# Patient Record
Sex: FEMALE | Race: WHITE | NOT HISPANIC OR LATINO | Employment: FULL TIME | URBAN - METROPOLITAN AREA
[De-identification: names, ages, dates, MRNs, and addresses within clinical notes are randomized per-mention and may not be internally consistent; named-entity substitution may affect disease eponyms.]

---

## 2017-05-09 ENCOUNTER — ALLSCRIPTS OFFICE VISIT (OUTPATIENT)
Dept: OTHER | Facility: OTHER | Age: 57
End: 2017-05-09

## 2017-06-02 ENCOUNTER — ALLSCRIPTS OFFICE VISIT (OUTPATIENT)
Dept: OTHER | Facility: OTHER | Age: 57
End: 2017-06-02

## 2017-06-02 DIAGNOSIS — R53.1 WEAKNESS: ICD-10-CM

## 2017-06-02 DIAGNOSIS — Z12.31 ENCOUNTER FOR SCREENING MAMMOGRAM FOR MALIGNANT NEOPLASM OF BREAST: ICD-10-CM

## 2018-01-13 VITALS
HEIGHT: 60 IN | BODY MASS INDEX: 33.67 KG/M2 | HEART RATE: 104 BPM | DIASTOLIC BLOOD PRESSURE: 74 MMHG | WEIGHT: 171.52 LBS | SYSTOLIC BLOOD PRESSURE: 106 MMHG | TEMPERATURE: 97.7 F

## 2018-01-14 VITALS
SYSTOLIC BLOOD PRESSURE: 110 MMHG | TEMPERATURE: 97.6 F | WEIGHT: 176.37 LBS | HEART RATE: 92 BPM | BODY MASS INDEX: 34.63 KG/M2 | HEIGHT: 60 IN | DIASTOLIC BLOOD PRESSURE: 64 MMHG

## 2018-01-23 ENCOUNTER — ALLSCRIPTS OFFICE VISIT (OUTPATIENT)
Dept: OTHER | Facility: OTHER | Age: 58
End: 2018-01-23

## 2018-01-24 NOTE — PROGRESS NOTES
Assessment   1  Acute upper respiratory infection (465 9) (J06 9)    Plan   Acute upper respiratory infection    · Drink at least 6 glasses of water or juice a day ; Status:Complete;   Done: [id number]   · Good hand washing is one of the best ways to control the spread of germs ;    Status:Complete;   Done: 12EGV9469   · The following home treatments may soothe a sore throat ; Status:Complete;   Done:    94TGQ8520    Discussion/Summary      No need for additional medications continue the tea , cough drops and Vicks as helping may return to work tomorrow note provided      The patient was counseled regarding instructions for management,-- risk factor reductions,-- prognosis,-- impressions  The treatment plan was reviewed with the patient/guardian  The patient/guardian understands and agrees with the treatment plan       Self Referrals: No      History of Present Illness   HPI: [facility] 1/16 for stomach bug and one day diarrhea was gone next day, then 2 days later with cough / cold and rested at home used all the OTC meds then decreased and admits symptoms are slightly improved but still coughing and worse at night, developed some chest wall pain  feels the vicks on chest helped more than anything else  clear discharge with blowing nose not been back at work this week  lots of sick contacts including family and work contacts  Hospital Based Practices Required Assessment:      Pain Assessment      the patient states they do not have pain  (on a scale of 0 to 10, the patient rates the pain at 0 )       Prefered Language is  ENGLISH  Primary Language is  ENGLISH  Review of Systems        Constitutional: feeling tired  ENT: as noted in HPI  Cardiovascular: as noted in HPI  Respiratory: as noted in HPI  Gastrointestinal: no complaints of abdominal pain, no constipation, no nausea or diarrhea, no vomiting, no bloody stools-- and-- as noted in HPI        Musculoskeletal: no complaints of arthralgia, no myalgia, no joint swelling or stiffness, no limb pain or swelling  ROS reviewed  Active Problems   1  Encounter for screening mammogram for breast cancer (V76 12) (Z12 31)   2  Feeling tired (780 79) (R53 83)   3  Follow-up vaginal Pap smear (V76 47) (Z12 72)   4  Generalized weakness (780 79) (R53 1)   5  Meniere's disease (cochlear hydrops) (386 02) (H81 09)   6  Obesity (278 00) (E66 9)   7  Screening for breast cancer (V76 10) (Z12 31)   8  Screening for colorectal cancer (V76 51) (Z12 11,Z12 12)   9  Screening for diabetes mellitus (V77 1) (Z13 1)   10  Screening for human papillomavirus (HPV) (V73 81) (Z11 51)   11  Screening for hyperlipidemia (V77 91) (Z13 220)   12  Sleep apnea (780 57) (G47 30)    Social History    · Former smoker (V15 82) (X54 773)   · Inadequate exercise (V69 0) (Z72 3)   · Physically able to work   · Sedentary lifestyle (V15 89) (Z91 89)   ·  from significant other (V61 03) (Z63 5)   · Tobacco use (305 1) (Z72 0)   · Unemployed, looking for work  The social history was reviewed and updated today  The social history was reviewed and is unchanged  Current Meds    1  DiazePAM 5 MG Oral Tablet; TAKE 1 TABLET Every 8 hours; Therapy: 05CDL8394 to (Evaluate:08Hov3081)  Requested for: 32PWT9444; Last     VX:24ZXN2827 Ordered   2  Triamterene-HCTZ 37 5-25 MG Oral Capsule; take 1 capsule daily as needed; Therapy: 51VNK8114 to (95 [id number])  Requested for: 56HEH2539; Last     Rx:19Hvb3856 Ordered     The medication list was reviewed and updated today  Allergies   1  No Known Drug Allergies    Vitals    Recorded: 95SRJ7272 11:04AM   Temperature 97 9 F   Heart Rate 896   Systolic 907   Diastolic 80   Height 5 ft    Weight 167 lb 8 78 oz   BMI Calculated 32 72   BSA Calculated 1 73     Physical Exam        Constitutional      General appearance: No acute distress, well appearing and well nourished         Eyes      Conjunctiva and lids: No swelling, erythema or discharge  -- glasses  Ears, Nose, Mouth, and Throat      External inspection of ears and nose: Normal        Otoscopic examination: Abnormal  -- minimal fluid, positive light reflex  Oropharynx: Abnormal  -- minimal post nasal drip, no erythema, no exudate, no swelling  Pulmonary      Auscultation of lungs: Clear to auscultation  Cardiovascular      Auscultation of heart: Normal rate and rhythm, normal S1 and S2, without murmurs  Lymphatic      Palpation of lymph nodes in neck: No lymphadenopathy  Musculoskeletal      Gait and station: Normal        Skin      Skin and subcutaneous tissue: Normal without rashes or lesions  Psychiatric      Orientation to person, place, and time: Normal        Mood and affect: Normal           Attending Note   Collaborating Physician Note: Collaborating Physician: I agree with the Advanced Practitioner note  Message   Return to work or school:    Ángela Betts is under my professional care  She was seen in my office on 1/23/2018    She is able to return to work on  1/24/2018         Weight Bearing Status: Full Weight-Bearing  Signatures    Electronically signed by : GHANSHYAM Hernández; Jan 23 2018 11:42AM EST                       (Author)     Electronically signed by :  STEPHEN Banks ; Jan 23 2018  4:02PM EST                       (Review)

## 2018-01-24 NOTE — MISCELLANEOUS
Message  Return to work or school:   Del Ahumada is under my professional care  She was seen in my office on 1/23/2018   She is able to return to work on  1/24/2018      Weight Bearing Status: Full Weight-Bearing  Signatures   Electronically signed by : GHANSHYAM Madrigal; Jan 23 2018 11:42AM EST                       (Author)    Electronically signed by :  STEPHEN Ortega ; Jan 23 2018  4:02PM EST                       (Review)

## 2018-02-03 DIAGNOSIS — H81.09 MENIERE'S DISEASE, UNSPECIFIED LATERALITY: Primary | ICD-10-CM

## 2018-02-04 RX ORDER — TRIAMTERENE AND HYDROCHLOROTHIAZIDE 37.5; 25 MG/1; MG/1
CAPSULE ORAL
Qty: 90 CAPSULE | Refills: 0 | Status: SHIPPED | OUTPATIENT
Start: 2018-02-04 | End: 2018-04-27 | Stop reason: SDUPTHER

## 2018-04-27 DIAGNOSIS — H81.09 MENIERE'S DISEASE, UNSPECIFIED LATERALITY: ICD-10-CM

## 2018-04-30 RX ORDER — TRIAMTERENE AND HYDROCHLOROTHIAZIDE 37.5; 25 MG/1; MG/1
CAPSULE ORAL
Qty: 90 CAPSULE | Refills: 0 | Status: SHIPPED | OUTPATIENT
Start: 2018-04-30 | End: 2018-08-10 | Stop reason: SDUPTHER

## 2018-08-10 DIAGNOSIS — H81.09 MENIERE'S DISEASE, UNSPECIFIED LATERALITY: ICD-10-CM

## 2018-08-10 RX ORDER — TRIAMTERENE AND HYDROCHLOROTHIAZIDE 37.5; 25 MG/1; MG/1
CAPSULE ORAL
Qty: 90 CAPSULE | Refills: 0 | Status: SHIPPED | OUTPATIENT
Start: 2018-08-10 | End: 2018-11-10 | Stop reason: SDUPTHER

## 2018-11-10 DIAGNOSIS — H81.09 MENIERE'S DISEASE, UNSPECIFIED LATERALITY: ICD-10-CM

## 2018-11-12 RX ORDER — TRIAMTERENE AND HYDROCHLOROTHIAZIDE 37.5; 25 MG/1; MG/1
CAPSULE ORAL
Qty: 90 CAPSULE | Refills: 0 | Status: SHIPPED | OUTPATIENT
Start: 2018-11-12 | End: 2019-02-22 | Stop reason: SDUPTHER

## 2019-02-22 DIAGNOSIS — H81.09 MENIERE'S DISEASE, UNSPECIFIED LATERALITY: ICD-10-CM

## 2019-02-26 DIAGNOSIS — H81.09 MENIERE'S DISEASE, UNSPECIFIED LATERALITY: ICD-10-CM

## 2019-02-26 RX ORDER — TRIAMTERENE AND HYDROCHLOROTHIAZIDE 37.5; 25 MG/1; MG/1
CAPSULE ORAL
Qty: 90 CAPSULE | Refills: 0 | OUTPATIENT
Start: 2019-02-26

## 2019-02-26 RX ORDER — TRIAMTERENE AND HYDROCHLOROTHIAZIDE 37.5; 25 MG/1; MG/1
1 CAPSULE ORAL DAILY
Qty: 90 CAPSULE | Refills: 0 | Status: SHIPPED | OUTPATIENT
Start: 2019-02-26 | End: 2019-04-29 | Stop reason: SDUPTHER

## 2019-03-16 DIAGNOSIS — H81.09 MENIERE'S DISEASE, UNSPECIFIED LATERALITY: ICD-10-CM

## 2019-03-17 RX ORDER — TRIAMTERENE AND HYDROCHLOROTHIAZIDE 37.5; 25 MG/1; MG/1
CAPSULE ORAL
Qty: 90 CAPSULE | Refills: 0 | OUTPATIENT
Start: 2019-03-17

## 2019-04-28 PROBLEM — H81.09 MENIERE DISEASE: Status: ACTIVE | Noted: 2019-04-28

## 2019-04-29 ENCOUNTER — OFFICE VISIT (OUTPATIENT)
Dept: INTERNAL MEDICINE CLINIC | Facility: CLINIC | Age: 59
End: 2019-04-29

## 2019-04-29 ENCOUNTER — TELEPHONE (OUTPATIENT)
Dept: GASTROENTEROLOGY | Facility: AMBULARY SURGERY CENTER | Age: 59
End: 2019-04-29

## 2019-04-29 VITALS
TEMPERATURE: 98.1 F | BODY MASS INDEX: 34.53 KG/M2 | WEIGHT: 176.81 LBS | DIASTOLIC BLOOD PRESSURE: 72 MMHG | HEART RATE: 92 BPM | SYSTOLIC BLOOD PRESSURE: 120 MMHG

## 2019-04-29 DIAGNOSIS — H81.09 MENIERE DISEASE: Primary | ICD-10-CM

## 2019-04-29 DIAGNOSIS — Z00.00 HEALTHCARE MAINTENANCE: ICD-10-CM

## 2019-04-29 DIAGNOSIS — H81.09 MENIERE'S DISEASE, UNSPECIFIED LATERALITY: ICD-10-CM

## 2019-04-29 PROCEDURE — 99213 OFFICE O/P EST LOW 20 MIN: CPT | Performed by: INTERNAL MEDICINE

## 2019-04-29 RX ORDER — TRIAMTERENE AND HYDROCHLOROTHIAZIDE 37.5; 25 MG/1; MG/1
1 CAPSULE ORAL DAILY
Qty: 90 CAPSULE | Refills: 0 | Status: SHIPPED | OUTPATIENT
Start: 2019-04-29 | End: 2019-09-10 | Stop reason: SDUPTHER

## 2019-09-10 ENCOUNTER — OFFICE VISIT (OUTPATIENT)
Dept: INTERNAL MEDICINE CLINIC | Facility: CLINIC | Age: 59
End: 2019-09-10

## 2019-09-10 ENCOUNTER — APPOINTMENT (OUTPATIENT)
Dept: LAB | Facility: CLINIC | Age: 59
End: 2019-09-10
Payer: COMMERCIAL

## 2019-09-10 VITALS
SYSTOLIC BLOOD PRESSURE: 108 MMHG | WEIGHT: 175.04 LBS | DIASTOLIC BLOOD PRESSURE: 72 MMHG | HEART RATE: 84 BPM | TEMPERATURE: 98 F | BODY MASS INDEX: 34.19 KG/M2

## 2019-09-10 DIAGNOSIS — H81.09 MENIERE'S DISEASE, UNSPECIFIED LATERALITY: ICD-10-CM

## 2019-09-10 DIAGNOSIS — Z00.00 HEALTHCARE MAINTENANCE: ICD-10-CM

## 2019-09-10 LAB
ALBUMIN SERPL BCP-MCNC: 4.3 G/DL (ref 3.5–5)
ALP SERPL-CCNC: 81 U/L (ref 46–116)
ALT SERPL W P-5'-P-CCNC: 72 U/L (ref 12–78)
ANION GAP SERPL CALCULATED.3IONS-SCNC: 7 MMOL/L (ref 4–13)
AST SERPL W P-5'-P-CCNC: 43 U/L (ref 5–45)
BASOPHILS # BLD AUTO: 0.06 THOUSANDS/ΜL (ref 0–0.1)
BASOPHILS NFR BLD AUTO: 1 % (ref 0–1)
BILIRUB SERPL-MCNC: 0.72 MG/DL (ref 0.2–1)
BUN SERPL-MCNC: 10 MG/DL (ref 5–25)
CALCIUM SERPL-MCNC: 9.7 MG/DL (ref 8.3–10.1)
CHLORIDE SERPL-SCNC: 94 MMOL/L (ref 100–108)
CHOLEST SERPL-MCNC: 278 MG/DL (ref 50–200)
CO2 SERPL-SCNC: 29 MMOL/L (ref 21–32)
CREAT SERPL-MCNC: 0.72 MG/DL (ref 0.6–1.3)
EOSINOPHIL # BLD AUTO: 0.17 THOUSAND/ΜL (ref 0–0.61)
EOSINOPHIL NFR BLD AUTO: 2 % (ref 0–6)
ERYTHROCYTE [DISTWIDTH] IN BLOOD BY AUTOMATED COUNT: 12 % (ref 11.6–15.1)
EST. AVERAGE GLUCOSE BLD GHB EST-MCNC: 197 MG/DL
GFR SERPL CREATININE-BSD FRML MDRD: 92 ML/MIN/1.73SQ M
GLUCOSE P FAST SERPL-MCNC: 170 MG/DL (ref 65–99)
HBA1C MFR BLD: 8.5 % (ref 4.2–6.3)
HCT VFR BLD AUTO: 46.4 % (ref 34.8–46.1)
HDLC SERPL-MCNC: 41 MG/DL (ref 40–60)
HGB BLD-MCNC: 15.9 G/DL (ref 11.5–15.4)
IMM GRANULOCYTES # BLD AUTO: 0.03 THOUSAND/UL (ref 0–0.2)
IMM GRANULOCYTES NFR BLD AUTO: 0 % (ref 0–2)
LDLC SERPL CALC-MCNC: 186 MG/DL (ref 0–100)
LYMPHOCYTES # BLD AUTO: 2.59 THOUSANDS/ΜL (ref 0.6–4.47)
LYMPHOCYTES NFR BLD AUTO: 25 % (ref 14–44)
MCH RBC QN AUTO: 31.4 PG (ref 26.8–34.3)
MCHC RBC AUTO-ENTMCNC: 34.3 G/DL (ref 31.4–37.4)
MCV RBC AUTO: 92 FL (ref 82–98)
MONOCYTES # BLD AUTO: 0.6 THOUSAND/ΜL (ref 0.17–1.22)
MONOCYTES NFR BLD AUTO: 6 % (ref 4–12)
NEUTROPHILS # BLD AUTO: 6.92 THOUSANDS/ΜL (ref 1.85–7.62)
NEUTS SEG NFR BLD AUTO: 66 % (ref 43–75)
NONHDLC SERPL-MCNC: 237 MG/DL
NRBC BLD AUTO-RTO: 0 /100 WBCS
PLATELET # BLD AUTO: 293 THOUSANDS/UL (ref 149–390)
PMV BLD AUTO: 10 FL (ref 8.9–12.7)
POTASSIUM SERPL-SCNC: 3.2 MMOL/L (ref 3.5–5.3)
PROT SERPL-MCNC: 8.8 G/DL (ref 6.4–8.2)
RBC # BLD AUTO: 5.06 MILLION/UL (ref 3.81–5.12)
SODIUM SERPL-SCNC: 130 MMOL/L (ref 136–145)
TRIGL SERPL-MCNC: 255 MG/DL
TSH SERPL DL<=0.05 MIU/L-ACNC: 0.69 UIU/ML (ref 0.36–3.74)
WBC # BLD AUTO: 10.37 THOUSAND/UL (ref 4.31–10.16)

## 2019-09-10 PROCEDURE — 85025 COMPLETE CBC W/AUTO DIFF WBC: CPT

## 2019-09-10 PROCEDURE — 36415 COLL VENOUS BLD VENIPUNCTURE: CPT

## 2019-09-10 PROCEDURE — 87389 HIV-1 AG W/HIV-1&-2 AB AG IA: CPT

## 2019-09-10 PROCEDURE — 84443 ASSAY THYROID STIM HORMONE: CPT

## 2019-09-10 PROCEDURE — 86704 HEP B CORE ANTIBODY TOTAL: CPT

## 2019-09-10 PROCEDURE — 80053 COMPREHEN METABOLIC PANEL: CPT

## 2019-09-10 PROCEDURE — 80061 LIPID PANEL: CPT

## 2019-09-10 PROCEDURE — 83036 HEMOGLOBIN GLYCOSYLATED A1C: CPT

## 2019-09-10 PROCEDURE — 87340 HEPATITIS B SURFACE AG IA: CPT

## 2019-09-10 PROCEDURE — 86803 HEPATITIS C AB TEST: CPT

## 2019-09-10 PROCEDURE — 86705 HEP B CORE ANTIBODY IGM: CPT

## 2019-09-10 PROCEDURE — 99213 OFFICE O/P EST LOW 20 MIN: CPT | Performed by: INTERNAL MEDICINE

## 2019-09-10 RX ORDER — DIAZEPAM 5 MG/1
5 TABLET ORAL EVERY 12 HOURS PRN
Qty: 10 TABLET | Refills: 0 | Status: SHIPPED | OUTPATIENT
Start: 2019-09-10 | End: 2022-05-25

## 2019-09-10 RX ORDER — TRIAMTERENE AND HYDROCHLOROTHIAZIDE 37.5; 25 MG/1; MG/1
1 CAPSULE ORAL DAILY
Qty: 90 CAPSULE | Refills: 0 | Status: SHIPPED | OUTPATIENT
Start: 2019-09-10 | End: 2020-01-08

## 2019-09-10 RX ORDER — DIAZEPAM 5 MG/1
1 TABLET ORAL EVERY 8 HOURS
COMMUNITY
Start: 2011-10-11 | End: 2019-09-10 | Stop reason: SDUPTHER

## 2019-09-10 RX ORDER — TRAMADOL HYDROCHLORIDE 50 MG/1
50 TABLET ORAL EVERY 6 HOURS PRN
COMMUNITY
Start: 2017-06-28 | End: 2019-10-01

## 2019-09-10 RX ORDER — IBUPROFEN 200 MG
200 TABLET ORAL EVERY 6 HOURS PRN
COMMUNITY
End: 2020-02-03

## 2019-09-10 NOTE — LETTER
September 10, 2019     Patient: Lizette Dolan   YOB: 1960   Date of Visit: 9/10/2019       To Whom it May Concern:    Elias Ray is under my professional care  She was seen in my office on 9/10/2019  She may return to work on 09/12/2019  If you have any questions or concerns, please don't hesitate to call           Sincerely,          Joslyn Alfaro MD        CC: No Recipients

## 2019-09-10 NOTE — PROGRESS NOTES
[address]  INTERNAL MEDICINE  [address]    NAME: Rain Ramon  AGE: 61 y o  SEX: female    DATE OF ENCOUNTER: 9/10/2019    Assessment and Plan     1  Meniere's disease, unspecified laterality  · Patient has been diagnosed 10 years ago, has not had acute attacks for a while but starting Saturday has been experiencing intermittent vertigo and tinnitus which she attributes to stress in her family   · She was prescribed diazepam in the past for acute attacks but doesn't have it anymore  · Work note given because diazepam will make her sleepy, she can resume working on 09/12/10 unless the vertigo worsens  · She will follow up with ENT as well   - diazepam (VALIUM) 5 mg tablet; Take 1 tablet (5 mg total) by mouth every 12 (twelve) hours as needed for anxiety for up to 5 days  Dispense: 10 tablet; Refill: 0  - triamterene-hydrochlorothiazide (DYAZIDE) 37 5-25 mg per capsule; Take 1 capsule by mouth daily  Dispense: 90 capsule; Refill: 0    No orders of the defined types were placed in this encounter       - Counseling Documentation: patient was counseled regarding: diagnostic results, instructions for management, risk factor reductions, prognosis, patient and family education, impressions, risks and benefits of treatment options and importance of compliance with treatment  - Counseling Time: not applicable  - Barriers to treatment: None  - Medication Side Effects: Adverse side effects of medications were reviewed with the patient/guardian today  - Self Referrals: No    Chief Complaint     Chief Complaint   Patient presents with    Dizziness     symptoms since Saturday    Fatigue    Anxiety       History of Present Illness     59-year-old female with a past medical history of Meniere's disease presents with vertigo and tinnitus since Saturday  The vertigo is episodic but the tinnitus has been persistent   Patient has been diagnosed 10 years ago, has not had acute attacks for a while but starting Saturday has been experiencing intermittent vertigo and tinnitus which she attributes to stress in her family  These attacks have caused her to feel anxious as well  Otherwise she is doing well  She has not established with a new ENT doctor yet        The following portions of the patient's history were reviewed and updated as appropriate: allergies, current medications, past family history, past medical history, past social history, past surgical history and problem list     Review of Systems     Review of Systems   HENT: Positive for tinnitus  Neurological: Positive for dizziness  All other systems reviewed and are negative  Active Problem List     Patient Active Problem List   Diagnosis    Meniere disease       Objective     /72   Pulse 84   Temp 98 °F (36 7 °C)   Wt 79 4 kg (175 lb 0 7 oz)   BMI 34 19 kg/m²     Physical Exam:   GENERAL: NAD  HEENT:  NC/AT, MMM, no scleral icterus  CARDIAC:  RRR, +S1/S2, no S3/S4 heard, no m/g/r  PULMONARY:  CTA B/L, no wheezing/rales/rhonci, non-labored breathing  ABDOMEN:  Soft, NT/ND, +BS, no rebound/guarding/rigidity  Extremities:  2+ Pulses in DP/PT  No edema, cyanosis, or clubbing  NEUROLOGIC:  Alert/oriented x3     SKIN:  No rashes or erythema    Current Medications     Current Outpatient Medications:     triamterene-hydrochlorothiazide (DYAZIDE) 37 5-25 mg per capsule, Take 1 capsule by mouth daily, Disp: 90 capsule, Rfl: 0    diazepam (VALIUM) 5 mg tablet, Take 1 tablet (5 mg total) by mouth every 12 (twelve) hours as needed for anxiety for up to 5 days, Disp: 10 tablet, Rfl: 0    ibuprofen (MOTRIN) 200 mg tablet, Take 200 mg by mouth every 6 (six) hours as needed, Disp: , Rfl:     traMADol (ULTRAM) 50 mg tablet, Take 50 mg by mouth every 6 (six) hours as needed, Disp: , Rfl:     Health Maintenance     Health Maintenance   Topic Date Due    Hepatitis C Screening  1960    MAMMOGRAM 1960    CRC Screening: Colonoscopy  1960    INFLUENZA VACCINE  07/01/2019    Pneumococcal Vaccine: Pediatrics (0 to 5 Years) and At-Risk Patients (6 to 59 Years) (1 of 1 - PPSV23) 05/27/2020 (Originally 7/6/1966)    Depression Screening PHQ  04/29/2020    BMI: Adult  04/29/2020    DTaP,Tdap,and Td Vaccines (2 - Td) 06/05/2024    Pneumococcal Vaccine: 65+ Years (1 of 2 - PCV13) 07/06/2025    HEPATITIS B VACCINES  Aged Out       There is no immunization history on file for this patient      Lorena Avila  9/10/2019 12:12 PM

## 2019-09-11 LAB
HBV CORE AB SER QL: NORMAL
HBV CORE IGM SER QL: NORMAL
HBV SURFACE AG SER QL: NORMAL
HCV AB SER QL: NORMAL
HIV 1+2 AB+HIV1 P24 AG SERPL QL IA: NORMAL

## 2019-09-13 ENCOUNTER — TELEPHONE (OUTPATIENT)
Dept: MULTI SPECIALTY CLINIC | Facility: CLINIC | Age: 59
End: 2019-09-13

## 2019-09-17 ENCOUNTER — TELEPHONE (OUTPATIENT)
Dept: INTERNAL MEDICINE CLINIC | Facility: CLINIC | Age: 59
End: 2019-09-17

## 2019-09-17 NOTE — TELEPHONE ENCOUNTER
Called patient and spoke to her regarding her recent lab results  Explained that her hemoglobin A1c is consistent with a diagnosis of diabetes  Patient is surprised that this news but states that she does have a family history of diabetes and does have a prior history of gestational diabetes as well  Explained to her that we may want to repeat this test to ensure its validity  I also explained finding of hyperlipidemia on lipid panel  I advised the patient to come in for an appointment to discuss diabetes diagnosis and consider starting new medications (hypoglycemics, statin, etc )  Remainder of patient's questions answered to satisfaction    Can the staff please give the patient a call tomorrow to set up this appointment? Thank you!

## 2019-09-18 NOTE — TELEPHONE ENCOUNTER
Per Dr Philip Ke request, please schedule patient for appointment to discuss diabetes diagnosis/possible medications  Thank you

## 2019-09-19 ENCOUNTER — OFFICE VISIT (OUTPATIENT)
Dept: INTERNAL MEDICINE CLINIC | Facility: CLINIC | Age: 59
End: 2019-09-19

## 2019-09-19 VITALS
SYSTOLIC BLOOD PRESSURE: 122 MMHG | WEIGHT: 180.34 LBS | TEMPERATURE: 97.9 F | DIASTOLIC BLOOD PRESSURE: 80 MMHG | BODY MASS INDEX: 35.22 KG/M2 | HEART RATE: 104 BPM

## 2019-09-19 DIAGNOSIS — Z12.31 SCREENING MAMMOGRAM, ENCOUNTER FOR: ICD-10-CM

## 2019-09-19 DIAGNOSIS — Z12.11 SCREENING FOR COLON CANCER: ICD-10-CM

## 2019-09-19 DIAGNOSIS — Z12.4 PAP SMEAR FOR CERVICAL CANCER SCREENING: ICD-10-CM

## 2019-09-19 DIAGNOSIS — E11.69 TYPE 2 DIABETES MELLITUS WITH OTHER SPECIFIED COMPLICATION, WITHOUT LONG-TERM CURRENT USE OF INSULIN (HCC): Primary | ICD-10-CM

## 2019-09-19 PROBLEM — E11.9 DIABETES MELLITUS (HCC): Status: ACTIVE | Noted: 2019-09-19

## 2019-09-19 PROCEDURE — 99213 OFFICE O/P EST LOW 20 MIN: CPT | Performed by: INTERNAL MEDICINE

## 2019-09-19 RX ORDER — ATORVASTATIN CALCIUM 40 MG/1
40 TABLET, FILM COATED ORAL DAILY
Qty: 30 TABLET | Refills: 0 | Status: SHIPPED | OUTPATIENT
Start: 2019-09-19 | End: 2019-10-15 | Stop reason: SDUPTHER

## 2019-09-19 NOTE — PROGRESS NOTES
INTERNAL MEDICINE FOLLOW-UP OFFICE VISIT  [facility]  [address], [address]    NAME: Leidy Vega  AGE: 61 y o  SEX: female    DATE OF ENCOUNTER: 9/19/2019    Assessment and Plan     1  Type 2 diabetes mellitus with other specified complication, without long-term current use of insulin (HCC)  -newly diagnosed  Elevated hemoglobin A1c of 8 5, elevated fasting glucose of 170  -given entry A1c of 8 5, will start dual oral therapy with metformin 500 mg b i d  and daily sitagliptin  -start high-intensity lipid with atorvastatin 40 mg daily  -counseled patient on healthy dietary choices and exercise regimen  -will refer to diabetes Education class  Provided with script for glucometer, lancets, test strips  -follow-up urine microalbumin to creatinine ratio  -follow-up in 3 months  - metFORMIN (GLUCOPHAGE) 500 mg tablet; Take 1 tablet (500 mg total) by mouth 2 (two) times a day with meals  Dispense: 60 tablet; Refill: 0  - sitaGLIPtin (JANUVIA) 50 mg tablet; Take 1 tablet (50 mg total) by mouth daily  Dispense: 30 tablet; Refill: 0  - atorvastatin (LIPITOR) 40 mg tablet; Take 1 tablet (40 mg total) by mouth daily  Dispense: 30 tablet; Refill: 0  - Glucometer  - Lancets  - Glucometer test strips  - Microalbumin / creatinine urine ratio  - Ambulatory referral to Diabetic Education; Future    2  Screening mammogram, encounter for  - Mammo screening bilateral w cad; Future    3  Screening for colon cancer  - Ambulatory referral to Gastroenterology; Future    4  Pap smear for cervical cancer screening  - Ambulatory referral to Gynecology;  Future    Orders Placed This Encounter   Procedures    Glucometer    Lancets    Glucometer test strips    Mammo screening bilateral w cad    Microalbumin / creatinine urine ratio    Ambulatory referral to Gastroenterology    Ambulatory referral to Gynecology    Ambulatory referral to Diabetic Education    POCT hemoglobin A1c - Counseling Documentation: patient was counseled regarding: diagnostic results, instructions for management, prognosis and patient and family education    Chief Complaint     Chief Complaint   Patient presents with    Follow-up     discuss lab results       History of Present Illness     Patient is a 51-year-old female with a past medical history of Meniere's disease diagnosed around 10 years ago presents to the office for discussion of new diagnosis of type 2 diabetes  Patient recently had labs done as an outpatient and was found to have a hemoglobin A1c of 8 5 with elevated fasting glucose of 170  Labs also significant for hyperlipidemia with elevated total cholesterol 278,     Patient states that she has generally been feeling well at home  She states that she does feel that her Meniere's disease has been acting up somewhat of late and she has had some vertigo this past week  Denies any recent polyphasia, polydipsia, polyuria  States that she has a very strong family history of cardiovascular disease and diagnoses of diabetes as well  She also has a personal history of gestational diabetes  States that she is relatively sedentary  Tries to eat a balanced diet and avoid processed foods      The following portions of the patient's history were reviewed and updated as appropriate: allergies, current medications, past family history, past medical history, past social history, past surgical history and problem list     Review of Systems     Review of Systems   Constitutional: Negative for chills and fever  HENT: Negative for sore throat and trouble swallowing  Respiratory: Negative for shortness of breath and wheezing  Cardiovascular: Negative for chest pain and palpitations  Gastrointestinal: Negative for abdominal distention and abdominal pain  Genitourinary: Negative for dysuria and frequency  Musculoskeletal: Negative for arthralgias and back pain     Skin: Negative for color change and rash  Neurological: Positive for dizziness  Negative for headaches  Psychiatric/Behavioral: Negative for agitation and behavioral problems  Active Problem List     Patient Active Problem List   Diagnosis    Meniere disease    Diabetes mellitus ([facility] [address] )    Screening mammogram, encounter for       Objective     /80   Pulse 104   Temp 97 9 °F (36 6 °C)   Wt 81 8 kg (180 lb 5 4 oz)   BMI 35 22 kg/m²     Physical Exam   Constitutional: She is oriented to person, place, and time  She appears well-developed and well-nourished  No distress  HENT:   Head: Normocephalic and atraumatic  Mouth/Throat: No oropharyngeal exudate  Eyes: Right eye exhibits no discharge  Left eye exhibits no discharge  No scleral icterus  Cardiovascular: Normal rate, regular rhythm and normal heart sounds  Exam reveals no friction rub  No murmur heard  Pulmonary/Chest: Effort normal and breath sounds normal  She has no wheezes  She has no rales  Abdominal: Soft  Bowel sounds are normal  She exhibits no distension  There is no tenderness  Musculoskeletal: She exhibits no edema or tenderness  Neurological: She is alert and oriented to person, place, and time  No sensory deficit  Skin: Skin is warm and dry  No erythema  Psychiatric: She has a normal mood and affect  Her behavior is normal      Pertinent Laboratory/Diagnostic Studies:  Patient was never admitted      Images and diagnostics reviewed     Current Medications     Current Outpatient Medications:     ibuprofen (MOTRIN) 200 mg tablet, Take 200 mg by mouth every 6 (six) hours as needed, Disp: , Rfl:     triamterene-hydrochlorothiazide (DYAZIDE) 37 5-25 mg per capsule, Take 1 capsule by mouth daily, Disp: 90 capsule, Rfl: 0    atorvastatin (LIPITOR) 40 mg tablet, Take 1 tablet (40 mg total) by mouth daily, Disp: 30 tablet, Rfl: 0    diazepam (VALIUM) 5 mg tablet, Take 1 tablet (5 mg total) by mouth every 12 (twelve) hours as needed for anxiety for up to 5 days, Disp: 10 tablet, Rfl: 0    metFORMIN (GLUCOPHAGE) 500 mg tablet, Take 1 tablet (500 mg total) by mouth 2 (two) times a day with meals, Disp: 60 tablet, Rfl: 0    sitaGLIPtin (JANUVIA) 50 mg tablet, Take 1 tablet (50 mg total) by mouth daily, Disp: 30 tablet, Rfl: 0    traMADol (ULTRAM) 50 mg tablet, Take 50 mg by mouth every 6 (six) hours as needed, Disp: , Rfl:     Health Maintenance     Health Maintenance   Topic Date Due    MAMMOGRAM  1960    CRC Screening: Colonoscopy  1960    Diabetic Foot Exam  07/06/1970    DM Eye Exam  07/06/1970    URINE MICROALBUMIN  07/06/1970    INFLUENZA VACCINE  10/16/2019 (Originally 7/1/2019)    Pneumococcal Vaccine: Pediatrics (0 to 5 Years) and At-Risk Patients (6 to 59 Years) (1 of 1 - PPSV23) 05/27/2020 (Originally 7/6/1966)    HEMOGLOBIN A1C  03/10/2020    Depression Screening PHQ  04/29/2020    BMI: Adult  09/10/2020    DTaP,Tdap,and Td Vaccines (2 - Td) 06/05/2024    Pneumococcal Vaccine: 65+ Years (1 of 2 - PCV13) 07/06/2025    Hepatitis C Screening  Completed    HEPATITIS B VACCINES  Aged Out     Immunization History   Administered Date(s) Administered    Tdap 06/05/2014       [facility]  Internal Medicine PGY-2  [facility]  [address] , [address] [address] [facility] [facility]  Office: [phone]  Fax: [phone]

## 2019-09-19 NOTE — PATIENT INSTRUCTIONS
Type 2 Diabetes in Adults   WHAT YOU NEED TO KNOW:   Type 2 diabetes is a disease that affects how your body uses glucose (sugar)  Normally, when the blood sugar level increases, the pancreas makes more insulin  Insulin helps move sugar out of the blood so it can be used for energy  Type 2 diabetes develops because either the body cannot make enough insulin, or it cannot use the insulin correctly  After many years, your pancreas may stop making insulin  DISCHARGE INSTRUCTIONS:   Call 911 for any of the following:   · You have any of the following signs of a stroke:      ¨ Numbness or drooping on one side of your face     ¨ Weakness in an arm or leg    ¨ Confusion or difficulty speaking    ¨ Dizziness, a severe headache, or vision loss    · You have any of the following signs of a heart attack:      ¨ Squeezing, pressure, or pain in your chest that lasts longer than 5 minutes or returns    ¨ Discomfort or pain in your back, neck, jaw, stomach, or arm     ¨ Trouble breathing    ¨ Nausea or vomiting    ¨ Lightheadedness or a sudden cold sweat, especially with chest pain or trouble breathing  Return to the emergency department if:   · You have severe abdominal pain, or the pain spreads to your back  You may also be vomiting  · You have trouble staying awake or focusing  · You are shaking or sweating  · You have blurred or double vision  · Your breath has a fruity, sweet smell  · Your breathing is deep and labored, or rapid and shallow  · Your heartbeat is fast and weak  Contact your healthcare provider if:   · You are vomiting or have diarrhea  · You have an upset stomach and cannot eat the foods on your meal plan  · You feel weak or more tired than usual      · You feel dizzy, have headaches, or are easily irritated  · Your skin is red, warm, dry, or swollen  · You have a wound that does not heal      · You have numbness in your arms or legs       · You have trouble coping with your illness, or you feel anxious or depressed  · You have questions or concerns about your condition or care  Medicines: You may  need any of the following:  · Hypoglycemic medicines or insulin  may be given to decrease the amount of sugar in your blood  · Blood pressure medicine  may be given to lower your blood pressure  Your blood pressure should be less than 140/90  · Cholesterol lowering medicine  may be given to prevent heart disease  · Antiplatelets , such as aspirin, help prevent blood clots  Take your antiplatelet medicine exactly as directed  These medicines make it more likely for you to bleed or bruise  If you are told to take aspirin, do not take acetaminophen or ibuprofen instead  · Take your medicine as directed  Contact your healthcare provider if you think your medicine is not helping or if you have side effects  Tell him or her if you are allergic to any medicine  Keep a list of the medicines, vitamins, and herbs you take  Include the amounts, and when and why you take them  Bring the list or the pill bottles to follow-up visits  Carry your medicine list with you in case of an emergency  Check your blood sugar level as directed: You will be taught how to use a glucose monitor  Ask your healthcare provider when and how often to check during the day  You will need to check your blood sugar level at least 3 times each day if you are on insulin  If you check your blood sugar level before a meal , it should be between 80 and 130 mg/dL  If you check your blood sugar level 1 to 2 hours after a meal , it should be less than 180 mg/dL  Ask your healthcare provider if these are good goals for you  Write down your results, and show them to your healthcare provider  He may use the results to make changes to your medicine, food, or exercise schedules  If your blood sugar level is too low: Your blood sugar level is too low if it goes below 70 mg/dL   If the level is too low, eat or drink 15 grams of fast-acting carbohydrate  These are found naturally in fruits  Fast-acting carbohydrates will raise your blood sugar level quickly  Examples of 15 grams of fast-acting carbohydrate are 4 ounces (½ cup) of fruit juice or 4 ounces of regular soda  Other examples are 2 tablespoons of raisins or 3 to 4 glucose tablets  Check your blood sugar level 15 minutes later  If the level is still low (less than 100 mg/dL), eat another 15 grams of carbohydrate  When the level returns to 100 mg/dL, eat a snack or meal that contains carbohydrates  This will help prevent another drop in blood sugar  Always carefully follow your healthcare provider's instructions on how to treat low blood sugar levels  Wear medical alert identification:  Wear medical alert jewelry or carry a card that says you have diabetes  Ask your healthcare provider where to get these items  Check your feet each day for sores:  Wear shoes and socks that fit correctly  Do not trim your toenails  Ask your healthcare provider for more information about foot care  Maintain a healthy weight:  Ask your healthcare provider how much you should weigh  A healthy weight can help you control your diabetes  Ask your provider to help you create a weight loss plan if you are overweight  Together you can set manageable weight loss goals  Follow your meal plan:  A dietitian will help you make a meal plan to keep your blood sugar level steady  Do not skip meals  Your blood sugar level may drop too low if you have taken diabetes medicine and do not eat  · Keep track of carbohydrates (sugar and starchy foods)  Your blood sugar level can get too high if you eat too many carbohydrates  Your dietitian will help you plan meals and snacks that have the right amount of carbohydrates  · Eat low-fat foods , such as skinless chicken and low-fat milk  · Eat less sodium (salt)    Limit high-sodium foods, such as soy sauce, potato chips, and soup  Do not add salt to food you cook  Limit your use of table salt  You should have less than 2,300 mg of sodium per day  · Eat high-fiber foods , such as vegetables, whole grain breads, and beans  · Limit alcohol  Alcohol affects your blood sugar level and can make it harder to manage your diabetes  Limit alcohol to 1 drink a day if you are a woman  Limit alcohol to 2 drinks a day if you are a man  A drink of alcohol is 12 ounces of beer, 5 ounces of wine, or 1½ ounces of liquor  Exercise as directed:  Exercise can help keep your blood sugar level steady, decrease your risk of heart disease, and help you lose weight  Stretch before and after you exercise  Exercise for at least 150 minutes every week  Spread this amount of exercise over at least 3 days a week  Do not skip exercise more than 2 days in a row  Include muscle strengthening activities 2 to 3 days each week  Older adults should include balance training 2 to 3 times each week  Activities that help increase balance include yoga and marga chi  Work with your healthcare provider to create an exercise plan  · Check your blood sugar level before and after exercise  Healthcare providers may tell you to change the amount of insulin you take or food you eat  If your blood sugar level is high, check your blood or urine for ketones before you exercise  Do not exercise if your blood sugar level is high and you have ketones  · If your blood sugar level is less than 100 mg/dL, have a carbohydrate snack before you exercise  Examples are 4 to 6 crackers, ½ banana, 8 ounces (1 cup) of milk, or 4 ounces (½ cup) of juice  Drink water or liquids that do not contain sugar before, during, and after exercise  Ask your dietitian or healthcare provider which liquids you should drink when you exercise  · Do not sit for longer than 30 minutes  If you cannot walk around, at least stand up  This will help you stay active and keep your blood circulating    Do not smoke: Nicotine and other chemicals in cigarettes and cigars can cause lung damage and make it more difficult to manage your diabetes  Ask your healthcare provider for information if you currently smoke and need help to quit  Do not use e-cigarettes or smokeless tobacco in place of cigarettes or to help you quit  They still contain nicotine  Check your blood pressure as directed:  Ask your healthcare provider what your blood pressure should be  Most adults with diabetes and high blood pressure should have a systolic blood pressure (first number) less than 140  Your diastolic blood pressure (second number) should be less than 90  Ask about vaccines: You have a higher risk for serious illness if you get the flu, pneumonia, or hepatitis  Ask your healthcare provider if you should get a flu, pneumonia, or hepatitis B vaccine, and when to get the vaccine  Follow up with your healthcare provider as directed: You may need to return to have your A1c checked every 3 months  You will need to return at least once each year to have your feet checked  You will need an eye exam once a year to check for retinopathy  You will also need urine tests every year to check for kidney problems  You may need tests to monitor for heart disease such as an EKG, stress test, blood pressure monitoring, and blood tests  Write down your questions so you remember to ask them during your visits  © 2017 [address] [employer]  Information is for End User's use only and may not be sold, redistributed or otherwise used for commercial purposes  All illustrations and images included in CareNotes® are the copyrighted property of [employer]  or [employer]  The above information is an  only  It is not intended as medical advice for individual conditions or treatments  Talk to your doctor, nurse or pharmacist before following any medical regimen to see if it is safe and effective for you

## 2019-09-20 ENCOUNTER — TELEPHONE (OUTPATIENT)
Dept: INTERNAL MEDICINE CLINIC | Facility: CLINIC | Age: 59
End: 2019-09-20

## 2019-09-20 NOTE — TELEPHONE ENCOUNTER
Patient called and states [facility] needs a Doc to call in and approve her refill for Januvia  I guess requires a 2 step approval? It has nothing to do with insurance  Please call [facility] at [phone]  Sent to doc of the day

## 2019-09-24 ENCOUNTER — TELEPHONE (OUTPATIENT)
Dept: FAMILY MEDICINE CLINIC | Facility: CLINIC | Age: 59
End: 2019-09-24

## 2019-09-24 NOTE — TELEPHONE ENCOUNTER
Tried calling patient to schedule an appointment for Diabetes education, but her phone is "not available/ disconnected"  Will try to call again    CAITY

## 2019-09-25 DIAGNOSIS — E11.69 TYPE 2 DIABETES MELLITUS WITH OTHER SPECIFIED COMPLICATION, WITHOUT LONG-TERM CURRENT USE OF INSULIN (HCC): ICD-10-CM

## 2019-09-30 NOTE — PATIENT INSTRUCTIONS
Colonoscopy   WHAT YOU NEED TO KNOW:   What do I need to know about a colonoscopy? A colonoscopy is a procedure to examine the inside of your colon (intestine) with a scope  A scope is a flexible tube with a small light and camera on the end  Polyps or tissue growths may be removed during your colonoscopy  How should I prepare the week before my colonoscopy? You will need to stop taking medicines that contain aspirin or iron for 7 days before your colonoscopy  If you take anticoagulants, such as warfarin, ask when you should stop taking it  Make plans for someone to drive you home after your procedure  How should I prepare the day before my colonoscopy? Your healthcare provider will have you prepare your bowels before your procedure  Your bowels will need to be empty before your procedure to allow him to clearly see your colon  You will need to do the following the day before your procedure:  · Have only clear liquids  for the entire day before your colonoscopy  Clear liquid diet includes clear fruit juices and broths, clear flavored gelatin, and hard candy  It also includes coffee, tea, carbonated beverages, and clear sports drinks  · Follow your bowel prep as directed  There are many different preparations that can be given before a colonoscopy  Some are given over 2 hours and others over 6 hours  Some are given earlier in the afternoon the day before the colonoscopy  Others are given the day before and then the morning of the colonoscopy  With any bowel prep, stay close to the bathroom  This liquid will cause your bowels to move frequently  · An enema  may be needed  Your healthcare provider may tell you to use an enema to help clean out your bowels  · Do not eat or drink anything after midnight  This will help prevent problems that can happen if you vomit while under anesthesia  What will happen during my colonoscopy? · You will be given medicine to help you relax   You will lie on your left side and raise one or both knees toward your chest  Your healthcare provider will examine your anus and use a finger to check your rectum  You may need another enema if your bowel is not empty  The scope will be lubricated and gently placed into your anus  It will then be passed through your rectum and into your colon  Water or air will be put into your colon to help clean or expand it  This is done so your healthcare provider can see your colon clearly  · Tissue samples may be taken from the walls of your bowel and sent to a lab for tests  If you have a polyp, your healthcare provider will pass a wire loop through the scope and use it to hold the polyp  The polyp is then burned or cut off the wall of your colon  Removed polyps are sent to a lab for tests  Pictures of your colon may be taken during the procedure  The scope will be removed when the procedure is done  What will happen after my colonoscopy? · Rest after your procedure  You may feel bloated, have some gas and abdominal discomfort  You may need to lie on your right side with a heating pad on your abdomen  You may need to take short walks to help move the gas out  Eat small meals, if you feel bloated  Do not drive or make important decisions until the day after your procedure  · You may have polyps removed  Do not take aspirin or go on long car trips for 7 days after your procedure  Ask your healthcare provider about any other limits after your procedure  What are the risks of a colonoscopy? You may have pain or bleeding after the scope or polyps are removed  You may also have a slow heartbeat, decreased blood pressure, or increased sweating  Your colon may tear due to the increased pressure from the scope and other instruments  This may cause bowel contents to leak out of your colon and into your abdomen  If this happens, you will need to stay in the hospital and have surgery on your colon     CARE AGREEMENT:   You have the right to help plan your care  Learn about your health condition and how it may be treated  Discuss treatment options with your caregivers to decide what care you want to receive  You always have the right to refuse treatment  The above information is an  only  It is not intended as medical advice for individual conditions or treatments  Talk to your doctor, nurse or pharmacist before following any medical regimen to see if it is safe and effective for you  © 2017 [address] is for End User's use only and may not be sold, redistributed or otherwise used for commercial purposes  All illustrations and images included in CareNotes® are the copyrighted property of A D A M , Inc  or [employer]  Mammogram   WHAT YOU NEED TO KNOW:   What do I need to know about a mammogram?  A mammogram is an x-ray of your breasts to screen for breast cancer  Experts recommend mammograms every 2 years starting at age 48 years  You may need a mammogram at age 52 years or younger if you have an increased risk for breast cancer  Talk to your healthcare provider about when you should start having mammograms and how often you need them  How do I prepare for a mammogram?   · Do not use deodorant, powder, lotion, or perfume  These products may cause particles to appear on your mammogram      · Wear a 2-piece outfit  · If your breasts are tender before your monthly period, do not have a mammogram during this time  Schedule your mammogram to be done 1 week after your period ends  · If you are breastfeeding, express as much milk as possible before the mammogram     · Bring a list of the dates and places of your past mammograms and other breast tests or treatments    What will happen during a mammogram?  A top view and a side view x-ray are usually done for each breast  Tell healthcare providers if you have breast implants or breast problems before you have your mammogram  You may need extra x-rays of each breast   · You will be given a hospital gown  Take off your clothes from the waist up  Wear the hospital gown so that it opens in the front  · You will sit or stand next to a small x-ray machine  The healthcare provider will help you place one of your breasts on the x-ray plate  Your arm and breast will be moved until your breast is in the correct position  · Your breast will be gently pressed between 2 plastic plates for a few seconds while the x-ray is taken  This may be uncomfortable  · You will be asked to hold your breath while the x-ray is taken  Another x-ray will be taken of the same breast after the position of the x-ray machine has been changed  · Your other breast will be x-rayed the same way  What will happen after my mammogram?  Your breasts may feel tender for a short while after the mammogram  You may resume your regular activities  Ask your healthcare provider when you should receive the results of your mammogram   What are the risks of a mammogram?  You will be exposed to a small amount of radiation  Some breast cancers may not show up on mammograms  When should I contact my healthcare provider? · You cannot make your appointment on time  · You do not receive your results when expected  · You have questions or concerns about the mammogram   CARE AGREEMENT:   You have the right to help plan your care  Learn about your health condition and how it may be treated  Discuss treatment options with your caregivers to decide what care you want to receive  You always have the right to refuse treatment  The above information is an  only  It is not intended as medical advice for individual conditions or treatments  Talk to your doctor, nurse or pharmacist before following any medical regimen to see if it is safe and effective for you    © 2017 [address] Information is for End User's use only and may not be sold, redistributed or otherwise used for commercial purposes  All illustrations and images included in CareNotes® are the copyrighted property of A D A [employer]  or Gilbert Vasques  Breast Self Exam for Women   WHAT YOU NEED TO KNOW:   What is a breast self-exam (BSE)? A BSE is a way to check your breasts for lumps and other changes  Regular BSEs can help you know how your breasts normally look and feel  Most breast lumps or changes are not cancer, but you should always have them checked by a healthcare provider  Your healthcare provider can also watch you do a BSE and can tell you if you are doing your BSE correctly  Why should I do a BSE? Breast cancer is the most common type of cancer in women  Even if you have mammograms, you may still want to do a BSE regularly  If you know how your breasts normally feel and look, it may help you know when to contact your healthcare provider  Mammograms can miss some cancers  You may find a lump during a BSE that did not show up on your mammogram   When should I do a BSE? Godwin your calendar to help you remember to do BSE on a regular schedule  One easy way to remember to do a BSE is to do the exam on the same day of each month  If you have periods, you may want to do your BSE 1 week after your period ends  This is the time when your breasts may be the least swollen, lumpy, or tender  You can do regular BSEs even if you are breastfeeding or have breast implants  How should I do a BSE? · Look at your breasts in a mirror  Look at the size and shape of each breast and nipple  Check for swelling, lumps, dimpling, scaly skin, or other skin changes  Look for nipple changes, such as a nipple that is painful or beginning to pull inward  Gently squeeze both nipples and check to see if fluid (that is not breast milk) comes out of them  If you find any of these or other breast changes, contact your healthcare provider   Check your breasts while you sit or  the following 3 positions:    [facility] your arms down at your sides  ¨ Raise your hands and join them behind your head  ¨ Put firm pressure with your hands on your hips  Bend slightly forward while you look at your breasts in the mirror  · Lie down and feel your breasts  When you lie down, your breast tissue spreads out evenly over your chest  This makes it easier for you to feel for lumps and anything that may not be normal for your breasts  Do a BSE on one breast at a time  ¨ Place a small pillow or towel under your left shoulder  Put your left arm behind your head  ¨ Use the 3 middle fingers of your right hand  Use your fingertip pads, on the top of your fingers  Your fingertip pad is the most sensitive part of your finger  ¨ Use small circles to feel your breast tissue  Use your fingertip pads to make dime-sized, overlapping circles on your breast and armpits  Use light, medium, and firm pressure  First, press lightly  Second, press with medium pressure to feel a little deeper into the breast  Last, use firm pressure to feel deep within your breast     ¨ Examine your entire breast area  Examine the breast area from above the breast to below the breast where you feel only ribs  Make small circles with your fingertips, starting in the middle of your armpit  Make circles going up and down the breast area  Continue toward your breast and all the way across it  Examine the area from your armpit all the way over to the middle of your chest (breastbone)  Stop at the middle of your chest     ¨ Move the pillow or towel to your right shoulder, and put your right arm behind your head  Use the 3 fingertip pads of your left hand, and repeat the above steps to do a BSE on your right breast        What else can I do to check for breast problems or cancer? Some experts suggest that women 36years of age or older should have a mammogram every year   Other experts suggest that women between the ages of 48and 76years old should have a mammogram every 2 years  Talk to your healthcare provider about when you should have a mammogram   When should I contact my healthcare provider? · You find any lumps or changes in your breasts  · You have breast pain or fluid coming from your nipples  · You have questions or concerns or concerns about your condition or care  CARE AGREEMENT:   You have the right to help plan your care  Learn about your health condition and how it may be treated  Discuss treatment options with your caregivers to decide what care you want to receive  You always have the right to refuse treatment  The above information is an  only  It is not intended as medical advice for individual conditions or treatments  Talk to your doctor, nurse or pharmacist before following any medical regimen to see if it is safe and effective for you  © 2017 [facility] Information is for End User's use only and may not be sold, redistributed or otherwise used for commercial purposes  All illustrations and images included in CareNotes® are the copyrighted property of A D A M , Inc  or [employer]  Pap Smear   GENERAL INFORMATION:   What is a Pap smear? A Pap smear, or Pap test, is a procedure to check your cervix for abnormal cells  The cervix is the narrow opening at the bottom of your uterus  The cervix meets the top part of the vagina  How do I prepare for a Pap smear? The best time to schedule the test is right after your period stops  Do not have a Pap smear during your monthly period  Do not have intercourse or put anything in your vagina for 24 hours before your test    What will happen during a Pap smear? · You will lie on your back and place your feet on footrests called stirrups  Your caregiver will gently insert a device called a speculum into your vagina  The speculum is used to spread the walls of your vagina so he can see your cervix   He will use a thin brush or cotton swab to collect cells from the inside of your cervix  · Your caregiver will also collect cells from the surface of your cervix with a plastic or wooden tool called a spatula  He may also gently scrape the upper part of your vagina for a sample  The samples are placed in a container with liquid or on a glass slide  They are sent to a lab and examined for abnormal cells  How often do I need a Pap smear? Pap smears are usually done every 1 to 3 years  You may need a Pap smear more often if you have any of the following:  · Positive test result for the human papillomavirus (HPV)    · Cervical intraepithelial neoplasm or cervical cancer    · HIV    · A weak immune system    · Exposure to diethylstilbestrol (MARBIN) medicine when your mother was pregnant with you  CARE AGREEMENT:   You have the right to help plan your care  Learn about your health condition and how it may be treated  Discuss treatment options with your caregivers to decide what care you want to receive  You always have the right to refuse treatment  The above information is an  only  It is not intended as medical advice for individual conditions or treatments  Talk to your doctor, nurse or pharmacist before following any medical regimen to see if it is safe and effective for you  © 2014 [address] is for End User's use only and may not be sold, redistributed or otherwise used for commercial purposes  All illustrations and images included in CareNotes® are the copyrighted property of A D A "[employer] , Inc  or Gilbert Vasques  Wellness Visit for Adults   AMBULATORY CARE:   A wellness visit  is when you see your healthcare provider to get screened for health problems  You can also get advice on how to stay healthy  Write down your questions so you remember to ask them  Ask your healthcare provider how often you should have a wellness visit     What happens at a wellness visit:  Your healthcare provider will ask about your health, and your family history of health problems  This includes high blood pressure, heart disease, and cancer  He or she will ask if you have symptoms that concern you, if you smoke, and about your mood  You may also be asked about your intake of medicines, supplements, food, and alcohol  Any of the following may be done:  · Your weight  will be checked  Your height may also be checked so your body mass index (BMI) can be calculated  Your BMI shows if you are at a healthy weight  · Your blood pressure  and heart rate will be checked  Your temperature may also be checked  · Blood and urine tests  may be done  Blood tests may be done to check your cholesterol levels  Abnormal cholesterol levels increase your risk for heart disease and stroke  You may also need a blood or urine test to check for diabetes if you are at increased risk  Urine tests may be done to look for signs of an infection or kidney disease  · A physical exam  includes checking your heartbeat and lungs with a stethoscope  Your healthcare provider may also check your skin to look for sun damage  · Screening tests  may be recommended  A screening test is done to check for diseases that may not cause symptoms  The screening tests you may need depend on your age, gender, family history, and lifestyle habits  For example, colorectal screening may be recommended if you are 48years old or older  Screening tests you need if you are a woman:   · A Pap smear  is used to screen for cervical cancer  Pap smears are usually done every 3 to 5 years depending on your age  You may need them more often if you have had abnormal Pap smear test results in the past  Ask your healthcare provider how often you should have a Pap smear  · A mammogram  is an x-ray of your breasts to screen for breast cancer  Experts recommend mammograms every 2 years starting at age 48 years   You may need a mammogram at age 52 years or younger if you have an increased risk for breast cancer  Talk to your healthcare provider about when you should start having mammograms and how often you need them  Vaccines you may need:   · Get an influenza vaccine  every year  The influenza vaccine protects you from the flu  Several types of viruses cause the flu  The viruses change over time, so new vaccines are made each year  · Get a tetanus-diphtheria (Td) booster vaccine  every 10 years  This vaccine protects you against tetanus and diphtheria  Tetanus is a severe infection that may cause painful muscle spasms and lockjaw  Diphtheria is a severe bacterial infection that causes a thick covering in the back of your mouth and throat  · Get a human papillomavirus (HPV) vaccine  if you are female and aged 23 to 32 or male 23 to 24 and never received it  This vaccine protects you from HPV infection  HPV is the most common infection spread by sexual contact  HPV may also cause vaginal, penile, and anal cancers  · Get a pneumococcal vaccine  if you are aged 72 years or older  The pneumococcal vaccine is an injection given to protect you from pneumococcal disease  Pneumococcal disease is an infection caused by pneumococcal bacteria  The infection may cause pneumonia, meningitis, or an ear infection  · Get a shingles vaccine  if you are aged 61 or older, even if you have had shingles before  The shingles vaccine is an injection to protect you from the varicella-zoster virus  This is the same virus that causes chickenpox  Shingles is a painful rash that develops in people who had chickenpox or have been exposed to the virus  How to eat healthy:  My Plate is a model for planning healthy meals  It shows the types and amounts of foods that should go on your plate  Fruits and vegetables make up about half of your plate, and grains and protein make up the other half  A serving of dairy is included on the side of your plate   The amount of calories and serving sizes you need depends on your age, gender, weight, and height  Examples of healthy foods are listed below:  · Eat a variety of vegetables  such as dark green, red, and orange vegetables  You can also include canned vegetables low in sodium (salt) and frozen vegetables without added butter or sauces  · Eat a variety of fresh fruits , canned fruit in 100% juice, frozen fruit, and dried fruit  · Include whole grains  At least half of the grains you eat should be whole grains  Examples include whole-wheat bread, wheat pasta, brown rice, and whole-grain cereals such as oatmeal     · Eat a variety of protein foods such as seafood (fish and shellfish), lean meat, and poultry without skin (turkey and chicken)  Examples of lean meats include pork leg, shoulder, or tenderloin, and beef round, sirloin, tenderloin, and extra lean ground beef  Other protein foods include eggs and egg substitutes, beans, peas, soy products, nuts, and seeds  · Choose low-fat dairy products such as skim or 1% milk or low-fat yogurt, cheese, and cottage cheese  · Limit unhealthy fats  such as butter, hard margarine, and shortening  Exercise:  Exercise at least 30 minutes per day on most days of the week  Some examples of exercise include walking, biking, dancing, and swimming  You can also fit in more physical activity by taking the stairs instead of the elevator or parking farther away from stores  Include muscle strengthening activities 2 days each week  Regular exercise provides many health benefits  It helps you manage your weight, and decreases your risk for type 2 diabetes, heart disease, stroke, and high blood pressure  Exercise can also help improve your mood  Ask your healthcare provider about the best exercise plan for you  General health and safety guidelines:   · Do not smoke  Nicotine and other chemicals in cigarettes and cigars can cause lung damage  Ask your healthcare provider for information if you currently smoke and need help to quit  E-cigarettes or smokeless tobacco still contain nicotine  Talk to your healthcare provider before you use these products  · Limit alcohol  A drink of alcohol is 12 ounces of beer, 5 ounces of wine, or 1½ ounces of liquor  · Lose weight, if needed  Being overweight increases your risk of certain health conditions  These include heart disease, high blood pressure, type 2 diabetes, and certain types of cancer  · Protect your skin  Do not sunbathe or use tanning beds  Use sunscreen with a SPF 15 or higher  Apply sunscreen at least 15 minutes before you go outside  Reapply sunscreen every 2 hours  Wear protective clothing, hats, and sunglasses when you are outside  · Drive safely  Always wear your seatbelt  Make sure everyone in your car wears a seatbelt  A seatbelt can save your life if you are in an accident  Do not use your cell phone when you are driving  This could distract you and cause an accident  Pull over if you need to make a call or send a text message  · Practice safe sex  Use latex condoms if are sexually active and have more than one partner  Your healthcare provider may recommend screening tests for sexually transmitted infections (STIs)  · Wear helmets, lifejackets, and protective gear  Always wear a helmet when you ride a bike or motorcycle, go skiing, or play sports that could cause a head injury  Wear protective equipment when you play sports  Wear a lifejacket when you are on a boat or doing water sports  © 2017 [address]  Information is for End User's use only and may not be sold, redistributed or otherwise used for commercial purposes  All illustrations and images included in CareNotes® are the copyrighted property of A D A [employer]  or Gilbert Vasques  The above information is an  only  It is not intended as medical advice for individual conditions or treatments   Talk to your doctor, nurse or pharmacist before following any medical regimen to see if it is safe and effective for you

## 2019-09-30 NOTE — PROGRESS NOTES
Subjective      Sherry Goodman is a 61 y o  female who presents for annual exam   Last Pap smear about 4-5 years ago  Denies history of abnormal Pap smear  Will collect Pap smear today  Mammogram ordered  Last mammogram about 5 years ago  Has never had colon cancer screening  Has referral by PCP  The patient has no complaints today  The patient is not currently sexually active    The patient is not taking hormone replacement therapy  Patient denies post-menopausal vaginal bleeding    The patient wears seatbelts: yes  The patient participates in regular exercise: no  The patient reports that there is not domestic violence in her life  Menstrual History:  OB History        3    Para   2    Term   2            AB   1    Living   2       SAB        TAB        Ectopic        Multiple        Live Births                    Menarche age:12  Patient's last menstrual period was 10/01/2010 (approximate)  Period Cycle (Days): (no menses since age 52 )    The following portions of the patient's history were reviewed and updated as appropriate: allergies, current medications, past family history, past medical history, past social history, past surgical history and problem list     Review of Systems  Pertinent items are noted in HPI  Objective      /82 (BP Location: Left arm, Patient Position: Sitting, Cuff Size: Large)   Pulse 90   Ht 5' 1" (1 549 m)   Wt 79 4 kg (175 lb)   LMP 10/01/2010 (Approximate)   Breastfeeding?  No   BMI 33 07 kg/m²     General:   alert and oriented, in no acute distress   Heart: regular rate and rhythm, S1, S2 normal, no murmur, click, rub or gallop   Lungs: clear to auscultation bilaterally   Abdomen: soft, non-tender, without masses or organomegaly and nondistended   Vulva: normal   Vagina: normal mucosa, normal discharge, no palpable nodules   Cervix: no bleeding following Pap, no cervical motion tenderness and no lesions   Uterus: normal size, non-tender, normal shape and consistency   Adnexa: normal adnexa and no mass, fullness, tenderness   Breast:  Nontender, no palpable masses, no nipple discharge, no skin changes bilaterally        Assessment/Plan     Diagnoses and all orders for this visit:    Women's annual routine gynecological examination  -     Liquid-based pap, screening    Pap smear for cervical cancer screening  -     Ambulatory referral to Gynecology    Other orders  -     Blood Glucose Monitoring Suppl (FREESTYLE FREEDOM LITE) w/Device KIT; Apply 1 m topically 4 (four) times a day as needed Use as directed  -     Probiotic Product (PRO-BIOTIC BLEND PO); Take 1 tablet by mouth daily    -encouraged to continue close contact with PCP for chronic conditions  -encouraged to continue diet for recently diagnosed diabetes  -encouraged healthy lifestyle, exercise and diet  -mammogram order printed patient encouraged to make appointment  -referral for GI for colonoscopy printed patient encouraged to make appointment  Tobacco Cessation Counseling: Tobacco cessation counseling was not provided  The patient is sincerely urged to quit consumption of tobacco  She is not ready to quit tobacco  The numerous health risks of tobacco consumption were discussed     Breast awareness reviewed  Two maternal aunts and 2 cousins with ovarian cancer number provided for hope line  Will call with results

## 2019-10-01 ENCOUNTER — OFFICE VISIT (OUTPATIENT)
Dept: OBGYN CLINIC | Facility: CLINIC | Age: 59
End: 2019-10-01

## 2019-10-01 VITALS
BODY MASS INDEX: 33.04 KG/M2 | HEIGHT: 61 IN | DIASTOLIC BLOOD PRESSURE: 82 MMHG | WEIGHT: 175 LBS | SYSTOLIC BLOOD PRESSURE: 122 MMHG | HEART RATE: 90 BPM

## 2019-10-01 DIAGNOSIS — Z01.419 WOMEN'S ANNUAL ROUTINE GYNECOLOGICAL EXAMINATION: Primary | ICD-10-CM

## 2019-10-01 DIAGNOSIS — Z12.4 PAP SMEAR FOR CERVICAL CANCER SCREENING: ICD-10-CM

## 2019-10-01 PROCEDURE — 99386 PREV VISIT NEW AGE 40-64: CPT | Performed by: NURSE PRACTITIONER

## 2019-10-01 PROCEDURE — G0145 SCR C/V CYTO,THINLAYER,RESCR: HCPCS | Performed by: NURSE PRACTITIONER

## 2019-10-01 PROCEDURE — 87624 HPV HI-RISK TYP POOLED RSLT: CPT | Performed by: NURSE PRACTITIONER

## 2019-10-01 RX ORDER — BLOOD-GLUCOSE METER
1 KIT MISCELLANEOUS 4 TIMES DAILY PRN
Refills: 0 | COMMUNITY
Start: 2019-09-21

## 2019-10-02 LAB
HPV HR 12 DNA CVX QL NAA+PROBE: NEGATIVE
HPV16 DNA CVX QL NAA+PROBE: NEGATIVE
HPV18 DNA CVX QL NAA+PROBE: NEGATIVE

## 2019-10-03 ENCOUNTER — TELEPHONE (OUTPATIENT)
Dept: FAMILY MEDICINE CLINIC | Facility: CLINIC | Age: 59
End: 2019-10-03

## 2019-10-03 NOTE — PROGRESS NOTES
Attempted made to contact patient to remind her that she have an mammogram order that still active  Derailed message left on vm to call us back with any question

## 2019-10-07 DIAGNOSIS — E11.69 TYPE 2 DIABETES MELLITUS WITH OTHER SPECIFIED COMPLICATION, WITHOUT LONG-TERM CURRENT USE OF INSULIN (HCC): ICD-10-CM

## 2019-10-07 LAB
LAB AP GYN PRIMARY INTERPRETATION: NORMAL
Lab: NORMAL

## 2019-10-07 NOTE — TELEPHONE ENCOUNTER
sitagliptin requires insurance prior auth  This medication is not integral, please inform pt that I have increased her metformin to 1000mg tabs bid which she can take all together, morning or night  Which ever is most tolerated

## 2019-10-09 NOTE — TELEPHONE ENCOUNTER
Attempted to contact patient but there was no response , left voicemail  Will attempt again at a later time

## 2019-10-11 ENCOUNTER — TELEPHONE (OUTPATIENT)
Dept: FAMILY MEDICINE CLINIC | Facility: CLINIC | Age: 59
End: 2019-10-11

## 2019-10-13 DIAGNOSIS — E11.69 TYPE 2 DIABETES MELLITUS WITH OTHER SPECIFIED COMPLICATION, WITHOUT LONG-TERM CURRENT USE OF INSULIN (HCC): ICD-10-CM

## 2019-10-14 ENCOUNTER — TELEPHONE (OUTPATIENT)
Dept: INTERNAL MEDICINE CLINIC | Facility: CLINIC | Age: 59
End: 2019-10-14

## 2019-10-14 NOTE — TELEPHONE ENCOUNTER
Patient had TDAP in 2014, patient is good for 10 years  Patient does not need vaccine   Mila, can you call and cancel her

## 2019-10-14 NOTE — TELEPHONE ENCOUNTER
Patient called to get appt for TDAP vaccine  - daughter is having a baby tomorrow 10/15/19    Please put in an order       Patient has a nurse visit appt scheduled for 10/14/19 2pm

## 2019-10-15 DIAGNOSIS — E11.69 TYPE 2 DIABETES MELLITUS WITH OTHER SPECIFIED COMPLICATION, WITHOUT LONG-TERM CURRENT USE OF INSULIN (HCC): ICD-10-CM

## 2019-10-15 RX ORDER — ATORVASTATIN CALCIUM 40 MG/1
40 TABLET, FILM COATED ORAL DAILY
Qty: 30 TABLET | Refills: 0 | Status: SHIPPED | OUTPATIENT
Start: 2019-10-15 | End: 2019-11-14 | Stop reason: SDUPTHER

## 2019-10-15 RX ORDER — ATORVASTATIN CALCIUM 40 MG/1
TABLET, FILM COATED ORAL
Qty: 30 TABLET | Refills: 0 | OUTPATIENT
Start: 2019-10-15

## 2019-10-29 ENCOUNTER — TELEPHONE (OUTPATIENT)
Dept: FAMILY MEDICINE CLINIC | Facility: CLINIC | Age: 59
End: 2019-10-29

## 2019-11-14 DIAGNOSIS — E11.69 TYPE 2 DIABETES MELLITUS WITH OTHER SPECIFIED COMPLICATION, WITHOUT LONG-TERM CURRENT USE OF INSULIN (HCC): ICD-10-CM

## 2019-11-18 RX ORDER — ATORVASTATIN CALCIUM 40 MG/1
TABLET, FILM COATED ORAL
Qty: 90 TABLET | Refills: 1 | Status: SHIPPED | OUTPATIENT
Start: 2019-11-18 | End: 2020-02-18 | Stop reason: SDUPTHER

## 2019-12-09 DIAGNOSIS — E11.69 TYPE 2 DIABETES MELLITUS WITH OTHER SPECIFIED COMPLICATION, WITHOUT LONG-TERM CURRENT USE OF INSULIN (HCC): ICD-10-CM

## 2019-12-24 ENCOUNTER — TELEPHONE (OUTPATIENT)
Dept: INTERNAL MEDICINE CLINIC | Facility: CLINIC | Age: 59
End: 2019-12-24

## 2019-12-24 NOTE — TELEPHONE ENCOUNTER
Please see below  Patient was seen by you on 9/19/19 and is scheduled to see you on 12/30/19 as you are covering for Dr Ilda Moya

## 2019-12-24 NOTE — TELEPHONE ENCOUNTER
Patient needs a new doctor's referral - has appointment with ENT 12/27/19 2pm for vertigo        Please prepare new doctor's referral so that we can attach to the appointment     thanks

## 2019-12-26 ENCOUNTER — TRANSCRIBE ORDERS (OUTPATIENT)
Dept: INTERNAL MEDICINE CLINIC | Facility: CLINIC | Age: 59
End: 2019-12-26

## 2019-12-26 DIAGNOSIS — H81.09 MENIERE DISEASE: Primary | ICD-10-CM

## 2019-12-27 ENCOUNTER — CONSULT (OUTPATIENT)
Dept: MULTI SPECIALTY CLINIC | Facility: CLINIC | Age: 59
End: 2019-12-27

## 2019-12-27 VITALS
BODY MASS INDEX: 31.8 KG/M2 | DIASTOLIC BLOOD PRESSURE: 62 MMHG | HEART RATE: 93 BPM | SYSTOLIC BLOOD PRESSURE: 124 MMHG | WEIGHT: 168.43 LBS | HEIGHT: 61 IN

## 2019-12-27 DIAGNOSIS — H91.92 HEARING LOSS OF LEFT EAR, UNSPECIFIED HEARING LOSS TYPE: Primary | ICD-10-CM

## 2019-12-27 DIAGNOSIS — H81.09 MENIERE DISEASE: ICD-10-CM

## 2019-12-27 DIAGNOSIS — R42 DIZZINESS: ICD-10-CM

## 2019-12-27 PROCEDURE — 99203 OFFICE O/P NEW LOW 30 MIN: CPT | Performed by: OTOLARYNGOLOGY

## 2019-12-27 RX ORDER — MECLIZINE HYDROCHLORIDE 25 MG/1
TABLET ORAL
Qty: 30 TABLET | Refills: 3 | Status: SHIPPED | OUTPATIENT
Start: 2019-12-27

## 2019-12-27 NOTE — PROGRESS NOTES
[facility]  [facility]  [facility] Otolaryngology      Krystle Mcdonald is a 61 y o  who presents with a chief complaint of Meniere's f/u    Pertinent elements of the history include:  Abran Limon   Is a 22-year-old female who is here for recheck on her Meniere's disease that affects her left ear  She has not been seen since 2009, and at that time saw Dr Cherelle Dickson occur in the [facility] over by the [employer] tracks  She was placed on Dyazide at that time which she continues to take once a day  She gets about 1 episode per year of spinning vertigo, and she will take a Valium if needed  That is very infrequent and usually the Valium becomes powder since she does not use it often  She states that her triggers include salt, caffeine, stress, elevated blood pressure  She does have periods of lightheadedness that occur on a regular basis with quick head movements, and she has to stare at something standing still to help control it  She also was diagnosed with diabetes in September with a hemoglobin A1c of 8 5, so is unsure if that has something to do with it  She had a CBC that showed elevated white blood cell count and elevated hemoglobin and hematocrit in September as well  She is due for a follow-up with her PCP next week and will be having lab work again after that  She has known left-sided hearing loss, and no significant fluctuations have occurred  She  Has constant tinnitus left ear as well  She has not had a recent hearing test and her family doctor refilled her medications for many years at this point  She is not currently on meclizine  She came back to ENT to see if there is any new treatment options and to have an evaluation to ensure that there is nothing more she can do    No Known Allergies  Past Medical History:   Diagnosis Date    Diabetes mellitus ([address] )     Vertigo      Past Surgical History:   Procedure Laterality Date    TUMOR REMOVAL right leg- benign      Family History   Problem Relation Age of Onset   Grant Gordon Stroke Mother     Hypertension Mother     Diabetes Mother     Throat cancer Brother     Ovarian cancer Family         2 maternal aunts and 2 cousins     No Known Problems Father     Thyroid cancer Sister     Heart disease Sister     No Known Problems Son     Heart attack Maternal Grandmother 43    Stroke Maternal Grandfather 58    Heart attack Paternal Grandmother     Leukemia Paternal Grandfather     No Known Problems Sister     No Known Problems Son      Current Outpatient Medications on File Prior to Visit   Medication Sig Dispense Refill    atorvastatin (LIPITOR) 40 mg tablet TAKE 1 TABLET BY MOUTH EVERY DAY 90 tablet 1    Blood Glucose Monitoring Suppl (FREESTYLE FREEDOM LITE) w/Device KIT Apply 1 m topically 4 (four) times a day as needed Use as directed  0    ibuprofen (MOTRIN) 200 mg tablet Take 200 mg by mouth every 6 (six) hours as needed      metFORMIN (GLUCOPHAGE) 1000 MG tablet TAKE 2 TABLETS (2,000 MG TOTAL) BY MOUTH DAILY 120 tablet 0    Probiotic Product (PRO-BIOTIC BLEND PO) Take 1 tablet by mouth daily      triamterene-hydrochlorothiazide (DYAZIDE) 37 5-25 mg per capsule Take 1 capsule by mouth daily 90 capsule 0    diazepam (VALIUM) 5 mg tablet Take 1 tablet (5 mg total) by mouth every 12 (twelve) hours as needed for anxiety for up to 5 days 10 tablet 0     No current facility-administered medications on file prior to visit  Results reviewed; images from any scan have been personally reviewed:    Labs from September 2019    Physical exam:    /62 (BP Location: Left arm, Patient Position: Sitting, Cuff Size: Adult)   Pulse 93   Ht 5' 1" (1 549 m)   Wt 76 4 kg (168 lb 6 9 oz)   LMP 10/01/2010 (Approximate)   BMI 31 82 kg/m²     Constitutional:  Well developed, well nourished and groomed, in no acute distress  Cooperative      Eyes:  Extra-ocular movements intact, smooth tracking, pupils equally round, the lids and conjunctivae are normal in appearance  Gaze-normal left and right  Nystagmus absent left and right  Head: Atraumatic, normocephalic with no lesions or palpable masses  Ears:  Auricles normal in appearance bilaterally, mastoid prominence non-tender  External Auditory Canal - Left - external auditory canal clear, no drainage observed, no edema noted in EAC, no exostoses present, no osteoma present, no tenderness noted  Right - external auditory canal is clear, no drainage observed, no edema noted in EAC, no exostoses present, no osteoma present, no tenderness noted  Otoscopic Exam - Tympanic Membrane - Left - intact and normal in appearance, no retraction of TM observed, no serous effusion observed, no evidence of tympanosclerosis  Right - intact and normal in appearance, no retraction of TM observed, no serous effusion observed, no evidence of tympanosclerosis  Nose/Sinuses:  External Inspection of the Nose - no deformities observed, no deviation of bone structure, no skin lesion present, no swelling present  Inspection of the nares - Left - nares are symmetric, no deviation of caudal portion of septum  Right - nares are symmetric, no deviation of caudal portion of septum  Nasal Mucosa - Left - no congestion observed, no mucosal lesion or mass present, no ulcerations observed  Right - no congestion observed, no mucosal lesion or mass present, no ulcerations observed  Nasal Septum - Cartilaginous Septum - midline, no bleeding noted, no crusting present, no perforation noted  Turbinates - Inferior - Left - no hypertrophy, no inflammation noted  Right - no hypertrophy, no inflammation noted  Middle - Left - no inflammation noted  Right - no inflammation noted  Oral Cavity:  Lips - Upper Lip - normal color, moist, no cracks or lesions  Lower Lip - normal color, moist, no cracks or lesions  Teeth - no loose teeth, no missing teeth   Gingiva - no bleeding observed, no inflammation present  Hard Palate - no asymmetry observed, no torus present  Soft Palate - normal, no ulcers noted  Oropharynx - no uvular edema is observed, uvula is midline, no edema of posterior pharyngeal walls observed  Tongue - normal mobility, surfaces without fissures,leukoplakia, ulceration or masses, not enlarged, no pallor noted, no white patches present  Oral Mucosa - no masses, lesions, leukoplakia, scarring and normal James's ducts, pink and moist, no discoloration noted  Tonsils -no hypertrophy, no ulcerations noted  No exudate  Floor of mouth- normal Warthin's ducts, no lesions, ulceration, leukoplakia or torus mandibularis  Salivary Glands - Submandibular Glands - Left - non-tender  Right - non-tender  Parotid Glands - Left - non-tender  Right - non-tender  Sublingual Salivary Glands - non-tender  Neck:  No visible or palpable cervical lesions or lymphadenopathy, thyroid gland is normal in size and symmetry and without masses, normal laryngeal elevation with swallowing  No tenderness  Vestibular: Rosibel Wilbert negative bilaterally, Romberg negative, Tandem walking negative, Fukuda step testing turns to right  Cardiovascular:  Not examined  Respiratory:  Normal respiratory effort without evidence of retractions or use of accessory muscles  Integument:  Normal appearing without observed masses or lesions  Neurologic:  Cranial nerves II-XII grossly intact bilaterally  Normal gait  Psychiatric:  Alert and oriented to time, place and person, normal affect, normal speech  Procedures      Assessment:   1  Hearing loss of left ear, unspecified hearing loss type  Comprehensive hearing evaluation   2   Meniere disease  Ambulatory Referral to Otolaryngology    Comprehensive hearing evaluation    meclizine (ANTIVERT) 25 mg tablet       Orders  Orders Placed This Encounter   Procedures    Comprehensive hearing evaluation     Standing Status:   Future     Standing Expiration Date:   12/27/2020 Discussion/Plan:    1  History of Meniere's disease affecting the left side  Overall well controlled, except for some episodes of lightheadedness depending on her triggers which include salt, caffeine, stress, elevated blood pressure  I would like to recheck her hearing to see if there is any  usable hearing on left  I am doubtful that we have any records for comparison given that she was seen 10 years ago  I would like her to continue Dyazide, so on caffeine restriction, and emphasized hydration to prevent kidney stones  We also discussed use of Valium as needed for vertigo attacks, which she uses very infrequently  I have called in meclizine for her to try for these shorter more lightheaded episodes to see if that helps,  Discussed side effect of drowsiness  Consider vestibular therapy, but she does not have time to do it at this point  She should follow up at least once every year or 2 years to have a repeat audiogram and checkup  On her recent labs, she did have an elevated hematocrit and hemoglobin and white count, and this needs to be repeated as thicker blood can cause dizziness  She may need an evaluation at Hematology if this persists  She has a follow-up with her primary care physician this week for a diabetes checkup, which we discussed that hypo or hyperglycemic and also affect her balance and can cause symptoms of lightheadedness  Will call with results of hearing testing in interim of her 1 year visit, sooner to be seen if she has flare ups   Or if she wants to pursue vestibular therapy  Thank you for allowing me to participate in the care of your patient

## 2019-12-30 ENCOUNTER — OFFICE VISIT (OUTPATIENT)
Dept: INTERNAL MEDICINE CLINIC | Facility: CLINIC | Age: 59
End: 2019-12-30

## 2019-12-30 ENCOUNTER — APPOINTMENT (OUTPATIENT)
Dept: LAB | Facility: CLINIC | Age: 59
End: 2019-12-30
Payer: COMMERCIAL

## 2019-12-30 VITALS
BODY MASS INDEX: 32.13 KG/M2 | HEIGHT: 61 IN | SYSTOLIC BLOOD PRESSURE: 120 MMHG | TEMPERATURE: 97.7 F | HEART RATE: 78 BPM | DIASTOLIC BLOOD PRESSURE: 72 MMHG | OXYGEN SATURATION: 97 % | WEIGHT: 170.19 LBS

## 2019-12-30 DIAGNOSIS — M25.561 CHRONIC PAIN OF RIGHT KNEE: Primary | ICD-10-CM

## 2019-12-30 DIAGNOSIS — H81.02 MENIERE'S DISEASE OF LEFT EAR: ICD-10-CM

## 2019-12-30 DIAGNOSIS — E87.1 HYPONATREMIA: ICD-10-CM

## 2019-12-30 DIAGNOSIS — E11.69 TYPE 2 DIABETES MELLITUS WITH OTHER SPECIFIED COMPLICATION, WITHOUT LONG-TERM CURRENT USE OF INSULIN (HCC): ICD-10-CM

## 2019-12-30 DIAGNOSIS — E87.6 HYPOKALEMIA: ICD-10-CM

## 2019-12-30 DIAGNOSIS — G89.29 CHRONIC PAIN OF RIGHT KNEE: Primary | ICD-10-CM

## 2019-12-30 DIAGNOSIS — R77.8 ELEVATED TOTAL PROTEIN: ICD-10-CM

## 2019-12-30 LAB
ALBUMIN SERPL BCP-MCNC: 4.2 G/DL (ref 3.5–5)
ALP SERPL-CCNC: 74 U/L (ref 46–116)
ALT SERPL W P-5'-P-CCNC: 42 U/L (ref 12–78)
ANION GAP SERPL CALCULATED.3IONS-SCNC: 6 MMOL/L (ref 4–13)
AST SERPL W P-5'-P-CCNC: 17 U/L (ref 5–45)
BASOPHILS # BLD AUTO: 0.08 THOUSANDS/ΜL (ref 0–0.1)
BASOPHILS NFR BLD AUTO: 1 % (ref 0–1)
BILIRUB SERPL-MCNC: 0.36 MG/DL (ref 0.2–1)
BUN SERPL-MCNC: 13 MG/DL (ref 5–25)
CALCIUM SERPL-MCNC: 9.9 MG/DL (ref 8.3–10.1)
CHLORIDE SERPL-SCNC: 107 MMOL/L (ref 100–108)
CO2 SERPL-SCNC: 27 MMOL/L (ref 21–32)
CREAT SERPL-MCNC: 0.63 MG/DL (ref 0.6–1.3)
EOSINOPHIL # BLD AUTO: 0.25 THOUSAND/ΜL (ref 0–0.61)
EOSINOPHIL NFR BLD AUTO: 2 % (ref 0–6)
ERYTHROCYTE [DISTWIDTH] IN BLOOD BY AUTOMATED COUNT: 13 % (ref 11.6–15.1)
GFR SERPL CREATININE-BSD FRML MDRD: 98 ML/MIN/1.73SQ M
GLUCOSE P FAST SERPL-MCNC: 82 MG/DL (ref 65–99)
HCT VFR BLD AUTO: 44.1 % (ref 34.8–46.1)
HGB BLD-MCNC: 14.6 G/DL (ref 11.5–15.4)
IMM GRANULOCYTES # BLD AUTO: 0.04 THOUSAND/UL (ref 0–0.2)
IMM GRANULOCYTES NFR BLD AUTO: 0 % (ref 0–2)
LYMPHOCYTES # BLD AUTO: 3.3 THOUSANDS/ΜL (ref 0.6–4.47)
LYMPHOCYTES NFR BLD AUTO: 30 % (ref 14–44)
MAGNESIUM SERPL-MCNC: 2.1 MG/DL (ref 1.6–2.6)
MCH RBC QN AUTO: 30.9 PG (ref 26.8–34.3)
MCHC RBC AUTO-ENTMCNC: 33.1 G/DL (ref 31.4–37.4)
MCV RBC AUTO: 93 FL (ref 82–98)
MONOCYTES # BLD AUTO: 0.58 THOUSAND/ΜL (ref 0.17–1.22)
MONOCYTES NFR BLD AUTO: 5 % (ref 4–12)
NEUTROPHILS # BLD AUTO: 6.76 THOUSANDS/ΜL (ref 1.85–7.62)
NEUTS SEG NFR BLD AUTO: 62 % (ref 43–75)
NRBC BLD AUTO-RTO: 0 /100 WBCS
PLATELET # BLD AUTO: 303 THOUSANDS/UL (ref 149–390)
PMV BLD AUTO: 10.3 FL (ref 8.9–12.7)
POTASSIUM SERPL-SCNC: 3.8 MMOL/L (ref 3.5–5.3)
PROT SERPL-MCNC: 8.2 G/DL (ref 6.4–8.2)
RBC # BLD AUTO: 4.72 MILLION/UL (ref 3.81–5.12)
SL AMB POCT HEMOGLOBIN AIC: 6.1 (ref ?–6.5)
SODIUM SERPL-SCNC: 140 MMOL/L (ref 136–145)
WBC # BLD AUTO: 11.01 THOUSAND/UL (ref 4.31–10.16)

## 2019-12-30 PROCEDURE — 99213 OFFICE O/P EST LOW 20 MIN: CPT | Performed by: INTERNAL MEDICINE

## 2019-12-30 PROCEDURE — 86334 IMMUNOFIX E-PHORESIS SERUM: CPT | Performed by: PATHOLOGY

## 2019-12-30 PROCEDURE — 3044F HG A1C LEVEL LT 7.0%: CPT | Performed by: INTERNAL MEDICINE

## 2019-12-30 PROCEDURE — 85025 COMPLETE CBC W/AUTO DIFF WBC: CPT

## 2019-12-30 PROCEDURE — 36415 COLL VENOUS BLD VENIPUNCTURE: CPT

## 2019-12-30 PROCEDURE — 84165 PROTEIN E-PHORESIS SERUM: CPT | Performed by: PATHOLOGY

## 2019-12-30 PROCEDURE — 80053 COMPREHEN METABOLIC PANEL: CPT

## 2019-12-30 PROCEDURE — 84165 PROTEIN E-PHORESIS SERUM: CPT

## 2019-12-30 PROCEDURE — 86334 IMMUNOFIX E-PHORESIS SERUM: CPT

## 2019-12-30 PROCEDURE — 83735 ASSAY OF MAGNESIUM: CPT

## 2019-12-30 PROCEDURE — 3008F BODY MASS INDEX DOCD: CPT | Performed by: INTERNAL MEDICINE

## 2019-12-30 PROCEDURE — 83036 HEMOGLOBIN GLYCOSYLATED A1C: CPT | Performed by: INTERNAL MEDICINE

## 2019-12-30 RX ORDER — NAPROXEN 500 MG/1
500 TABLET ORAL 2 TIMES DAILY PRN
Qty: 60 TABLET | Refills: 0 | Status: SHIPPED | OUTPATIENT
Start: 2019-12-30 | End: 2020-02-03

## 2019-12-30 NOTE — PROGRESS NOTES
INTERNAL MEDICINE FOLLOW-UP OFFICE VISIT  [facility]  [address], [address]    NAME: Jan De Oliveira  AGE: 61 y o  SEX: female    DATE OF ENCOUNTER: 12/30/2019    Assessment and Plan     1  Chronic pain of right knee  -likely musculoskeletal in origin  Consideration for osteoarthritis versus Baker's cyst versus popliteal tendon injury  Will manage conservatively with physical therapy, NSAIDs, activity as tolerated  -right knee x-ray ordered  - naproxen (EC NAPROSYN) 500 MG EC tablet; Take 1 tablet (500 mg total) by mouth 2 (two) times a day as needed for mild pain  Dispense: 60 tablet; Refill: 0  - Ambulatory referral to Physical Therapy; Future  - XR knee 3 vw right non injury; Future    2  Type 2 diabetes mellitus with other specified complication, without long-term current use of insulin ([facility])  -hemoglobin A1c much improved today at 6 1 from 8 5 on 09/10/2019  -continue to reinforce lifestyle modifications  Will continue with metformin monotherapy at this time  -eye and foot exam completed  - POCT hemoglobin A1c  - IRIS Diabetic eye exam  - CBC and differential; Future  - Magnesium; Future  - Comprehensive metabolic panel; Future    3  Elevated total protein  -elevated total protein seen on recent CMP with elevated hemoglobin and WBC count  Will check SPEP  - Protein electrophoresis, serum; Future    4  Hyponatremia  -seen on recent CMP in September  Possibly secondary to diuretic use as patient is on Dyazide  Will check CMP and magnesium levels and consider supplementation with potassium and magnesium if levels remain low    5  Hypokalemia  -as above    6  Meniere's disease of left ear  -stable  Follows with ENT  Continue Dyazide, meclizine p r n        Orders Placed This Encounter   Procedures    IRIS Diabetic eye exam    POCT hemoglobin A1c       Chief Complaint     Chief Complaint   Patient presents with    Follow-up    Diabetes    Knee Pain right; pain radiates through entire leg        History of Present Illness     Patient is a 68-year-old female with a past medical history of type 2 diabetes on oral medications, essential hypertension, Meniere's disease diagnosed around 2009 who presents for a routine follow-up appointment to discuss diabetes and right knee pain    In regards to patient's diabetes, she has been compliant with home metformin 2000 mg daily  She has been attempting to make lifestyle modifications with improvements in her diet, though her father unfortunately had a stroke recently which has been a large stressor for her  She states that she checks her blood glucose approximately twice daily and has got readings in the 130s to 140s on her glucometer  Denies any recent hypoglycemic episodes    Patient also endorses chronic right knee pain  She states that this initially began in the springtime and she noticed it when she was sitting on her bed sitting [place] [facility] style  She denies any inciting trauma or falls  The pain is described as a dull, achy sensation, primarily located in the posterior and lateral aspect of his right knee  She states that the pain is worsened with prolonged sitting and tends to improve after walking for a while  The pain radiates up and down her right leg  Pain is constant in nature  She states that she has noticed progressive pain and swelling in the leg over the past several months  Denies any constitutional symptoms including fever, chills, dysuria, rash  Denies any know tick exposure  States that family history is positive for gout in her father      The following portions of the patient's history were reviewed and updated as appropriate: allergies, current medications, past family history, past medical history, past social history, past surgical history and problem list     Is this patient a diabetic? Yes  If not contraindicated, are they prescribed metformin?  Yes    Review of Systems     10 point ROS negative except per HPI    Active Problem List     Patient Active Problem List   Diagnosis    Meniere disease    Diabetes mellitus ([facility] [address] )    Screening mammogram, encounter for       Objective     /72   Pulse 78   Temp 97 7 °F (36 5 °C)   Ht 5' 1" (1 549 m)   Wt 77 2 kg (170 lb 3 1 oz)   LMP 10/01/2010 (Approximate)   SpO2 97%   BMI 32 16 kg/m²     Physical Exam   Constitutional: She is oriented to person, place, and time  She appears well-developed and well-nourished  No distress  HENT:   Head: Normocephalic and atraumatic  Mouth/Throat: No oropharyngeal exudate  Eyes: Right eye exhibits no discharge  Left eye exhibits no discharge  No scleral icterus  Cardiovascular: Normal rate, regular rhythm and normal heart sounds  Exam reveals no friction rub  Pulses are no weak pulses  No murmur heard  Pulses:       Dorsalis pedis pulses are 2+ on the right side, and 2+ on the left side  Posterior tibial pulses are 2+ on the right side, and 2+ on the left side  Pulmonary/Chest: Effort normal and breath sounds normal  She has no wheezes  She has no rales  Abdominal: Soft  Bowel sounds are normal  She exhibits no distension  There is no tenderness  Musculoskeletal: She exhibits edema and tenderness (Moderate tenderness to palpation posterior and lateral to the right knee  No discrete mass appreciated)  Very small right knee effusion present  Patient able to ambulate independently and squat  Full active and passive range of motion with knee flexion, extension  Feet:   Right Foot:   Skin Integrity: Negative for ulcer, skin breakdown, erythema, warmth, callus or dry skin  Left Foot:   Skin Integrity: Negative for ulcer, skin breakdown, erythema, warmth, callus or dry skin  Neurological: She is alert and oriented to person, place, and time  No sensory deficit  Skin: Skin is warm and dry  No rash noted  No erythema  Psychiatric: She has a normal mood and affect   Her behavior is normal        Pertinent Laboratory/Diagnostic Studies:  Patient was never admitted      Images and diagnostics reviewed     Current Medications     Current Outpatient Medications:     atorvastatin (LIPITOR) 40 mg tablet, TAKE 1 TABLET BY MOUTH EVERY DAY, Disp: 90 tablet, Rfl: 1    Blood Glucose Monitoring Suppl (FREESTYLE FREEDOM LITE) w/Device KIT, Apply 1 m topically 4 (four) times a day as needed Use as directed, Disp: , Rfl: 0    ibuprofen (MOTRIN) 200 mg tablet, Take 200 mg by mouth every 6 (six) hours as needed, Disp: , Rfl:     metFORMIN (GLUCOPHAGE) 1000 MG tablet, TAKE 2 TABLETS (2,000 MG TOTAL) BY MOUTH DAILY, Disp: 120 tablet, Rfl: 0    Probiotic Product (PRO-BIOTIC BLEND PO), Take 1 tablet by mouth daily, Disp: , Rfl:     triamterene-hydrochlorothiazide (DYAZIDE) 37 5-25 mg per capsule, Take 1 capsule by mouth daily, Disp: 90 capsule, Rfl: 0    diazepam (VALIUM) 5 mg tablet, Take 1 tablet (5 mg total) by mouth every 12 (twelve) hours as needed for anxiety for up to 5 days, Disp: 10 tablet, Rfl: 0    meclizine (ANTIVERT) 25 mg tablet, 1/2 to 1 tablet PO Q8 hours prn vertigo (Patient not taking: Reported on 12/30/2019), Disp: 30 tablet, Rfl: 3    Health Maintenance     Health Maintenance   Topic Date Due    MAMMOGRAM  1960    CRC Screening: Colonoscopy  1960    Diabetic Foot Exam  07/06/1970    DM Eye Exam  07/06/1970    URINE MICROALBUMIN  07/06/1970    BMI: Followup Plan  07/06/1978    Hepatitis B Vaccine (1 of 3 - Risk 3-dose series) 07/06/1979    Influenza Vaccine  01/26/2020 (Originally 7/1/2019)    Pneumococcal Vaccine: Pediatrics (0 to 5 Years) and At-Risk Patients (6 to 59 Years) (1 of 1 - PPSV23) 05/27/2020 (Originally 7[birth year]6/1966)    HEMOGLOBIN A1C  03/10/2020    Depression Screening PHQ  10/01/2020    BMI: Adult  12/27/2020    Cervical Cancer Screening  10/01/2022    DTaP,Tdap,and Td Vaccines (2 - Td) 06/05/2024    Pneumococcal Vaccine: 65+ Years (1 of 2 - PCV13) 07/06/2025    HIV Screening  Completed    Hepatitis C Screening  Completed    HIB Vaccine  Aged Out    IPV Vaccine  Aged Out    Hepatitis A Vaccine  Aged Out    Meningococcal ACWY Vaccine  Aged Out    HPV Vaccine  Aged Out     Immunization History   Administered Date(s) Administered    Tdap 06/05/2014       [facility]  Internal Medicine PGY-2  [address] , [address], [address] [facility]  Office: [phone]  Fax: [phone]             Patient's shoes and socks removed  Right Foot/Ankle   Right Foot Inspection  Skin Exam: skin normal and skin intact no dry skin, no warmth, no callus, no erythema, no maceration, no abnormal color, no pre-ulcer, no ulcer and no callus                          Toe Exam: ROM and strength within normal limits  Sensory       Monofilament testing: intact  Vascular  Capillary refills: < 3 seconds  The right DP pulse is 2+  The right PT pulse is 2+  Left Foot/Ankle  Left Foot Inspection  Skin Exam: skin normal and skin intactno dry skin, no warmth, no erythema, no maceration, normal color, no pre-ulcer, no ulcer and no callus                         Toe Exam: ROM and strength within normal limits                   Sensory       Monofilament: intact  Vascular  Capillary refills: < 3 seconds  The left DP pulse is 2+  The left PT pulse is 2+  Assign Risk Category:  No deformity present; No loss of protective sensation;  No weak pulses       Risk: 0

## 2020-01-01 LAB
ALBUMIN SERPL ELPH-MCNC: 4.47 G/DL (ref 3.5–5)
ALBUMIN SERPL ELPH-MCNC: 58 % (ref 52–65)
ALPHA1 GLOB SERPL ELPH-MCNC: 0.37 G/DL (ref 0.1–0.4)
ALPHA1 GLOB SERPL ELPH-MCNC: 4.8 % (ref 2.5–5)
ALPHA2 GLOB SERPL ELPH-MCNC: 0.87 G/DL (ref 0.4–1.2)
ALPHA2 GLOB SERPL ELPH-MCNC: 11.3 % (ref 7–13)
BETA GLOB ABNORMAL SERPL ELPH-MCNC: 0.53 G/DL (ref 0.4–0.8)
BETA1 GLOB SERPL ELPH-MCNC: 6.9 % (ref 5–13)
BETA2 GLOB SERPL ELPH-MCNC: 6.6 % (ref 2–8)
BETA2+GAMMA GLOB SERPL ELPH-MCNC: 0.51 G/DL (ref 0.2–0.5)
GAMMA GLOB ABNORMAL SERPL ELPH-MCNC: 0.95 G/DL (ref 0.5–1.6)
GAMMA GLOB SERPL ELPH-MCNC: 12.4 % (ref 12–22)
IGG/ALB SER: 1.38 {RATIO} (ref 1.1–1.8)
INTERPRETATION UR IFE-IMP: NORMAL
M PROTEIN 1 MFR SERPL ELPH: 2.5 %
M PROTEIN 1 SERPL ELPH-MCNC: 0.19 G/DL
PROT PATTERN SERPL ELPH-IMP: ABNORMAL
PROT SERPL-MCNC: 7.7 G/DL (ref 6.4–8.2)

## 2020-01-08 DIAGNOSIS — H81.09 MENIERE'S DISEASE, UNSPECIFIED LATERALITY: ICD-10-CM

## 2020-01-08 RX ORDER — TRIAMTERENE AND HYDROCHLOROTHIAZIDE 37.5; 25 MG/1; MG/1
CAPSULE ORAL
Qty: 90 CAPSULE | Refills: 0 | Status: SHIPPED | OUTPATIENT
Start: 2020-01-08 | End: 2020-04-13

## 2020-01-22 ENCOUNTER — TELEPHONE (OUTPATIENT)
Dept: FAMILY MEDICINE CLINIC | Facility: CLINIC | Age: 60
End: 2020-01-22

## 2020-01-22 NOTE — TELEPHONE ENCOUNTER
Spoke with patient about Diabetes classes  I explained that she needs to call her insurance company to see if classes are covered  I gave her my name & number to call back once she finds out, & if she wants to schedule an appointment    DDS

## 2020-01-24 DIAGNOSIS — G89.29 CHRONIC PAIN OF RIGHT KNEE: ICD-10-CM

## 2020-01-24 DIAGNOSIS — M25.561 CHRONIC PAIN OF RIGHT KNEE: ICD-10-CM

## 2020-02-13 DIAGNOSIS — E11.69 TYPE 2 DIABETES MELLITUS WITH OTHER SPECIFIED COMPLICATION, WITHOUT LONG-TERM CURRENT USE OF INSULIN (HCC): ICD-10-CM

## 2020-02-18 ENCOUNTER — OFFICE VISIT (OUTPATIENT)
Dept: INTERNAL MEDICINE CLINIC | Facility: CLINIC | Age: 60
End: 2020-02-18

## 2020-02-18 VITALS
HEART RATE: 85 BPM | TEMPERATURE: 97.6 F | HEIGHT: 61 IN | BODY MASS INDEX: 32.97 KG/M2 | OXYGEN SATURATION: 95 % | WEIGHT: 174.6 LBS | SYSTOLIC BLOOD PRESSURE: 112 MMHG | DIASTOLIC BLOOD PRESSURE: 70 MMHG

## 2020-02-18 DIAGNOSIS — R73.9 HYPERGLYCEMIA: ICD-10-CM

## 2020-02-18 DIAGNOSIS — E11.69 TYPE 2 DIABETES MELLITUS WITH OTHER SPECIFIED COMPLICATION, WITHOUT LONG-TERM CURRENT USE OF INSULIN (HCC): Primary | ICD-10-CM

## 2020-02-18 LAB — SL AMB POCT GLUCOSE BLD: 107

## 2020-02-18 PROCEDURE — 99213 OFFICE O/P EST LOW 20 MIN: CPT | Performed by: HOSPITALIST

## 2020-02-18 PROCEDURE — 82948 REAGENT STRIP/BLOOD GLUCOSE: CPT | Performed by: HOSPITALIST

## 2020-02-18 PROCEDURE — 3008F BODY MASS INDEX DOCD: CPT | Performed by: HOSPITALIST

## 2020-02-18 RX ORDER — ATORVASTATIN CALCIUM 40 MG/1
40 TABLET, FILM COATED ORAL DAILY
Qty: 90 TABLET | Refills: 3 | Status: SHIPPED | OUTPATIENT
Start: 2020-02-18 | End: 2020-07-15 | Stop reason: SDUPTHER

## 2020-02-18 NOTE — PROGRESS NOTES
ASSESSMENT/PLAN:  Plan:  Hyperglycemia in the setting of DM2  Suspect possibly secondary to inappropriate metformin dose as well as poor diet/carbohydrate rich foods  Pt has been taking incorrect metformin dosing for last two days because prescription was for 2000mg daily  Pt reports previously she had been taking 1000 mg b i d  Patient reports blood sugar yesterday evening after dinner was 260 but that she also does not check her sugars after eating normally  Blood glucose at visit checked and 107 in clinic this AM   Counseled patient on consistent carbohydrate diet and on checking sugars when she feels unwell, avoiding foods that are likely to raise sugars quickly  Pt has not been to diabetes education classes and does not want to  Refill sent for atorvastatin  Advised patient of return precautions, that if her GI symptoms worsen she may have early stages of gastroenteritis and she should remain hydrated  Follow-up: In 3 months  CHIEF COMPLAINT: nausea, elevated blood glucose, hand numbness    HISTORY OF PRESENT ILLNESS:  49-year-old female with history of Meniere disease diabetes recently diagnosed presenting for episode of elevated blood sugar yesterday evening as well as fatigue and nausea  Patient reports that she ran out of blood sugar strips after checking yesterday evening and came to clinic because she was concerned  Patient reports that she has been checking her blood sugars 1-2x a week since last visit, mid morning around 10am after no breakfast with values around 130s-140s  Pt felt overwhelmed, tired through weekend because of future daughter-in-laws bridal shower  Yesterday, pt arrived home, had ravioli without sauce, beer, piece of chocolate and then felt very fatigued, falling asleep before 7pm  Patient checked her blood sugars and it was 260 which was unusual for her   This morning, pt still feels tired and nauseous, but felt worse one hour ago prior to arrival   Pt reports last 2 days she has been taking metformin 2000mg daily in AM because she noticed her prescription bottle advised taking it this way  Patient also reported hand numbness/tingliness for a few minutes in L hand which has mostly resolved and was present most prominently while cutting food this morning with right hand  Patient denies any prior episodes of hand tingling  Pt reported one episode of diarrhea this AM     Review of Systems   Constitutional: Negative for chills, fatigue and fever  HENT: Negative for rhinorrhea and sore throat  Respiratory: Negative for choking, chest tightness, shortness of breath, wheezing and stridor  Cardiovascular: Negative for chest pain, palpitations and leg swelling  Gastrointestinal: Positive for nausea (mild but improving this AM)  Negative for abdominal pain, blood in stool, constipation, diarrhea and vomiting  Genitourinary: Negative for hematuria  Neurological: Negative for dizziness and light-headedness  OBJECTIVE:  Vitals:    02/18/20 0912   BP: 112/70   BP Location: Left arm   Patient Position: Sitting   Cuff Size: Adult   Pulse: 85   Temp: 97 6 °F (36 4 °C)   TempSrc: Oral   SpO2: 95%   Weight: 79 2 kg (174 lb 9 7 oz)   Height: 5' 1" (1 549 m)       Physical Exam   Constitutional: She is oriented to person, place, and time  She appears well-developed and well-nourished  HENT:   Head: Normocephalic and atraumatic  Nose: Nose normal    Mouth/Throat: Oropharynx is clear and moist    Eyes: Right eye exhibits no discharge  Left eye exhibits no discharge  Cardiovascular: Normal rate, regular rhythm and normal heart sounds  Exam reveals no gallop and no friction rub  No murmur heard  Pulmonary/Chest: Effort normal and breath sounds normal  No respiratory distress  She has no wheezes  She has no rales  Abdominal: Soft  Bowel sounds are normal  She exhibits no distension  There is no tenderness  There is no guarding     Neurological: She is alert and oriented to person, place, and time  Skin: Skin is warm and dry     Psychiatric: Her speech is normal          Current Outpatient Medications:     atorvastatin (LIPITOR) 40 mg tablet, TAKE 1 TABLET BY MOUTH EVERY DAY, Disp: 90 tablet, Rfl: 1    Blood Glucose Monitoring Suppl (FREESTYLE FREEDOM LITE) w/Device KIT, Apply 1 m topically 4 (four) times a day as needed Use as directed, Disp: , Rfl: 0    NAPROXEN  MG EC tablet, TAKE 1 TABLET (500 MG TOTAL) BY MOUTH 2 (TWO) TIMES A DAY AS NEEDED FOR MILD PAIN, Disp: 60 tablet, Rfl: 0    Probiotic Product (PRO-BIOTIC BLEND PO), Take 1 tablet by mouth daily, Disp: , Rfl:     triamterene-hydrochlorothiazide (DYAZIDE) 37 5-25 mg per capsule, TAKE 1 CAPSULE BY MOUTH EVERY DAY, Disp: 90 capsule, Rfl: 0    diazepam (VALIUM) 5 mg tablet, Take 1 tablet (5 mg total) by mouth every 12 (twelve) hours as needed for anxiety for up to 5 days, Disp: 10 tablet, Rfl: 0    meclizine (ANTIVERT) 25 mg tablet, 1/2 to 1 tablet PO Q8 hours prn vertigo (Patient not taking: Reported on 12/30/2019), Disp: 30 tablet, Rfl: 3    metFORMIN (GLUCOPHAGE) 1000 MG tablet, TAKE 2 TABLETS (2,000 MG TOTAL) BY MOUTH DAILY, Disp: 120 tablet, Rfl: 0    Past Medical History:   Diagnosis Date    Diabetes mellitus ([facility] [address] )     Vertigo      Past Surgical History:   Procedure Laterality Date    TUMOR REMOVAL      right leg- benign      Social History     Socioeconomic History    Marital status: Single     Spouse name: Not on file    Number of children: Not on file    Years of education: Not on file    Highest education level: Not on file   Occupational History    Not on file   Social Needs    Financial resource strain: Not on file    Food insecurity:     Worry: Not on file     Inability: Not on file    Transportation needs:     Medical: Not on file     Non-medical: Not on file   Tobacco Use    Smoking status: Light Tobacco Smoker    Smokeless tobacco: Never Used    Tobacco comment: 1 cig/ day    Substance and Sexual Activity    Alcohol use: Yes     Frequency: Monthly or less     Comment: socially     Drug use: Never    Sexual activity: Not Currently     Partners: Male     Birth control/protection: Post-menopausal   Lifestyle    Physical activity:     Days per week: Not on file     Minutes per session: Not on file    Stress: Not on file   Relationships    Social connections:     Talks on phone: Not on file     Gets together: Not on file     Attends [employer] service: Not on file     Active member of club or organization: Not on file     Attends meetings of clubs or organizations: Not on file     Relationship status: Not on file    Intimate partner violence:     Fear of current or ex partner: Not on file     Emotionally abused: Not on file     Physically abused: Not on file     Forced sexual activity: Not on file   Other Topics Concern    Not on file   Social History Narrative    Former smoker - As per [employer]    Inadequate exercise    Physically able to work    Sedentary lifestyle     from significant other    Tobacco use - As per [employer]    Unemployed, looking for work      Family History   Problem Relation Age of Onset    Stroke Mother     Hypertension Mother     Diabetes Mother     Throat cancer Brother     Ovarian cancer Family         2 maternal aunts and 2 cousins     No Known Problems Father     Thyroid cancer Sister     Heart disease Sister     No Known Problems Son     Heart attack Maternal Grandmother 43    Stroke Maternal Grandfather 58    Heart attack Paternal Grandmother     Leukemia Paternal Grandfather     No Known Problems Sister     No Known Problems Son        [address] [facility], [address] Internal Medicine PGY-2    [facility]  [address], [address] [facility]  [phone]

## 2020-02-18 NOTE — PATIENT INSTRUCTIONS
Diabetes Mellitus Type 2 in Adults, Ambulatory Care   GENERAL INFORMATION:   Diabetes mellitus type 2  is a disease that affects how your body uses glucose (sugar)  Insulin helps move sugar out of the blood so it can be used for energy  Normally, when the blood sugar level increases, the pancreas makes more insulin  Type 2 diabetes develops because either the body cannot make enough insulin, or it cannot use the insulin correctly  After many years, your pancreas may stop making insulin  Common symptoms include the following:   · More hunger or thirst than usual     · Frequent urination     · Weight loss without trying     · Blurred vision  Seek immediate care for the following symptoms:   · Severe abdominal pain, or pain that spreads to your back  You may also be vomiting  · Trouble staying awake or focusing    · Shaking or sweating    · Blurred or double vision    · Breath has a fruity, sweet smell    · Breathing is deep and labored, or rapid and shallow    · Heartbeat is fast and weak  Treatment for diabetes mellitus type 2  includes keeping your blood sugar at a normal level  You must eat the right foods, and exercise regularly  You may also need medicine if you cannot control your blood sugar level with nutrition and exercise  Manage diabetes mellitus type 2:   · Check your blood sugar level  You will be taught how to check a small drop of blood in a glucose monitor  Ask your healthcare provider when and how often to check during the day  Ask your healthcare provider what your blood sugar levels should be when you check them  · Keep track of carbohydrates (sugar and starchy foods)  Your blood sugar level can get too high if you eat too many carbohydrates  Your dietitian will help you plan meals and snacks that have the right amount of carbohydrates  · Eat low-fat foods  Some examples are skinless chicken and low-fat milk  · Eat less sodium (salt)    Some examples of high-sodium foods to limit are soy sauce, potato chips, and soup  Do not add salt to food you cook  Limit your use of table salt  · Eat high-fiber foods  Foods that are a good source of fiber include vegetables, whole grain bread, and beans  · Limit alcohol  Alcohol affects your blood sugar level and can make it harder to manage your diabetes  Women should limit alcohol to 1 drink a day  Men should limit alcohol to 2 drinks a day  A drink of alcohol is 12 ounces of beer, 5 ounces of wine, or 1½ ounces of liquor  · Get regular exercise  Exercise can help keep your blood sugar level steady, decrease your risk of heart disease, and help you lose weight  Exercise for at least 30 minutes, 5 days a week  Include muscle strengthening activities 2 days each week  Work with your healthcare provider to create an exercise plan  · Check your feet each day  for injuries or open sores  Ask your healthcare provider for activities you can do if you have an open sore  · Quit smoking  If you smoke, it is never too late to quit  Smoking can worsen the problems that may occur with diabetes  Ask your healthcare provider for information about how to stop smoking if you are having trouble quitting  · Ask about your weight:  Ask healthcare providers if you need to lose weight, and how much to lose  Ask them to help you with a weight loss program  Even a 10 to 15 pound weight loss can help you manage your blood sugar level  · Carry medical alert identification  Wear medical alert jewelry or carry a card that says you have diabetes  Ask your healthcare provider where to get these items  · Ask about vaccines  Diabetes puts you at risk of serious illness if you get the flu, pneumonia, or hepatitis  Ask your healthcare provider if you should get a flu, pneumonia, or hepatitis B vaccine, and when to get the vaccine    Follow up with your healthcare provider as directed:  Write down your questions so you remember to ask them during your visits  CARE AGREEMENT:   You have the right to help plan your care  Learn about your health condition and how it may be treated  Discuss treatment options with your caregivers to decide what care you want to receive  You always have the right to refuse treatment  The above information is an  only  It is not intended as medical advice for individual conditions or treatments  Talk to your doctor, nurse or pharmacist before following any medical regimen to see if it is safe and effective for you  © 2014 [address] is for End User's use only and may not be sold, redistributed or otherwise used for commercial purposes  All illustrations and images included in CareNotes® are the copyrighted property of [employer]  or Gilbert Vasques

## 2020-02-27 DIAGNOSIS — G89.29 CHRONIC PAIN OF RIGHT KNEE: ICD-10-CM

## 2020-02-27 DIAGNOSIS — M25.561 CHRONIC PAIN OF RIGHT KNEE: ICD-10-CM

## 2020-03-10 DIAGNOSIS — G89.29 CHRONIC PAIN OF RIGHT KNEE: ICD-10-CM

## 2020-03-10 DIAGNOSIS — M25.561 CHRONIC PAIN OF RIGHT KNEE: ICD-10-CM

## 2020-03-10 RX ORDER — NAPROXEN 500 MG/1
500 TABLET ORAL 2 TIMES DAILY WITH MEALS
Qty: 60 TABLET | Refills: 3 | Status: SHIPPED | OUTPATIENT
Start: 2020-03-10 | End: 2020-09-01

## 2020-03-10 NOTE — TELEPHONE ENCOUNTER
Requested Prescriptions     Pending Prescriptions Disp Refills    naproxen (Naproxen DR) 500 MG EC tablet 60 tablet 0

## 2020-03-24 ENCOUNTER — TELEPHONE (OUTPATIENT)
Dept: INTERNAL MEDICINE CLINIC | Facility: CLINIC | Age: 60
End: 2020-03-24

## 2020-03-24 NOTE — TELEPHONE ENCOUNTER
Patient called, her job is requiring a letter from her physician stating that she does have medical conditions that compromise her immune system and that she should work from home for the duration of the pandemic  Letter is to be mailed to the patient's home

## 2020-04-12 DIAGNOSIS — H81.09 MENIERE'S DISEASE, UNSPECIFIED LATERALITY: ICD-10-CM

## 2020-04-13 RX ORDER — TRIAMTERENE AND HYDROCHLOROTHIAZIDE 37.5; 25 MG/1; MG/1
CAPSULE ORAL
Qty: 90 CAPSULE | Refills: 0 | Status: SHIPPED | OUTPATIENT
Start: 2020-04-13 | End: 2020-07-15 | Stop reason: SDUPTHER

## 2020-04-23 ENCOUNTER — TELEPHONE (OUTPATIENT)
Dept: INTERNAL MEDICINE CLINIC | Facility: CLINIC | Age: 60
End: 2020-04-23

## 2020-04-24 DIAGNOSIS — E11.69 TYPE 2 DIABETES MELLITUS WITH OTHER SPECIFIED COMPLICATION, WITHOUT LONG-TERM CURRENT USE OF INSULIN (HCC): Primary | ICD-10-CM

## 2020-06-29 DIAGNOSIS — E11.69 TYPE 2 DIABETES MELLITUS WITH OTHER SPECIFIED COMPLICATION, WITHOUT LONG-TERM CURRENT USE OF INSULIN (HCC): ICD-10-CM

## 2020-07-10 DIAGNOSIS — H81.09 MENIERE'S DISEASE, UNSPECIFIED LATERALITY: ICD-10-CM

## 2020-07-15 DIAGNOSIS — H81.09 MENIERE'S DISEASE, UNSPECIFIED LATERALITY: ICD-10-CM

## 2020-07-15 DIAGNOSIS — E11.69 TYPE 2 DIABETES MELLITUS WITH OTHER SPECIFIED COMPLICATION, WITHOUT LONG-TERM CURRENT USE OF INSULIN (HCC): ICD-10-CM

## 2020-07-15 RX ORDER — TRIAMTERENE AND HYDROCHLOROTHIAZIDE 37.5; 25 MG/1; MG/1
CAPSULE ORAL
Qty: 90 CAPSULE | Refills: 0 | OUTPATIENT
Start: 2020-07-15

## 2020-07-15 RX ORDER — TRIAMTERENE AND HYDROCHLOROTHIAZIDE 37.5; 25 MG/1; MG/1
1 CAPSULE ORAL DAILY
Qty: 90 CAPSULE | Refills: 3 | Status: SHIPPED | OUTPATIENT
Start: 2020-07-15 | End: 2021-08-27

## 2020-07-15 RX ORDER — ATORVASTATIN CALCIUM 40 MG/1
40 TABLET, FILM COATED ORAL DAILY
Qty: 90 TABLET | Refills: 3 | Status: SHIPPED | OUTPATIENT
Start: 2020-07-15 | End: 2021-08-27

## 2020-07-15 RX ORDER — TRIAMTERENE AND HYDROCHLOROTHIAZIDE 37.5; 25 MG/1; MG/1
1 CAPSULE ORAL DAILY
Qty: 90 CAPSULE | Refills: 0 | Status: CANCELLED | OUTPATIENT
Start: 2020-07-15

## 2020-07-15 NOTE — TELEPHONE ENCOUNTER
Name of medication, dose, quantity and frequency   Requested Prescriptions     Pending Prescriptions Disp Refills    triamterene-hydrochlorothiazide (DYAZIDE) 37 5-25 mg per capsule 90 capsule 0     Sig: Take 1 capsule by mouth daily    atorvastatin (LIPITOR) 40 mg tablet 90 tablet 3     Sig: Take 1 tablet (40 mg total) by mouth daily       Number of refills left:0    Amount of medication left:0    Pharmacy verified and updated yes     Additional information:

## 2020-09-01 DIAGNOSIS — M25.561 CHRONIC PAIN OF RIGHT KNEE: ICD-10-CM

## 2020-09-01 DIAGNOSIS — G89.29 CHRONIC PAIN OF RIGHT KNEE: ICD-10-CM

## 2020-09-01 RX ORDER — NAPROXEN 500 MG/1
TABLET, DELAYED RELEASE ORAL
Qty: 60 TABLET | Refills: 3 | Status: SHIPPED | OUTPATIENT
Start: 2020-09-01

## 2021-04-08 DIAGNOSIS — E11.69 TYPE 2 DIABETES MELLITUS WITH OTHER SPECIFIED COMPLICATION, WITHOUT LONG-TERM CURRENT USE OF INSULIN (HCC): ICD-10-CM

## 2021-04-08 NOTE — TELEPHONE ENCOUNTER
Requested Prescriptions     Pending Prescriptions Disp Refills    metFORMIN (GLUCOPHAGE) 1000 MG tablet 180 tablet 1     Sig: Take 1 tablet (1,000 mg total) by mouth 2 (two) times a day with meals         Number of refills left: 0    Amount of medication left:    Pharmacy verified and updated: Yes    Additional information: Patient last appt was 2/18/2020  An refill can been sent or she need to be scheduled? Please advise   Thank You

## 2021-06-24 ENCOUNTER — TELEPHONE (OUTPATIENT)
Dept: INTERNAL MEDICINE CLINIC | Facility: CLINIC | Age: 61
End: 2021-06-24

## 2021-06-24 ENCOUNTER — TELEMEDICINE (OUTPATIENT)
Dept: INTERNAL MEDICINE CLINIC | Facility: CLINIC | Age: 61
End: 2021-06-24

## 2021-06-24 DIAGNOSIS — J30.89 ALLERGIC RHINITIS DUE TO OTHER ALLERGIC TRIGGER, UNSPECIFIED SEASONALITY: Primary | ICD-10-CM

## 2021-06-24 PROCEDURE — 99213 OFFICE O/P EST LOW 20 MIN: CPT | Performed by: INTERNAL MEDICINE

## 2021-06-24 RX ORDER — FLUTICASONE PROPIONATE 50 MCG
2 SPRAY, SUSPENSION (ML) NASAL DAILY
Qty: 18.2 ML | Refills: 1 | Status: SHIPPED | OUTPATIENT
Start: 2021-06-24 | End: 2021-07-19

## 2021-06-24 RX ORDER — LORATADINE 10 MG/1
10 TABLET ORAL DAILY
Qty: 30 TABLET | Refills: 1 | Status: SHIPPED | OUTPATIENT
Start: 2021-06-24 | End: 2021-07-19

## 2021-06-24 NOTE — PATIENT INSTRUCTIONS
· Follow up with PCP Dr Dontrell Encinas in 1 months  · Make sure follow up is with Dr Dontrell Encinas  ,

## 2021-06-24 NOTE — PROGRESS NOTES
Virtual Regular Visit      Assessment/Plan:      1  Allergic rhinitis:  Will prescribe intranasal steroids and Claritin  Denies any recent exposure to COVID-19  She wears her mask when she goes forward surgery  Denies any fever or myalgia  Problem List Items Addressed This Visit     None               Reason for visit is   Chief Complaint   Patient presents with    Cough    Nasal Congestion        Encounter provider Brock Swan DO    Provider located at [facility]  [address] [facility] [address] [place] [address] [phone]      Recent Visits  No visits were found meeting these conditions  Showing recent visits within past 7 days and meeting all other requirements  Today's Visits  Date Type Provider Dept   06/24/21 Telemedicine Brock Swan, [address] [facility] today's visits and meeting all other requirements  Future Appointments  No visits were found meeting these conditions  Showing future appointments within next 150 days and meeting all other requirements       The patient was identified by name and date of birth  Jeremy Ortiz was informed that this is a telemedicine visit and that the visit is being conducted through [facility] and patient was informed that this is a secure, HIPAA-compliant platform  She agrees to proceed     My office door was closed  No one else was in the room  She acknowledged consent and understanding of privacy and security of the video platform  The patient has agreed to participate and understands they can discontinue the visit at any time  Patient is aware this is a billable service  Subjective  Jeremy Ortiz is a 61 y o  female with past medical history of Meniere's disease and diabetes he is on video call visit for cough and sore throat  She admits that she recently had dental work on Monday  Also has dentures in place at this time    Admits to having cough with minimal amount of sputum which is white in color  Denies any fever or myalgia  Denies any recent COVID-19 exposure  She wear mask when she goes for grocery shopping  Admits to having sore throat and runny nose  Solitario Diamond HPI     Past Medical History:   Diagnosis Date    Diabetes mellitus ([address] )     Vertigo        Past Surgical History:   Procedure Laterality Date    TUMOR REMOVAL      right leg- benign        Current Outpatient Medications   Medication Sig Dispense Refill    atorvastatin (LIPITOR) 40 mg tablet Take 1 tablet (40 mg total) by mouth daily 90 tablet 3    Blood Glucose Monitoring Suppl (FREESTYLE FREEDOM LITE) w/Device KIT Apply 1 m topically 4 (four) times a day as needed Use as directed  0    diazepam (VALIUM) 5 mg tablet Take 1 tablet (5 mg total) by mouth every 12 (twelve) hours as needed for anxiety for up to 5 days 10 tablet 0    EC-Naproxen 500 MG EC tablet TAKE 1 TABLET BY MOUTH TWICE A DAY WITH MEALS 60 tablet 3    glucose blood (FREESTYLE LITE) test strip Use as instructed 100 each 0    metFORMIN (GLUCOPHAGE) 1000 MG tablet Take 1 tablet (1,000 mg total) by mouth 2 (two) times a day with meals 180 tablet 1    Probiotic Product (PRO-BIOTIC BLEND PO) Take 1 tablet by mouth daily      triamterene-hydrochlorothiazide (DYAZIDE) 37 5-25 mg per capsule Take 1 capsule by mouth daily 90 capsule 3    meclizine (ANTIVERT) 25 mg tablet 1/2 to 1 tablet PO Q8 hours prn vertigo (Patient not taking: Reported on 12/30/2019) 30 tablet 3     No current facility-administered medications for this visit  No Known Allergies    Review of Systems   HENT: Negative for congestion, sinus pressure, sinus pain and sore throat  Respiratory: Negative for apnea, chest tightness, shortness of breath and stridor  Cardiovascular: Negative for chest pain, palpitations and leg swelling  Gastrointestinal: Negative for abdominal pain, constipation and diarrhea         Video Exam    There were no vitals filed for this visit     Physical Exam  Constitutional:       General: She is not in acute distress  Appearance: She is not ill-appearing, toxic-appearing or diaphoretic  HENT:      Head: Normocephalic and atraumatic  Pulmonary:      Effort: Pulmonary effort is normal    Neurological:      Mental Status: She is alert  Psychiatric:         Mood and Affect: Mood normal          Behavior: Behavior normal          Thought Content: Thought content normal          Judgment: Judgment normal           I spent 30 minutes directly with the patient during this visit      VIRTUAL VISIT DISCLAIMER    Brennan Johnson acknowledges that she has consented to an online visit or consultation  She understands that the online visit is based solely on information provided by her, and that, in the absence of a face-to-face physical evaluation by the physician, the diagnosis she receives is both limited and provisional in terms of accuracy and completeness  This is not intended to replace a full medical face-to-face evaluation by the physician  Brennan Johnson understands and accepts these terms

## 2021-06-24 NOTE — TELEPHONE ENCOUNTER
Patient had a virtual today, patient is due "Return in about 1 month (around 7/24/2021) for Next scheduled follow up for basic lab work and diabetic management    " I called pt to schedule appt I left a voicemail for a call back

## 2021-06-28 ENCOUNTER — TELEPHONE (OUTPATIENT)
Dept: INTERNAL MEDICINE CLINIC | Facility: CLINIC | Age: 61
End: 2021-06-28

## 2021-06-28 DIAGNOSIS — J32.9 SINUSITIS, UNSPECIFIED CHRONICITY, UNSPECIFIED LOCATION: Primary | ICD-10-CM

## 2021-06-28 DIAGNOSIS — E11.69 TYPE 2 DIABETES MELLITUS WITH OTHER SPECIFIED COMPLICATION, WITHOUT LONG-TERM CURRENT USE OF INSULIN (HCC): ICD-10-CM

## 2021-06-28 RX ORDER — BLOOD-GLUCOSE METER
KIT MISCELLANEOUS
Qty: 100 EACH | Refills: 0 | Status: SHIPPED | OUTPATIENT
Start: 2021-06-28 | End: 2021-09-28 | Stop reason: SDUPTHER

## 2021-06-28 RX ORDER — CETIRIZINE HYDROCHLORIDE 10 MG/1
10 TABLET ORAL DAILY
Qty: 60 TABLET | Refills: 0 | Status: SHIPPED | OUTPATIENT
Start: 2021-06-28 | End: 2021-07-01

## 2021-06-28 NOTE — TELEPHONE ENCOUNTER
I have ordered Zyrtec for her instead of Gutierrez  Please also ask her to watch video on Youtube on how to properly use Flonase  Thanks

## 2021-06-28 NOTE — TELEPHONE ENCOUNTER
Patient called to state that the loratadine (CLARITIN) 10 mg tablet is not working       fluticasone (FLONASE) 50 mcg/act nasal spray  Doubled up on the fluticasone     Please call patient to consult regarding whether or not a higher dosage is feasible

## 2021-06-28 NOTE — TELEPHONE ENCOUNTER
Name of medication, dose, quantity and frequency    Requested Prescriptions     Pending Prescriptions Disp Refills    glucose blood (FREESTYLE LITE) test strip 100 each 0     Sig: Use as instructed       Number of refills left:  0    Amount of medication left:    Pharmacy verified and updated  yes    Additional information:

## 2021-07-01 ENCOUNTER — HOSPITAL ENCOUNTER (EMERGENCY)
Facility: HOSPITAL | Age: 61
Discharge: HOME | End: 2021-07-01
Attending: EMERGENCY MEDICINE
Payer: COMMERCIAL

## 2021-07-01 ENCOUNTER — APPOINTMENT (EMERGENCY)
Dept: RADIOLOGY | Facility: HOSPITAL | Age: 61
End: 2021-07-01
Payer: COMMERCIAL

## 2021-07-01 ENCOUNTER — TELEPHONE (OUTPATIENT)
Dept: INTERNAL MEDICINE CLINIC | Facility: CLINIC | Age: 61
End: 2021-07-01

## 2021-07-01 VITALS
HEIGHT: 61 IN | RESPIRATION RATE: 18 BRPM | OXYGEN SATURATION: 97 % | TEMPERATURE: 97.9 F | HEART RATE: 94 BPM | BODY MASS INDEX: 36.17 KG/M2 | WEIGHT: 191.58 LBS | SYSTOLIC BLOOD PRESSURE: 161 MMHG | DIASTOLIC BLOOD PRESSURE: 85 MMHG

## 2021-07-01 DIAGNOSIS — J32.9 SINUSITIS, UNSPECIFIED CHRONICITY, UNSPECIFIED LOCATION: ICD-10-CM

## 2021-07-01 DIAGNOSIS — J20.9 ACUTE BRONCHITIS: Primary | ICD-10-CM

## 2021-07-01 LAB
FLUAV RNA NPH QL NAA+PROBE: NORMAL
FLUBV RNA NPH QL NAA+PROBE: NORMAL
RSV RNA NPH QL NAA+PROBE: NORMAL

## 2021-07-01 PROCEDURE — 94640 AIRWAY INHALATION TREATMENT: CPT

## 2021-07-01 PROCEDURE — 71046 X-RAY EXAM CHEST 2 VIEWS: CPT

## 2021-07-01 PROCEDURE — 99284 EMERGENCY DEPT VISIT MOD MDM: CPT | Performed by: PHYSICIAN ASSISTANT

## 2021-07-01 PROCEDURE — 87631 RESP VIRUS 3-5 TARGETS: CPT | Performed by: PHYSICIAN ASSISTANT

## 2021-07-01 PROCEDURE — 99284 EMERGENCY DEPT VISIT MOD MDM: CPT

## 2021-07-01 RX ORDER — AZITHROMYCIN 250 MG/1
250 TABLET, FILM COATED ORAL DAILY
Qty: 6 TABLET | Refills: 0 | Status: SHIPPED | OUTPATIENT
Start: 2021-07-01 | End: 2021-07-06

## 2021-07-01 RX ORDER — ALBUTEROL SULFATE 90 UG/1
2 AEROSOL, METERED RESPIRATORY (INHALATION) EVERY 4 HOURS PRN
Qty: 6.7 G | Refills: 0 | Status: SHIPPED | OUTPATIENT
Start: 2021-07-01 | End: 2022-02-24 | Stop reason: SDUPTHER

## 2021-07-01 RX ORDER — PREDNISONE 20 MG/1
60 TABLET ORAL ONCE
Status: COMPLETED | OUTPATIENT
Start: 2021-07-01 | End: 2021-07-01

## 2021-07-01 RX ORDER — CETIRIZINE HYDROCHLORIDE 10 MG/1
TABLET ORAL
Qty: 90 TABLET | Refills: 1 | Status: SHIPPED | OUTPATIENT
Start: 2021-07-01 | End: 2021-09-28 | Stop reason: ALTCHOICE

## 2021-07-01 RX ORDER — ALBUTEROL SULFATE 2.5 MG/3ML
5 SOLUTION RESPIRATORY (INHALATION) ONCE
Status: COMPLETED | OUTPATIENT
Start: 2021-07-01 | End: 2021-07-01

## 2021-07-01 RX ORDER — METHYLPREDNISOLONE 4 MG/1
TABLET ORAL
Qty: 21 TABLET | Refills: 0 | Status: SHIPPED | OUTPATIENT
Start: 2021-07-01 | End: 2021-09-28 | Stop reason: ALTCHOICE

## 2021-07-01 RX ADMIN — IPRATROPIUM BROMIDE 0.5 MG: 0.5 SOLUTION RESPIRATORY (INHALATION) at 12:14

## 2021-07-01 RX ADMIN — PREDNISONE 60 MG: 20 TABLET ORAL at 12:14

## 2021-07-01 RX ADMIN — ALBUTEROL SULFATE 5 MG: 2.5 SOLUTION RESPIRATORY (INHALATION) at 12:14

## 2021-07-01 NOTE — TELEPHONE ENCOUNTER
Patient called, she had a virtual appt last week w/ Dr Tariq Kirk  Per patient, the meds he prescribed are not working  Pt is experiencing SOB, cant take a deep breath  lots of phlegm, pain in chest when coughing  Cant talk without coughing  Per my conversation with Tiki Nascimento Dr will call the patient to discuss her symptoms

## 2021-07-01 NOTE — TELEPHONE ENCOUNTER
Per verbal communication with Dr Camilla Serrano he reached out to patient and advised her to go to ED to be evaluated

## 2021-07-01 NOTE — Clinical Note
Miranda Martino was seen and treated in our emergency department on 7/1/2021  Diagnosis: Acute bronchitis    Luz Elena    She may return on this date: 07/05/2021         If you have any questions or concerns, please don't hesitate to call        Amy Alfaro PA-C    ______________________________           _______________          _______________  Hospital Representative                              Date                                Time

## 2021-07-01 NOTE — ED PROVIDER NOTES
History  Chief Complaint   Patient presents with    Cough     Patient reports cough and congestion for the last few days; reports that she was started on something for it but it isn't helping; very congested cough when moving around     This is a 10year-old female patient has approximately 10 day history of a cough that is productive  Seen by PCP who put her on any histamines which did not improve her symptoms  Had a COVID test approximately 4 days ago without a positive  No shortness of breath or chest pain  Does have a cough and wheezing that she has never had before  Nothing seems to make it better or worse today she started with some chills  Nontoxic no acute distress denies any headache blurred vision double vision sore throat mild nasal congestion without runny nose no nausea vomiting diarrhea abdominal pain no chest pain or shortness of breath no urinary symptoms  Patient smokes intermittently  Patient denies wheezing in past   Differential diagnosis includes not limited to bronchitis, pneumonia, RSV, COVID less likely          Prior to Admission Medications   Prescriptions Last Dose Informant Patient Reported? Taking?    Blood Glucose Monitoring Suppl (FREESTYLE FREEDOM LITE) w/Device KIT   Yes No   Sig: Apply 1 m topically 4 (four) times a day as needed Use as directed   EC-Naproxen 500 MG EC tablet   No No   Sig: TAKE 1 TABLET BY MOUTH TWICE A DAY WITH MEALS   Probiotic Product (PRO-BIOTIC BLEND PO)   Yes No   Sig: Take 1 tablet by mouth daily   atorvastatin (LIPITOR) 40 mg tablet   No No   Sig: Take 1 tablet (40 mg total) by mouth daily   diazepam (VALIUM) 5 mg tablet   No No   Sig: Take 1 tablet (5 mg total) by mouth every 12 (twelve) hours as needed for anxiety for up to 5 days   fluticasone (FLONASE) 50 mcg/act nasal spray   No No   Si sprays into each nostril daily   glucose blood (FREESTYLE LITE) test strip   No No   Sig: Use as instructed   loratadine (CLARITIN) 10 mg tablet   No No Sig: Take 1 tablet (10 mg total) by mouth daily   meclizine (ANTIVERT) 25 mg tablet   No No   Si/2 to 1 tablet PO Q8 hours prn vertigo   Patient not taking: Reported on 2019   metFORMIN (GLUCOPHAGE) 1000 MG tablet   No No   Sig: Take 1 tablet (1,000 mg total) by mouth 2 (two) times a day with meals   triamterene-hydrochlorothiazide (DYAZIDE) 37 5-25 mg per capsule   No No   Sig: Take 1 capsule by mouth daily      Facility-Administered Medications: None       Past Medical History:   Diagnosis Date    Diabetes mellitus ([facility] [address] )     Vertigo        Past Surgical History:   Procedure Laterality Date    TUMOR REMOVAL      right leg- benign        Family History   Problem Relation Age of Onset    Stroke Mother     Hypertension Mother     Diabetes Mother     Throat cancer Brother     Ovarian cancer Family         2 maternal aunts and 2 cousins     No Known Problems Father     Thyroid cancer Sister     Heart disease Sister     No Known Problems Son     Heart attack Maternal Grandmother 43    Stroke Maternal Grandfather 58    Heart attack Paternal Grandmother     Leukemia Paternal Grandfather     No Known Problems Sister     No Known Problems Son      I have reviewed and agree with the history as documented  E-Cigarette/Vaping    E-Cigarette Use Never User      E-Cigarette/Vaping Substances    Nicotine No     THC No     CBD No     Flavoring No     Other No     Unknown No      Social History     Tobacco Use    Smoking status: Light Tobacco Smoker    Smokeless tobacco: Never Used    Tobacco comment: 1 cig/ day    Vaping Use    Vaping Use: Never used   Substance Use Topics    Alcohol use: Yes     Comment: socially     Drug use: Never       Review of Systems   Constitutional: Negative for fatigue and fever  HENT: Negative for congestion and hearing loss  Eyes: Negative for photophobia and pain  Respiratory: Positive for cough and wheezing   Negative for apnea, choking, chest tightness, shortness of breath and stridor  Cardiovascular: Negative for chest pain and leg swelling  Gastrointestinal: Negative for abdominal pain, diarrhea and nausea  Endocrine: Negative for polydipsia and polyphagia  Genitourinary: Negative for dysuria and frequency  Musculoskeletal: Negative for arthralgias and gait problem  Skin: Negative for pallor and rash  Allergic/Immunologic: Negative for environmental allergies and food allergies  Neurological: Negative for dizziness and headaches  Psychiatric/Behavioral: Negative for agitation and confusion  Physical Exam  Physical Exam  Vitals and nursing note reviewed  Constitutional:       General: She is not in acute distress  Appearance: She is well-developed  She is not ill-appearing, toxic-appearing or diaphoretic  HENT:      Head: Normocephalic and atraumatic  Right Ear: Tympanic membrane, ear canal and external ear normal       Left Ear: Tympanic membrane, ear canal and external ear normal       Nose: Nose normal       Mouth/Throat:      Mouth: Mucous membranes are moist       Pharynx: Oropharynx is clear  No oropharyngeal exudate or posterior oropharyngeal erythema  Eyes:      Conjunctiva/sclera: Conjunctivae normal       Pupils: Pupils are equal, round, and reactive to light  Cardiovascular:      Rate and Rhythm: Normal rate and regular rhythm  Pulmonary:      Effort: Pulmonary effort is normal  No respiratory distress  Breath sounds: No stridor  Wheezing present  No rhonchi or rales  Chest:      Chest wall: No tenderness  Abdominal:      General: Bowel sounds are normal       Palpations: Abdomen is soft  Tenderness: There is no abdominal tenderness  Musculoskeletal:         General: Normal range of motion  Cervical back: Normal range of motion and neck supple  Right lower leg: No edema  Left lower leg: No edema  Skin:     General: Skin is warm        Capillary Refill: Capillary refill takes less than 2 seconds  Neurological:      General: No focal deficit present  Mental Status: She is alert and oriented to person, place, and time  Mental status is at baseline  Psychiatric:         Behavior: Behavior normal          Vital Signs  ED Triage Vitals   Temperature Pulse Respirations Blood Pressure SpO2   07/01/21 1157 07/01/21 1157 07/01/21 1157 07/01/21 1159 07/01/21 1157   97 9 °F (36 6 °C) 94 18 161/85 97 %      Temp Source Heart Rate Source Patient Position - Orthostatic VS BP Location FiO2 (%)   07/01/21 1157 07/01/21 1157 07/01/21 1159 07/01/21 1159 --   Tympanic Monitor Sitting Left arm       Pain Score       07/01/21 1157       No Pain           Vitals:    07/01/21 1157 07/01/21 1159   BP:  161/85   Pulse: 94    Patient Position - Orthostatic VS:  Sitting         Visual Acuity      ED Medications  Medications   albuterol inhalation solution 5 mg (5 mg Nebulization Given 7/1/21 1214)   predniSONE tablet 60 mg (60 mg Oral Given 7/1/21 1214)   ipratropium (ATROVENT) 0 02 % inhalation solution 0 5 mg (0 5 mg Nebulization Given 7/1/21 1214)       Diagnostic Studies  Results Reviewed     Procedure Component Value Units Date/Time    Influenza A/B and RSV PCR - 2 Hour Stat [[id number]]  (Normal) Collected: 07/01/21 1214    Lab Status: Final result Specimen: Nares from Nose Updated: 07/01/21 1328     INFLUENZA A PCR None Detected     INFLUENZA B PCR None Detected     RSV PCR None Detected                 XR chest 2 views   Final Result by Margarito Ron MD (07/01 1314)      No acute cardiopulmonary disease  The findings were discussed with Annmarie Ortiz PA-C at approximately 1310 hours hours on 7/1/2021  Workstation performed: [id number]                    Procedures  Procedures         ED Course  ED Course as of Jul 04 1430   Thu Jul 01, 2021   1316 Upon re-evaluation patient feels better  No longer wheezing    Post slow done by me 325 negative x-ray stable discharge                                              MDM    Disposition  Final diagnoses:   Acute bronchitis     Time reflects when diagnosis was documented in both MDM as applicable and the Disposition within this note     Time User Action Codes Description Comment    7/1/2021  1:19 PM [place], [address] [J20 9] Acute bronchitis       ED Disposition     ED Disposition Condition Date/Time Comment    Discharge Stable Thu Jul 1, 2021  1:19 PM [address] discharge to home/self care              Follow-up Information     Follow up With Specialties Details Why Contact Info Additional [address]  [facility] Schedule an appointment as soon as possible for a visit   [address] [phone]  [facility], [address]          Discharge Medication List as of 7/1/2021  1:29 PM      START taking these medications    Details   albuterol (PROVENTIL HFA,VENTOLIN HFA) 90 mcg/act inhaler Inhale 2 puffs every 4 (four) hours as needed for wheezing, Starting u 7/1/2021, Print      azithromycin (ZITHROMAX) 250 mg tablet Take 1 tablet (250 mg total) by mouth daily for 5 days 2 po day 1 then the 1 po day 2-5, Starting Thu 7/1/2021, Until Tue 7/6/2021, Print      methylPREDNISolone 4 MG tablet therapy pack Use as directed on package start tomorrow, Print         CONTINUE these medications which have NOT CHANGED    Details   atorvastatin (LIPITOR) 40 mg tablet Take 1 tablet (40 mg total) by mouth daily, Starting Wed 7/15/2020, Normal      Blood Glucose Monitoring Suppl (FREESTYLE FREEDOM LITE) w/Device KIT Apply 1 m topically 4 (four) times a day as needed Use as directed, Starting Sat 9/21/2019, Historical Med      diazepam (VALIUM) 5 mg tablet Take 1 tablet (5 mg total) by mouth every 12 (twelve) hours as needed for anxiety for up to 5 days, Starting Tue 9/10/2019, Until Thu 6/24/2021 at 2359, Print      EC-Naproxen 500 MG EC tablet TAKE 1 TABLET BY MOUTH TWICE A DAY WITH MEALS, Normal      fluticasone (FLONASE) 50 mcg/act nasal spray 2 sprays into each nostril daily, Starting Thu 6/24/2021, Normal      glucose blood (FREESTYLE LITE) test strip Use as instructed, Normal      loratadine (CLARITIN) 10 mg tablet Take 1 tablet (10 mg total) by mouth daily, Starting Thu 6/24/2021, Normal      meclizine (ANTIVERT) 25 mg tablet 1/2 to 1 tablet PO Q8 hours prn vertigo, Normal      metFORMIN (GLUCOPHAGE) 1000 MG tablet Take 1 tablet (1,000 mg total) by mouth 2 (two) times a day with meals, Starting Fri 4/9/2021, Normal      Probiotic Product (PRO-BIOTIC BLEND PO) Take 1 tablet by mouth daily, Historical Med      triamterene-hydrochlorothiazide (DYAZIDE) 37 5-25 mg per capsule Take 1 capsule by mouth daily, Starting Wed 7/15/2020, Normal      cetirizine (ZyrTEC) 10 mg tablet Take 1 tablet (10 mg total) by mouth daily, Starting Mon 6/28/2021, Normal           No discharge procedures on file      PDMP Review     None          ED Provider  Electronically Signed by           Amy Alfaro PA-C  07/04/21 0724

## 2021-07-16 DIAGNOSIS — J30.89 ALLERGIC RHINITIS DUE TO OTHER ALLERGIC TRIGGER, UNSPECIFIED SEASONALITY: ICD-10-CM

## 2021-07-19 RX ORDER — FLUTICASONE PROPIONATE 50 MCG
SPRAY, SUSPENSION (ML) NASAL
Qty: 16 ML | Refills: 1 | Status: SHIPPED | OUTPATIENT
Start: 2021-07-19 | End: 2021-08-02 | Stop reason: SDUPTHER

## 2021-07-19 RX ORDER — LORATADINE 10 MG/1
TABLET ORAL
Qty: 30 TABLET | Refills: 1 | Status: SHIPPED | OUTPATIENT
Start: 2021-07-19 | End: 2021-08-02

## 2021-07-27 PROBLEM — E66.812 CLASS 2 SEVERE OBESITY DUE TO EXCESS CALORIES WITH SERIOUS COMORBIDITY IN ADULT (HCC): Status: ACTIVE | Noted: 2021-07-27

## 2021-07-27 PROBLEM — E66.01 CLASS 2 SEVERE OBESITY DUE TO EXCESS CALORIES WITH SERIOUS COMORBIDITY IN ADULT (HCC): Status: ACTIVE | Noted: 2021-07-27

## 2021-08-02 DIAGNOSIS — J30.89 ALLERGIC RHINITIS DUE TO OTHER ALLERGIC TRIGGER, UNSPECIFIED SEASONALITY: ICD-10-CM

## 2021-08-02 RX ORDER — FLUTICASONE PROPIONATE 50 MCG
2 SPRAY, SUSPENSION (ML) NASAL DAILY
Qty: 16 ML | Refills: 0 | Status: SHIPPED | OUTPATIENT
Start: 2021-08-02 | End: 2021-09-28 | Stop reason: ALTCHOICE

## 2021-09-28 ENCOUNTER — OFFICE VISIT (OUTPATIENT)
Dept: INTERNAL MEDICINE CLINIC | Facility: CLINIC | Age: 61
End: 2021-09-28

## 2021-09-28 VITALS
TEMPERATURE: 98.2 F | HEART RATE: 88 BPM | WEIGHT: 175.2 LBS | DIASTOLIC BLOOD PRESSURE: 82 MMHG | BODY MASS INDEX: 33.1 KG/M2 | SYSTOLIC BLOOD PRESSURE: 139 MMHG

## 2021-09-28 DIAGNOSIS — E11.69 TYPE 2 DIABETES MELLITUS WITH OTHER SPECIFIED COMPLICATION, WITHOUT LONG-TERM CURRENT USE OF INSULIN (HCC): Primary | ICD-10-CM

## 2021-09-28 DIAGNOSIS — E11.69 TYPE 2 DIABETES MELLITUS WITH OTHER SPECIFIED COMPLICATION, WITHOUT LONG-TERM CURRENT USE OF INSULIN (HCC): ICD-10-CM

## 2021-09-28 DIAGNOSIS — F41.9 ANXIETY: ICD-10-CM

## 2021-09-28 LAB
LEFT EYE DIABETIC RETINOPATHY: NORMAL
LEFT EYE IMAGE QUALITY: NORMAL
LEFT EYE MACULAR EDEMA: NORMAL
LEFT EYE OTHER RETINOPATHY: NORMAL
RIGHT EYE DIABETIC RETINOPATHY: NORMAL
RIGHT EYE IMAGE QUALITY: NORMAL
RIGHT EYE MACULAR EDEMA: NORMAL
RIGHT EYE OTHER RETINOPATHY: NORMAL
SEVERITY (EYE EXAM): NORMAL

## 2021-09-28 PROCEDURE — 2025F 7 FLD RTA PHOTO W/O RTNOPTHY: CPT | Performed by: INTERNAL MEDICINE

## 2021-09-28 PROCEDURE — 99213 OFFICE O/P EST LOW 20 MIN: CPT | Performed by: INTERNAL MEDICINE

## 2021-09-28 PROCEDURE — 83036 HEMOGLOBIN GLYCOSYLATED A1C: CPT | Performed by: INTERNAL MEDICINE

## 2021-09-28 RX ORDER — CHLORHEXIDINE GLUCONATE 0.12 MG/ML
RINSE ORAL
COMMUNITY
Start: 2021-06-21 | End: 2021-09-28 | Stop reason: ALTCHOICE

## 2021-09-28 RX ORDER — IBUPROFEN 600 MG/1
600 TABLET ORAL EVERY 6 HOURS PRN
COMMUNITY
Start: 2021-06-21

## 2021-09-28 RX ORDER — AMOXICILLIN 500 MG/1
CAPSULE ORAL
COMMUNITY
Start: 2021-06-21 | End: 2021-09-28 | Stop reason: ALTCHOICE

## 2021-09-28 RX ORDER — CITALOPRAM 20 MG/1
20 TABLET ORAL DAILY
Qty: 30 TABLET | Refills: 0 | Status: SHIPPED | OUTPATIENT
Start: 2021-09-28 | End: 2022-05-25

## 2021-09-28 RX ORDER — ACETAMINOPHEN AND CODEINE PHOSPHATE 300; 30 MG/1; MG/1
1 TABLET ORAL
COMMUNITY
Start: 2021-06-21 | End: 2021-09-28 | Stop reason: ALTCHOICE

## 2021-09-28 RX ORDER — METFORMIN HYDROCHLORIDE 750 MG/1
TABLET, EXTENDED RELEASE ORAL
Qty: 180 TABLET | Refills: 3 | Status: SHIPPED | OUTPATIENT
Start: 2021-09-28 | End: 2021-10-09 | Stop reason: SDUPTHER

## 2021-09-28 RX ORDER — HYDROXYZINE HYDROCHLORIDE 10 MG/1
10 TABLET, FILM COATED ORAL EVERY 6 HOURS PRN
Qty: 30 TABLET | Refills: 1 | Status: SHIPPED | OUTPATIENT
Start: 2021-09-28

## 2021-09-28 NOTE — ASSESSMENT & PLAN NOTE
Lab Results   Component Value Date    HGBA1C 6 1 12/30/2019   She decreased her metformin from 2000 mg to 4617-8915 mg due to GI upset  A1c 6 4 today  IRIS exam normal   Feels well      · Switch to extended release metformin 750 mg to take 2 tablets at dinner  · Instructed to take 1 pill in the morning and one in the evening if GI upset occurs  · Continue diet modification and exercise as tolerated  · Ordered CBC, CMP, lipid panel, urine microalbumin/creatinine ratio  · A1c 6 4 today

## 2021-09-28 NOTE — ASSESSMENT & PLAN NOTE
Anxiety with decreased concentration and sleep attributed to stress  Her father lives with her and she is the primary caregiver      · Citalopram 20 mg daily  · Hydroxyzine 10 mg PRN  · Explained to the patient that citalopram takes weeks for full effect and that hydroxyzine can be used in the meantime and for breakthrough anxiety episodes  · F/u in 6 weeks

## 2021-09-28 NOTE — PROGRESS NOTES
[facility]  INTERNAL MEDICINE OFFICE VISIT     PATIENT INFORMATION     Tha Brooks   64 y o  female   MRN: [id number]    ASSESSMENT/PLAN     Problem List Items Addressed This Visit        Endocrine    Diabetes mellitus ([address] ) - Primary       Lab Results   Component Value Date    HGBA1C 6 1 12/30/2019   She decreased her metformin from 2000 mg to 6299-5681 mg due to GI upset  A1c 6 4 today  IRIS exam normal   Feels well  Switch to extended release metformin 750 mg to take 2 tablets at dinner  Instructed to take 1 pill in the morning and one in the evening if GI upset occurs  Continue diet modification and exercise as tolerated  Ordered CBC, CMP, lipid panel, urine microalbumin/creatinine ratio  A1c 6 4 today         Relevant Medications    metFORMIN (GLUCOPHAGE-XR) 750 mg 24 hr tablet    Other Relevant Orders    POCT hemoglobin A1c    Comprehensive metabolic panel    CBC and differential    Lipid panel    Microalbumin / creatinine urine ratio    IRIS Diabetic eye exam       Other    Anxiety     Anxiety with decreased concentration and sleep attributed to stress  Her father lives with her and she is the primary caregiver  Citalopram 20 mg daily  Hydroxyzine 10 mg PRN  Explained to the patient that citalopram takes weeks for full effect and that hydroxyzine can be used in the meantime and for breakthrough anxiety episodes  F/u in 6 weeks          Relevant Medications    citalopram (CeleXA) 20 mg tablet    hydrOXYzine HCL (ATARAX) 10 mg tablet        Declined flu, pneumococcal, and COVID vaccines  Schedule a follow-up appointment in 6 weeks      HEALTH MAINTENANCE     Immunization History   Administered Date(s) Administered    Tdap 06/05/2014     CHIEF COMPLAINT     Chief Complaint   Patient presents with    Follow-up     Diabetes follow up      HISTORY OF PRESENT ILLNESS     63 y/o female with history of type 2 diabetes, hyperlipidemia, and Meniere's disease here for f/u of diabetes  Last labs were in 2019 - a1c 6 1, TSH 0 687, CBC and CMP unremarkable, lipid panel with elevated cholesterol, triglycerides, and LDL, with HDL WNL  Several months ago, she decreased metformin on her own to 3794-5463 mg daily  She had cramping and diarrhea at the 2000 mg dose  States she is careful to limit her carbohydrate and dessert intake  Exercise is limited as this will trigger dizziness from Meniere's  Otherwise, feels well  She has not needed to take meclizine  She declined referrals to a dietician and weight management  She endorses anxiety, decreased sleep (4 hours a night), and difficulty concentrating  She attributes this to her father living with her  He had a stroke and she does not want to send him to a nursing home  A caregiver comes Monday through Friday from 8:30am-12:30pm while she is at work  She says that she and her father get along well but she is anxious about thinking of his needs  No homicidal or suicidal ideations  Declined referral to behavioral health but was amenable to medical therapy  REVIEW OF SYSTEMS     Review of Systems   Constitutional: Negative for chills, fever and unexpected weight change  HENT: Negative for ear pain and sore throat  Eyes: Negative for visual disturbance  Respiratory: Negative for cough and shortness of breath  Cardiovascular: Negative for chest pain and leg swelling  Gastrointestinal: Negative for abdominal pain, constipation, diarrhea and nausea  Endocrine: Negative for cold intolerance and heat intolerance  Genitourinary: Negative for dysuria and hematuria  Musculoskeletal: Negative for arthralgias  Skin: Negative for rash  Neurological: Negative for dizziness and headaches  Psychiatric/Behavioral: Positive for decreased concentration and sleep disturbance (average 4 hrs a night, interrupted)  Negative for dysphoric mood, self-injury and suicidal ideas  The patient is nervous/anxious        OBJECTIVE Vitals:    09/28/21 1504   BP: 139/82   BP Location: Right arm   Patient Position: Sitting   Cuff Size: Standard   Pulse: 88   Temp: 98 2 °F (36 8 °C)   TempSrc: Temporal   Weight: 79 5 kg (175 lb 3 2 oz)     Physical Exam  Constitutional:       General: She is not in acute distress  HENT:      Head: Normocephalic and atraumatic  Nose: No rhinorrhea  Eyes:      General: No scleral icterus  Cardiovascular:      Rate and Rhythm: Normal rate and regular rhythm  Pulses: Normal pulses  Heart sounds: No murmur heard  No gallop  Pulmonary:      Breath sounds: Normal breath sounds  No wheezing, rhonchi or rales  Abdominal:      General: Abdomen is flat  There is no distension  Palpations: Abdomen is soft  There is no mass  Tenderness: There is no abdominal tenderness  There is no guarding  Musculoskeletal:      Right lower leg: No edema  Left lower leg: No edema  Skin:     General: Skin is warm and dry  Coloration: Skin is not jaundiced  Neurological:      Mental Status: She is alert and oriented to person, place, and time     Psychiatric:         Behavior: Behavior normal        CURRENT MEDICATIONS     Current Outpatient Medications:     atorvastatin (LIPITOR) 40 mg tablet, TAKE 1 TABLET BY MOUTH EVERY DAY, Disp: 30 tablet, Rfl: 0    Blood Glucose Monitoring Suppl (FREESTYLE FREEDOM LITE) w/Device KIT, Apply 1 m topically 4 (four) times a day as needed Use as directed, Disp: , Rfl: 0    EC-Naproxen 500 MG EC tablet, TAKE 1 TABLET BY MOUTH TWICE A DAY WITH MEALS, Disp: 60 tablet, Rfl: 3    glucose blood (FREESTYLE LITE) test strip, Use as instructed, Disp: 100 each, Rfl: 0    meclizine (ANTIVERT) 25 mg tablet, 1/2 to 1 tablet PO Q8 hours prn vertigo, Disp: 30 tablet, Rfl: 3    Probiotic Product (PRO-BIOTIC BLEND PO), Take 1 tablet by mouth daily, Disp: , Rfl:     triamterene-hydrochlorothiazide (DYAZIDE) 37 5-25 mg per capsule, TAKE 1 CAPSULE BY MOUTH EVERY DAY, Disp: 30 capsule, Rfl: 0    albuterol (PROVENTIL HFA,VENTOLIN HFA) 90 mcg/act inhaler, Inhale 2 puffs every 4 (four) hours as needed for wheezing (Patient not taking: Reported on 9/28/2021), Disp: 6 7 g, Rfl: 0    citalopram (CeleXA) 20 mg tablet, Take 1 tablet (20 mg total) by mouth daily, Disp: 30 tablet, Rfl: 0    diazepam (VALIUM) 5 mg tablet, Take 1 tablet (5 mg total) by mouth every 12 (twelve) hours as needed for anxiety for up to 5 days (Patient not taking: Reported on 9/28/2021), Disp: 10 tablet, Rfl: 0    hydrOXYzine HCL (ATARAX) 10 mg tablet, Take 1 tablet (10 mg total) by mouth every 6 (six) hours as needed for itching, Disp: 30 tablet, Rfl: 1    ibuprofen (MOTRIN) 600 mg tablet, Take 600 mg by mouth every 6 (six) hours as needed, Disp: , Rfl:     metFORMIN (GLUCOPHAGE-XR) 750 mg 24 hr tablet, Take 2 tablets with dinner , Disp: 180 tablet, Rfl: 3    PAST MEDICAL & SURGICAL HISTORY     Past Medical History:   Diagnosis Date    Diabetes mellitus ([facility] [address] )     Vertigo      Past Surgical History:   Procedure Laterality Date    TUMOR REMOVAL      right leg- benign      SOCIAL & FAMILY HISTORY     Social History     Socioeconomic History    Marital status: Single     Spouse name: Not on file    Number of children: Not on file    Years of education: Not on file    Highest education level: Not on file   Occupational History    Not on file   Tobacco Use    Smoking status: Current Every Day Smoker    Smokeless tobacco: Never Used    Tobacco comment: 1-6 cigarettes a day   Vaping Use    Vaping Use: Never used   Substance and Sexual Activity    Alcohol use: Yes     Comment: socially     Drug use: Never    Sexual activity: Not Currently     Partners: Male     Birth control/protection: Post-menopausal   Other Topics Concern    Not on file   Social History Narrative    Former smoker - As per [employer]    Inadequate exercise    Physically able to work    Sedentary lifestyle     from significant other    Tobacco use - As per [employer]    Unemployed, looking for work      Social Determinants of Health     Financial Resource Strain:     Difficulty of Paying Living Expenses:    Food Insecurity:     Worried About [address] in the Last Year:     [address] in the Last Year:    Transportation Needs:     Lack of Transportation (Medical):      Lack of Transportation (Non-Medical):    Physical Activity:     Days of Exercise per Week:     Minutes of Exercise per Session:    Stress:     Feeling of Stress :    Social Connections:     Frequency of Communication with Friends and Family:     Frequency of Social Gatherings with Friends and Family:     Attends Buddhist Services:     Active Member of Clubs or Organizations:     Attends Club or Organization Meetings:     Marital Status:    Intimate Partner Violence:     Fear of Current or Ex-Partner:     Emotionally Abused:     Physically Abused:     Sexually Abused:      Social History     Substance and Sexual Activity   Alcohol Use Yes    Comment: socially        Social History     Substance and Sexual Activity   Drug Use Never     Social History     Tobacco Use   Smoking Status Current Every Day Smoker   Smokeless Tobacco Never Used   Tobacco Comment    1-6 cigarettes a day     Family History   Problem Relation Age of Onset    Stroke Mother     Hypertension Mother     Diabetes Mother     Throat cancer Brother     Ovarian cancer Family         2 maternal aunts and 2 cousins     No Known Problems Father     Thyroid cancer Sister     Heart disease Sister     No Known Problems Son     Heart attack Maternal Grandmother 43    Stroke Maternal Grandfather 58    Heart attack Paternal Grandmother     Leukemia Paternal Grandfather     No Known Problems Sister     No Known Problems Son          ==  Ginny Liliam,   Internal Medicine Resident, PGY-1  [address]  [facility] , [address] [address] [facility], [address] [facility]  Office: [phone]  Fax: [phone]

## 2021-09-29 LAB — SL AMB POCT HEMOGLOBIN AIC: 6.4 (ref ?–6.5)

## 2021-09-29 PROCEDURE — 3044F HG A1C LEVEL LT 7.0%: CPT | Performed by: INTERNAL MEDICINE

## 2021-09-29 RX ORDER — BLOOD-GLUCOSE METER
KIT MISCELLANEOUS
Qty: 100 EACH | Refills: 5 | Status: SHIPPED | OUTPATIENT
Start: 2021-09-29

## 2021-10-09 ENCOUNTER — NURSE TRIAGE (OUTPATIENT)
Dept: OTHER | Facility: OTHER | Age: 61
End: 2021-10-09

## 2021-10-09 DIAGNOSIS — E11.69 TYPE 2 DIABETES MELLITUS WITH OTHER SPECIFIED COMPLICATION, WITHOUT LONG-TERM CURRENT USE OF INSULIN (HCC): ICD-10-CM

## 2021-10-09 RX ORDER — METFORMIN HYDROCHLORIDE 750 MG/1
TABLET, EXTENDED RELEASE ORAL
Qty: 180 TABLET | Refills: 3 | Status: SHIPPED | OUTPATIENT
Start: 2021-10-09

## 2021-10-19 ENCOUNTER — HOSPITAL ENCOUNTER (OUTPATIENT)
Dept: RADIOLOGY | Age: 61
Discharge: HOME | End: 2021-10-19
Payer: COMMERCIAL

## 2021-10-19 VITALS — WEIGHT: 170 LBS | BODY MASS INDEX: 32.1 KG/M2 | HEIGHT: 61 IN

## 2021-10-19 DIAGNOSIS — Z12.31 ENCOUNTER FOR SCREENING MAMMOGRAM FOR MALIGNANT NEOPLASM OF BREAST: ICD-10-CM

## 2021-10-19 PROCEDURE — 77067 SCR MAMMO BI INCL CAD: CPT

## 2021-10-19 PROCEDURE — 77063 BREAST TOMOSYNTHESIS BI: CPT

## 2021-11-09 ENCOUNTER — HOSPITAL ENCOUNTER (OUTPATIENT)
Dept: MAMMOGRAPHY | Facility: CLINIC | Age: 61
Discharge: HOME | End: 2021-11-09
Payer: COMMERCIAL

## 2021-11-09 ENCOUNTER — HOSPITAL ENCOUNTER (OUTPATIENT)
Dept: ULTRASOUND IMAGING | Facility: CLINIC | Age: 61
Discharge: HOME | End: 2021-11-09
Payer: COMMERCIAL

## 2021-11-09 DIAGNOSIS — R92.8 ABNORMAL SCREENING MAMMOGRAM: ICD-10-CM

## 2021-11-09 PROCEDURE — G0279 TOMOSYNTHESIS, MAMMO: HCPCS

## 2021-11-09 PROCEDURE — 77066 DX MAMMO INCL CAD BI: CPT

## 2021-11-09 PROCEDURE — 76642 ULTRASOUND BREAST LIMITED: CPT

## 2021-11-16 ENCOUNTER — TELEPHONE (OUTPATIENT)
Dept: INTERNAL MEDICINE CLINIC | Facility: CLINIC | Age: 61
End: 2021-11-16

## 2021-11-19 ENCOUNTER — APPOINTMENT (OUTPATIENT)
Dept: LAB | Facility: CLINIC | Age: 61
End: 2021-11-19
Payer: COMMERCIAL

## 2021-11-19 DIAGNOSIS — E11.69 TYPE 2 DIABETES MELLITUS WITH OTHER SPECIFIED COMPLICATION, WITHOUT LONG-TERM CURRENT USE OF INSULIN (HCC): ICD-10-CM

## 2021-11-19 LAB
ALBUMIN SERPL BCP-MCNC: 3.9 G/DL (ref 3.5–5)
ALP SERPL-CCNC: 73 U/L (ref 46–116)
ALT SERPL W P-5'-P-CCNC: 61 U/L (ref 12–78)
ANION GAP SERPL CALCULATED.3IONS-SCNC: 8 MMOL/L (ref 4–13)
AST SERPL W P-5'-P-CCNC: 32 U/L (ref 5–45)
BASOPHILS # BLD AUTO: 0.06 THOUSANDS/ΜL (ref 0–0.1)
BASOPHILS NFR BLD AUTO: 1 % (ref 0–1)
BILIRUB SERPL-MCNC: 0.61 MG/DL (ref 0.2–1)
BUN SERPL-MCNC: 10 MG/DL (ref 5–25)
CALCIUM SERPL-MCNC: 9.4 MG/DL (ref 8.3–10.1)
CHLORIDE SERPL-SCNC: 105 MMOL/L (ref 100–108)
CHOLEST SERPL-MCNC: 129 MG/DL
CO2 SERPL-SCNC: 26 MMOL/L (ref 21–32)
CREAT SERPL-MCNC: 0.71 MG/DL (ref 0.6–1.3)
CREAT UR-MCNC: 260 MG/DL
EOSINOPHIL # BLD AUTO: 0.21 THOUSAND/ΜL (ref 0–0.61)
EOSINOPHIL NFR BLD AUTO: 2 % (ref 0–6)
ERYTHROCYTE [DISTWIDTH] IN BLOOD BY AUTOMATED COUNT: 12.7 % (ref 11.6–15.1)
GFR SERPL CREATININE-BSD FRML MDRD: 92 ML/MIN/1.73SQ M
GLUCOSE P FAST SERPL-MCNC: 119 MG/DL (ref 65–99)
HCT VFR BLD AUTO: 44.9 % (ref 34.8–46.1)
HDLC SERPL-MCNC: 40 MG/DL
HGB BLD-MCNC: 15.2 G/DL (ref 11.5–15.4)
IMM GRANULOCYTES # BLD AUTO: 0.03 THOUSAND/UL (ref 0–0.2)
IMM GRANULOCYTES NFR BLD AUTO: 0 % (ref 0–2)
LDLC SERPL CALC-MCNC: 60 MG/DL (ref 0–100)
LYMPHOCYTES # BLD AUTO: 2.67 THOUSANDS/ΜL (ref 0.6–4.47)
LYMPHOCYTES NFR BLD AUTO: 28 % (ref 14–44)
MCH RBC QN AUTO: 31.7 PG (ref 26.8–34.3)
MCHC RBC AUTO-ENTMCNC: 33.9 G/DL (ref 31.4–37.4)
MCV RBC AUTO: 94 FL (ref 82–98)
MICROALBUMIN UR-MCNC: 41.4 MG/L (ref 0–20)
MICROALBUMIN/CREAT 24H UR: 16 MG/G CREATININE (ref 0–30)
MONOCYTES # BLD AUTO: 0.56 THOUSAND/ΜL (ref 0.17–1.22)
MONOCYTES NFR BLD AUTO: 6 % (ref 4–12)
NEUTROPHILS # BLD AUTO: 6.02 THOUSANDS/ΜL (ref 1.85–7.62)
NEUTS SEG NFR BLD AUTO: 63 % (ref 43–75)
NONHDLC SERPL-MCNC: 89 MG/DL
NRBC BLD AUTO-RTO: 0 /100 WBCS
PLATELET # BLD AUTO: 249 THOUSANDS/UL (ref 149–390)
PMV BLD AUTO: 11.1 FL (ref 8.9–12.7)
POTASSIUM SERPL-SCNC: 3.6 MMOL/L (ref 3.5–5.3)
PROT SERPL-MCNC: 8.1 G/DL (ref 6.4–8.2)
RBC # BLD AUTO: 4.79 MILLION/UL (ref 3.81–5.12)
SODIUM SERPL-SCNC: 139 MMOL/L (ref 136–145)
TRIGL SERPL-MCNC: 143 MG/DL
WBC # BLD AUTO: 9.55 THOUSAND/UL (ref 4.31–10.16)

## 2021-11-19 PROCEDURE — 82043 UR ALBUMIN QUANTITATIVE: CPT | Performed by: STUDENT IN AN ORGANIZED HEALTH CARE EDUCATION/TRAINING PROGRAM

## 2021-11-19 PROCEDURE — 80053 COMPREHEN METABOLIC PANEL: CPT

## 2021-11-19 PROCEDURE — 82570 ASSAY OF URINE CREATININE: CPT | Performed by: STUDENT IN AN ORGANIZED HEALTH CARE EDUCATION/TRAINING PROGRAM

## 2021-11-19 PROCEDURE — 85025 COMPLETE CBC W/AUTO DIFF WBC: CPT

## 2021-11-19 PROCEDURE — 36415 COLL VENOUS BLD VENIPUNCTURE: CPT

## 2021-11-19 PROCEDURE — 3060F POS MICROALBUMINURIA REV: CPT | Performed by: INTERNAL MEDICINE

## 2021-11-19 PROCEDURE — 80061 LIPID PANEL: CPT

## 2021-11-22 ENCOUNTER — OFFICE VISIT (OUTPATIENT)
Dept: INTERNAL MEDICINE CLINIC | Facility: CLINIC | Age: 61
End: 2021-11-22

## 2021-11-22 VITALS
HEART RATE: 94 BPM | TEMPERATURE: 97.6 F | HEIGHT: 61 IN | BODY MASS INDEX: 32.66 KG/M2 | SYSTOLIC BLOOD PRESSURE: 118 MMHG | OXYGEN SATURATION: 96 % | WEIGHT: 173 LBS | DIASTOLIC BLOOD PRESSURE: 75 MMHG

## 2021-11-22 DIAGNOSIS — R92.8 ABNORMALITY OF BOTH BREASTS ON SCREENING MAMMOGRAM: ICD-10-CM

## 2021-11-22 DIAGNOSIS — E11.69 TYPE 2 DIABETES MELLITUS WITH OTHER SPECIFIED COMPLICATION, WITHOUT LONG-TERM CURRENT USE OF INSULIN (HCC): ICD-10-CM

## 2021-11-22 DIAGNOSIS — F41.9 ANXIETY: Primary | ICD-10-CM

## 2021-11-22 PROCEDURE — 3725F SCREEN DEPRESSION PERFORMED: CPT | Performed by: INTERNAL MEDICINE

## 2021-11-22 PROCEDURE — 3008F BODY MASS INDEX DOCD: CPT | Performed by: INTERNAL MEDICINE

## 2021-11-22 PROCEDURE — 99213 OFFICE O/P EST LOW 20 MIN: CPT | Performed by: INTERNAL MEDICINE

## 2021-11-25 DIAGNOSIS — E11.69 TYPE 2 DIABETES MELLITUS WITH OTHER SPECIFIED COMPLICATION, WITHOUT LONG-TERM CURRENT USE OF INSULIN (HCC): ICD-10-CM

## 2021-11-25 DIAGNOSIS — H81.09 MENIERE'S DISEASE, UNSPECIFIED LATERALITY: ICD-10-CM

## 2021-11-26 RX ORDER — ATORVASTATIN CALCIUM 40 MG/1
TABLET, FILM COATED ORAL
Qty: 30 TABLET | Refills: 0 | Status: SHIPPED | OUTPATIENT
Start: 2021-11-26 | End: 2021-12-13

## 2021-11-26 RX ORDER — TRIAMTERENE AND HYDROCHLOROTHIAZIDE 37.5; 25 MG/1; MG/1
CAPSULE ORAL
Qty: 30 CAPSULE | Refills: 0 | Status: SHIPPED | OUTPATIENT
Start: 2021-11-26 | End: 2021-12-13

## 2021-12-01 ENCOUNTER — HOSPITAL ENCOUNTER (OUTPATIENT)
Dept: MAMMOGRAPHY | Facility: CLINIC | Age: 61
Discharge: HOME | End: 2021-12-01
Payer: COMMERCIAL

## 2021-12-01 ENCOUNTER — HOSPITAL ENCOUNTER (OUTPATIENT)
Dept: ULTRASOUND IMAGING | Facility: CLINIC | Age: 61
Discharge: HOME | End: 2021-12-01
Payer: COMMERCIAL

## 2021-12-01 VITALS
HEIGHT: 61 IN | BODY MASS INDEX: 32.66 KG/M2 | WEIGHT: 173 LBS | HEART RATE: 84 BPM | DIASTOLIC BLOOD PRESSURE: 80 MMHG | SYSTOLIC BLOOD PRESSURE: 140 MMHG

## 2021-12-01 VITALS — WEIGHT: 173 LBS | HEIGHT: 61 IN | BODY MASS INDEX: 32.66 KG/M2

## 2021-12-01 DIAGNOSIS — R92.8 ABNORMAL MAMMOGRAM: ICD-10-CM

## 2021-12-01 PROCEDURE — 88342 IMHCHEM/IMCYTCHM 1ST ANTB: CPT | Performed by: PATHOLOGY

## 2021-12-01 PROCEDURE — 19081 BX BREAST 1ST LESION STRTCTC: CPT

## 2021-12-01 PROCEDURE — 88305 TISSUE EXAM BY PATHOLOGIST: CPT | Performed by: PATHOLOGY

## 2021-12-01 PROCEDURE — A4648 IMPLANTABLE TISSUE MARKER: HCPCS

## 2021-12-01 PROCEDURE — 19082 BX BREAST ADD LESION STRTCTC: CPT

## 2021-12-01 PROCEDURE — 88341 IMHCHEM/IMCYTCHM EA ADD ANTB: CPT | Performed by: PATHOLOGY

## 2021-12-01 RX ORDER — LIDOCAINE HYDROCHLORIDE AND EPINEPHRINE BITARTRATE 20; .01 MG/ML; MG/ML
10 INJECTION, SOLUTION SUBCUTANEOUS ONCE
Status: COMPLETED | OUTPATIENT
Start: 2021-12-01 | End: 2021-12-01

## 2021-12-01 RX ORDER — LIDOCAINE HYDROCHLORIDE 10 MG/ML
5 INJECTION, SOLUTION EPIDURAL; INFILTRATION; INTRACAUDAL; PERINEURAL ONCE
Status: COMPLETED | OUTPATIENT
Start: 2021-12-01 | End: 2021-12-01

## 2021-12-01 RX ORDER — LIDOCAINE HYDROCHLORIDE 10 MG/ML
5 INJECTION, SOLUTION EPIDURAL; INFILTRATION; INTRACAUDAL; PERINEURAL ONCE
Status: DISCONTINUED | OUTPATIENT
Start: 2021-12-01 | End: 2021-12-02 | Stop reason: HOSPADM

## 2021-12-01 RX ADMIN — LIDOCAINE HYDROCHLORIDE AND EPINEPHRINE 10 ML: 20; 10 INJECTION, SOLUTION INFILTRATION; PERINEURAL at 09:38

## 2021-12-01 RX ADMIN — LIDOCAINE HYDROCHLORIDE 5 ML: 10 INJECTION, SOLUTION EPIDURAL; INFILTRATION; INTRACAUDAL; PERINEURAL at 09:38

## 2021-12-01 RX ADMIN — LIDOCAINE HYDROCHLORIDE 5 ML: 10 INJECTION, SOLUTION EPIDURAL; INFILTRATION; INTRACAUDAL; PERINEURAL at 10:08

## 2021-12-01 RX ADMIN — LIDOCAINE HYDROCHLORIDE AND EPINEPHRINE 10 ML: 20; 10 INJECTION, SOLUTION INFILTRATION; PERINEURAL at 10:08

## 2021-12-06 ENCOUNTER — TELEPHONE (OUTPATIENT)
Dept: HEMATOLOGY ONCOLOGY | Facility: CLINIC | Age: 61
End: 2021-12-06

## 2021-12-06 ENCOUNTER — TELEPHONE (OUTPATIENT)
Dept: MAMMOGRAPHY | Facility: CLINIC | Age: 61
End: 2021-12-06

## 2021-12-07 ENCOUNTER — TELEPHONE (OUTPATIENT)
Dept: MAMMOGRAPHY | Facility: CLINIC | Age: 61
End: 2021-12-07

## 2021-12-09 ENCOUNTER — TELEPHONE (OUTPATIENT)
Dept: SURGICAL ONCOLOGY | Facility: CLINIC | Age: 61
End: 2021-12-09

## 2021-12-15 ENCOUNTER — HOSPITAL ENCOUNTER (OUTPATIENT)
Dept: ULTRASOUND IMAGING | Facility: CLINIC | Age: 61
Discharge: HOME | End: 2021-12-15

## 2021-12-15 ENCOUNTER — HOSPITAL ENCOUNTER (OUTPATIENT)
Dept: MAMMOGRAPHY | Facility: CLINIC | Age: 61
Discharge: HOME | End: 2021-12-15

## 2021-12-15 VITALS — SYSTOLIC BLOOD PRESSURE: 135 MMHG | DIASTOLIC BLOOD PRESSURE: 77 MMHG | HEART RATE: 85 BPM

## 2021-12-15 VITALS — BODY MASS INDEX: 32.66 KG/M2 | WEIGHT: 173 LBS | HEIGHT: 61 IN

## 2021-12-15 DIAGNOSIS — R92.8 ABNORMAL MAMMOGRAM: ICD-10-CM

## 2021-12-15 RX ORDER — LIDOCAINE HYDROCHLORIDE 10 MG/ML
5 INJECTION, SOLUTION EPIDURAL; INFILTRATION; INTRACAUDAL; PERINEURAL ONCE
Status: DISCONTINUED | OUTPATIENT
Start: 2021-12-15 | End: 2021-12-16 | Stop reason: HOSPADM

## 2021-12-18 ENCOUNTER — IMMUNIZATIONS (OUTPATIENT)
Dept: FAMILY MEDICINE CLINIC | Facility: HOSPITAL | Age: 61
End: 2021-12-18

## 2021-12-18 DIAGNOSIS — Z23 ENCOUNTER FOR IMMUNIZATION: Primary | ICD-10-CM

## 2021-12-18 PROCEDURE — 91300 COVID-19 PFIZER VACC 0.3 ML: CPT

## 2021-12-18 PROCEDURE — 0001A COVID-19 PFIZER VACC 0.3 ML: CPT

## 2021-12-27 ENCOUNTER — TELEPHONE (OUTPATIENT)
Dept: INTERNAL MEDICINE CLINIC | Facility: CLINIC | Age: 61
End: 2021-12-27

## 2021-12-27 DIAGNOSIS — R05.9 COUGH: Primary | ICD-10-CM

## 2021-12-27 DIAGNOSIS — R52 BODY ACHES: ICD-10-CM

## 2022-01-07 PROBLEM — Z12.31 SCREENING MAMMOGRAM, ENCOUNTER FOR: Status: RESOLVED | Noted: 2019-09-19 | Resolved: 2022-01-07

## 2022-01-07 PROBLEM — R92.8 ABNORMALITY OF BOTH BREASTS ON SCREENING MAMMOGRAM: Status: RESOLVED | Noted: 2021-11-22 | Resolved: 2022-01-07

## 2022-01-08 ENCOUNTER — IMMUNIZATIONS (OUTPATIENT)
Dept: FAMILY MEDICINE CLINIC | Facility: HOSPITAL | Age: 62
End: 2022-01-08

## 2022-01-08 DIAGNOSIS — Z23 ENCOUNTER FOR IMMUNIZATION: Primary | ICD-10-CM

## 2022-01-08 PROCEDURE — 0002A COVID-19 PFIZER VACC 0.3 ML: CPT

## 2022-01-08 PROCEDURE — 91300 COVID-19 PFIZER VACC 0.3 ML: CPT

## 2022-02-24 ENCOUNTER — HOSPITAL ENCOUNTER (OUTPATIENT)
Dept: RADIOLOGY | Facility: HOSPITAL | Age: 62
Discharge: HOME | End: 2022-02-24
Payer: COMMERCIAL

## 2022-02-24 ENCOUNTER — TELEMEDICINE (OUTPATIENT)
Dept: INTERNAL MEDICINE CLINIC | Facility: CLINIC | Age: 62
End: 2022-02-24

## 2022-02-24 DIAGNOSIS — J20.9 ACUTE BRONCHITIS: Primary | ICD-10-CM

## 2022-02-24 DIAGNOSIS — J20.9 ACUTE BRONCHITIS: ICD-10-CM

## 2022-02-24 PROCEDURE — 71046 X-RAY EXAM CHEST 2 VIEWS: CPT

## 2022-02-24 PROCEDURE — 99213 OFFICE O/P EST LOW 20 MIN: CPT | Performed by: INTERNAL MEDICINE

## 2022-02-24 RX ORDER — ALBUTEROL SULFATE 90 UG/1
2 AEROSOL, METERED RESPIRATORY (INHALATION) EVERY 4 HOURS PRN
Qty: 6.7 G | Refills: 0 | Status: SHIPPED | OUTPATIENT
Start: 2022-02-24 | End: 2022-05-25 | Stop reason: SDUPTHER

## 2022-02-24 RX ORDER — AZITHROMYCIN 250 MG/1
TABLET, FILM COATED ORAL
Qty: 6 TABLET | Refills: 0 | Status: SHIPPED | OUTPATIENT
Start: 2022-02-24 | End: 2022-03-01

## 2022-02-24 NOTE — PROGRESS NOTES
Virtual Regular Visit    Verification of patient location:    Patient is located in the following state in which I hold an active license [place]      Assessment/Plan:    Problem List Items Addressed This Visit     None      Visit Diagnoses     Acute bronchitis    -  Primary    Relevant Medications    azithromycin (Zithromax) 250 mg tablet    dextromethorphan-guaifenesin (MUCINEX DM)  MG per 12 hr tablet    albuterol (PROVENTIL HFA,VENTOLIN HFA) 90 mcg/act inhaler    Other Relevant Orders    XR chest pa & lateral        Suspect patient's symptoms secondary to acute bronchitis  Recommend obtaining chest x-ray  Will prescribe Z-Tarun, and Mucinex DM given persistent symptoms for multiple weeks  Will also prescribe albuterol inhaler to be used as needed for wheezing  Recommend calling clinic if no improvement in the next couple of days  Recommend tobacco cessation at least during acute illness  Reason for visit is   Chief Complaint   Patient presents with    Cough     congestion- wheezing- patient unable to sleep    Virtual Regular Visit        Encounter provider Benito Mendoza DO    Provider located at [facility] [address] [address] [phone]      Recent Visits  No visits were found meeting these conditions  Showing recent visits within past 7 days and meeting all other requirements  Today's Visits  Date Type Provider Dept   02/24/22 Telemedicine [facility] today's visits and meeting all other requirements  Future Appointments  No visits were found meeting these conditions  Showing future appointments within next 150 days and meeting all other requirements       The patient was identified by name and date of birth   Bill Martinez was informed that this is a telemedicine visit and that the visit is being conducted through [address] [place] [address] Now and patient was informed that this is a secure, HIPAA-compliant platform  She agrees to proceed     My office door was closed  No one else was in the room  She acknowledged consent and understanding of privacy and security of the video platform  The patient has agreed to participate and understands they can discontinue the visit at any time  Patient is aware this is a billable service  Subjective  Donaldo Ashraf is a 64 y o  female presenting with acute complain of productive cough and wheezing worsening over the past month  Patient reports that she likely had COVID-19 infection during Christmas of 2021 however she was not tested  Since then she has been having persistent cough  Lately however this, has been productive (nonpurulent) and she has noted congestion, mild shortness of breath and wheezing as well  This has been going on since January  Denies any history of allergies  Patient smokes 2-3 cigarettes a day  Denies any other acute complains of fevers, chills, nausea, vomiting, abdominal pain, diarrhea, sore throat  Denies any sick contacts      Past Medical History:   Diagnosis Date    Diabetes mellitus ([facility] [address] )     Vertigo        Past Surgical History:   Procedure Laterality Date    BREAST BIOPSY Bilateral 12/01/2021    stereo x2    BREAST BIOPSY Right 12/01/2021    MAMMO STEREOTACTIC BREAST BIOPSY LEFT (ALL INC) Left 12/1/2021    MAMMO STEREOTACTIC BREAST BIOPSY RIGHT (ALL INC) Right 12/1/2021    TUMOR REMOVAL      right leg- benign        Current Outpatient Medications   Medication Sig Dispense Refill    atorvastatin (LIPITOR) 40 mg tablet TAKE 1 TABLET BY MOUTH EVERY DAY 90 tablet 1    Blood Glucose Monitoring Suppl (FREESTYLE FREEDOM LITE) w/Device KIT Apply 1 m topically 4 (four) times a day as needed Use as directed  0    citalopram (CeleXA) 20 mg tablet Take 1 tablet (20 mg total) by mouth daily 30 tablet 0    diazepam (VALIUM) 5 mg tablet Take 1 tablet (5 mg total) by mouth every 12 (twelve) hours as needed for anxiety for up to 5 days 10 tablet 0    EC-Naproxen 500 MG EC tablet TAKE 1 TABLET BY MOUTH TWICE A DAY WITH MEALS 60 tablet 3    glucose blood (FREESTYLE LITE) test strip Use as instructed 100 each 5    hydrOXYzine HCL (ATARAX) 10 mg tablet Take 1 tablet (10 mg total) by mouth every 6 (six) hours as needed for itching 30 tablet 1    ibuprofen (MOTRIN) 600 mg tablet Take 600 mg by mouth every 6 (six) hours as needed      meclizine (ANTIVERT) 25 mg tablet 1/2 to 1 tablet PO Q8 hours prn vertigo 30 tablet 3    metFORMIN (GLUCOPHAGE-XR) 750 mg 24 hr tablet Take 2 tablets with dinner  180 tablet 3    Probiotic Product (PRO-BIOTIC BLEND PO) Take 1 tablet by mouth daily      triamterene-hydrochlorothiazide (DYAZIDE) 37 5-25 mg per capsule TAKE 1 CAPSULE BY MOUTH EVERY DAY 90 capsule 1    albuterol (PROVENTIL HFA,VENTOLIN HFA) 90 mcg/act inhaler Inhale 2 puffs every 4 (four) hours as needed for wheezing 6 7 g 0    azithromycin (Zithromax) 250 mg tablet Take 2 tablets (500 mg total) by mouth daily for 1 day, THEN 1 tablet (250 mg total) daily for 4 days  6 tablet 0    dextromethorphan-guaifenesin (MUCINEX DM)  MG per 12 hr tablet Take 1 tablet by mouth every 12 (twelve) hours for 7 days 14 tablet 0     No current facility-administered medications for this visit  No Known Allergies    Review of Systems     ROS negative unless stated in subjective  Video Exam    Vitals:       Physical Exam  Constitutional:       General: She is not in acute distress  Appearance: Normal appearance  She is not ill-appearing  Comments: Nontoxic appearing  Sounds congested  Appears to have productive cough   HENT:      Head: Normocephalic and atraumatic  Pulmonary:      Effort: Pulmonary effort is normal    Neurological:      Mental Status: She is alert and oriented to person, place, and time            I spent 20 minutes directly with the patient during this visit    VIRTUAL VISIT Alyce Edge Lucas Austin verbally agrees to participate in [address]  Pt is aware that [address] could be limited without vital signs or the ability to perform a full hands-on physical exam  Luz Elena Austin understands she or the provider may request at any time to terminate the video visit and request the patient to seek care or treatment in person      DO [facility] [place] [facility] Internal Medicine PGY-2

## 2022-03-07 ENCOUNTER — TELEPHONE (OUTPATIENT)
Dept: INTERNAL MEDICINE CLINIC | Facility: CLINIC | Age: 62
End: 2022-03-07

## 2022-03-07 NOTE — TELEPHONE ENCOUNTER
Please review and advise, is there something the patient can take since she does not want to come into office?

## 2022-03-07 NOTE — TELEPHONE ENCOUNTER
Patient called regarding her symptoms that started at 3am  cramping and pain in her lower stomach  she feels  a little cold and clammy  Unable to go to bathroom  Patient did not want to schedule appt  Would like to speak to Clinical staff to see what she could take for her symptoms  No appts available for today please call patient

## 2022-03-07 NOTE — TELEPHONE ENCOUNTER
No, I would not advise anything for these symptoms over the phone  Would recommend patient come in for in-person evaluation so that we can talk about her symptoms and so that she can be examined  Would not offer a telemedicine visit either given her abdominal symptoms and difficulty voiding  Advised that if her symptoms become acutely worse, she develops fever, chills, chest pains, shortness of breath, etc , she report to the ED for prompt in-person thorough evaluation  Thank you

## 2022-03-08 NOTE — TELEPHONE ENCOUNTER
Called and advised patient  Per patient she is feeling much better  She is no longer experiencing any symptoms  Per patient she is just really tired  Patient does not feel as she needs an appointment at this time  I did advise her if symptoms become worse she should go to ED  Patient understood  I also advised patient to call our office if there is any other problems or concerns  Patient understood and had no further questions

## 2022-03-21 ENCOUNTER — TELEPHONE (OUTPATIENT)
Dept: INTERNAL MEDICINE CLINIC | Facility: CLINIC | Age: 62
End: 2022-03-21

## 2022-05-25 ENCOUNTER — OFFICE VISIT (OUTPATIENT)
Dept: INTERNAL MEDICINE CLINIC | Facility: CLINIC | Age: 62
End: 2022-05-25

## 2022-05-25 VITALS
DIASTOLIC BLOOD PRESSURE: 77 MMHG | TEMPERATURE: 97.8 F | SYSTOLIC BLOOD PRESSURE: 114 MMHG | OXYGEN SATURATION: 93 % | WEIGHT: 164.8 LBS | BODY MASS INDEX: 32.35 KG/M2 | HEART RATE: 94 BPM | HEIGHT: 60 IN

## 2022-05-25 DIAGNOSIS — J20.9 ACUTE BRONCHITIS: ICD-10-CM

## 2022-05-25 DIAGNOSIS — I10 HYPERTENSION, UNSPECIFIED TYPE: ICD-10-CM

## 2022-05-25 DIAGNOSIS — E11.69 TYPE 2 DIABETES MELLITUS WITH OTHER SPECIFIED COMPLICATION, WITHOUT LONG-TERM CURRENT USE OF INSULIN (HCC): Primary | ICD-10-CM

## 2022-05-25 DIAGNOSIS — Z12.11 ENCOUNTER FOR SCREENING COLONOSCOPY: ICD-10-CM

## 2022-05-25 LAB — SL AMB POCT HEMOGLOBIN AIC: 5.8 (ref ?–6.5)

## 2022-05-25 PROCEDURE — 3044F HG A1C LEVEL LT 7.0%: CPT | Performed by: INTERNAL MEDICINE

## 2022-05-25 PROCEDURE — 3008F BODY MASS INDEX DOCD: CPT | Performed by: INTERNAL MEDICINE

## 2022-05-25 PROCEDURE — 83036 HEMOGLOBIN GLYCOSYLATED A1C: CPT | Performed by: INTERNAL MEDICINE

## 2022-05-25 PROCEDURE — 3725F SCREEN DEPRESSION PERFORMED: CPT | Performed by: INTERNAL MEDICINE

## 2022-05-25 PROCEDURE — 99213 OFFICE O/P EST LOW 20 MIN: CPT | Performed by: INTERNAL MEDICINE

## 2022-05-25 RX ORDER — ALBUTEROL SULFATE 90 UG/1
2 AEROSOL, METERED RESPIRATORY (INHALATION) EVERY 4 HOURS PRN
Qty: 6.7 G | Refills: 3 | Status: SHIPPED | OUTPATIENT
Start: 2022-05-25

## 2022-05-25 NOTE — PROGRESS NOTES
Patient's shoes and socks removed  Right Foot/Ankle   Right Foot Inspection  Skin Exam: skin normal and skin intact  No dry skin, no warmth, no callus, no erythema, no maceration, no abnormal color, no pre-ulcer, no ulcer and no callus  Toe Exam: ROM and strength within normal limits  No swelling and no tenderness    Sensory   Vibration: intact  Proprioception: intact  Monofilament testing: intact    Vascular  Capillary refills: < 3 seconds  The right DP pulse is 2+  The right PT pulse is 2+  Left Foot/Ankle  Left Foot Inspection  Skin Exam: skin normal and skin intact  No dry skin, no warmth, no erythema, no maceration, normal color, no pre-ulcer, no ulcer and no callus  Toe Exam: ROM and strength within normal limits  No swelling and no tenderness  Sensory   Vibration: intact  Proprioception: intact  Monofilament testing: intact    Vascular  Capillary refills: < 3 seconds  The left DP pulse is 2+  The left PT pulse is 2+  Assign Risk Category  No deformity present  No loss of protective sensation  No weak pulses  Risk: 0   diabe  INTERNAL MEDICINE FOLLOW-UP OFFICE VISIT  [facility]  [address], [address]    NAME: Ariana Mata  AGE: 64 y o  SEX: female    DATE OF ENCOUNTER: 5/25/2022    Assessment and Plan     1  Type 2 diabetes mellitus with other specified complication, without long-term current use of insulin ([facility])  A1c improved 5 8 from 6 4  Patient has started exercising and has been  eating a healthier diet  Patient was encouraged today to continue good lifestyle changes will not make any changes to her current regimen    -  Continue metformin 750 mg  Daily     - POCT hemoglobin A1c    2  Hypertension, unspecified type  /77  Will not make any changes to her medication  -  Continue triamterene- HCTZ  37 5- 25 daily  She will need a BMP which has been ordered    - Basic metabolic panel; Future    3   Encounter for screening colonoscopy    - Ambulatory referral for colonoscopy; Future    4  Acute bronchitis    - albuterol (PROVENTIL HFA,VENTOLIN HFA) 90 mcg/act inhaler; Inhale 2 puffs every 4 (four) hours as needed for wheezing  Dispense: 6 7 g; Refill: 3    Orders Placed This Encounter   Procedures    Basic metabolic panel    Ambulatory referral for colonoscopy    POCT hemoglobin A1c       - Counseling Documentation: patient was counseled regarding: impressions, risks and benefits of treatment options and importance of compliance with treatment    Chief Complaint     Chief Complaint   Patient presents with    Follow-up       History of Present Illness     HPI   patient is a 60-year-old female with a past medical history of hypertension, anxiety, diabetes A1c 5 8, hyperlipidemia presents for follow-up of her anxiety  At her last visit she complained of increased anxiety due to the fact that she had to take care of her father at home, had a lot of responsibilities she was started on Celexa 20 mg daily and also was given Atarax 10 mg p r n     Today she states that she took Celexa for only 2 days and has not taking Atarax  She claims start her depression/ anxiety is improved and under control, denies SI /HI  She reports that her father is now in a nursing facility which has really is up a lot of burden and allowed her to focus on herself  A1c today is 5 8, she is compliant with her metformin 750 mg daily  Blood pressure today is 114/77 and she is in compliance with her medication/ diet  Patient reports increased exercising, she denies headaches, lightheadedness, chest pain, shortness of breath, palpitation, abdominal pain, N/ V/ C/D patient looks stable not in any obvious distress on room air    The following portions of the patient's history were reviewed and updated as appropriate: allergies, current medications, past family history, past medical history, past social history, past surgical history and problem list     Review of Systems     Review of Systems   Constitutional: Negative for chills and fever  HENT: Negative for ear pain and sore throat  Eyes: Negative for pain and visual disturbance  Respiratory: Negative for cough and shortness of breath  Cardiovascular: Negative for chest pain and palpitations  Gastrointestinal: Negative for abdominal pain and vomiting  Genitourinary: Negative for dysuria and hematuria  Musculoskeletal: Negative for arthralgias and back pain  Skin: Negative for color change and rash  Neurological: Negative for seizures and syncope  All other systems reviewed and are negative  Active Problem List     Patient Active Problem List   Diagnosis    Meniere disease    Diabetes mellitus ([address] )    Chronic pain of right knee    Elevated total protein    Class 2 severe obesity due to excess calories with serious comorbidity in adult (HCC)    Anxiety       Objective     /77 (BP Location: Left arm, Patient Position: Sitting, Cuff Size: Large)   Pulse 94   Temp 97 8 °F (36 6 °C) (Temporal)   Ht 5' (1 524 m)   Wt 74 8 kg (164 lb 12 8 oz)   LMP 10/01/2010 (Approximate)   SpO2 93%   BMI 32 19 kg/m²     Physical Exam  Vitals reviewed  Constitutional:       Appearance: She is obese  HENT:      Nose: Nose normal  No congestion  Mouth/Throat:      Mouth: Mucous membranes are moist       Pharynx: Oropharynx is clear  Eyes:      General: No scleral icterus  Extraocular Movements: Extraocular movements intact  Cardiovascular:      Pulses: Normal pulses  no weak pulses          Dorsalis pedis pulses are 2+ on the right side and 2+ on the left side  Posterior tibial pulses are 2+ on the right side and 2+ on the left side  Heart sounds: Normal heart sounds  No murmur heard  Pulmonary:      Effort: Pulmonary effort is normal       Breath sounds: Normal breath sounds     Abdominal:      General: Bowel sounds are normal    Musculoskeletal: General: No swelling or tenderness  Feet:      Right foot:      Skin integrity: No ulcer, skin breakdown, erythema, warmth, callus or dry skin  Left foot:      Skin integrity: No ulcer, skin breakdown, erythema, warmth, callus or dry skin  Skin:     General: Skin is warm  Capillary Refill: Capillary refill takes less than 2 seconds  Neurological:      General: No focal deficit present  Mental Status: She is alert and oriented to person, place, and time     Psychiatric:         Mood and Affect: Mood normal          Behavior: Behavior normal          Pertinent Laboratory/Diagnostic Studies:  CBC:   Lab Results   Component Value Date/Time    WBC 9 55 11/19/2021 08:10 AM    RBC 4 79 11/19/2021 08:10 AM    HGB 15 2 11/19/2021 08:10 AM    HCT 44 9 11/19/2021 08:10 AM    MCV 94 11/19/2021 08:10 AM    MCH 31 7 11/19/2021 08:10 AM    MCHC 33 9 11/19/2021 08:10 AM    RDW 12 7 11/19/2021 08:10 AM    MPV 11 1 11/19/2021 08:10 AM     11/19/2021 08:10 AM    NRBC 0 11/19/2021 08:10 AM    NEUTOPHILPCT 63 11/19/2021 08:10 AM    LYMPHOPCT 28 11/19/2021 08:10 AM    MONOPCT 6 11/19/2021 08:10 AM    EOSPCT 2 11/19/2021 08:10 AM    BASOPCT 1 11/19/2021 08:10 AM    NEUTROABS 6 02 11/19/2021 08:10 AM    LYMPHSABS 2 67 11/19/2021 08:10 AM    MONOSABS 0 56 11/19/2021 08:10 AM    EOSABS 0 21 11/19/2021 08:10 AM     Chemistry Profile:   Lab Results   Component Value Date/Time    K 3 6 11/19/2021 08:10 AM     11/19/2021 08:10 AM    CO2 26 11/19/2021 08:10 AM    BUN 10 11/19/2021 08:10 AM    CREATININE 0 71 11/19/2021 08:10 AM    GLUC 107 02/18/2020 09:27 AM    GLUC 110 03/23/2016 10:32 AM    GLUF 119 (H) 11/19/2021 08:10 AM    CALCIUM 9 4 11/19/2021 08:10 AM    MG 2 1 12/30/2019 11:40 AM    AST 32 11/19/2021 08:10 AM    ALT 61 11/19/2021 08:10 AM    ALKPHOS 73 11/19/2021 08:10 AM    EGFR 92 11/19/2021 08:10 AM       Current Medications     Current Outpatient Medications:     albuterol (Edra Jah HFA) 90 mcg/act inhaler, Inhale 2 puffs every 4 (four) hours as needed for wheezing, Disp: 6 7 g, Rfl: 3    atorvastatin (LIPITOR) 40 mg tablet, TAKE 1 TABLET BY MOUTH EVERY DAY, Disp: 90 tablet, Rfl: 1    Blood Glucose Monitoring Suppl (FREESTYLE FREEDOM LITE) w/Device KIT, Apply 1 m topically 4 (four) times a day as needed Use as directed, Disp: , Rfl: 0    citalopram (CeleXA) 20 mg tablet, Take 1 tablet (20 mg total) by mouth daily, Disp: 30 tablet, Rfl: 0    diazepam (VALIUM) 5 mg tablet, Take 1 tablet (5 mg total) by mouth every 12 (twelve) hours as needed for anxiety for up to 5 days, Disp: 10 tablet, Rfl: 0    EC-Naproxen 500 MG EC tablet, TAKE 1 TABLET BY MOUTH TWICE A DAY WITH MEALS, Disp: 60 tablet, Rfl: 3    glucose blood (FREESTYLE LITE) test strip, Use as instructed, Disp: 100 each, Rfl: 5    hydrOXYzine HCL (ATARAX) 10 mg tablet, Take 1 tablet (10 mg total) by mouth every 6 (six) hours as needed for itching, Disp: 30 tablet, Rfl: 1    ibuprofen (MOTRIN) 600 mg tablet, Take 600 mg by mouth every 6 (six) hours as needed, Disp: , Rfl:     metFORMIN (GLUCOPHAGE-XR) 750 mg 24 hr tablet, Take 2 tablets with dinner , Disp: 180 tablet, Rfl: 3    Probiotic Product (PRO-BIOTIC BLEND PO), Take 1 tablet by mouth daily, Disp: , Rfl:     triamterene-hydrochlorothiazide (DYAZIDE) 37 5-25 mg per capsule, TAKE 1 CAPSULE BY MOUTH EVERY DAY, Disp: 90 capsule, Rfl: 1    meclizine (ANTIVERT) 25 mg tablet, 1/2 to 1 tablet PO Q8 hours prn vertigo (Patient not taking: Reported on 5/25/2022), Disp: 30 tablet, Rfl: 3    Health Maintenance     Health Maintenance   Topic Date Due    BMI: Followup Plan  Never done    Colorectal Cancer Screening  Never done    Annual Physical  10/01/2020    Pneumococcal Vaccine: Pediatrics (0 to 5 Years) and At-Risk Patients (6 to 64 Years) (1 - PCV) 11/22/2022 (Originally 7/6/1966)    COVID-19 Vaccine (3 - Booster for Pfizer series) 06/08/2022    Influenza Vaccine (Season Ended) 09/01/2022    DM Eye Exam  09/28/2022    Cervical Cancer Screening  10/01/2022    Breast Cancer Screening: Mammogram  11/09/2022    URINE MICROALBUMIN  11/19/2022    HEMOGLOBIN A1C  11/25/2022    Depression Screening  05/25/2023    BMI: Adult  05/25/2023    Diabetic Foot Exam  05/25/2023    DTaP,Tdap,and Td Vaccines (2 - Td or Tdap) 06/05/2024    HIV Screening  Completed    Hepatitis C Screening  Completed    HIB Vaccine  Aged Out    Hepatitis B Vaccine  Aged Out    IPV Vaccine  Aged Out    Hepatitis A Vaccine  Aged Out    Meningococcal ACWY Vaccine  Aged Out    HPV Vaccine  Aged Out     Immunization History   Administered Date(s) Administered    COVID-19 PFIZER VACCINE 0 3 ML IM 12/18/2021, 01/08/2022    Tdap 06/05/2014       Giselle MONTENEGRO    Internal Medicine PGY-3  5/25/2022 6:32 PM

## 2022-07-11 ENCOUNTER — HOSPITAL ENCOUNTER (OUTPATIENT)
Dept: MAMMOGRAPHY | Facility: CLINIC | Age: 62
Discharge: HOME | End: 2022-07-11
Payer: COMMERCIAL

## 2022-07-11 DIAGNOSIS — R92.8 ABNORMAL MAMMOGRAM: ICD-10-CM

## 2022-07-11 PROCEDURE — G0279 TOMOSYNTHESIS, MAMMO: HCPCS

## 2022-07-11 PROCEDURE — 77065 DX MAMMO INCL CAD UNI: CPT

## 2022-07-17 DIAGNOSIS — H81.09 MENIERE'S DISEASE, UNSPECIFIED LATERALITY: ICD-10-CM

## 2022-07-17 DIAGNOSIS — E11.69 TYPE 2 DIABETES MELLITUS WITH OTHER SPECIFIED COMPLICATION, WITHOUT LONG-TERM CURRENT USE OF INSULIN (HCC): ICD-10-CM

## 2022-07-18 RX ORDER — ATORVASTATIN CALCIUM 40 MG/1
TABLET, FILM COATED ORAL
Qty: 90 TABLET | Refills: 1 | Status: SHIPPED | OUTPATIENT
Start: 2022-07-18

## 2022-07-18 RX ORDER — TRIAMTERENE AND HYDROCHLOROTHIAZIDE 37.5; 25 MG/1; MG/1
CAPSULE ORAL
Qty: 90 CAPSULE | Refills: 1 | Status: SHIPPED | OUTPATIENT
Start: 2022-07-18

## 2022-07-18 NOTE — TELEPHONE ENCOUNTER
Are these appropriate for refill? I dont see the Dyazide in the last office note       You are covering Dr Julio Lawrence

## 2022-10-11 ENCOUNTER — OFFICE VISIT (OUTPATIENT)
Dept: INTERNAL MEDICINE CLINIC | Facility: CLINIC | Age: 62
End: 2022-10-11

## 2022-10-11 VITALS
OXYGEN SATURATION: 95 % | WEIGHT: 157.2 LBS | HEART RATE: 89 BPM | DIASTOLIC BLOOD PRESSURE: 86 MMHG | HEIGHT: 60 IN | SYSTOLIC BLOOD PRESSURE: 133 MMHG | BODY MASS INDEX: 30.86 KG/M2 | TEMPERATURE: 98.3 F

## 2022-10-11 DIAGNOSIS — Z00.00 ANNUAL PHYSICAL EXAM: Primary | ICD-10-CM

## 2022-10-11 DIAGNOSIS — Z12.4 CERVICAL CANCER SCREENING: ICD-10-CM

## 2022-10-11 DIAGNOSIS — F41.9 ANXIETY: ICD-10-CM

## 2022-10-11 DIAGNOSIS — J20.9 ACUTE BRONCHITIS: ICD-10-CM

## 2022-10-11 PROCEDURE — 99396 PREV VISIT EST AGE 40-64: CPT | Performed by: INTERNAL MEDICINE

## 2022-10-11 RX ORDER — CITALOPRAM 20 MG/1
20 TABLET ORAL DAILY
Qty: 30 TABLET | Refills: 0 | Status: SHIPPED | OUTPATIENT
Start: 2022-10-11 | End: 2022-11-10

## 2022-10-11 RX ORDER — ALBUTEROL SULFATE 90 UG/1
2 AEROSOL, METERED RESPIRATORY (INHALATION) EVERY 4 HOURS PRN
Qty: 6.7 G | Refills: 3 | Status: SHIPPED | OUTPATIENT
Start: 2022-10-11

## 2022-10-11 NOTE — PATIENT INSTRUCTIONS

## 2022-10-11 NOTE — PROGRESS NOTES
[address] [facility]    NAME: Anjel Estevez  AGE: 58 y o  SEX: female  : 1960     DATE: 10/11/2022     Assessment and Plan:     Problem List Items Addressed This Visit        Other    Anxiety    Relevant Medications    citalopram (CeleXA) 20 mg tablet      Other Visit Diagnoses     Annual physical exam    -  Primary  Endorses depression due completing financial paperwork for her father at [facility]  Feels hopeful now that her sister is flying in to help with paperwork  Denies SI/HI  Willing to restart Celexa  · Reordered Celexa 20 mg daily  · Discussed that full effect of Celexa make take 6-8 weeks  · Referral placed for colonoscopy in May 2022 - patient will call and schedule appointment  · Referral to OBGYN for cervical cancer screening - last co-testing in  negative  · Mammogram scheduled for 2023  · Follow up in 1-2 months for diabetes, depression      Acute bronchitis      Refilled at patient's request   Uses when she is anxious and becomes short of breath  Relevant Medications    albuterol (PROVENTIL HFA,VENTOLIN HFA) 90 mcg/act inhaler    Cervical cancer screening        Relevant Orders    Ambulatory Referral to Obstetrics / Gynecology          Immunizations and preventive care screenings were discussed with patient today  Appropriate education was printed on patient's after visit summary  Counseling:  Alcohol/drug use: discussed moderation in alcohol intake, the recommendations for healthy alcohol use, and avoidance of illicit drug use  Injury prevention: discussed safety/seat belts, safety helmets, smoke detectors, carbon dioxide detectors, and smoking near bedding or upholstery  · Exercise: the importance of regular exercise/physical activity was discussed  Recommend exercise 3-5 times per week for at least 30 minutes  No follow-ups on file       Chief Complaint:     Chief Complaint   Patient presents with   • Annual Exam      History of Present Illness:     Adult Annual Physical   Patient here for a comprehensive physical exam  The patient reports depression  Her father moved into [facility] a few months aog  She feels overwhelmed with the care of her father due to financial paperwork and other social issues for him  States she is depressed, distracted, wakes up every morning crying, poor sleep, decreased appetite  Feels she had to push herself to visit her grandkids  Feels her 3 sisters are not supporting her with her father  Sister Neris Ahr is flying in tomorrow to help with paperwork/mail  Patient took time off of work to pick her sister up and have her help  States she does feel hope now that her sister is coming to help  No SI/HI  Diet and Physical Activity  · Diet/Nutrition: decreased appetite  · Exercise: no formal exercise  Depression Screening  PHQ-2/9 Depression Screening         General Health  · Sleep: sleeps poorly  · Hearing: normal - bilateral   · Vision: wears glasses  · Dental: brushes teeth twice daily  /GYN Health  · Patient is: postmenopausal  · Last menstrual period: many years ago  · Contraceptive method: N/A  Review of Systems:     Review of Systems   Constitutional: Negative for appetite change, chills, diaphoresis, fever and unexpected weight change  HENT: Negative for congestion, ear pain, rhinorrhea and sore throat  Eyes: Negative for visual disturbance  Respiratory: Negative for cough and shortness of breath  Cardiovascular: Negative for chest pain, palpitations and leg swelling  Gastrointestinal: Negative for abdominal pain, constipation, diarrhea, nausea and vomiting  Genitourinary: Negative for difficulty urinating, dysuria and hematuria  Musculoskeletal: Negative for back pain  Skin: Negative for rash  Neurological: Negative for dizziness, weakness, light-headedness, numbness and headaches  Psychiatric/Behavioral: Positive for decreased concentration, dysphoric mood and sleep disturbance  Negative for self-injury and suicidal ideas  The patient is nervous/anxious  Past Medical History:     Past Medical History:   Diagnosis Date   • Diabetes mellitus ([address] )    • Vertigo       Past Surgical History:     Past Surgical History:   Procedure Laterality Date   • BREAST BIOPSY Bilateral 12/01/2021    stereo x2   • BREAST BIOPSY Right 12/01/2021   • MAMMO STEREOTACTIC BREAST BIOPSY LEFT (ALL INC) Left 12/1/2021   • MAMMO STEREOTACTIC BREAST BIOPSY RIGHT (ALL INC) Right 12/1/2021   • TUMOR REMOVAL      right leg- benign       Social History:     Social History     Socioeconomic History   • Marital status: Single     Spouse name: None   • Number of children: None   • Years of education: None   • Highest education level: None   Occupational History   • None   Tobacco Use   • Smoking status: Current Every Day Smoker     Packs/day: 0 25   • Smokeless tobacco: Never Used   • Tobacco comment: 1-6 cigarettes a day   Vaping Use   • Vaping Use: Never used   Substance and Sexual Activity   • Alcohol use: Yes     Comment: socially    • Drug use: Never   • Sexual activity: Not Currently     Partners: Male     Birth control/protection: Post-menopausal   Other Topics Concern   • None   Social History Narrative    Former smoker - As per [employer]    Inadequate exercise    Physically able to work    Sedentary lifestyle     from significant other    Tobacco use - As per [employer]    Unemployed, looking for work      Social Determinants of Health     Financial Resource Strain: Low Risk    • Difficulty of Paying Living Expenses: Not hard at all   Food Insecurity: No Food Insecurity   • Worried About Running Out of Food in the Last Year: Never true   • Ran Out of Food in the Last Year: Never true   Transportation Needs: No Transportation Needs   • Lack of Transportation (Medical):  No   • Lack of Transportation (Non-Medical):  No   Physical Activity: Inactive   • Days of Exercise per Week: 0 days   • Minutes of Exercise per Session: 0 min   Stress: Not on file   Social Connections: Socially Isolated   • Frequency of Communication with Friends and Family: Once a week   • Frequency of Social Gatherings with Friends and Family: Twice a week   • Attends Confucianist Services: Never   • Active Member of Clubs or Organizations: No   • Attends Club or Organization Meetings: Never   • Marital Status:    Intimate Partner Violence: Not At Risk   • Fear of Current or Ex-Partner: No   • Emotionally Abused: No   • Physically Abused: No   • Sexually Abused: No   Housing Stability: Low Risk    • Unable to Pay for Housing in the Last Year: No   • Number of [place] in the Last Year: 1   • Unstable Housing in the Last Year: No      Family History:     Family History   Problem Relation Age of Onset   • Stroke Mother    • Hypertension Mother    • Diabetes Mother    • Throat cancer Brother    • Ovarian cancer Family         2 maternal aunts and 2 cousins    • Stroke Father    • Thyroid cancer Sister    • Heart disease Sister    • Uterine cancer Sister    • No Known Problems Son    • Heart attack Maternal Grandmother 43   • Stroke Maternal Grandfather 58   • Heart attack Paternal Grandmother    • Leukemia Paternal Grandfather    • No Known Problems Sister    • No Known Problems Son    • Breast cancer Cousin 36   • Breast cancer Maternal Aunt 26   • Uterine cancer Maternal Aunt    • Uterine cancer Cousin    • No Known Problems Paternal Aunt       Current Medications:     Current Outpatient Medications   Medication Sig Dispense Refill   • albuterol (PROVENTIL HFA,VENTOLIN HFA) 90 mcg/act inhaler Inhale 2 puffs every 4 (four) hours as needed for wheezing 6 7 g 3   • atorvastatin (LIPITOR) 40 mg tablet TAKE 1 TABLET BY MOUTH EVERY DAY 90 tablet 1   • Blood Glucose Monitoring Suppl (FREESTYLE FREEDOM LITE) w/Device KIT Apply 1 m topically 4 (four) times a day as needed Use as directed  0   • citalopram (CeleXA) 20 mg tablet Take 1 tablet (20 mg total) by mouth daily 30 tablet 0   • diazepam (VALIUM) 5 mg tablet Take 1 tablet (5 mg total) by mouth every 12 (twelve) hours as needed for anxiety for up to 5 days 10 tablet 0   • EC-Naproxen 500 MG EC tablet TAKE 1 TABLET BY MOUTH TWICE A DAY WITH MEALS 60 tablet 3   • glucose blood (FREESTYLE LITE) test strip Use as instructed 100 each 5   • hydrOXYzine HCL (ATARAX) 10 mg tablet Take 1 tablet (10 mg total) by mouth every 6 (six) hours as needed for itching 30 tablet 1   • ibuprofen (MOTRIN) 600 mg tablet Take 600 mg by mouth every 6 (six) hours as needed     • meclizine (ANTIVERT) 25 mg tablet 1/2 to 1 tablet PO Q8 hours prn vertigo 30 tablet 3   • metFORMIN (GLUCOPHAGE-XR) 750 mg 24 hr tablet Take 2 tablets with dinner  180 tablet 3   • Probiotic Product (PRO-BIOTIC BLEND PO) Take 1 tablet by mouth daily     • triamterene-hydrochlorothiazide (DYAZIDE) 37 5-25 mg per capsule TAKE 1 CAPSULE BY MOUTH EVERY DAY 90 capsule 1     No current facility-administered medications for this visit  Allergies:     No Known Allergies   Physical Exam:     /86 (BP Location: Right arm, Patient Position: Sitting, Cuff Size: Large)   Pulse 89   Temp 98 3 °F (36 8 °C) (Temporal)   Ht 5' (1 524 m)   Wt 71 3 kg (157 lb 3 2 oz)   LMP 10/01/2010 (Approximate)   SpO2 95%   BMI 30 70 kg/m²     Physical Exam  Vitals reviewed  Constitutional:       General: She is not in acute distress  Appearance: She is not ill-appearing  HENT:      Head: Normocephalic and atraumatic  Right Ear: Tympanic membrane, ear canal and external ear normal  There is no impacted cerumen  Left Ear: Tympanic membrane, ear canal and external ear normal  There is no impacted cerumen  Nose: Nose normal  No rhinorrhea        Mouth/Throat:      Mouth: Mucous membranes are moist       Pharynx: No oropharyngeal exudate or posterior oropharyngeal erythema  Eyes:      General: No scleral icterus  Right eye: No discharge  Left eye: No discharge  Conjunctiva/sclera: Conjunctivae normal       Comments: Pupils equal   Cardiovascular:      Rate and Rhythm: Normal rate and regular rhythm  Pulses: Normal pulses  Heart sounds: Normal heart sounds  No murmur heard  No friction rub  No gallop  Pulmonary:      Effort: Pulmonary effort is normal  No respiratory distress  Breath sounds: Normal breath sounds  No wheezing, rhonchi or rales  Abdominal:      General: Bowel sounds are normal  There is no distension  Palpations: Abdomen is soft  There is no mass  Tenderness: There is no abdominal tenderness  There is no guarding  Hernia: No hernia is present  Musculoskeletal:      Right lower leg: No edema  Left lower leg: No edema  Lymphadenopathy:      Cervical: No cervical adenopathy  Skin:     General: Skin is warm and dry  Capillary Refill: Capillary refill takes less than 2 seconds  Coloration: Skin is not jaundiced  Findings: No rash (None seen on back, chest, abdomen, forearms, hands, lower extremities)  Neurological:      Mental Status: She is alert        Comments: CN 2-12 grossly intact, no dysarthria, sensation to light touch grossly intact, 5/5 muscle strength in shoulder abduction, biceps, triceps, hip flexion, knee extension and flexion, hand  symmetric,    Psychiatric:      Comments: Tearful          Chapis Mole, DO  [address]

## 2022-10-16 DIAGNOSIS — E11.69 TYPE 2 DIABETES MELLITUS WITH OTHER SPECIFIED COMPLICATION, WITHOUT LONG-TERM CURRENT USE OF INSULIN (HCC): ICD-10-CM

## 2022-10-17 RX ORDER — METFORMIN HYDROCHLORIDE 750 MG/1
TABLET, EXTENDED RELEASE ORAL
Qty: 180 TABLET | Refills: 3 | Status: SHIPPED | OUTPATIENT
Start: 2022-10-17

## 2022-11-04 DIAGNOSIS — F41.9 ANXIETY: ICD-10-CM

## 2022-11-04 RX ORDER — CITALOPRAM 20 MG/1
TABLET ORAL
Qty: 90 TABLET | Refills: 1 | Status: SHIPPED | OUTPATIENT
Start: 2022-11-04

## 2022-12-06 ENCOUNTER — OFFICE VISIT (OUTPATIENT)
Dept: INTERNAL MEDICINE CLINIC | Facility: CLINIC | Age: 62
End: 2022-12-06

## 2022-12-06 VITALS
OXYGEN SATURATION: 98 % | DIASTOLIC BLOOD PRESSURE: 71 MMHG | BODY MASS INDEX: 30.04 KG/M2 | HEART RATE: 87 BPM | SYSTOLIC BLOOD PRESSURE: 112 MMHG | WEIGHT: 153.8 LBS | TEMPERATURE: 98.1 F

## 2022-12-06 DIAGNOSIS — J06.9 VIRAL UPPER RESPIRATORY TRACT INFECTION: Primary | ICD-10-CM

## 2022-12-06 RX ORDER — GUAIFENESIN 600 MG/1
1200 TABLET, EXTENDED RELEASE ORAL EVERY 12 HOURS SCHEDULED
Qty: 20 TABLET | Refills: 0 | Status: SHIPPED | OUTPATIENT
Start: 2022-12-06

## 2022-12-06 RX ORDER — BENZONATATE 100 MG/1
100 CAPSULE ORAL 3 TIMES DAILY PRN
Qty: 20 CAPSULE | Refills: 0 | Status: SHIPPED | OUTPATIENT
Start: 2022-12-06

## 2022-12-06 NOTE — PROGRESS NOTES
Assessment/Plan:    1  Viral respiratory tract infection  - Pt reports 2 day history of rhinorrhea, nasal congestion, subjective fevers, and productive cough with yellow sputum  - likely appears viral based on symptoms and presentation  - pt is afebrile on presentation with not acute lung findings on auscultation   -  will prescribe tessalon perles to suppress cough as need; pt advised to take only if cough is bothersome or at work  - will prescribe guaifenesin to help clear secretions   - also advised to take tylenol as needed for fevers/chills/ myalgias  - recommended continuing to take albuterol inhaler as needed and to stay hydrated  - pt advised to report back if symptoms fail to improve or worsen in the next 2-3 days            Patient Instructions   1) We will continue symptom management with tessalon perles to suppress cough when needed and take mucinex to help clear your sputum  2) Please also stay hydrated  3) Please give us a call if the symptoms get worse or do not improve in the next 2 days  4) Use albuterol inhaler as needed at least 4 hours apart       No follow-ups on file  Subjective:      Patient ID: yMra Nunez is a 58 y o  female  Chief Complaint   Patient presents with   • Cough     Sx started 12/04/2022- possible bronchitis  Negative for COVID on 12/4/2022 and negative 12/6/2022   • Nasal Congestion     sneezing   • Headache   • Sore Throat       The pt is a 58year old female presenting for rhinorrhea, nasal congestion, subjective fevers, and productive cough with yellow sputum  She reports that this started 2 days ago and has gotten better in the past 2 days  She states that she gets these symptoms every year which is relieved by azithromycin  She states that she has been using dextromethorphan for cough which made her thought cloudy  She reports that she was in contact with some family that was sick  She tested herself twice for COVID at home and both have been negative   She denies getting vaccinated for the flu  She reports smoking a couple cigarettes every couple days  She reports having to take her albuterol inhaler daily for the past couple days  The following portions of the patient's history were reviewed and updated as appropriate: allergies, current medications, past family history, past medical history, past social history, past surgical history and problem list     Review of Systems   Constitutional: Positive for chills and fever  HENT: Positive for rhinorrhea and sore throat  Negative for ear pain, postnasal drip and sinus pressure  Respiratory: Positive for cough  Negative for chest tightness, shortness of breath and wheezing  Cardiovascular: Positive for chest pain  Gastrointestinal: Negative for abdominal distention, abdominal pain, constipation, diarrhea, nausea and vomiting  Genitourinary: Negative for difficulty urinating  Musculoskeletal: Negative for arthralgias and myalgias  Neurological: Negative for dizziness           Current Outpatient Medications   Medication Sig Dispense Refill   • albuterol (PROVENTIL HFA,VENTOLIN HFA) 90 mcg/act inhaler Inhale 2 puffs every 4 (four) hours as needed for wheezing 6 7 g 3   • atorvastatin (LIPITOR) 40 mg tablet TAKE 1 TABLET BY MOUTH EVERY DAY 90 tablet 1   • benzonatate (TESSALON PERLES) 100 mg capsule Take 1 capsule (100 mg total) by mouth 3 (three) times a day as needed for cough 20 capsule 0   • Blood Glucose Monitoring Suppl (FREESTYLE FREEDOM LITE) w/Device KIT Apply 1 m topically 4 (four) times a day as needed Use as directed  0   • glucose blood (FREESTYLE LITE) test strip Use as instructed 100 each 5   • guaiFENesin (MUCINEX) 600 mg 12 hr tablet Take 2 tablets (1,200 mg total) by mouth every 12 (twelve) hours 20 tablet 0   • ibuprofen (MOTRIN) 600 mg tablet Take 600 mg by mouth every 6 (six) hours as needed     • metFORMIN (GLUCOPHAGE-XR) 750 mg 24 hr tablet TAKE 2 TABLETS BY MOUTH EVERY DAY WITH DINNER 180 tablet 3   • Probiotic Product (PRO-BIOTIC BLEND PO) Take 1 tablet by mouth daily     • triamterene-hydrochlorothiazide (DYAZIDE) 37 5-25 mg per capsule TAKE 1 CAPSULE BY MOUTH EVERY DAY 90 capsule 1   • citalopram (CeleXA) 20 mg tablet TAKE 1 TABLET BY MOUTH EVERY DAY (Patient not taking: Reported on 12/6/2022) 90 tablet 1   • EC-Naproxen 500 MG EC tablet TAKE 1 TABLET BY MOUTH TWICE A DAY WITH MEALS (Patient not taking: Reported on 12/6/2022) 60 tablet 3   • hydrOXYzine HCL (ATARAX) 10 mg tablet Take 1 tablet (10 mg total) by mouth every 6 (six) hours as needed for itching (Patient not taking: Reported on 12/6/2022) 30 tablet 1     No current facility-administered medications for this visit  Objective:    /71 (BP Location: Left arm, Patient Position: Sitting, Cuff Size: Large)   Pulse 87   Temp 98 1 °F (36 7 °C) (Temporal)   Wt 69 8 kg (153 lb 12 8 oz)   LMP 10/01/2010 (Approximate)   SpO2 98%   BMI 30 04 kg/m²        Physical Exam  Constitutional:       General: She is not in acute distress  Appearance: Normal appearance  She is normal weight  She is not ill-appearing or toxic-appearing  HENT:      Head: Normocephalic and atraumatic  Cardiovascular:      Rate and Rhythm: Normal rate and regular rhythm  Heart sounds: No murmur heard  No gallop  Pulmonary:      Effort: Pulmonary effort is normal  No respiratory distress  Breath sounds: No stridor  No wheezing, rhonchi or rales  Abdominal:      General: Abdomen is flat  There is no distension  Tenderness: There is no abdominal tenderness  There is no guarding  Musculoskeletal:      Right lower leg: No edema  Left lower leg: No edema  Neurological:      Mental Status: She is alert and oriented to person, place, and time     Psychiatric:         Mood and Affect: Mood normal          Behavior: Behavior normal                 Het KADE Vitale DO, [facility] PGY-1

## 2022-12-06 NOTE — PATIENT INSTRUCTIONS
1) We will continue symptom management with tessalon perles to suppress cough when needed and take mucinex to help clear your sputum  2) Please also stay hydrated  3) Please give us a call if the symptoms get worse or do not improve in the next 2 days  4) Use albuterol inhaler as needed at least 4 hours apart

## 2022-12-12 ENCOUNTER — TELEPHONE (OUTPATIENT)
Dept: INTERNAL MEDICINE CLINIC | Facility: CLINIC | Age: 62
End: 2022-12-12

## 2022-12-12 NOTE — TELEPHONE ENCOUNTER
Patient had a visit on 12/6/22 for a Viral upper respiratory infection  Sean Mancia advised that patient call back if she has no relief  Patient reports she feels worse then last week and would like the antibiotic sent to her pharmacy as discussed in her visit  Please send to preferred pharmacy

## 2022-12-12 NOTE — TELEPHONE ENCOUNTER
Patient was advised to return to the office for another appt if she doesn't feel better   Please schedule an appt

## 2022-12-13 ENCOUNTER — OFFICE VISIT (OUTPATIENT)
Dept: INTERNAL MEDICINE CLINIC | Facility: CLINIC | Age: 62
End: 2022-12-13

## 2022-12-13 VITALS
HEART RATE: 80 BPM | SYSTOLIC BLOOD PRESSURE: 118 MMHG | OXYGEN SATURATION: 98 % | TEMPERATURE: 97.8 F | HEIGHT: 60 IN | BODY MASS INDEX: 30.67 KG/M2 | DIASTOLIC BLOOD PRESSURE: 80 MMHG | WEIGHT: 156.2 LBS

## 2022-12-13 DIAGNOSIS — J20.8 ACUTE BRONCHITIS DUE TO OTHER SPECIFIED ORGANISMS: Primary | ICD-10-CM

## 2022-12-13 RX ORDER — AZITHROMYCIN 250 MG/1
TABLET, FILM COATED ORAL
Qty: 6 TABLET | Refills: 0 | Status: SHIPPED | OUTPATIENT
Start: 2022-12-13 | End: 2022-12-17

## 2022-12-13 RX ORDER — METHYLPREDNISOLONE 4 MG/1
TABLET ORAL
Qty: 21 EACH | Refills: 0 | Status: SHIPPED | OUTPATIENT
Start: 2022-12-13

## 2022-12-13 NOTE — PROGRESS NOTES
Name: Shad Cortez      : 1960      MRN: [id number]  Encounter Provider: Jim Hodgson PA-C  Encounter Date: 2022   Encounter department: [address] [place] [address]    Assessment & Plan     1  Acute bronchitis due to other specified organisms  Assessment & Plan:  Acute bronchitis, secondary to viral URI  Patient possibly has underlying COPD  Will cover with course of azithromycin and Medrol for symptomatic relief  Recommend probiotic  No NSAIDs until Medrol complete  Increase hydration  Continue Mucinex as needed  Recommend warm tea with honey and lemon  Tylenol as needed for discomfort  Recommend discussing possible COPD with PCP at next visit  Follow up If needed  Orders:  -     azithromycin (Zithromax) 250 mg tablet; Take two tablets on day one, then one tablet daily until gone  -     methylPREDNISolone 4 MG tablet therapy pack; Use as directed on package         Subjective      Patient is a 58year old female presenting for a same day appointment  She was seen on  and diagnosed with a viral URI  States her symptoms started as a cold with nasal congestion, sore throat, and cough  She feels it is in her chest  Denies fever or chills  States she has back and chest pain, especially with deep breaths and coughing  Admits to fatigue and lack of energy  Denies feeling lightheaded or dizzy  Denies sore throat  Admits to some minor nasal congestion and rhinorrhea  States her cough has been worse, especially at night and in the morning  Sometimes productive of clear to white mucus  She has been wheezing at night as well  Denies SOB  She has been taking Mucinex and Tessalon Perles as needed with little relief  She states she has had bronchitis every year for the last 3 years  Review of Systems   Constitutional: Positive for fatigue  Negative for chills, diaphoresis, fever and unexpected weight change  HENT: Positive for congestion, postnasal drip and rhinorrhea  Negative for ear pain, facial swelling, sinus pressure, sinus pain, sore throat, tinnitus and trouble swallowing  Respiratory: Positive for cough and wheezing  Negative for chest tightness and shortness of breath  Cardiovascular: Positive for chest pain  Negative for palpitations and leg swelling  Gastrointestinal: Negative for abdominal pain, diarrhea, nausea and vomiting  Musculoskeletal: Positive for back pain  Negative for myalgias  Skin: Negative for rash  Neurological: Negative for dizziness, tremors, weakness, light-headedness and headaches  Hematological: Negative for adenopathy         Current Outpatient Medications on File Prior to Visit   Medication Sig   • albuterol (PROVENTIL HFA,VENTOLIN HFA) 90 mcg/act inhaler Inhale 2 puffs every 4 (four) hours as needed for wheezing   • atorvastatin (LIPITOR) 40 mg tablet TAKE 1 TABLET BY MOUTH EVERY DAY   • benzonatate (TESSALON PERLES) 100 mg capsule Take 1 capsule (100 mg total) by mouth 3 (three) times a day as needed for cough   • Blood Glucose Monitoring Suppl (FREESTYLE FREEDOM LITE) w/Device KIT Apply 1 m topically 4 (four) times a day as needed Use as directed   • glucose blood (FREESTYLE LITE) test strip Use as instructed   • guaiFENesin (MUCINEX) 600 mg 12 hr tablet Take 2 tablets (1,200 mg total) by mouth every 12 (twelve) hours   • ibuprofen (MOTRIN) 600 mg tablet Take 600 mg by mouth every 6 (six) hours as needed   • metFORMIN (GLUCOPHAGE-XR) 750 mg 24 hr tablet TAKE 2 TABLETS BY MOUTH EVERY DAY WITH DINNER   • Probiotic Product (PRO-BIOTIC BLEND PO) Take 1 tablet by mouth daily   • triamterene-hydrochlorothiazide (DYAZIDE) 37 5-25 mg per capsule TAKE 1 CAPSULE BY MOUTH EVERY DAY   • citalopram (CeleXA) 20 mg tablet TAKE 1 TABLET BY MOUTH EVERY DAY (Patient not taking: Reported on 12/6/2022)   • hydrOXYzine HCL (ATARAX) 10 mg tablet Take 1 tablet (10 mg total) by mouth every 6 (six) hours as needed for itching (Patient not taking: Reported on 12/6/2022)   • [DISCONTINUED] EC-Naproxen 500 MG EC tablet TAKE 1 TABLET BY MOUTH TWICE A DAY WITH MEALS (Patient not taking: Reported on 12/6/2022)       Objective     /80 (BP Location: Right arm, Patient Position: Sitting, Cuff Size: Standard)   Pulse 80   Temp 97 8 °F (36 6 °C) (Temporal)   Ht 5' (1 524 m)   Wt 70 9 kg (156 lb 3 2 oz)   LMP 10/01/2010 (Approximate)   SpO2 98%   BMI 30 51 kg/m²     Physical Exam  Vitals and nursing note reviewed  Constitutional:       General: She is awake  She is not in acute distress  Appearance: Normal appearance  She is well-developed, well-groomed and overweight  She is not ill-appearing  HENT:      Head: Normocephalic and atraumatic  Right Ear: Hearing, tympanic membrane, ear canal and external ear normal       Left Ear: Hearing, tympanic membrane, ear canal and external ear normal       Nose: Congestion and rhinorrhea present  Rhinorrhea is clear  Mouth/Throat:      Lips: Pink  Mouth: Mucous membranes are moist       Pharynx: Oropharynx is clear  Uvula midline  No oropharyngeal exudate or posterior oropharyngeal erythema  Tonsils: No tonsillar exudate or tonsillar abscesses  Eyes:      General: No scleral icterus  Conjunctiva/sclera: Conjunctivae normal    Cardiovascular:      Rate and Rhythm: Normal rate and regular rhythm  Pulses: Normal pulses  Heart sounds: Normal heart sounds  No murmur heard  Pulmonary:      Effort: Pulmonary effort is normal  No respiratory distress  Breath sounds: Normal breath sounds and air entry  No decreased air movement  No decreased breath sounds, wheezing, rhonchi or rales  Comments: Wet cough  Chest:      Comments: Generalized chest and upper back tenderness bilaterally, increased with respirations, reproducible  Abdominal:      General: Abdomen is flat  Bowel sounds are normal  There is no distension  Palpations: Abdomen is soft  There is no mass  Tenderness: There is no abdominal tenderness  There is no right CVA tenderness, left CVA tenderness, guarding or rebound  Hernia: No hernia is present  Musculoskeletal:         General: Normal range of motion  Cervical back: Neck supple  Right lower leg: No edema  Left lower leg: No edema  Lymphadenopathy:      Cervical: No cervical adenopathy  Skin:     General: Skin is warm  Capillary Refill: Capillary refill takes less than 2 seconds  Coloration: Skin is not jaundiced  Findings: No rash  Neurological:      General: No focal deficit present  Mental Status: She is alert and oriented to person, place, and time  Mental status is at baseline  Sensory: Sensation is intact  Motor: Motor function is intact  Coordination: Coordination is intact  Gait: Gait is intact  Psychiatric:         Attention and Perception: Attention normal          Mood and Affect: Mood and affect normal          Speech: Speech normal          Behavior: Behavior normal  Behavior is cooperative  Thought Content:  Thought content normal          Cognition and Memory: Cognition normal        Sally Peterson PA-C

## 2022-12-14 NOTE — ASSESSMENT & PLAN NOTE
Acute bronchitis, secondary to viral URI  Patient possibly has underlying COPD  Will cover with course of azithromycin and Medrol for symptomatic relief  Recommend probiotic  No NSAIDs until Medrol complete  Increase hydration  Continue Mucinex as needed  Recommend warm tea with honey and lemon  Tylenol as needed for discomfort  Recommend discussing possible COPD with PCP at next visit  Follow up If needed

## 2023-01-27 DIAGNOSIS — E11.69 TYPE 2 DIABETES MELLITUS WITH OTHER SPECIFIED COMPLICATION, WITHOUT LONG-TERM CURRENT USE OF INSULIN (HCC): ICD-10-CM

## 2023-01-27 DIAGNOSIS — H81.09 MENIERE'S DISEASE, UNSPECIFIED LATERALITY: ICD-10-CM

## 2023-01-27 NOTE — TELEPHONE ENCOUNTER
You're covering for Dr Donna Macdonald  Patient left a message asking for refill of these medications as well as Naproxen which is on her list  Please refill if appropriate  Pt said she has been with out for a couple days   Please review

## 2023-01-30 RX ORDER — TRIAMTERENE AND HYDROCHLOROTHIAZIDE 37.5; 25 MG/1; MG/1
1 CAPSULE ORAL DAILY
Qty: 90 CAPSULE | Refills: 1 | Status: SHIPPED | OUTPATIENT
Start: 2023-01-30

## 2023-01-30 RX ORDER — ATORVASTATIN CALCIUM 40 MG/1
40 TABLET, FILM COATED ORAL DAILY
Qty: 90 TABLET | Refills: 1 | Status: SHIPPED | OUTPATIENT
Start: 2023-01-30

## 2023-02-01 ENCOUNTER — APPOINTMENT (OUTPATIENT)
Dept: LAB | Age: 63
End: 2023-02-01

## 2023-02-01 DIAGNOSIS — I10 HYPERTENSION, UNSPECIFIED TYPE: ICD-10-CM

## 2023-02-01 LAB
ANION GAP SERPL CALCULATED.3IONS-SCNC: 6 MMOL/L (ref 4–13)
BUN SERPL-MCNC: 15 MG/DL (ref 5–25)
CALCIUM SERPL-MCNC: 9.4 MG/DL (ref 8.3–10.1)
CHLORIDE SERPL-SCNC: 106 MMOL/L (ref 96–108)
CO2 SERPL-SCNC: 25 MMOL/L (ref 21–32)
CREAT SERPL-MCNC: 0.62 MG/DL (ref 0.6–1.3)
GFR SERPL CREATININE-BSD FRML MDRD: 96 ML/MIN/1.73SQ M
GLUCOSE SERPL-MCNC: 81 MG/DL (ref 65–140)
POTASSIUM SERPL-SCNC: 3.9 MMOL/L (ref 3.5–5.3)
SODIUM SERPL-SCNC: 137 MMOL/L (ref 135–147)

## 2023-02-11 PROBLEM — J20.8 ACUTE BRONCHITIS DUE TO OTHER SPECIFIED ORGANISMS: Status: RESOLVED | Noted: 2022-12-13 | Resolved: 2023-02-11

## 2023-02-15 ENCOUNTER — HOSPITAL ENCOUNTER (OUTPATIENT)
Dept: MAMMOGRAPHY | Facility: CLINIC | Age: 63
Discharge: HOME | End: 2023-02-15

## 2023-02-15 VITALS — WEIGHT: 156 LBS | HEIGHT: 60 IN | BODY MASS INDEX: 30.63 KG/M2

## 2023-02-15 DIAGNOSIS — R92.8 ABNORMAL MAMMOGRAM: ICD-10-CM

## 2023-02-22 DIAGNOSIS — E11.69 TYPE 2 DIABETES MELLITUS WITH OTHER SPECIFIED COMPLICATION, WITHOUT LONG-TERM CURRENT USE OF INSULIN (HCC): Primary | ICD-10-CM

## 2023-02-22 RX ORDER — BLOOD-GLUCOSE METER
KIT MISCELLANEOUS
Qty: 1 EACH | Refills: 0 | Status: SHIPPED | OUTPATIENT
Start: 2023-02-22

## 2023-02-22 RX ORDER — LANCETS 28 GAUGE
EACH MISCELLANEOUS
Qty: 100 EACH | Refills: 3 | Status: SHIPPED | OUTPATIENT
Start: 2023-02-22

## 2023-02-23 DIAGNOSIS — E11.69 TYPE 2 DIABETES MELLITUS WITH OTHER SPECIFIED COMPLICATION, WITHOUT LONG-TERM CURRENT USE OF INSULIN (HCC): Primary | ICD-10-CM

## 2023-02-24 ENCOUNTER — OFFICE VISIT (OUTPATIENT)
Dept: INTERNAL MEDICINE CLINIC | Facility: CLINIC | Age: 63
End: 2023-02-24

## 2023-02-24 VITALS
HEIGHT: 60 IN | OXYGEN SATURATION: 99 % | BODY MASS INDEX: 31.25 KG/M2 | DIASTOLIC BLOOD PRESSURE: 75 MMHG | SYSTOLIC BLOOD PRESSURE: 125 MMHG | TEMPERATURE: 98.6 F | HEART RATE: 88 BPM | WEIGHT: 159.2 LBS

## 2023-02-24 DIAGNOSIS — E78.2 MIXED HYPERLIPIDEMIA: ICD-10-CM

## 2023-02-24 DIAGNOSIS — H81.02 MENIERE'S DISEASE OF LEFT EAR: ICD-10-CM

## 2023-02-24 DIAGNOSIS — E11.69 TYPE 2 DIABETES MELLITUS WITH OTHER SPECIFIED COMPLICATION, WITHOUT LONG-TERM CURRENT USE OF INSULIN (HCC): Primary | ICD-10-CM

## 2023-02-24 DIAGNOSIS — M25.562 CHRONIC PAIN OF BOTH KNEES: ICD-10-CM

## 2023-02-24 DIAGNOSIS — G89.29 CHRONIC PAIN OF BOTH KNEES: ICD-10-CM

## 2023-02-24 DIAGNOSIS — M25.561 CHRONIC PAIN OF BOTH KNEES: ICD-10-CM

## 2023-02-24 PROBLEM — E66.812 CLASS 2 SEVERE OBESITY DUE TO EXCESS CALORIES WITH SERIOUS COMORBIDITY IN ADULT (HCC): Status: RESOLVED | Noted: 2021-07-27 | Resolved: 2023-02-24

## 2023-02-24 PROBLEM — E66.01 CLASS 2 SEVERE OBESITY DUE TO EXCESS CALORIES WITH SERIOUS COMORBIDITY IN ADULT (HCC): Status: RESOLVED | Noted: 2021-07-27 | Resolved: 2023-02-24

## 2023-02-24 PROBLEM — R77.8 ELEVATED TOTAL PROTEIN: Status: RESOLVED | Noted: 2019-12-30 | Resolved: 2023-02-24

## 2023-02-24 RX ORDER — NAPROXEN 500 MG/1
500 TABLET ORAL 2 TIMES DAILY WITH MEALS
Qty: 60 TABLET | Refills: 2 | Status: SHIPPED | OUTPATIENT
Start: 2023-02-24

## 2023-02-26 NOTE — ASSESSMENT & PLAN NOTE
Will evaluate further with x-rays  Likely due to arthritis  Start naproxen 500 mg twice daily as needed with food  Recommend warm/cool compresses  Knee sleeves as needed  Patient declines PT at this time

## 2023-02-26 NOTE — ASSESSMENT & PLAN NOTE
Lab Results   Component Value Date    CHOLESTEROL 129 11/19/2021    CHOLESTEROL 278 (H) 09/10/2019    CHOLESTEROL 250 (H) 03/23/2016     Lab Results   Component Value Date    HDL 40 (L) 11/19/2021    HDL 41 09/10/2019    HDL 47 03/23/2016     Lab Results   Component Value Date    TRIG 143 11/19/2021    TRIG 255 (H) 09/10/2019    TRIG 234 (H) 03/23/2016     Lab Results   Component Value Date    [place] 89 11/19/2021    [place] 237 09/10/2019     Lab Results   Component Value Date    LDLCALC 60 11/19/2021     Controlled  Continue atorvastatin 40 mg daily  Low fat diet  Due for repeat lipid panel

## 2023-02-26 NOTE — PROGRESS NOTES
Name: Roxy Coello      : 1960      MRN: [id number]  Encounter Provider: Justine Song PA-C  Encounter Date: 2023   Encounter department: [address] [place] [address]    Assessment & Plan     1  Type 2 diabetes mellitus with other specified complication, without long-term current use of insulin [facility])  Assessment & Plan:    Lab Results   Component Value Date    HGBA1C 5 8 2022     Well controlled  Continue metformin 750 mg twice daily  Reviewed nutrition and exercise recommendations  See BMI counseling  She has her diabetic eye exam next month  Will have patient sign VIOLET  Normal foot exam today  Follow up in 4 months, sooner if needed  2  Mixed hyperlipidemia  Assessment & Plan:  Lab Results   Component Value Date    CHOLESTEROL 129 2021    CHOLESTEROL 278 (H) 09/10/2019    CHOLESTEROL 250 (H) 2016     Lab Results   Component Value Date    HDL 40 (L) 2021    HDL 41 09/10/2019    HDL 47 2016     Lab Results   Component Value Date    TRIG 143 2021    TRIG 255 (H) 09/10/2019    TRIG 234 (H) 2016     Lab Results   Component Value Date    [place] 89 2021    [place] 237 09/10/2019     Lab Results   Component Value Date    LDLCALC 60 2021     Controlled  Continue atorvastatin 40 mg daily  Low fat diet  Due for repeat lipid panel  Orders:  -     Lipid panel; Future    3  Meniere's disease of left ear  Assessment & Plan:  Symptoms controlled with triamterene-HCTZ 37 5-25 mg daily  Recent BMP normal        4  Chronic pain of both knees  Assessment & Plan: Will evaluate further with x-rays  Likely due to arthritis  Start naproxen 500 mg twice daily as needed with food  Recommend warm/cool compresses  Knee sleeves as needed  Patient declines PT at this time  Orders:  -     XR knee 3 vw right non injury; Future; Expected date: 2023  -     XR knee 3 vw left non injury;  Future; Expected date: 2023  -     naproxen (Naprosyn) 500 mg tablet; Take 1 tablet (500 mg total) by mouth 2 (two) times a day with meals    5  BMI 31 0-31 9,adult    BMI Counseling: Body mass index is 31 09 kg/m²  The BMI is above normal  Nutrition recommendations include decreasing portion sizes, encouraging healthy choices of fruits and vegetables, decreasing fast food intake, consuming healthier snacks, limiting drinks that contain sugar, moderation in carbohydrate intake, increasing intake of lean protein, reducing intake of saturated and trans fat and reducing intake of cholesterol  Exercise recommendations include moderate physical activity 150 minutes/week, exercising 3-5 times per week and strength training exercises  No pharmacotherapy was ordered  Rationale for BMI follow-up plan is due to patient being overweight or obese  Tobacco Cessation Counseling: Tobacco cessation counseling was provided  The patient is sincerely urged to quit consumption of tobacco  She is not ready to quit tobacco  Medication options and side effects of medication discussed  Patient refused medication  Subjective      Patient is a 58year old female presenting for follow up on her diabetes  States she has been compliant with all of her medications  Denies any medication side effects  She is not currently checking her sugar  Overall feeling well, but would like to discuss her knee pain  States it has been present for a long time, but has gradually been getting worse  Denies any injuries or trauma  She has pain in both anterior knee knees  Denies radiation  The pain is constant, but worse with activity  She can not rate the pain or describe  She admits to stiffness  They do not feel unstable  Admits to swelling  Denies redness or warmth  Denies numbness, tingling, or weakness  She has not tried anything for it yet  Review of Systems   Constitutional: Negative for appetite change, chills, diaphoresis, fatigue, fever and unexpected weight change     Eyes: Negative for visual disturbance  Respiratory: Negative for cough, chest tightness, shortness of breath and wheezing  Cardiovascular: Negative for chest pain, palpitations and leg swelling  Gastrointestinal: Negative for abdominal pain, blood in stool, constipation, diarrhea, nausea and vomiting  Endocrine: Negative for cold intolerance, heat intolerance, polydipsia, polyphagia and polyuria  Musculoskeletal: Positive for arthralgias and joint swelling  Negative for myalgias  Skin: Negative for rash  Neurological: Negative for dizziness, tremors, weakness, light-headedness, numbness and headaches  Hematological: Negative for adenopathy  Current Outpatient Medications on File Prior to Visit   Medication Sig   • albuterol (PROVENTIL HFA,VENTOLIN HFA) 90 mcg/act inhaler Inhale 2 puffs every 4 (four) hours as needed for wheezing   • atorvastatin (LIPITOR) 40 mg tablet Take 1 tablet (40 mg total) by mouth daily   • Blood Glucose Monitoring Suppl KIT Use in the morning   • glucose blood (FREESTYLE LITE) test strip Use as instructed   • glucose monitoring kit (FREESTYLE) monitoring kit Apply 1 m topically 4 (four) times a day as needed Use as directed   • Lancets (freestyle) lancets Use as instructed   • metFORMIN (GLUCOPHAGE-XR) 750 mg 24 hr tablet TAKE 2 TABLETS BY MOUTH EVERY DAY WITH DINNER   • Probiotic Product (PRO-BIOTIC BLEND PO) Take 1 tablet by mouth daily   • triamterene-hydrochlorothiazide (DYAZIDE) 37 5-25 mg per capsule Take 1 capsule by mouth daily       Objective     /75 (BP Location: Right arm, Patient Position: Sitting, Cuff Size: Standard)   Pulse 88   Temp 98 6 °F (37 °C) (Temporal)   Ht 5' (1 524 m)   Wt 72 2 kg (159 lb 3 2 oz)   LMP 10/01/2010 (Approximate)   SpO2 99%   BMI 31 09 kg/m²     Physical Exam  Vitals and nursing note reviewed  Constitutional:       General: She is awake  She is not in acute distress  Appearance: Normal appearance   She is well-developed and well-groomed  She is obese  She is not ill-appearing  HENT:      Head: Normocephalic and atraumatic  Eyes:      General: No scleral icterus  Conjunctiva/sclera: Conjunctivae normal    Cardiovascular:      Rate and Rhythm: Normal rate and regular rhythm  Pulses: Normal pulses  no weak pulses          Dorsalis pedis pulses are 2+ on the right side and 2+ on the left side  Posterior tibial pulses are 2+ on the right side and 2+ on the left side  Heart sounds: Normal heart sounds  No murmur heard  Pulmonary:      Effort: Pulmonary effort is normal  No respiratory distress  Breath sounds: Normal breath sounds and air entry  No decreased air movement  No decreased breath sounds, wheezing, rhonchi or rales  Abdominal:      General: Abdomen is flat  Bowel sounds are normal  There is no distension  Palpations: Abdomen is soft  There is no mass  Tenderness: There is no abdominal tenderness  There is no right CVA tenderness, left CVA tenderness, guarding or rebound  Hernia: No hernia is present  Musculoskeletal:         General: Normal range of motion  Cervical back: Neck supple  Right knee: Effusion, bony tenderness and crepitus present  No swelling, deformity, erythema, ecchymosis or lacerations  Normal range of motion  Tenderness present over the medial joint line  No lateral joint line, MCL, LCL, ACL, PCL or patellar tendon tenderness  No LCL laxity, MCL laxity, ACL laxity or PCL laxity  Normal alignment, normal meniscus and normal patellar mobility  Normal pulse  Instability Tests: Anterior drawer test negative  Posterior drawer test negative  Anterior Lachman test negative  Medial Josh test negative and lateral Josh test negative  Left knee: Effusion, bony tenderness and crepitus present  No swelling, deformity, erythema, ecchymosis or lacerations  Normal range of motion  Tenderness present over the medial joint line   No lateral joint line, MCL, LCL, ACL, PCL or patellar tendon tenderness  No LCL laxity, MCL laxity, ACL laxity or PCL laxity  Normal alignment, normal meniscus and normal patellar mobility  Normal pulse  Instability Tests: Anterior drawer test negative  Posterior drawer test negative  Anterior Lachman test negative  Medial Josh test negative and lateral Josh test negative  Right lower leg: Normal  No edema  Left lower leg: Normal  No edema  Feet:      Right foot:      Skin integrity: No ulcer, skin breakdown, erythema, warmth, callus or dry skin  Left foot:      Skin integrity: No ulcer, skin breakdown, erythema, warmth, callus or dry skin  Lymphadenopathy:      Cervical: No cervical adenopathy  Skin:     General: Skin is warm  Capillary Refill: Capillary refill takes less than 2 seconds  Coloration: Skin is not jaundiced  Findings: No rash  Neurological:      General: No focal deficit present  Mental Status: She is alert and oriented to person, place, and time  Mental status is at baseline  Sensory: Sensation is intact  Motor: Motor function is intact  Coordination: Coordination is intact  Gait: Gait is intact  Psychiatric:         Attention and Perception: Attention normal          Mood and Affect: Mood and affect normal          Speech: Speech normal          Behavior: Behavior normal  Behavior is cooperative  Cognition and Memory: Cognition normal        Patient's shoes and socks removed  Right Foot/Ankle   Right Foot Inspection  Skin Exam: skin normal and skin intact  No dry skin, no warmth, no callus, no erythema, no maceration, no abnormal color, no pre-ulcer, no ulcer and no callus  Toe Exam: ROM and strength within normal limits  Sensory   Proprioception: intact  Monofilament testing: intact    Vascular  Capillary refills: < 3 seconds  The right DP pulse is 2+  The right PT pulse is 2+       Left Foot/Ankle  Left Foot Inspection  Skin Exam: skin normal and skin intact  No dry skin, no warmth, no erythema, no maceration, normal color, no pre-ulcer, no ulcer and no callus  Toe Exam: ROM and strength within normal limits  Sensory   Proprioception: intact  Monofilament testing: intact    Vascular  Capillary refills: < 3 seconds  The left DP pulse is 2+  The left PT pulse is 2+       Assign Risk Category  No deformity present  No loss of protective sensation  No weak pulses  Risk: Radha Evans 399, PA-C

## 2023-02-26 NOTE — ASSESSMENT & PLAN NOTE
Lab Results   Component Value Date    HGBA1C 5 8 05/25/2022     Well controlled  Continue metformin 750 mg twice daily  Reviewed nutrition and exercise recommendations  See BMI counseling  She has her diabetic eye exam next month  Will have patient sign VIOLET  Normal foot exam today  Follow up in 4 months, sooner if needed

## 2023-05-09 ENCOUNTER — VBI (OUTPATIENT)
Dept: ADMINISTRATIVE | Facility: OTHER | Age: 63
End: 2023-05-09

## 2023-05-11 ENCOUNTER — OFFICE VISIT (OUTPATIENT)
Dept: INTERNAL MEDICINE CLINIC | Facility: CLINIC | Age: 63
End: 2023-05-11

## 2023-05-11 VITALS
OXYGEN SATURATION: 98 % | WEIGHT: 156.4 LBS | HEART RATE: 88 BPM | BODY MASS INDEX: 30.54 KG/M2 | SYSTOLIC BLOOD PRESSURE: 140 MMHG | TEMPERATURE: 98.6 F | DIASTOLIC BLOOD PRESSURE: 85 MMHG

## 2023-05-11 DIAGNOSIS — A05.9 FOODBORNE GASTROENTERITIS: ICD-10-CM

## 2023-05-11 DIAGNOSIS — A09 DIARRHEA OF INFECTIOUS ORIGIN: ICD-10-CM

## 2023-05-11 DIAGNOSIS — R11.2 NAUSEA AND VOMITING, UNSPECIFIED VOMITING TYPE: Primary | ICD-10-CM

## 2023-05-11 RX ORDER — ONDANSETRON 4 MG/1
4 TABLET, ORALLY DISINTEGRATING ORAL EVERY 6 HOURS PRN
Qty: 20 TABLET | Refills: 0 | Status: SHIPPED | OUTPATIENT
Start: 2023-05-11

## 2023-05-11 NOTE — PROGRESS NOTES
INTERNAL MEDICINE OFFICE VISIT  [address] [address] [address], [facility]    NAME: Berenice Pacheco  AGE: 58 y o  SEX: female    DATE OF ENCOUNTER: 5/11/2023    Assessment and Plan     1  Nausea and vomiting, unspecified vomiting type  2  Diarrhea of infectious origin  3  Foodborne gastroenteritis  Likely infectious gastroenteritis  Unclear on specific organism considering longer-term diarrhea has a later onset of symptoms and something like a Staphylococcus aureus toxin that happens within 1 to 6 hours and resolves in 1 to 2 days  Concerning that she has subjective fevers  OTC use of loperamide and Pepto-Bismol likely slowed clearing of infectious organism, as she does note slow improvement over time  · Will check stool enteric and C  difficile pcr--> can consider antibiotic therapy depending on organism  · Continue supportive therapy with incorporating BRAT (bread, rice, applesauce, toast), oral rehydration solution, and PRN loperamide + peptobismol (but advised using this less often than before to clear organism)  · Will give script of ODT zofran for nausea relief  · Recommended that if symptoms do not improve in the next week to come back for another same-day appointment--> need to consider ova and parasite evaluation at that time if enteric panel is unremarkable  · Work note provided for her this visit    - ondansetron (ZOFRAN-ODT) 4 mg disintegrating tablet; Take 1 tablet (4 mg total) by mouth every 6 (six) hours as needed for nausea or vomiting  Dispense: 20 tablet; Refill: 0  - Stool Enteric Bacterial Panel by PCR; Future  - CBC and differential; Future  - Clostridium difficile toxin by PCR; Future  - Basic metabolic panel; Future    No orders of the defined types were placed in this encounter        Chief Complaint     Chief Complaint   Patient presents with   • Follow-up     Food poisoning from saturday    • Vomiting   • Bloated   • Nausea       History of Present Illness     60-year-old female with PMH of type 2 diabetes mellitus, hyperlipidemia, Ménière disease, and anxiety who presents as a same-day appointment for acute nausea, vomiting, and diarrhea after consuming steak from [employer] restaurant on Saturday, 5/6/2023  She states that it must of been a new cook and food was left out for a long time because although they got there at 5 PM, they did not get their food until 7 PM   After she had her steak and got home around 8 PM, within the next 1 to 2 hours, she felt a lot of lower abdominal cramping followed by nonbloody vomiting and nonbloody watery diarrhea  Since that day, she has had some improvement in her abdominal cramping but still some persistent episodes of nonbloody watery diarrhea  She has about 4-5 episodes per day and has been taking Pepto-Bismol and Imodium OTC at home  She is able to tolerate liquids and Jell-O  Still has a little bit of nausea and vomits about 0-1 times a day  She reports that she has been experiencing subjective fevers as well  She remarks that her sister had some of the same steak and felt sick for 1 to 2 days after consumption as well  She has no history of recent antibiotic use or history of C  difficile  The following portions of the patient's history were reviewed and updated as appropriate: allergies, current medications, past family history, past medical history, past social history, past surgical history and problem list     Review of Systems     Review of Systems   Constitutional: Positive for fever  Negative for chills  HENT: Negative for ear pain and sore throat  Eyes: Negative for pain and visual disturbance  Respiratory: Negative for cough and shortness of breath  Cardiovascular: Negative for chest pain and palpitations  Gastrointestinal: Positive for abdominal pain, diarrhea, nausea and vomiting  Negative for blood in stool and constipation     Genitourinary: Negative for dysuria and hematuria  Musculoskeletal: Negative for arthralgias and back pain  Skin: Negative for color change and rash  Neurological: Negative for seizures and syncope  All other systems reviewed and are negative  Active Problem List     Patient Active Problem List   Diagnosis   • Meniere disease   • Diabetes mellitus ([facility] [address] )   • Anxiety   • Mixed hyperlipidemia   • Chronic pain of both knees       Objective     LMP 10/01/2010 (Approximate)     Physical Exam  Vitals reviewed  Constitutional:       General: She is not in acute distress  Appearance: Normal appearance  She is not ill-appearing  HENT:      Head: Normocephalic and atraumatic  Nose: No congestion  Mouth/Throat:      Mouth: Mucous membranes are dry  Pharynx: Oropharynx is clear  Eyes:      General: No scleral icterus  Conjunctiva/sclera: Conjunctivae normal    Cardiovascular:      Rate and Rhythm: Normal rate and regular rhythm  Pulses: Normal pulses  Heart sounds: No murmur heard  No gallop  Pulmonary:      Effort: Pulmonary effort is normal       Breath sounds: Normal breath sounds  Abdominal:      General: Bowel sounds are normal  There is no distension  Palpations: Abdomen is soft  Tenderness: There is abdominal tenderness in the left upper quadrant and left lower quadrant  Musculoskeletal:         General: No swelling or tenderness  Cervical back: Neck supple  No muscular tenderness  Right lower leg: No edema  Left lower leg: No edema  Lymphadenopathy:      Cervical: No cervical adenopathy  Skin:     General: Skin is warm  Coloration: Skin is not pale  Findings: No erythema or rash  Comments: Skin tenting on bilateral hands   Neurological:      General: No focal deficit present  Mental Status: She is alert and oriented to person, place, and time     Psychiatric:         Mood and Affect: Mood normal          Behavior: Behavior normal          Thought Content:  Thought content normal          Judgment: Judgment normal          Pertinent Laboratory/Diagnostic Studies:  CBC:   Lab Results   Component Value Date/Time    WBC 9 55 11/19/2021 08:10 AM    RBC 4 79 11/19/2021 08:10 AM    HGB 15 2 11/19/2021 08:10 AM    HCT 44 9 11/19/2021 08:10 AM    MCV 94 11/19/2021 08:10 AM    MCH 31 7 11/19/2021 08:10 AM    MCHC 33 9 11/19/2021 08:10 AM    RDW 12 7 11/19/2021 08:10 AM    MPV 11 1 11/19/2021 08:10 AM     11/19/2021 08:10 AM    NRBC 0 11/19/2021 08:10 AM    NEUTOPHILPCT 63 11/19/2021 08:10 AM    LYMPHOPCT 28 11/19/2021 08:10 AM    MONOPCT 6 11/19/2021 08:10 AM    EOSPCT 2 11/19/2021 08:10 AM    BASOPCT 1 11/19/2021 08:10 AM    NEUTROABS 6 02 11/19/2021 08:10 AM    LYMPHSABS 2 67 11/19/2021 08:10 AM    MONOSABS 0 56 11/19/2021 08:10 AM    EOSABS 0 21 11/19/2021 08:10 AM     Chemistry Profile:   Lab Results   Component Value Date/Time    K 3 9 02/01/2023 06:16 PM     02/01/2023 06:16 PM    CO2 25 02/01/2023 06:16 PM    BUN 15 02/01/2023 06:16 PM    CREATININE 0 62 02/01/2023 06:16 PM    GLUC 81 02/01/2023 06:16 PM    GLUF 119 (H) 11/19/2021 08:10 AM    CALCIUM 9 4 02/01/2023 06:16 PM    MG 2 1 12/30/2019 11:40 AM    AST 32 11/19/2021 08:10 AM    ALT 61 11/19/2021 08:10 AM    ALKPHOS 73 11/19/2021 08:10 AM    EGFR 96 02/01/2023 06:16 PM     Coagulation Studies: No results found for: PROTIME, INR, PTT  Cardiac Studies: No results found for: NTBNP, BNP, TROPONINI, POCTROP  Hepatology: No results found for: LIPASE, AMMONIA, AFP  Endocrine Studies:   Lab Results   Component Value Date/Time    HGBA1C 5 8 05/25/2022 04:18 PM    HGBA1C 8 5 (H) 09/10/2019 12:35 PM    BLC6ARREAERE 0 687 09/10/2019 12:35 PM    TRIG 143 11/19/2021 08:10 AM    CHOLESTEROL 129 11/19/2021 08:10 AM    HDL 40 (L) 11/19/2021 08:10 AM    LDLCALC 60 11/19/2021 08:10 AM    NONHDLC 89 11/19/2021 08:10 AM     Iron Studies: No results found for: LABIRON, IRON, TIBC, FERRITIN  Health Maintenance: Lab Results   Component Value Date/Time    HEPCAB Non-reactive 09/10/2019 12:35 PM     Toxicology: No results found for: Teryl Hernandez, BDZUR, COCAINEUR, COCAINEUR, OPIATEUR, PCPUR, THCUR, ETOH, ACTMNPHEN, SALICYLATE  Urine Protein/Creatinine Ratio:   Lab Results   Component Value Date/Time    CREATININEUR 260 0 11/19/2021 08:10 AM    LABMICR 41 4 (H) 11/19/2021 08:10 AM    MICROALBCRE 16 11/19/2021 08:10 AM     Urinalysis: No results found for: Lisseth Eliazar, SPECGRAV, PHUR, LEUKOCYTESUR, NITRITE, PROTEINUA, GLUCOSEU, KETONESU, BILIRUBINUR, BLOODU   Urine Micro: No results found for: RBCUA, WBCUA, EPIS, BACTERIA, AMORPHPHOS  Urine Dip: No components found for: Ancel Stamp, SLAMBSG, SLAMBPHUA, SLAMBLEUKOUA, [address] [facility], [address] [facility], [address] [facility], [address] [facility], [address] [facility]  Hematology: No results found for: FOLATE, [id number], LDH, IRF, RETICCTPCT, RETIC, RETICHGB    Current Medications     Current Outpatient Medications:   •  albuterol (PROVENTIL HFA,VENTOLIN HFA) 90 mcg/act inhaler, Inhale 2 puffs every 4 (four) hours as needed for wheezing, Disp: 6 7 g, Rfl: 3  •  atorvastatin (LIPITOR) 40 mg tablet, Take 1 tablet (40 mg total) by mouth daily, Disp: 90 tablet, Rfl: 1  •  Blood Glucose Monitoring Suppl KIT, Use in the morning, Disp: 1 kit, Rfl: 0  •  glucose blood (FREESTYLE LITE) test strip, Use as instructed, Disp: 100 each, Rfl: 5  •  glucose monitoring kit (FREESTYLE) monitoring kit, Apply 1 m topically 4 (four) times a day as needed Use as directed, Disp: 1 each, Rfl: 0  •  Lancets (freestyle) lancets, Use as instructed, Disp: 100 each, Rfl: 3  •  metFORMIN (GLUCOPHAGE-XR) 750 mg 24 hr tablet, TAKE 2 TABLETS BY MOUTH EVERY DAY WITH DINNER, Disp: 180 tablet, Rfl: 3  •  naproxen (Naprosyn) 500 mg tablet, Take 1 tablet (500 mg total) by mouth 2 (two) times a day with meals, Disp: 60 tablet, Rfl: 2  •  Probiotic Product (PRO-BIOTIC BLEND PO), Take 1 tablet by mouth daily, Disp: , Rfl: •  triamterene-hydrochlorothiazide (DYAZIDE) 37 5-25 mg per capsule, Take 1 capsule by mouth daily, Disp: 90 capsule, Rfl: 1    Health Maintenance     Health Maintenance   Topic Date Due   • Pneumococcal Vaccine: Pediatrics (0 to 5 Years) and At-Risk Patients (6 to 59 Years) (1 - PCV) Never done   • Colorectal Cancer Screening  Never done   • COVID-19 Vaccine (3 - Booster for Pfizer series) 03/05/2022   • DM Eye Exam  09/28/2022   • Cervical Cancer Screening  10/01/2022   • HEMOGLOBIN A1C  11/25/2022   • Depression Screening  10/11/2023   • Influenza Vaccine (Season Ended) 09/01/2023   • Annual Physical  10/11/2023   • Depression Follow-up Plan  10/11/2023   • Breast Cancer Screening: Mammogram  02/15/2024   • BMI: Adult  02/24/2024   • BMI: Followup Plan  02/26/2024   • Diabetic Foot Exam  02/26/2024   • DTaP,Tdap,and Td Vaccines (2 - Td or Tdap) 06/05/2024   • HIV Screening  Completed   • Hepatitis C Screening  Completed   • HIB Vaccine  Aged Out   • IPV Vaccine  Aged Out   • Hepatitis A Vaccine  Aged Out   • Meningococcal ACWY Vaccine  Aged Out   • HPV Vaccine  Aged Out     Immunization History   Administered Date(s) Administered   • COVID-19 PFIZER VACCINE 0 3 ML IM 12/18/2021, 01/08/2022   • Tdap 06/05/2014       Jackalyn Harada, DO  Internal Medicine  PGY-3  5/11/2023 2:14 PM

## 2023-05-12 ENCOUNTER — APPOINTMENT (OUTPATIENT)
Dept: LAB | Facility: CLINIC | Age: 63
End: 2023-05-12

## 2023-05-12 DIAGNOSIS — A09 DIARRHEA OF INFECTIOUS ORIGIN: ICD-10-CM

## 2023-05-12 DIAGNOSIS — E78.2 MIXED HYPERLIPIDEMIA: ICD-10-CM

## 2023-05-12 DIAGNOSIS — A05.9 FOODBORNE GASTROENTERITIS: ICD-10-CM

## 2023-05-12 DIAGNOSIS — R11.2 NAUSEA AND VOMITING, UNSPECIFIED VOMITING TYPE: ICD-10-CM

## 2023-05-12 LAB
ANION GAP SERPL CALCULATED.3IONS-SCNC: 3 MMOL/L (ref 4–13)
BASOPHILS # BLD AUTO: 0.05 THOUSANDS/ÂΜL (ref 0–0.1)
BASOPHILS NFR BLD AUTO: 1 % (ref 0–1)
BUN SERPL-MCNC: 14 MG/DL (ref 5–25)
CALCIUM SERPL-MCNC: 9 MG/DL (ref 8.3–10.1)
CHLORIDE SERPL-SCNC: 104 MMOL/L (ref 96–108)
CHOLEST SERPL-MCNC: 140 MG/DL
CO2 SERPL-SCNC: 28 MMOL/L (ref 21–32)
CREAT SERPL-MCNC: 0.58 MG/DL (ref 0.6–1.3)
EOSINOPHIL # BLD AUTO: 0.38 THOUSAND/ÂΜL (ref 0–0.61)
EOSINOPHIL NFR BLD AUTO: 4 % (ref 0–6)
ERYTHROCYTE [DISTWIDTH] IN BLOOD BY AUTOMATED COUNT: 12.4 % (ref 11.6–15.1)
GFR SERPL CREATININE-BSD FRML MDRD: 99 ML/MIN/1.73SQ M
GLUCOSE P FAST SERPL-MCNC: 152 MG/DL (ref 65–99)
HCT VFR BLD AUTO: 41.8 % (ref 34.8–46.1)
HDLC SERPL-MCNC: 46 MG/DL
HGB BLD-MCNC: 14.3 G/DL (ref 11.5–15.4)
IMM GRANULOCYTES # BLD AUTO: 0.03 THOUSAND/UL (ref 0–0.2)
IMM GRANULOCYTES NFR BLD AUTO: 0 % (ref 0–2)
LDLC SERPL CALC-MCNC: 47 MG/DL (ref 0–100)
LYMPHOCYTES # BLD AUTO: 2.37 THOUSANDS/ÂΜL (ref 0.6–4.47)
LYMPHOCYTES NFR BLD AUTO: 27 % (ref 14–44)
MCH RBC QN AUTO: 32.8 PG (ref 26.8–34.3)
MCHC RBC AUTO-ENTMCNC: 34.2 G/DL (ref 31.4–37.4)
MCV RBC AUTO: 96 FL (ref 82–98)
MONOCYTES # BLD AUTO: 0.64 THOUSAND/ÂΜL (ref 0.17–1.22)
MONOCYTES NFR BLD AUTO: 7 % (ref 4–12)
NEUTROPHILS # BLD AUTO: 5.19 THOUSANDS/ÂΜL (ref 1.85–7.62)
NEUTS SEG NFR BLD AUTO: 61 % (ref 43–75)
NONHDLC SERPL-MCNC: 94 MG/DL
NRBC BLD AUTO-RTO: 0 /100 WBCS
PLATELET # BLD AUTO: 273 THOUSANDS/UL (ref 149–390)
PMV BLD AUTO: 10.8 FL (ref 8.9–12.7)
POTASSIUM SERPL-SCNC: 3.2 MMOL/L (ref 3.5–5.3)
RBC # BLD AUTO: 4.36 MILLION/UL (ref 3.81–5.12)
SODIUM SERPL-SCNC: 135 MMOL/L (ref 135–147)
TRIGL SERPL-MCNC: 236 MG/DL
WBC # BLD AUTO: 8.66 THOUSAND/UL (ref 4.31–10.16)

## 2023-05-13 LAB — C DIFF TOX GENS STL QL NAA+PROBE: NEGATIVE

## 2023-05-15 LAB
CAMPYLOBACTER DNA SPEC NAA+PROBE: NORMAL
SALMONELLA DNA SPEC QL NAA+PROBE: NORMAL
SHIGA TOXIN STX GENE SPEC NAA+PROBE: NORMAL
SHIGELLA DNA SPEC QL NAA+PROBE: NORMAL

## 2023-06-08 DIAGNOSIS — J20.9 ACUTE BRONCHITIS: ICD-10-CM

## 2023-06-09 ENCOUNTER — HOSPITAL ENCOUNTER (INPATIENT)
Facility: HOSPITAL | Age: 63
LOS: 3 days | Discharge: HOME | DRG: 720 | End: 2023-06-13
Attending: EMERGENCY MEDICINE | Admitting: INTERNAL MEDICINE
Payer: COMMERCIAL

## 2023-06-09 ENCOUNTER — APPOINTMENT (EMERGENCY)
Dept: RADIOLOGY | Facility: HOSPITAL | Age: 63
DRG: 720 | End: 2023-06-09
Payer: COMMERCIAL

## 2023-06-09 DIAGNOSIS — Z72.0 TOBACCO USE: ICD-10-CM

## 2023-06-09 DIAGNOSIS — J44.1 ACUTE EXACERBATION OF CHRONIC OBSTRUCTIVE PULMONARY DISEASE (COPD) (HCC): ICD-10-CM

## 2023-06-09 DIAGNOSIS — R06.00 DYSPNEA: ICD-10-CM

## 2023-06-09 DIAGNOSIS — A41.9 SEPSIS (HCC): Primary | ICD-10-CM

## 2023-06-09 DIAGNOSIS — J18.9 PNEUMONIA: ICD-10-CM

## 2023-06-09 DIAGNOSIS — R07.9 ACUTE CHEST PAIN: ICD-10-CM

## 2023-06-09 PROBLEM — R06.02 SHORTNESS OF BREATH: Status: ACTIVE | Noted: 2023-06-09

## 2023-06-09 LAB
2HR DELTA HS TROPONIN: 0 NG/L
ALBUMIN SERPL BCP-MCNC: 4.1 G/DL (ref 3.5–5)
ALP SERPL-CCNC: 72 U/L (ref 46–116)
ALT SERPL W P-5'-P-CCNC: 51 U/L (ref 12–78)
ANION GAP SERPL CALCULATED.3IONS-SCNC: 6 MMOL/L (ref 4–13)
APTT PPP: 30 SECONDS (ref 23–37)
AST SERPL W P-5'-P-CCNC: 24 U/L (ref 5–45)
BASOPHILS # BLD AUTO: 0.1 THOUSANDS/ÂΜL (ref 0–0.1)
BASOPHILS NFR BLD AUTO: 1 % (ref 0–1)
BILIRUB SERPL-MCNC: 0.43 MG/DL (ref 0.2–1)
BNP SERPL-MCNC: 7 PG/ML (ref 0–100)
BUN SERPL-MCNC: 12 MG/DL (ref 5–25)
CALCIUM SERPL-MCNC: 9.5 MG/DL (ref 8.3–10.1)
CARDIAC TROPONIN I PNL SERPL HS: 3 NG/L
CARDIAC TROPONIN I PNL SERPL HS: 3 NG/L
CHLORIDE SERPL-SCNC: 107 MMOL/L (ref 96–108)
CO2 SERPL-SCNC: 24 MMOL/L (ref 21–32)
CREAT SERPL-MCNC: 0.81 MG/DL (ref 0.6–1.3)
EOSINOPHIL # BLD AUTO: 0.74 THOUSAND/ÂΜL (ref 0–0.61)
EOSINOPHIL NFR BLD AUTO: 6 % (ref 0–6)
ERYTHROCYTE [DISTWIDTH] IN BLOOD BY AUTOMATED COUNT: 12.8 % (ref 11.6–15.1)
FLUAV RNA RESP QL NAA+PROBE: NEGATIVE
FLUBV RNA RESP QL NAA+PROBE: NEGATIVE
GFR SERPL CREATININE-BSD FRML MDRD: 78 ML/MIN/1.73SQ M
GLUCOSE SERPL-MCNC: 119 MG/DL (ref 65–140)
GLUCOSE SERPL-MCNC: 148 MG/DL (ref 65–140)
HCT VFR BLD AUTO: 46.4 % (ref 34.8–46.1)
HGB BLD-MCNC: 15.8 G/DL (ref 11.5–15.4)
IMM GRANULOCYTES # BLD AUTO: 0.05 THOUSAND/UL (ref 0–0.2)
IMM GRANULOCYTES NFR BLD AUTO: 0 % (ref 0–2)
INR PPP: 0.9 (ref 0.84–1.19)
LACTATE SERPL-SCNC: 1.9 MMOL/L (ref 0.5–2)
LYMPHOCYTES # BLD AUTO: 3.36 THOUSANDS/ÂΜL (ref 0.6–4.47)
LYMPHOCYTES NFR BLD AUTO: 28 % (ref 14–44)
MCH RBC QN AUTO: 32.6 PG (ref 26.8–34.3)
MCHC RBC AUTO-ENTMCNC: 34.1 G/DL (ref 31.4–37.4)
MCV RBC AUTO: 96 FL (ref 82–98)
MONOCYTES # BLD AUTO: 0.7 THOUSAND/ÂΜL (ref 0.17–1.22)
MONOCYTES NFR BLD AUTO: 6 % (ref 4–12)
NEUTROPHILS # BLD AUTO: 6.92 THOUSANDS/ÂΜL (ref 1.85–7.62)
NEUTS SEG NFR BLD AUTO: 59 % (ref 43–75)
NRBC BLD AUTO-RTO: 0 /100 WBCS
PLATELET # BLD AUTO: 324 THOUSANDS/UL (ref 149–390)
PMV BLD AUTO: 9.9 FL (ref 8.9–12.7)
POTASSIUM SERPL-SCNC: 4 MMOL/L (ref 3.5–5.3)
PROCALCITONIN SERPL-MCNC: <0.05 NG/ML
PROT SERPL-MCNC: 8.2 G/DL (ref 6.4–8.4)
PROTHROMBIN TIME: 12.4 SECONDS (ref 11.6–14.5)
RBC # BLD AUTO: 4.84 MILLION/UL (ref 3.81–5.12)
RSV RNA RESP QL NAA+PROBE: NEGATIVE
SARS-COV-2 RNA RESP QL NAA+PROBE: NEGATIVE
SODIUM SERPL-SCNC: 137 MMOL/L (ref 135–147)
WBC # BLD AUTO: 11.87 THOUSAND/UL (ref 4.31–10.16)

## 2023-06-09 PROCEDURE — 83880 ASSAY OF NATRIURETIC PEPTIDE: CPT | Performed by: EMERGENCY MEDICINE

## 2023-06-09 PROCEDURE — 96361 HYDRATE IV INFUSION ADD-ON: CPT

## 2023-06-09 PROCEDURE — 84484 ASSAY OF TROPONIN QUANT: CPT | Performed by: EMERGENCY MEDICINE

## 2023-06-09 PROCEDURE — 85025 COMPLETE CBC W/AUTO DIFF WBC: CPT | Performed by: EMERGENCY MEDICINE

## 2023-06-09 PROCEDURE — 82948 REAGENT STRIP/BLOOD GLUCOSE: CPT

## 2023-06-09 PROCEDURE — 0241U HB NFCT DS VIR RESP RNA 4 TRGT: CPT | Performed by: EMERGENCY MEDICINE

## 2023-06-09 PROCEDURE — 99285 EMERGENCY DEPT VISIT HI MDM: CPT

## 2023-06-09 PROCEDURE — 84145 PROCALCITONIN (PCT): CPT | Performed by: EMERGENCY MEDICINE

## 2023-06-09 PROCEDURE — 96365 THER/PROPH/DIAG IV INF INIT: CPT

## 2023-06-09 PROCEDURE — 80053 COMPREHEN METABOLIC PANEL: CPT | Performed by: EMERGENCY MEDICINE

## 2023-06-09 PROCEDURE — 71046 X-RAY EXAM CHEST 2 VIEWS: CPT

## 2023-06-09 PROCEDURE — 36415 COLL VENOUS BLD VENIPUNCTURE: CPT | Performed by: EMERGENCY MEDICINE

## 2023-06-09 PROCEDURE — 94640 AIRWAY INHALATION TREATMENT: CPT

## 2023-06-09 PROCEDURE — 99223 1ST HOSP IP/OBS HIGH 75: CPT | Performed by: INTERNAL MEDICINE

## 2023-06-09 PROCEDURE — 93005 ELECTROCARDIOGRAM TRACING: CPT

## 2023-06-09 PROCEDURE — 85610 PROTHROMBIN TIME: CPT | Performed by: EMERGENCY MEDICINE

## 2023-06-09 PROCEDURE — 87040 BLOOD CULTURE FOR BACTERIA: CPT | Performed by: EMERGENCY MEDICINE

## 2023-06-09 PROCEDURE — 85730 THROMBOPLASTIN TIME PARTIAL: CPT | Performed by: EMERGENCY MEDICINE

## 2023-06-09 PROCEDURE — 83605 ASSAY OF LACTIC ACID: CPT | Performed by: EMERGENCY MEDICINE

## 2023-06-09 PROCEDURE — 96375 TX/PRO/DX INJ NEW DRUG ADDON: CPT

## 2023-06-09 RX ORDER — INSULIN LISPRO 100 [IU]/ML
1-5 INJECTION, SOLUTION INTRAVENOUS; SUBCUTANEOUS
Status: DISCONTINUED | OUTPATIENT
Start: 2023-06-10 | End: 2023-06-13 | Stop reason: HOSPADM

## 2023-06-09 RX ORDER — IPRATROPIUM BROMIDE AND ALBUTEROL SULFATE 2.5; .5 MG/3ML; MG/3ML
3 SOLUTION RESPIRATORY (INHALATION)
Status: DISCONTINUED | OUTPATIENT
Start: 2023-06-09 | End: 2023-06-10

## 2023-06-09 RX ORDER — METHYLPREDNISOLONE SODIUM SUCCINATE 40 MG/ML
40 INJECTION, POWDER, LYOPHILIZED, FOR SOLUTION INTRAMUSCULAR; INTRAVENOUS DAILY
Status: DISCONTINUED | OUTPATIENT
Start: 2023-06-10 | End: 2023-06-12

## 2023-06-09 RX ORDER — ENOXAPARIN SODIUM 100 MG/ML
40 INJECTION SUBCUTANEOUS DAILY
Status: DISCONTINUED | OUTPATIENT
Start: 2023-06-10 | End: 2023-06-13 | Stop reason: HOSPADM

## 2023-06-09 RX ORDER — ALBUTEROL SULFATE 90 UG/1
2 AEROSOL, METERED RESPIRATORY (INHALATION) EVERY 6 HOURS PRN
Status: DISCONTINUED | OUTPATIENT
Start: 2023-06-09 | End: 2023-06-13 | Stop reason: HOSPADM

## 2023-06-09 RX ORDER — TRIAMTERENE AND HYDROCHLOROTHIAZIDE 37.5; 25 MG/1; MG/1
1 TABLET ORAL DAILY
Status: DISCONTINUED | OUTPATIENT
Start: 2023-06-10 | End: 2023-06-13 | Stop reason: HOSPADM

## 2023-06-09 RX ORDER — ATORVASTATIN CALCIUM 40 MG/1
40 TABLET, FILM COATED ORAL DAILY
Status: DISCONTINUED | OUTPATIENT
Start: 2023-06-10 | End: 2023-06-13 | Stop reason: HOSPADM

## 2023-06-09 RX ORDER — DEXAMETHASONE SODIUM PHOSPHATE 10 MG/ML
10 INJECTION, SOLUTION INTRAMUSCULAR; INTRAVENOUS ONCE
Status: COMPLETED | OUTPATIENT
Start: 2023-06-09 | End: 2023-06-09

## 2023-06-09 RX ORDER — INSULIN LISPRO 100 [IU]/ML
1-5 INJECTION, SOLUTION INTRAVENOUS; SUBCUTANEOUS
Status: DISCONTINUED | OUTPATIENT
Start: 2023-06-09 | End: 2023-06-13 | Stop reason: HOSPADM

## 2023-06-09 RX ORDER — ALBUTEROL SULFATE 2.5 MG/3ML
2.5 SOLUTION RESPIRATORY (INHALATION) ONCE
Status: COMPLETED | OUTPATIENT
Start: 2023-06-09 | End: 2023-06-09

## 2023-06-09 RX ORDER — ALBUTEROL SULFATE 90 UG/1
AEROSOL, METERED RESPIRATORY (INHALATION)
Qty: 6.7 G | Refills: 3 | Status: SHIPPED | OUTPATIENT
Start: 2023-06-09

## 2023-06-09 RX ADMIN — ALBUTEROL SULFATE 2.5 MG: 2.5 SOLUTION RESPIRATORY (INHALATION) at 18:47

## 2023-06-09 RX ADMIN — CEFTRIAXONE SODIUM 1000 MG: 10 INJECTION, POWDER, FOR SOLUTION INTRAVENOUS at 20:00

## 2023-06-09 RX ADMIN — SODIUM CHLORIDE 500 ML: 0.9 INJECTION, SOLUTION INTRAVENOUS at 18:44

## 2023-06-09 RX ADMIN — IPRATROPIUM BROMIDE AND ALBUTEROL SULFATE 3 ML: .5; 3 SOLUTION RESPIRATORY (INHALATION) at 22:30

## 2023-06-09 RX ADMIN — DEXAMETHASONE SODIUM PHOSPHATE 10 MG: 10 INJECTION, SOLUTION INTRAMUSCULAR; INTRAVENOUS at 18:47

## 2023-06-09 RX ADMIN — IPRATROPIUM BROMIDE 0.5 MG: 0.5 SOLUTION RESPIRATORY (INHALATION) at 18:47

## 2023-06-09 RX ADMIN — AZITHROMYCIN MONOHYDRATE 500 MG: 500 INJECTION, POWDER, LYOPHILIZED, FOR SOLUTION INTRAVENOUS at 20:42

## 2023-06-09 NOTE — ED PROVIDER NOTES
History  Chief Complaint   Patient presents with   • Shortness of Breath     Pt reports a cough & SOB for a couple of weeks  Per pt thought it was getting better last weekend, Went to work Monday to Wednesday and unable to work yesterday  Pt reports worsening SOB when lying down and chest tightness with cough  • Cough     Patient is a 58-year-old female, with a history significant for chronic bronchitis, diabetes, tobacco use, who presents to the ED today due to dyspnea  Patient states that her symptoms began two weeks ago with a productive cough that had been improving in course until recently when she has had significant worsening of her symptoms  Patient reports associated wheezing, subjective fever, diaphoresis, abdominal bloating, chest pain characterized as heaviness/tightness has been present since last Friday  This discomfort is exertional and pleuritic in nature  Patient denies history of DVT, recent trauma or surgery  Laying back exacerbates her dyspnea  Patient is without other concerns at this time  Prior to Admission Medications   Prescriptions Last Dose Informant Patient Reported? Taking?    Blood Glucose Monitoring Suppl KIT   No No   Sig: Use in the morning   Lancets (freestyle) lancets   No No   Sig: Use as instructed   Probiotic Product (PRO-BIOTIC BLEND PO)   Yes No   Sig: Take 1 tablet by mouth daily   albuterol (PROVENTIL HFA,VENTOLIN HFA) 90 mcg/act inhaler   No No   Sig: TAKE 2 PUFFS BY MOUTH EVERY 4 HOURS AS NEEDED FOR WHEEZE   atorvastatin (LIPITOR) 40 mg tablet   No No   Sig: Take 1 tablet (40 mg total) by mouth daily   glucose blood (FREESTYLE LITE) test strip   No No   Sig: Use as instructed   glucose monitoring kit (FREESTYLE) monitoring kit   No No   Sig: Apply 1 m topically 4 (four) times a day as needed Use as directed   metFORMIN (GLUCOPHAGE-XR) 750 mg 24 hr tablet   No No   Sig: TAKE 2 TABLETS BY MOUTH EVERY DAY WITH DINNER   naproxen (Naprosyn) 500 mg tablet   No No Sig: Take 1 tablet (500 mg total) by mouth 2 (two) times a day with meals   ondansetron (ZOFRAN-ODT) 4 mg disintegrating tablet   No No   Sig: Take 1 tablet (4 mg total) by mouth every 6 (six) hours as needed for nausea or vomiting   triamterene-hydrochlorothiazide (DYAZIDE) 37 5-25 mg per capsule   No No   Sig: Take 1 capsule by mouth daily      Facility-Administered Medications: None       Past Medical History:   Diagnosis Date   • Diabetes mellitus ([facility] [address] )    • Vertigo        Past Surgical History:   Procedure Laterality Date   • BREAST BIOPSY Bilateral 12/01/2021    stereo x2   • BREAST BIOPSY Right 12/01/2021   • MAMMO STEREOTACTIC BREAST BIOPSY LEFT (ALL INC) Left 12/1/2021   • MAMMO STEREOTACTIC BREAST BIOPSY RIGHT (ALL INC) Right 12/1/2021   • TUMOR REMOVAL      right leg- benign        Family History   Problem Relation Age of Onset   • Stroke Mother    • Hypertension Mother    • Diabetes Mother    • Throat cancer Brother    • Ovarian cancer Family         2 maternal aunts and 2 cousins    • Stroke Father    • Thyroid cancer Sister    • Heart disease Sister    • Uterine cancer Sister    • No Known Problems Son    • Heart attack Maternal Grandmother 43   • Stroke Maternal Grandfather 58   • Heart attack Paternal Grandmother    • Leukemia Paternal Grandfather    • No Known Problems Sister    • No Known Problems Son    • Breast cancer Cousin 36   • Breast cancer Maternal Aunt 26   • Uterine cancer Maternal Aunt    • Uterine cancer Cousin    • No Known Problems Paternal Aunt      I have reviewed and agree with the history as documented      E-Cigarette/Vaping   • E-Cigarette Use Never User      E-Cigarette/Vaping Substances   • Nicotine No    • THC No    • CBD No    • Flavoring No    • Other No    • Unknown No      Social History     Tobacco Use   • Smoking status: Every Day     Packs/day: 0 25     Types: Cigarettes   • Smokeless tobacco: Never   • Tobacco comments:     1-6 cigarettes a day   Vaping Use   • Vaping Use: Never used   Substance Use Topics   • Alcohol use: Yes     Comment: socially    • Drug use: Never        Review of Systems   Constitutional: Positive for diaphoresis and fever (Subjective)  HENT: Negative for trouble swallowing  Eyes: Negative for visual disturbance  Respiratory: Positive for cough, shortness of breath and wheezing  Cardiovascular: Positive for chest pain  Gastrointestinal: Positive for abdominal pain (Bloating feeling)  Endocrine: Negative for polyuria  Genitourinary: Negative for dysuria  Musculoskeletal: Negative for gait problem  Allergic/Immunologic: Negative for environmental allergies  Neurological: Negative for weakness and numbness  Psychiatric/Behavioral: Negative for confusion  All other systems reviewed and are negative  Physical Exam  ED Triage Vitals [06/09/23 1806]   Temperature Pulse Respirations Blood Pressure SpO2   98 °F (36 7 °C) 99 (!) 26 127/81 92 %      Temp Source Heart Rate Source Patient Position - Orthostatic VS BP Location FiO2 (%)   Temporal Monitor Sitting Left arm --      Pain Score       8             Orthostatic Vital Signs  Vitals:    06/09/23 1806 06/09/23 1827 06/09/23 2000   BP: 127/81  118/72   Pulse: 99 104 90   Patient Position - Orthostatic VS: Sitting  Sitting       Physical Exam  Vitals and nursing note reviewed  Constitutional:       Appearance: She is not toxic-appearing or diaphoretic  HENT:      Head: Normocephalic and atraumatic  Right Ear: External ear normal       Left Ear: External ear normal       Nose: Nose normal  No rhinorrhea  Mouth/Throat:      Mouth: Mucous membranes are moist       Pharynx: Oropharynx is clear  No oropharyngeal exudate or posterior oropharyngeal erythema  Comments: Uvula midline  No oropharyngeal or submandibular mass/swelling  Eyes:      General: No scleral icterus  Right eye: No discharge  Left eye: No discharge        Conjunctiva/sclera: Conjunctivae normal       Pupils: Pupils are equal, round, and reactive to light  Neck:      Comments: Patient is spontaneously rotating their neck to the left and right during the history and physical exam interaction without difficulty or apparent discomfort    Cardiovascular:      Rate and Rhythm: Regular rhythm  Tachycardia present  Pulses: Normal pulses  Heart sounds: Normal heart sounds  No murmur heard  No friction rub  No gallop  Comments: 2+ Radial  Pulmonary:      Effort: No respiratory distress  Breath sounds: No stridor  Wheezing (Diffuse) present  No rhonchi or rales  Comments: Increased respiratory effort, tachypnea  Abdominal:      General: Abdomen is flat  There is no distension  Palpations: Abdomen is soft  Tenderness: There is no abdominal tenderness  There is no right CVA tenderness, left CVA tenderness, guarding or rebound  Musculoskeletal:         General: No tenderness (No pain with calf squeeze) or deformity  Cervical back: Neck supple  No tenderness  No muscular tenderness  Lymphadenopathy:      Cervical: No cervical adenopathy  Skin:     General: Skin is warm and dry  Capillary Refill: Capillary refill takes less than 2 seconds  Neurological:      Mental Status: She is alert  Comments: Patient is speaking clearly in complete sentences  Patient is answering appropriately and able follow commands  Patient is moving all four extremities spontaneously  No facial droop  Tongue midline        Psychiatric:         Mood and Affect: Mood normal          Behavior: Behavior normal          ED Medications  Medications   azithromycin (ZITHROMAX) 500 mg in sodium chloride 0 9 % 250 mL IVPB (has no administration in time range)   sodium chloride 0 9 % bolus 500 mL (500 mL Intravenous New Bag 6/9/23 1844)   dexamethasone (PF) (DECADRON) injection 10 mg (10 mg Intravenous Given 6/9/23 1847)   cefTRIAXone (ROCEPHIN) 1,000 mg in dextrose 5 % 50 mL IVPB (1,000 mg Intravenous New Bag 6/9/23 2000)   albuterol inhalation solution 2 5 mg (2 5 mg Nebulization Given 6/9/23 1847)   ipratropium (ATROVENT) 0 02 % inhalation solution 0 5 mg (0 5 mg Nebulization Given 6/9/23 1847)       Diagnostic Studies  Results Reviewed     Procedure Component Value Units Date/Time    HS Troponin I 2hr [[id number]] Collected: 06/09/23 2035    Lab Status: No result Specimen: Blood from Arm, Right     HS Troponin I 4hr [[id number]]     Lab Status: No result Specimen: Blood     COVID/FLU/RSV [[id number]]  (Normal) Collected: 06/09/23 1836    Lab Status: Final result Specimen: Nares from Nose Updated: 06/09/23 1951     SARS-CoV-2 Negative     INFLUENZA A PCR Negative     INFLUENZA B PCR Negative     RSV PCR Negative    Narrative:      FOR PEDIATRIC PATIENTS - copy/paste COVID Guidelines URL to browser: [email or web address]    SARS-CoV-2 assay is a Nucleic Acid Amplification assay intended for the  qualitative detection of nucleic acid from SARS-CoV-2 in nasopharyngeal  swabs  Results are for the presumptive identification of SARS-CoV-2 RNA  Positive results are indicative of infection with SARS-CoV-2, the virus  causing COVID-19, but do not rule out bacterial infection or co-infection  with other viruses  Laboratories within the [place] and its  territories are required to report all positive results to the appropriate  public health authorities  Negative results do not preclude SARS-CoV-2  infection and should not be used as the sole basis for treatment or other  patient management decisions  Negative results must be combined with  clinical observations, patient history, and epidemiological information  This test has not been FDA cleared or approved  This test has been authorized by FDA under an Emergency Use Authorization  (EUA)   This test is only authorized for the duration of time the  declaration that circumstances exist justifying the authorization of the  emergency use of an in vitro diagnostic tests for detection of SARS-CoV-2  virus and/or diagnosis of COVID-19 infection under section 564(b)(1) of  the Act, 21 U  S C  082UWQ-3(T)(6), unless the authorization is terminated  or revoked sooner  The test has been validated but independent review by FDA  and CLIA is pending  Test performed using [employer]: This RT-PCR assay targets N2,  a region unique to SARS-CoV-2  A conserved region in the E-gene was chosen  for pan-Sarbecovirus detection which includes SARS-CoV-2  According to CMS-2020-01-R, this platform meets the definition of high-throughput technology  B-Type Natriuretic Peptide(BNP) [[id number]]  (Normal) Collected: 06/09/23 1839    Lab Status: Final result Specimen: Blood from Hand, Left Updated: 06/09/23 1932     BNP 7 pg/mL     Procalcitonin [[id number]]  (Normal) Collected: 06/09/23 1836    Lab Status: Final result Specimen: Blood from Arm, Left Updated: 06/09/23 1926     Procalcitonin <0 05 ng/ml     HS Troponin 0hr (reflex protocol) [[id number]]  (Normal) Collected: 06/09/23 1836    Lab Status: Final result Specimen: Blood from Arm, Left Updated: 06/09/23 1924     hs TnI 0hr 3 ng/L     Lactic acid [[id number]]  (Normal) Collected: 06/09/23 1836    Lab Status: Final result Specimen: Blood from Arm, Left Updated: 06/09/23 1913     LACTIC ACID 1 9 mmol/L     Narrative:      Result may be elevated if tourniquet was used during collection      Comprehensive metabolic panel [[id number]] Collected: 06/09/23 1836    Lab Status: Final result Specimen: Blood from Arm, Left Updated: 06/09/23 1912     Sodium 137 mmol/L      Potassium 4 0 mmol/L      Chloride 107 mmol/L      CO2 24 mmol/L      ANION GAP 6 mmol/L      BUN 12 mg/dL      Creatinine 0 81 mg/dL      Glucose 119 mg/dL      Calcium 9 5 mg/dL      AST 24 U/L      ALT 51 U/L      Alkaline Phosphatase 72 U/L      Total Protein 8 2 g/dL      Albumin 4 1 g/dL Total Bilirubin 0 43 mg/dL      eGFR 78 ml/min/1 73sq m     Narrative:      [place] guidelines for Chronic Kidney Disease (CKD):   •  Stage 1 with normal or high GFR (GFR > 90 mL/min/1 73 square meters)  •  Stage 2 Mild CKD (GFR = 60-89 mL/min/1 73 square meters)  •  Stage 3A Moderate CKD (GFR = 45-59 mL/min/1 73 square meters)  •  Stage 3B Moderate CKD (GFR = 30-44 mL/min/1 73 square meters)  •  Stage 4 Severe CKD (GFR = 15-29 mL/min/1 73 square meters)  •  Stage 5 End Stage CKD (GFR <15 mL/min/1 73 square meters)  Note: GFR calculation is accurate only with a steady state creatinine    Protime-INR [[id number]]  (Normal) Collected: 06/09/23 1836    Lab Status: Final result Specimen: Blood from Arm, Left Updated: 06/09/23 1908     Protime 12 4 seconds      INR 0 90    APTT [[id number]]  (Normal) Collected: 06/09/23 1836    Lab Status: Final result Specimen: Blood from Arm, Left Updated: 06/09/23 1908     PTT 30 seconds     CBC and differential [[id number]]  (Abnormal) Collected: 06/09/23 1836    Lab Status: Final result Specimen: Blood from Arm, Left Updated: 06/09/23 1850     WBC 11 87 Thousand/uL      RBC 4 84 Million/uL      Hemoglobin 15 8 g/dL      Hematocrit 46 4 %      MCV 96 fL      MCH 32 6 pg      MCHC 34 1 g/dL      RDW 12 8 %      MPV 9 9 fL      Platelets 062 Thousands/uL      nRBC 0 /100 WBCs      Neutrophils Relative 59 %      Immat GRANS % 0 %      Lymphocytes Relative 28 %      Monocytes Relative 6 %      Eosinophils Relative 6 %      Basophils Relative 1 %      Neutrophils Absolute 6 92 Thousands/µL      Immature Grans Absolute 0 05 Thousand/uL      Lymphocytes Absolute 3 36 Thousands/µL      Monocytes Absolute 0 70 Thousand/µL      Eosinophils Absolute 0 74 Thousand/µL      Basophils Absolute 0 10 Thousands/µL     Blood culture #1 [[id number]] Collected: 06/09/23 1835    Lab Status:  In process Specimen: Blood from Hand, Right Updated: 06/09/23 1844    Blood culture #2 [[id number]] Collected: 06/09/23 1836    Lab Status: In process Specimen: Blood from Arm, Left Updated: 06/09/23 1844    UA w Reflex to Microscopic w Reflex to Culture [[id number]]     Lab Status: No result Specimen: Urine                  XR chest 2 views   ED Interpretation by Amanda Lerma MD (06/09 2026)   Abnormal   Per my independent interpretation: Concern for left lower lobe pneumonia            Procedures  Procedures      ED Course  ED Course as of 06/09/23 2041 Fri Jun 09, 2023 1819 ECG per my independent interpretation: Normal rate, regular rhythm, no ectopy, normal axis, no ST elevations or depressions     2026 Patient has both subjective and objective improvement in symptoms  Patient agreeable with admission  2028 hs TnI 0hr: 3  WNL             HEART Risk Score    Flowsheet Row Most Recent Value   Heart Score Risk Calculator    History 1 Filed at: 06/09/2023 2027   ECG 0 Filed at: 06/09/2023 2027   Age 1 Filed at: 06/09/2023 2027   Risk Factors 1 Filed at: 06/09/2023 2027   Troponin 0 Filed at: 06/09/2023 2027   HEART Score 3 Filed at: 06/09/2023 2027                   Initial Sepsis Screening     Row Name 06/09/23 2027                Is the patient's history suggestive of a new or worsening infection? Yes (Proceed)  -CL        Suspected source of infection pneumonia  -CL        Indicate SIRS criteria Tachycardia > 90 bpm;Tachypnea > 20 resp per min  -CL        Are two or more of the above signs & symptoms of infection both present and new to the patient? No  -CL              User Key  (r) = Recorded By, (t) = Taken By, (c) = Cosigned By    [address] Name Provider Michael Solis MD Physician                          Medical Decision Making  Patient is a 20-year-old female, with a history significant for chronic bronchitis, diabetes, tobacco use, who presents to the ED today due to dyspnea    Patient states that her symptoms began two weeks ago with a productive cough that had been improving in course until recently when she has had significant worsening of her symptoms  Patient reports associated wheezing, subjective fever, diaphoresis, abdominal bloating, chest pain characterized as heaviness/tightness has been present since last Friday  This discomfort is exertional and pleuritic in nature  Patient denies history of DVT, recent trauma or surgery  Laying back exacerbates her dyspnea  Patient is currently afebrile, tachycardic, tachypneic, borderline hypoxic  This presentation is concerning for: acute COPD exacerbation, pneumonia, ACS, pericarditis, myocarditis, CHF/cardiac wheeze  Will investigate with sepsis panel order set, troponin, BNP, CXR, ambulatory pulse ox  Will manage with Decadron, DuoNeb, fluids, ceftriaxone, azithromycin further based on work-up  - No language barrier    - History obtained from patient  - There are no limitations to the history obtained  - External record review performed of previous charting was performed by me  - I independently reviewed and interpreted ordered labs, ECGs, and imaging from this encounter   - Patient's medication regimen reviewed  - Patient's comorbidities factored into diagnosis considerations and disposition planning    - I discussed the patient's need for admission with Dr Raymundo Gonzalez  Amount and/or Complexity of Data Reviewed  Labs: ordered  Decision-making details documented in ED Course  Radiology: ordered and independent interpretation performed  Risk  Prescription drug management  Decision regarding hospitalization              Disposition  Final diagnoses:   Acute exacerbation of chronic obstructive pulmonary disease (COPD) ([facility] [address] )   Pneumonia   Dyspnea   Acute chest pain   Sepsis ([facility] [address] )     Time reflects when diagnosis was documented in both MDM as applicable and the Disposition within this note     Time User Action Codes Description Comment    6/9/2023  8:27 PM Charmaine CARBONE Add [J44 1] Acute exacerbation of chronic obstructive pulmonary disease (COPD) ([facility] )     6/9/2023  8:27 PM Atha Gram A Add [J18 9] Pneumonia     6/9/2023  8:27 PM Atha Gram A Add [R06 00] Dyspnea     6/9/2023  8:27 PM Atha Gram A Add [R07 9] Acute chest pain     6/9/2023  8:28 PM Atha Gram A Add [A41 9] Sepsis ([facility] )     6/9/2023  8:28 PM LegArmy mirza Glimpse A Modify [J44 1] Acute exacerbation of chronic obstructive pulmonary disease (COPD) ([facility] )     6/9/2023  8:28 PM Atha Gram A Modify [A41 9] Sepsis [facility])       ED Disposition     ED Disposition   Admit    Condition   Stable    Date/Time   Fri Jun 9, 2023  8:40 PM    Comment   Case was discussed with SOD and the patient's admission status was agreed to be Admission Status: observation status to the service of Dr Rachelle Loza   Follow-up Information    None         Patient's Medications   Discharge Prescriptions    No medications on file     No discharge procedures on file  PDMP Review     None           ED Provider  Attending physically available and evaluated Matthews Kocher I managed the patient along with the ED Attending      Electronically Signed by         Salty Lara MD  06/09/23 2041

## 2023-06-09 NOTE — ED ATTENDING ATTESTATION
6/9/2023  IRosetta DO, saw and evaluated the patient  I have discussed the patient with the resident/non-physician practitioner and agree with the resident's/non-physician practitioner's findings, Plan of Care, and MDM as documented in the resident's/non-physician practitioner's note, except where noted  All available labs and Radiology studies were reviewed  I was present for key portions of any procedure(s) performed by the resident/non-physician practitioner and I was immediately available to provide assistance  At this point I agree with the current assessment done in the Emergency Department  I have conducted an independent evaluation of this patient a history and physical is as follows:    Patient is a 35-year-old female history of diabetes, tobacco use, chronic bronchitis, says that she always has a bit of a slight productive cough but about a week got significantly worse, she was feeling much more winded having some increasing dyspnea on exertion slight orthopnea  It got better after several days and this past Monday, 4 days ago she began feeling well up to go back to work  She still had some slight cough but was improving significantly but yesterday it began feeling a lot worse  He has been feeling like her chest has been tight and sore from coughing but still feels tight overall for at least the last 36 hours  No recent antibiotics, no recent hospitalizations  Patient denies any prolonged travel history, no recent long travel, no immobilizations, or hospitalizations  She says that she did quit smoking about 2 weeks ago, no weight gains or pedal edema  No travel history or sick contacts      General:  Patient is well-appearing  Head:  Atraumatic  Eyes:  Conjunctiva pink  ENT:  Mucous membranes are moist  Neck:  Supple  Cardiac:  S1-S2, without murmurs  Lungs:  Diffuse end expiratory wheezing, no rhonchi, no rales, no accessory muscle usage  Abdomen:  Soft, nontender, normal bowel sounds, no CVA tenderness, no tympany, no rigidity, no guarding  Extremities:  Normal range of motion, no pedal edema or calf asymmetry, radial pulses are equal and symmetric bilaterally  Neurologic:  Awake, fluent speech, normal comprehension, AAOx3  Skin:  Pink warm and dry  Psychiatric:  Alert, pleasant, cooperative        ED Course  ED Course as of 06/09/23 1842 Fri Jun 09, 2023   1841 ECG interpreted by me, sinus rhythm, rate of 93, normal axis, short GA, no delta wave, no acute ischemic or infarctive changes, no acute change from December 13, 2011     XR chest 2 views   ED Interpretation   Abnormal   Per my independent interpretation: Concern for left lower lobe pneumonia          Labs Reviewed   CBC AND DIFFERENTIAL - Abnormal       Result Value Ref Range Status    WBC 11 87 (*) 4 31 - 10 16 Thousand/uL Final    RBC 4 84  3 81 - 5 12 Million/uL Final    Hemoglobin 15 8 (*) 11 5 - 15 4 g/dL Final    Hematocrit 46 4 (*) 34 8 - 46 1 % Final    MCV 96  82 - 98 fL Final    MCH 32 6  26 8 - 34 3 pg Final    MCHC 34 1  31 4 - 37 4 g/dL Final    RDW 12 8  11 6 - 15 1 % Final    MPV 9 9  8 9 - 12 7 fL Final    Platelets 180  288 - 390 Thousands/uL Final    nRBC 0  /100 WBCs Final    Neutrophils Relative 59  43 - 75 % Final    Immat GRANS % 0  0 - 2 % Final    Lymphocytes Relative 28  14 - 44 % Final    Monocytes Relative 6  4 - 12 % Final    Eosinophils Relative 6  0 - 6 % Final    Basophils Relative 1  0 - 1 % Final    Neutrophils Absolute 6 92  1 85 - 7 62 Thousands/µL Final    Immature Grans Absolute 0 05  0 00 - 0 20 Thousand/uL Final    Lymphocytes Absolute 3 36  0 60 - 4 47 Thousands/µL Final    Monocytes Absolute 0 70  0 17 - 1 22 Thousand/µL Final    Eosinophils Absolute 0 74 (*) 0 00 - 0 61 Thousand/µL Final    Basophils Absolute 0 10  0 00 - 0 10 Thousands/µL Final   COVID19, INFLUENZA A/B, RSV PCR, [facility] - Normal    SARS-CoV-2 Negative  Negative Final    Comment:      INFLUENZA A PCR Negative  Negative Final    Comment:      INFLUENZA B PCR Negative  Negative Final    Comment:      RSV PCR Negative  Negative Final    Comment:      Narrative:     FOR PEDIATRIC PATIENTS - copy/paste COVID Guidelines URL to browser: [email or web address]  [employer]    SARS-CoV-2 assay is a Nucleic Acid Amplification assay intended for the  qualitative detection of nucleic acid from SARS-CoV-2 in nasopharyngeal  swabs  Results are for the presumptive identification of SARS-CoV-2 RNA  Positive results are indicative of infection with SARS-CoV-2, the virus  causing COVID-19, but do not rule out bacterial infection or co-infection  with other viruses  Laboratories within the [place] and its  territories are required to report all positive results to the appropriate  public health authorities  Negative results do not preclude SARS-CoV-2  infection and should not be used as the sole basis for treatment or other  patient management decisions  Negative results must be combined with  clinical observations, patient history, and epidemiological information  This test has not been FDA cleared or approved  This test has been authorized by FDA under an Emergency Use Authorization  (EUA)  This test is only authorized for the duration of time the  declaration that circumstances exist justifying the authorization of the  emergency use of an in vitro diagnostic tests for detection of SARS-CoV-2  virus and/or diagnosis of COVID-19 infection under section 564(b)(1) of  the Act, 21 U  S C  772FQY-7(S)(8), unless the authorization is terminated  or revoked sooner  The test has been validated but independent review by FDA  and CLIA is pending  Test performed using [employer] GeneXpert: This RT-PCR assay targets N2,  a region unique to SARS-CoV-2  A conserved region in the E-gene was chosen  for pan-Sarbecovirus detection which includes SARS-CoV-2      According to CMS-2020-01-R, this platform meets the definition of high-[employer] technology  LACTIC ACID, PLASMA (W/REFLEX IF RESULT > 2 0) - Normal    LACTIC ACID 1 9  0 5 - 2 0 mmol/L Final    Narrative:     Result may be elevated if tourniquet was used during collection  PROCALCITONIN TEST - Normal    Procalcitonin <0 05  <=0 25 ng/ml Final    Comment: Suspected Lower Respiratory Tract Infection (LRTI):  - LESS than or EQUAL to 0 25 ng/mL:   low likelihood for bacterial LRTI; antibiotics DISCOURAGED   - GREATER than 0 25 ng/mL:   increased likelihood for bacterial LRTI; antibiotics ENCOURAGED  Suspected Sepsis:  - Strongly consider initiating antibiotics in ALL UNSTABLE patients  - LESS than or EQUAL to 0 5 ng/mL:   low likelihood for bacterial sepsis; antibiotics DISCOURAGED   - GREATER than 0 5 ng/mL:   increased likelihood for bacterial sepsis; antibiotics ENCOURAGED   - GREATER than 2 ng/mL:   high risk for severe sepsis / septic shock; antibiotics strongly ENCOURAGED  Decisions on antibiotic use should not be based solely on Procalcitonin (PCT) levels  If PCT is low but uncertainty exists with stopping antibiotics, repeat PCT in 6-24 hours to confirm the low level  If antibiotics are administered (regardless if initial PCT was high or low), repeat PCT every 1-2 days to consider early antibiotic cessation (when GREATER than 80% decrease from the peak OR when PCT drops below designated cutoffs, whichever comes first), so long as the infection is NOT one that typically requires prolonged treatment durations (e g , bone/joint infections, endocarditis, Staph  aureus bacteremia)      Situations of FALSE-POSITIVE Procalcitonin values:  1) Newborns < 67 hours old  2) Massive stress from severe trauma / burns, major surgery, acute pancreatitis, cardiogenic / hemorrhagic shock, sickle cell crisis, or other organ perfusion abnormalities  3) Malaria and some Candidal infections  4) Treatment with agents that stimulate cytokines (e g , OKT3, anti-lymphocyte "globulins, alemtuzumab, IL-2, granulocyte transfusion [NOT GCSFs])  5) Chronic renal disease causes elevated baseline levels (consider GREATER than 0 75 ng/mL as an abnormal cut-off); initiating HD/CRRT may cause transient decreases  6) Paraneoplastic syndromes from medullary thyroid or SCLC, some forms of vasculitis, and acute [id number] disease    Situations of FALSE-NEGATIVE Procalcitonin values:  1) Too early in clinical course for PCT to have reached its peak (may repeat in 6-24 hours to confirm low level)  2) Localized infection WITHOUT systemic (SIRS / sepsis) response (e g , an abscess, osteomyelitis, cystitis)  3) Mycobacteria (e g , Tuberculosis, MAC)  4) Cystic fibrosis exacerbations     PROTIME-INR - Normal    Protime 12 4  11 6 - 14 5 seconds Final    INR 0 90  0 84 - 1 19 Final   APTT - Normal    PTT 30  23 - 37 seconds Final    Comment: Therapeutic Heparin Range =  60-90 seconds   HS TROPONIN I 0HR - Normal    hs TnI 0hr 3  \"Refer to ACS Flowchart\"- see link ng/L Final    Comment:                                              Initial (time 0) result  If >=50 ng/L, Myocardial injury suggested ;  Type of myocardial injury and treatment strategy  to be determined  If 5-49 ng/L, a delta result at 2 hours and or 4 hours will be needed to further evaluate  If <4 ng/L, and chest pain has been >3 hours since onset, patient may qualify for discharge based on the HEART score in the ED  If <5 ng/L and <3hours since onset of chest pain, a delta result at 2 hours will be needed to further evaluate  HS Troponin 99th Percentile URL of a Health Population=12 ng/L with a 95% Confidence Interval of 8-18 ng/L  Second Troponin (time 2 hours)  If calculated delta >= 20 ng/L,  Myocardial injury suggested ; Type of myocardial injury and treatment strategy to be determined  If 5-49 ng/L and the calculated delta is 5-19 ng/L, consult medical service for evaluation    Continue evaluation for ischemia on ecg and " "other possible etiology and repeat hs troponin at 4 hours  If delta is <5 ng/L at 2 hours, consider discharge based on risk stratification via the HEART score (if in ED), or SONU risk score in IP/Observation  HS Troponin 99th Percentile URL of a Health Population=12 ng/L with a 95% Confidence Interval of 8-18 ng/L    B-TYPE NATRIURETIC PEPTIDE (BNP) - Normal    BNP 7  0 - 100 pg/mL Final   HS TROPONIN I 2HR - Normal    hs TnI 2hr 3  \"Refer to ACS Flowchart\"- see link ng/L Final    Comment:                                              Initial (time 0) result  If >=50 ng/L, Myocardial injury suggested ;  Type of myocardial injury and treatment strategy  to be determined  If 5-49 ng/L, a delta result at 2 hours and or 4 hours will be needed to further evaluate  If <4 ng/L, and chest pain has been >3 hours since onset, patient may qualify for discharge based on the HEART score in the ED  If <5 ng/L and <3hours since onset of chest pain, a delta result at 2 hours will be needed to further evaluate  HS Troponin 99th Percentile URL of a Health Population=12 ng/L with a 95% Confidence Interval of 8-18 ng/L  Second Troponin (time 2 hours)  If calculated delta >= 20 ng/L,  Myocardial injury suggested ; Type of myocardial injury and treatment strategy to be determined  If 5-49 ng/L and the calculated delta is 5-19 ng/L, consult medical service for evaluation  Continue evaluation for ischemia on ecg and other possible etiology and repeat hs troponin at 4 hours  If delta is <5 ng/L at 2 hours, consider discharge based on risk stratification via the HEART score (if in ED), or SONU risk score in IP/Observation  HS Troponin 99th Percentile URL of a Health Population=12 ng/L with a 95% Confidence Interval of 8-18 ng/L      Delta 2hr hsTnI 0  <20 ng/L Final   BLOOD CULTURE   BLOOD CULTURE   STREP PNEUMONIAE ANTIGEN,URINE   LEGIONELLA ANTIGEN, URINE   SPUTUM CULTURE AND GRAM STAIN   COMPREHENSIVE METABOLIC PANEL    " Sodium 137  135 - 147 mmol/L Final    Potassium 4 0  3 5 - 5 3 mmol/L Final    Chloride 107  96 - 108 mmol/L Final    CO2 24  21 - 32 mmol/L Final    ANION GAP 6  4 - 13 mmol/L Final    BUN 12  5 - 25 mg/dL Final    Creatinine 0 81  0 60 - 1 30 mg/dL Final    Comment: Standardized to IDMS reference method    Glucose 119  65 - 140 mg/dL Final    Comment: If the patient is fasting, the ADA then defines impaired fasting glucose as > 100 mg/dL and diabetes as > or equal to 123 mg/dL  Specimen collection should occur prior to Sulfasalazine administration due to the potential for falsely depressed results  Specimen collection should occur prior to Sulfapyridine administration due to the potential for falsely elevated results  Calcium 9 5  8 3 - 10 1 mg/dL Final    AST 24  5 - 45 U/L Final    Comment: Specimen collection should occur prior to Sulfasalazine administration due to the potential for falsely depressed results  ALT 51  12 - 78 U/L Final    Comment: Specimen collection should occur prior to Sulfasalazine and/or Sulfapyridine administration due to the potential for falsely depressed results  Alkaline Phosphatase 72  46 - 116 U/L Final    Total Protein 8 2  6 4 - 8 4 g/dL Final    Albumin 4 1  3 5 - 5 0 g/dL Final    Total Bilirubin 0 43  0 20 - 1 00 mg/dL Final    Comment: Use of this assay is not recommended for patients undergoing treatment with eltrombopag due to the potential for falsely elevated results      eGFR 78  ml/min/1 73sq m Final    Narrative:     [place] guidelines for Chronic Kidney Disease (CKD):   •  Stage 1 with normal or high GFR (GFR > 90 mL/min/1 73 square meters)  •  Stage 2 Mild CKD (GFR = 60-89 mL/min/1 73 square meters)  •  Stage 3A Moderate CKD (GFR = 45-59 mL/min/1 73 square meters)  •  Stage 3B Moderate CKD (GFR = 30-44 mL/min/1 73 square meters)  •  Stage 4 Severe CKD (GFR = 15-29 mL/min/1 73 square meters)  •  Stage 5 End Stage CKD (GFR <15 mL/min/1 73 square meters)  Note: GFR calculation is accurate only with a steady state creatinine   UA W REFLEX TO MICROSCOPIC WITH REFLEX TO CULTURE   HS TROPONIN I 4HR   PLATELET COUNT             MEDICAL DECISION MAKING CODING    The differential diagnosis before testing included (but is not limited to) acute exacerbation of chronic bronchitis, acute pneumonia, acute viral illness, acute sepsis, and acute coronary syndrome which is a medical condition that poses a threat to life/function      Patient presents with acute new problem with:  Threat to life or bodily function      Chronic conditions affecting care: as per HPI    COLLECTION AND INTERPRETATION OF DATA  I reviewed prior external notes, including December 13, 2011 ECG    I ordered each unique test  Tests reviewed personally by me:  Labs: see above  Imaging: I independently reviewed the CXR as above  Discussion with other providers: admitting physicians      RISK    Treatment:  Patient admitted    Social Determinants of Health:  Presentation to ED outside of business hours or on night shift            Critical Care Time  Procedures

## 2023-06-09 NOTE — ED NOTES
Ambulatory SPO2 on RA was 91%  WOB increased significantly and induced more frequent coughing  She recovered quickly after sitting down        Uriah Boyle RN  06/09/23 9416

## 2023-06-10 PROBLEM — J18.9 COMMUNITY ACQUIRED PNEUMONIA OF LEFT LOWER LOBE OF LUNG: Status: ACTIVE | Noted: 2023-06-09

## 2023-06-10 PROBLEM — Z72.0 TOBACCO USE: Status: ACTIVE | Noted: 2023-06-10

## 2023-06-10 PROBLEM — A41.9 SEPSIS (HCC): Status: ACTIVE | Noted: 2023-06-10

## 2023-06-10 LAB
ANION GAP SERPL CALCULATED.3IONS-SCNC: 5 MMOL/L (ref 4–13)
ATRIAL RATE: 93 BPM
BASOPHILS # BLD AUTO: 0.02 THOUSANDS/ÂΜL (ref 0–0.1)
BASOPHILS NFR BLD AUTO: 0 % (ref 0–1)
BUN SERPL-MCNC: 11 MG/DL (ref 5–25)
CALCIUM SERPL-MCNC: 9.2 MG/DL (ref 8.3–10.1)
CHLORIDE SERPL-SCNC: 107 MMOL/L (ref 96–108)
CO2 SERPL-SCNC: 26 MMOL/L (ref 21–32)
CREAT SERPL-MCNC: 0.8 MG/DL (ref 0.6–1.3)
EOSINOPHIL # BLD AUTO: 0.01 THOUSAND/ÂΜL (ref 0–0.61)
EOSINOPHIL NFR BLD AUTO: 0 % (ref 0–6)
ERYTHROCYTE [DISTWIDTH] IN BLOOD BY AUTOMATED COUNT: 12.7 % (ref 11.6–15.1)
GFR SERPL CREATININE-BSD FRML MDRD: 79 ML/MIN/1.73SQ M
GLUCOSE SERPL-MCNC: 143 MG/DL (ref 65–140)
GLUCOSE SERPL-MCNC: 165 MG/DL (ref 65–140)
GLUCOSE SERPL-MCNC: 185 MG/DL (ref 65–140)
GLUCOSE SERPL-MCNC: 202 MG/DL (ref 65–140)
GLUCOSE SERPL-MCNC: 239 MG/DL (ref 65–140)
HCT VFR BLD AUTO: 43.3 % (ref 34.8–46.1)
HGB BLD-MCNC: 15 G/DL (ref 11.5–15.4)
IMM GRANULOCYTES # BLD AUTO: 0.04 THOUSAND/UL (ref 0–0.2)
IMM GRANULOCYTES NFR BLD AUTO: 0 % (ref 0–2)
L PNEUMO1 AG UR QL IA.RAPID: NEGATIVE
LYMPHOCYTES # BLD AUTO: 1.03 THOUSANDS/ÂΜL (ref 0.6–4.47)
LYMPHOCYTES NFR BLD AUTO: 10 % (ref 14–44)
MCH RBC QN AUTO: 32.9 PG (ref 26.8–34.3)
MCHC RBC AUTO-ENTMCNC: 34.6 G/DL (ref 31.4–37.4)
MCV RBC AUTO: 95 FL (ref 82–98)
MONOCYTES # BLD AUTO: 0.13 THOUSAND/ÂΜL (ref 0.17–1.22)
MONOCYTES NFR BLD AUTO: 1 % (ref 4–12)
NEUTROPHILS # BLD AUTO: 9.51 THOUSANDS/ÂΜL (ref 1.85–7.62)
NEUTS SEG NFR BLD AUTO: 89 % (ref 43–75)
NRBC BLD AUTO-RTO: 0 /100 WBCS
P AXIS: 76 DEGREES
PLATELET # BLD AUTO: 283 THOUSANDS/UL (ref 149–390)
PMV BLD AUTO: 9.9 FL (ref 8.9–12.7)
POTASSIUM SERPL-SCNC: 3.8 MMOL/L (ref 3.5–5.3)
PR INTERVAL: 130 MS
PROCALCITONIN SERPL-MCNC: <0.05 NG/ML
QRS AXIS: 64 DEGREES
QRSD INTERVAL: 66 MS
QT INTERVAL: 354 MS
QTC INTERVAL: 440 MS
RBC # BLD AUTO: 4.56 MILLION/UL (ref 3.81–5.12)
S PNEUM AG UR QL: NEGATIVE
SODIUM SERPL-SCNC: 138 MMOL/L (ref 135–147)
T WAVE AXIS: 59 DEGREES
VENTRICULAR RATE: 93 BPM
WBC # BLD AUTO: 10.74 THOUSAND/UL (ref 4.31–10.16)

## 2023-06-10 PROCEDURE — 82948 REAGENT STRIP/BLOOD GLUCOSE: CPT

## 2023-06-10 PROCEDURE — 99232 SBSQ HOSP IP/OBS MODERATE 35: CPT | Performed by: INTERNAL MEDICINE

## 2023-06-10 PROCEDURE — 94640 AIRWAY INHALATION TREATMENT: CPT

## 2023-06-10 PROCEDURE — 94760 N-INVAS EAR/PLS OXIMETRY 1: CPT

## 2023-06-10 PROCEDURE — 94664 DEMO&/EVAL PT USE INHALER: CPT

## 2023-06-10 PROCEDURE — 80048 BASIC METABOLIC PNL TOTAL CA: CPT | Performed by: STUDENT IN AN ORGANIZED HEALTH CARE EDUCATION/TRAINING PROGRAM

## 2023-06-10 PROCEDURE — 87449 NOS EACH ORGANISM AG IA: CPT | Performed by: STUDENT IN AN ORGANIZED HEALTH CARE EDUCATION/TRAINING PROGRAM

## 2023-06-10 PROCEDURE — 93010 ELECTROCARDIOGRAM REPORT: CPT | Performed by: INTERNAL MEDICINE

## 2023-06-10 PROCEDURE — 84145 PROCALCITONIN (PCT): CPT | Performed by: STUDENT IN AN ORGANIZED HEALTH CARE EDUCATION/TRAINING PROGRAM

## 2023-06-10 PROCEDURE — 36415 COLL VENOUS BLD VENIPUNCTURE: CPT | Performed by: STUDENT IN AN ORGANIZED HEALTH CARE EDUCATION/TRAINING PROGRAM

## 2023-06-10 PROCEDURE — 85025 COMPLETE CBC W/AUTO DIFF WBC: CPT | Performed by: STUDENT IN AN ORGANIZED HEALTH CARE EDUCATION/TRAINING PROGRAM

## 2023-06-10 RX ORDER — LANOLIN ALCOHOL/MO/W.PET/CERES
3 CREAM (GRAM) TOPICAL
Status: DISCONTINUED | OUTPATIENT
Start: 2023-06-10 | End: 2023-06-13 | Stop reason: HOSPADM

## 2023-06-10 RX ORDER — LEVALBUTEROL INHALATION SOLUTION 1.25 MG/3ML
1.25 SOLUTION RESPIRATORY (INHALATION)
Status: DISCONTINUED | OUTPATIENT
Start: 2023-06-10 | End: 2023-06-13 | Stop reason: HOSPADM

## 2023-06-10 RX ORDER — ACETAMINOPHEN 325 MG/1
650 TABLET ORAL EVERY 6 HOURS PRN
Status: DISCONTINUED | OUTPATIENT
Start: 2023-06-10 | End: 2023-06-13 | Stop reason: HOSPADM

## 2023-06-10 RX ADMIN — CEFTRIAXONE SODIUM 1000 MG: 10 INJECTION, POWDER, FOR SOLUTION INTRAVENOUS at 23:10

## 2023-06-10 RX ADMIN — ENOXAPARIN SODIUM 40 MG: 40 INJECTION SUBCUTANEOUS at 09:31

## 2023-06-10 RX ADMIN — IPRATROPIUM BROMIDE 0.5 MG: 0.5 SOLUTION RESPIRATORY (INHALATION) at 13:54

## 2023-06-10 RX ADMIN — IPRATROPIUM BROMIDE AND ALBUTEROL SULFATE 3 ML: .5; 3 SOLUTION RESPIRATORY (INHALATION) at 02:25

## 2023-06-10 RX ADMIN — ACETAMINOPHEN 650 MG: 325 TABLET, FILM COATED ORAL at 10:55

## 2023-06-10 RX ADMIN — TRIAMTERENE AND HYDROCHLOROTHIAZIDE 1 TABLET: 37.5; 25 TABLET ORAL at 09:31

## 2023-06-10 RX ADMIN — LEVALBUTEROL HYDROCHLORIDE 1.25 MG: 1.25 SOLUTION RESPIRATORY (INHALATION) at 08:58

## 2023-06-10 RX ADMIN — IPRATROPIUM BROMIDE 0.5 MG: 0.5 SOLUTION RESPIRATORY (INHALATION) at 08:58

## 2023-06-10 RX ADMIN — LEVALBUTEROL HYDROCHLORIDE 1.25 MG: 1.25 SOLUTION RESPIRATORY (INHALATION) at 13:54

## 2023-06-10 RX ADMIN — ATORVASTATIN CALCIUM 40 MG: 40 TABLET, FILM COATED ORAL at 09:31

## 2023-06-10 RX ADMIN — INSULIN LISPRO 2 UNITS: 100 INJECTION, SOLUTION INTRAVENOUS; SUBCUTANEOUS at 23:11

## 2023-06-10 RX ADMIN — METHYLPREDNISOLONE SODIUM SUCCINATE 40 MG: 40 INJECTION, POWDER, FOR SOLUTION INTRAMUSCULAR; INTRAVENOUS at 18:14

## 2023-06-10 RX ADMIN — INSULIN LISPRO 1 UNITS: 100 INJECTION, SOLUTION INTRAVENOUS; SUBCUTANEOUS at 11:35

## 2023-06-10 RX ADMIN — IPRATROPIUM BROMIDE 0.5 MG: 0.5 SOLUTION RESPIRATORY (INHALATION) at 19:15

## 2023-06-10 RX ADMIN — NICOTINE 1 PATCH: 7 PATCH, EXTENDED RELEASE TRANSDERMAL at 09:31

## 2023-06-10 RX ADMIN — LEVALBUTEROL HYDROCHLORIDE 1.25 MG: 1.25 SOLUTION RESPIRATORY (INHALATION) at 19:14

## 2023-06-10 RX ADMIN — AZITHROMYCIN MONOHYDRATE 500 MG: 500 INJECTION, POWDER, LYOPHILIZED, FOR SOLUTION INTRAVENOUS at 19:57

## 2023-06-10 RX ADMIN — INSULIN LISPRO 1 UNITS: 100 INJECTION, SOLUTION INTRAVENOUS; SUBCUTANEOUS at 16:46

## 2023-06-10 NOTE — PROGRESS NOTES
INTERNAL MEDICINE RESIDENCY PROGRESS NOTE     Name: Lindsey Marshall   Age & Sex: 58 y o  female   MRN: [id number]  Unit/Bed#: -01   Encounter: [id number]  Team: SOD Team C     PATIENT INFORMATION     Name: Lindsey Marshall   Age & Sex: 58 y o  female   MRN: [id number]  Hospital Stay Days: 0    ASSESSMENT/PLAN     Principal Problem:    Community acquired pneumonia of left lower lobe of lung  Active Problems:    Diabetes mellitus ([address] )    Mixed hyperlipidemia    Tobacco use      Tobacco use  Assessment & Plan  Pt smokes 1-6 cigarettes per day for the past 30 years  Mixed hyperlipidemia  Assessment & Plan  Continue home statin    Diabetes mellitus [facility])  Assessment & Plan  Lab Results   Component Value Date    HGBA1C 5 8 05/25/2022       Controlled on metformin at home  [facility] inpatient, goal -180      * Community acquired pneumonia of left lower lobe of lung  Assessment & Plan  Patient initially presented to ED with dyspnea, mild orthopnea, rhinorrhea, productive cough for the last 2 weeks with interval improvement about 1 week ago  Patient has attempted several OTC allergy medications to relieve her symptoms with no significant relief  Found to have possible LLL infiltrate on CXR (pending official read) with associated crackles and wheezes on physical exam   Patient's symptoms significantly improved after receiving DuoNebs, Decadron in ED  Admitted for suspected CAP given SIRS criteria met on admission (HR>90, RR>20), as well as possible acute asthma/COPD (although patient has never had spirometry performed outpatient)      Plan  · Ceftriaxone 1 g every 24 hours  · Azithromycin 500 mg every 24 hours  · Solu-Medrol 40 mg every 24 hours  · Respiratory protocol  · Scheduled DuoNebs  · Albuterol as needed  · Wean supplemental O2 as needed to keep SPO2 >90%  · Urine strep, Legionella pending  · A m  procalcitonin  · Follow sputum cultures        Disposition: continue IV antibiotics for today SUBJECTIVE     Patient seen and examined  No acute events overnight  Patient notes improvement in her wheezing, shortness of breath, and productive cough since receiving nebulizer treatments  She denies any current fever, chills, or chest pain  OBJECTIVE     Vitals:    06/10/23 0001 06/10/23 0007 06/10/23 0400 06/10/23 0706   BP:   140/69 118/77   BP Location:   Right arm Left arm   Pulse: 92 87 84 77   Resp:   20 18   Temp:    97 7 °F (36 5 °C)   TempSrc:    Oral   SpO2: (!) 86% 91% 93% 94%   Weight:          Temperature:   Temp (24hrs), Av 9 °F (36 6 °C), Min:97 7 °F (36 5 °C), Max:98 °F (36 7 °C)    Temperature: 97 7 °F (36 5 °C)  Intake & Output:  I/O        0701  / 0700  0701  06/10 0700 06/10 0701   0700    P  O    180    IV Piggyback  750     Total Intake(mL/kg)  750 (11) 180 (2 6)    Net  +750 +180               Weights:        Body mass index is 29 29 kg/m²  Weight (last 2 days)     Date/Time Weight    23 1806 68 (150)        Physical Exam  Constitutional:       General: She is not in acute distress  HENT:      Head: Normocephalic and atraumatic  Mouth/Throat:      Mouth: Mucous membranes are moist    Eyes:      Extraocular Movements: Extraocular movements intact  Conjunctiva/sclera: Conjunctivae normal    Cardiovascular:      Rate and Rhythm: Normal rate and regular rhythm  Pulses: Normal pulses  Heart sounds: No murmur heard  Pulmonary:      Effort: No respiratory distress  Breath sounds: Wheezing and rhonchi present  Comments: On 2 L nasal cannula saturating 95%  Abdominal:      Palpations: Abdomen is soft  Tenderness: There is no abdominal tenderness  Musculoskeletal:      Right lower leg: No edema  Left lower leg: No edema  Skin:     General: Skin is warm and dry  Capillary Refill: Capillary refill takes less than 2 seconds  Neurological:      General: No focal deficit present        Mental Status: She is alert and oriented to person, place, and time  Mental status is at baseline  Psychiatric:         Mood and Affect: Mood normal          Behavior: Behavior normal          Thought Content: Thought content normal          Judgment: Judgment normal        LABORATORY DATA     Labs: I have personally reviewed pertinent reports  Results from last 7 days   Lab Units 06/10/23  0456 06/09/23  1836   EOS PCT % 0 6   HEMATOCRIT % 43 3 46 4*   HEMOGLOBIN g/dL 15 0 15 8*   MONOS PCT % 1* 6   NEUTROS PCT % 89* 59   PLATELETS Thousands/uL 283 324   WBC Thousand/uL 10 74* 11 87*      Results from last 7 days   Lab Units 06/10/23  0456 06/09/23  1836   ALK PHOS U/L  --  72   ALT U/L  --  51   AST U/L  --  24   BUN mg/dL 11 12   CALCIUM mg/dL 9 2 9 5   CHLORIDE mmol/L 107 107   CO2 mmol/L 26 24   CREATININE mg/dL 0 80 0 81   POTASSIUM mmol/L 3 8 4 0              Results from last 7 days   Lab Units 06/09/23  1836   INR  0 90   PTT seconds 30     Results from last 7 days   Lab Units 06/09/23  1836   LACTIC ACID mmol/L 1 9           IMAGING & DIAGNOSTIC TESTING     Radiology Results: I have personally reviewed pertinent reports  No results found  Other Diagnostic Testing: I have personally reviewed pertinent reports      ACTIVE MEDICATIONS     Current Facility-Administered Medications   Medication Dose Route Frequency   • albuterol (PROVENTIL HFA,VENTOLIN HFA) inhaler 2 puff  2 puff Inhalation Q6H PRN   • atorvastatin (LIPITOR) tablet 40 mg  40 mg Oral Daily   • azithromycin (ZITHROMAX) 500 mg in sodium chloride 0 9 % 250 mL IVPB  500 mg Intravenous Q24H   • cefTRIAXone (ROCEPHIN) 1,000 mg in dextrose 5 % 50 mL IVPB  1,000 mg Intravenous Q24H   • enoxaparin (LOVENOX) subcutaneous injection 40 mg  40 mg Subcutaneous Daily   • insulin lispro (HumaLOG) 100 units/mL subcutaneous injection 1-5 Units  1-5 Units Subcutaneous TID AC   • insulin lispro (HumaLOG) 100 units/mL subcutaneous injection 1-5 Units  1-5 Units Subcutaneous HS   • ipratropium "(ATROVENT) 0 02 % inhalation solution 0 5 mg  0 5 mg Nebulization Q6H   • levalbuterol (XOPENEX) inhalation solution 1 25 mg  1 25 mg Nebulization Q6H   • methylPREDNISolone sodium succinate (Solu-MEDROL) injection 40 mg  40 mg Intravenous Daily   • nicotine (NICODERM CQ) 7 mg/24hr TD 24 hr patch 1 patch  1 patch Transdermal Daily   • triamterene-hydrochlorothiazide (MAXZIDE-25) 37 5-25 mg per tablet 1 tablet  1 tablet Oral Daily       VTE Pharmacologic Prophylaxis: Enoxaparin (Lovenox)  VTE Mechanical Prophylaxis: sequential compression device    Portions of the record may have been created with voice recognition software  Occasional wrong word or \"sound a like\" substitutions may have occurred due to the inherent limitations of voice recognition software    Read the chart carefully and recognize, using context, where substitutions have occurred   ==  Rayetta Fothergill, [address] [facility]  Internal Medicine Residency PGY-1       "

## 2023-06-10 NOTE — PLAN OF CARE
Problem: INFECTION - ADULT  Goal: Absence or prevention of progression during hospitalization  Description: INTERVENTIONS:  - Assess and monitor for signs and symptoms of infection  - Monitor lab/diagnostic results  - Monitor all insertion sites, i e  indwelling lines, tubes, and drains  - Monitor endotracheal if appropriate and nasal secretions for changes in amount and color  - [place] appropriate cooling/warming therapies per order  - Administer medications as ordered  - Instruct and encourage patient and family to use good hand hygiene technique  - Identify and instruct in appropriate isolation precautions for identified infection/condition  Outcome: Progressing  Goal: Absence of fever/infection during neutropenic period  Description: INTERVENTIONS:  - Monitor WBC    Outcome: Progressing

## 2023-06-10 NOTE — UTILIZATION REVIEW
Initial Clinical Review  Observation on 06/09 @ 2040 upgraded to Inpatient on 06/10 @ 1340  Pt requiring continued stay for continued treatment of CAP with Solu-Medrol, ceftriaxone and azithromycin  On 2L NC  Admission: Date/Time/Statement:   Admission Orders (From admission, onward)     Ordered        06/10/23 1340  Inpatient Admission  Once            06/09/23 2040  Place in Observation  Once                      Orders Placed This Encounter   Procedures   • Inpatient Admission     Standing Status:   Standing     Number of Occurrences:   1     Order Specific Question:   Level of Care     Answer:   Med Surg [16]     Order Specific Question:   Estimated length of stay     Answer:   More than 2 Midnights     Order Specific Question:   Certification     Answer:   I certify that inpatient services are medically necessary for this patient for a duration of greater than two midnights  See H&P and MD Progress Notes for additional information about the patient's course of treatment  ED Arrival Information     Expected   -    Arrival   6/9/2023 18:02    Acuity   Emergent            Means of arrival   Walk-In    Escorted by   Self    Service   [facility]    Admission type   Emergency            Arrival complaint   SOB, Cough           Chief Complaint   Patient presents with   • Shortness of Breath     Pt reports a cough & SOB for a couple of weeks  Per pt thought it was getting better last weekend, Went to work Monday to Wednesday and unable to work yesterday  Pt reports worsening SOB when lying down and chest tightness with cough  • Cough       Initial Presentation: 58 y o  female with PMH of Meniere disease, DM2, HLD, MDD presented to the ED from home w/ dyspnea and productive cough x 2 wks  Pt reports that her symptoms worsen for about 1 week after which they improved, but never resolved  Cough has persisted   She woke up yesterday w/ significantly worse congestion This time she was again having productive cough as well as new orthopnea and dyspnea associated with the cough  She tried to use her home albuterol inhaler, OTC allergy and cold/flu medications without significant improvement, prompting ED presentation  In the ED, tachypneic, leukocytosis at 11 8  CXR wet read in ED shows possible LLL infiltrate (official read pending)  On exam, wheezing and rales present  Given 500 cc IVF bolus, IV Decadron, IV Rocephin, IV Zithromax, nebs  Admit as observation level of care for SOB, CAP of LLL of lung  Plan: Ceftriaxone 1 g every 24 hours  Azithromycin 500 mg every 24 hours  Solu-Medrol 40 mg every 24 hours  Respiratory protocol  Scheduled DuoNebs  Albuterol as needed  supplemental O2 as needed to keep SPO2 >90%  Urine strep, Legionella pending  A m  procalcitonin  Follow sputum cultures    06/10 Obs to IP  IM Notes: No acute events overnight  Patient notes improvement in her wheezing, shortness of breath, and productive cough since receiving nebulizer treatments  On 2L NC  Cont IV abx  Cont Solu Medrol  Cont nebs  Wean supplemental O2 as needed to keep SPO2 >90%  F/u sputum cxs  Date: 06/11 Day 2: No acute events overnight  Pt reports that congestion returned  Still has productive cough but overall feels better than yesterday  On 2L NC  Urinary Legionella and strep negative - discontinue azithromycin  Start Mucinex 600 mg twice daily  Cont Ceftriaxone  Cont Solu Medrol  Nebs  Wean supplemental O2 as needed to keep SPO2 >90%  Follow sputum cultures  PE: wheezing and rhonchi present       ED Triage Vitals [06/09/23 1806]   Temperature Pulse Respirations Blood Pressure SpO2   98 °F (36 7 °C) 99 (!) 26 127/81 92 %      Temp Source Heart Rate Source Patient Position - Orthostatic VS BP Location FiO2 (%)   Temporal Monitor Sitting Left arm --      Pain Score       8          Wt Readings from Last 1 Encounters:   06/09/23 68 kg (150 lb)     Additional Vital Signs:   Date/Time Temp Pulse Resp BP MAP (mmHg) SpO2 Calculated FIO2 (%) - Nasal Cannula Nasal Cannula O2 Flow Rate (L/min) O2 Device Patient Position - Orthostatic VS   06/11/23 15:50:29 98 1 °F (36 7 °C) 97 26 Abnormal  144/80 101 95 % -- -- -- --   06/11/23 1415 -- -- -- -- -- 92 % -- -- -- --   06/11/23 0847 -- -- -- -- -- 94 % -- -- -- --   06/11/23 07:01:05 97 8 °F (36 6 °C) 71 18 118/74 89 96 % 28 2 L/min Nasal cannula Sitting   06/11/23 0234 -- -- -- -- -- 94 % -- -- -- --   06/10/23 22:03:39 98 3 °F (36 8 °C) 95 -- 102/80 87 94 % -- -- -- --       Date/Time Temp Pulse Resp BP MAP (mmHg) SpO2 Calculated FIO2 (%) - Nasal Cannula Nasal Cannula O2 Flow Rate (L/min) O2 Device Patient Position - Orthostatic VS   06/10/23 1358 -- -- -- -- -- 96 % -- -- -- --   06/10/23 0904 -- -- -- -- -- 94 % -- -- -- --   06/10/23 07:06:20 97 7 °F (36 5 °C) 77 18 118/77 91 94 % 28 2 L/min Nasal cannula Sitting   06/10/23 0400 -- 84 20 140/69 -- 93 % 28 2 L/min Nasal cannula Lying   06/10/23 0007 -- 87 -- -- -- 91 % 28 2 L/min Nasal cannula --   06/10/23 0001 -- 92 -- -- -- 86 % Abnormal  -- -- None (Room air) --   06/09/23 2245 -- 80 20 101/66 78 99 % -- -- None (Room air) Sitting   06/09/23 2145 -- 92 -- -- -- 93 % -- -- -- --   06/09/23 2130 -- 94 -- -- -- 92 % -- -- -- --   06/09/23 2000 -- 90 20 118/72 -- 92 % -- -- None (Room air) Sitting   06/09/23 1827 -- 104 -- -- -- -- -- -- -- --       Pertinent Labs/Diagnostic Test Results:   XR chest 2 views   ED Interpretation by Erma Schuler MD (06/09 2026)   Abnormal   Per my independent interpretation: Concern for left lower lobe pneumonia      Final Result by Geronimo Davila MD (06/10 1309)      Linear opacity in the left base on the frontal projection only, question atelectasis or pneumonia  This corresponds with the preliminary interpretation by the emergency department clinician                 Workstation performed: [id number]           06/09  EKG result: Normal sinus rhythm  Nonspecific ST abnormality    Results from last 7 days   Lab Units 06/09/23  1836   SARS-COV-2  Negative     Results from last 7 days   Lab Units 06/11/23  0551 06/10/23  0456 06/09/23  1836   HEMATOCRIT % 42 5 43 3 46 4*   HEMOGLOBIN g/dL 14 5 15 0 15 8*   NEUTROS ABS Thousands/µL 12 14* 9 51* 6 92   PLATELETS Thousands/uL 293 283 324   WBC Thousand/uL 14 98* 10 74* 11 87*         Results from last 7 days   Lab Units 06/11/23  0551 06/10/23  0456 06/09/23  1836   ANION GAP mmol/L 6 5 6   BUN mg/dL 10 11 12   CALCIUM mg/dL 9 1 9 2 9 5   CHLORIDE mmol/L 107 107 107   CO2 mmol/L 24 26 24   CREATININE mg/dL 0 56* 0 80 0 81   EGFR ml/min/1 73sq m 100 79 78   POTASSIUM mmol/L 3 9 3 8 4 0   SODIUM mmol/L 137 138 137     Results from last 7 days   Lab Units 06/09/23  1836   ALBUMIN g/dL 4 1   ALK PHOS U/L 72   ALT U/L 51   AST U/L 24   TOTAL BILIRUBIN mg/dL 0 43   TOTAL PROTEIN g/dL 8 2     Results from last 7 days   Lab Units 06/11/23  1652 06/11/23  1041 06/11/23  0614 06/10/23  2107 06/10/23  1543 06/10/23  1126 06/10/23  0638 06/09/23  2221   POC GLUCOSE mg/dl 259* 140 142* 239* 202* 185* 143* 148*     Results from last 7 days   Lab Units 06/11/23  0551 06/10/23  0456 06/09/23  1836   GLUCOSE RANDOM mg/dL 155* 165* 119       Results from last 7 days   Lab Units 06/09/23  2035 06/09/23  1836   HS TNI 0HR ng/L  --  3   HS TNI 2HR ng/L 3  --    HSTNI D2 ng/L 0  --          Results from last 7 days   Lab Units 06/09/23  1836   INR  0 90   PROTIME seconds 12 4   PTT seconds 30         Results from last 7 days   Lab Units 06/11/23  0551 06/10/23  0456 06/09/23  1836   PROCALCITONIN ng/ml <0 05 <0 05 <0 05     Results from last 7 days   Lab Units 06/09/23  1836   LACTIC ACID mmol/L 1 9             Results from last 7 days   Lab Units 06/09/23  1839   BNP pg/mL 7               Results from last 7 days   Lab Units 06/10/23  0456 06/09/23  1836   INFLUENZA A PCR   --  Negative   INFLUENZA B PCR   --  Negative   LEGIONELLA URINARY ANTIGEN Negative  --    RSV PCR   --  Negative   STREP PNEUMONIAE ANTIGEN, URINE  Negative  --            Results from last 7 days   Lab Units 06/09/23  1836 06/09/23  1835   BLOOD CULTURE  No Growth at 24 hrs  No Growth at 24 hrs                     ED Treatment:   Medication Administration from 06/09/2023 1802 to 06/10/2023 0631       Date/Time Order Dose Route Action     06/09/2023 1944 EDT sodium chloride 0 9 % bolus 500 mL 0 mL Intravenous Stopped     06/09/2023 1844 EDT sodium chloride 0 9 % bolus 500 mL 500 mL Intravenous New Bag     06/09/2023 1847 EDT dexamethasone (PF) (DECADRON) injection 10 mg 10 mg Intravenous Given     06/09/2023 2030 EDT cefTRIAXone (ROCEPHIN) 1,000 mg in dextrose 5 % 50 mL IVPB 0 mg Intravenous Stopped     06/09/2023 2000 EDT cefTRIAXone (ROCEPHIN) 1,000 mg in dextrose 5 % 50 mL IVPB 1,000 mg Intravenous New Bag     06/09/2023 2142 EDT azithromycin (ZITHROMAX) 500 mg in sodium chloride 0 9 % 250 mL IVPB 0 mg Intravenous Stopped     06/09/2023 2042 EDT azithromycin (ZITHROMAX) 500 mg in sodium chloride 0 9 % 250 mL IVPB 500 mg Intravenous New Bag     06/09/2023 1847 EDT albuterol inhalation solution 2 5 mg 2 5 mg Nebulization Given     06/09/2023 1847 EDT ipratropium (ATROVENT) 0 02 % inhalation solution 0 5 mg 0 5 mg Nebulization Given     06/10/2023 0225 EDT ipratropium-albuterol (DUO-NEB) 0 5-2 5 mg/3 mL inhalation solution 3 mL 3 mL Nebulization Given     06/09/2023 2230 EDT ipratropium-albuterol (DUO-NEB) 0 5-2 5 mg/3 mL inhalation solution 3 mL 3 mL Nebulization Given        Past Medical History:   Diagnosis Date   • Diabetes mellitus ([facility] )    • Vertigo      Present on Admission:  • Diabetes mellitus ([facility] )  • Mixed hyperlipidemia  • Community acquired pneumonia of left lower lobe of lung      Admitting Diagnosis: Pneumonia [J18 9]  Dyspnea [R06 00]  SOB (shortness of breath) [R06 02]  Acute exacerbation of chronic obstructive pulmonary disease (COPD) ([facility] ) [J44 1]  Acute chest pain [R07 9]  Sepsis ([facility] [address] ) [A41 9]  Age/Sex: 58 y o  female  Admission Orders:  SCD  Check Pulse Ox while ambulating    Scheduled Medications:  atorvastatin, 40 mg, Oral, Daily  cefTRIAXone, 1,000 mg, Intravenous, Q24H  enoxaparin, 40 mg, Subcutaneous, Daily  guaiFENesin, 600 mg, Oral, Q12H BRIAN  insulin lispro, 1-5 Units, Subcutaneous, TID AC  insulin lispro, 1-5 Units, Subcutaneous, HS  ipratropium, 0 5 mg, Nebulization, Q6H  levalbuterol, 1 25 mg, Nebulization, Q6H  melatonin, 3 mg, Oral, HS  methylPREDNISolone sodium succinate, 40 mg, Intravenous, Daily  nicotine, 1 patch, Transdermal, Daily  triamterene-hydrochlorothiazide, 1 tablet, Oral, Daily  azithromycin (ZITHROMAX) 500 mg in sodium chloride 0 9 % 250 mL IVPB  Dose: 500 mg  Freq: Every 24 hours Route: IV  Last Dose: 500 mg (06/10/23 1957)  Start: 06/10/23 1900 End: 06/11/23 0703    Continuous IV Infusions: none     PRN Meds:  acetaminophen, 650 mg, Oral, Q6H PRN 06/10 x 1  albuterol, 2 puff, Inhalation, Q6H PRN        IP CONSULT TO CASE MANAGEMENT    Network Utilization Review Department  ATTENTION: Please call with any questions or concerns to [phone] and carefully listen to the prompts so that you are directed to the right person  All voicemails are confidential   Henrietta Patrick all requests for admission clinical reviews, approved or denied determinations and any other requests to dedicated fax number below belonging to the campus where the patient is receiving treatment   List of dedicated fax numbers for the Facilities:  [address] DENIALS (Administrative/Medical Necessity) [phone]   [address] (Maternity/NICU/Pediatrics) [phone]   910 Yoana Mendez [phone]   Loretta Douglass 77 [phone]   13[address] [facility] [address] [facility] [place] [address] [facility] [address] Cristine Hernandez [place] [address] [phone] [address] [place] Rodney Melendrez [facility] 134 815 [facility] [phone]

## 2023-06-10 NOTE — SEPSIS NOTE
Sepsis Note   Zach Sutherland 58 y o  female MRN: [id number]  Unit/Bed#: CRB Encounter: [id number]       Initial Sepsis Screening     Row Name 06/09/23 2027                Is the patient's history suggestive of a new or worsening infection? Yes (Proceed)  -CL        Suspected source of infection pneumonia  -CL        Indicate SIRS criteria Tachycardia > 90 bpm;Tachypnea > 20 resp per min  -CL        Are two or more of the above signs & symptoms of infection both present and new to the patient? No  -CL              User Key  (r) = Recorded By, (t) = Taken By, (c) = Cosigned By    [address] Name Provider Faye Pino MD Physician                    Body mass index is 29 29 kg/m²    Wt Readings from Last 1 Encounters:   06/09/23 68 kg (150 lb)        Ideal body weight: 45 5 kg (100 lb 4 9 oz)  Adjusted ideal body weight: 54 5 kg (120 lb 3 oz)

## 2023-06-10 NOTE — ASSESSMENT & PLAN NOTE
Patient initially presented to ED with dyspnea, mild orthopnea, rhinorrhea, productive cough for the last 2 weeks with interval improvement about 1 week ago  Patient has attempted several OTC allergy medications to relieve her symptoms with no significant relief  Found to have possible LLL infiltrate on CXR (pending official read) with associated crackles and wheezes on physical exam   Patient's symptoms significantly improved after receiving DuoNebs, Decadron in ED  Admitted for suspected CAP given SIRS criteria met on admission (HR>90, RR>20), as well as possible acute asthma/COPD (although patient has never had spirometry performed outpatient)  · 06/12 - P/S procalcitonin - <0 05 (normal)  · 06/12 - P/S blood cultures from left arm and right hand: No growth at 48 hours    Plan  · Ceftriaxone 1 g every 24 hours -consider switching to p o  antibiotic tomorrow if she continues to improve  · Urinary Legionella and strep negative - discontinue azithromycin  · Start Mucinex 600 mg twice daily  · Solu-Medrol 40 mg every 24 hours - consider switching her to oral tomorrow if patient continues to improve  · Respiratory protocol  · Scheduled DuoNebs  · Albuterol as needed  · Wean supplemental O2 as needed to keep SPO2 >90%  · A m  procalcitonin  · Follow sputum cultures  · Blood cultures NGTD  · 06/12 - Switching ceftriaxone to Cefdinir 300 mg PO q12h  · Switching Solu-Medrol to prednisone 40 mg PO daily  · 06/13 Abx D5; prednisone D4  · 06/13 Discharged with  · Prednisone 20mg tablet PO starting on 06/13/23 (with taper)  · Take 2 tablets (40 mg total) by mouth daily for 3 days, THEN 1 5 tablets (30 mg total) daily for 3 days, THEN 1 tablet (20 mg total) daily for 3 days, THEN 0 5 tablets (10 mg total) daily for 3 days    · Guaifenisin (Mucinex) 600mg q12h PO PRN  · Benzonatate (TESALON PERLES) 100 mg TID PO PRN for cough  · Varenicline (0 5 mg X 11 & 1 mg X 42 tablet therapy pack) PO   · Start with 0 5 mg by mouth daily for 3 days, Then 0 5 mg 2 (two) times a day for 4 days  Then 1 mg 2 (two) times a day  · Cont  Albuterol inhaler 90 mcg/act; 2 puffs q4h PRN  · Cont  Atorvastatin 40 mg daily PO  · Cont  Freestyle Lite blood glucose test strip  · Cont  Metformin 750 mg 24 hr tablet 2 tablets daily PO with dinner  · Cont  Naproxen 500 mg BID  · Cont  Ondansetron 4 mg q6h PO PRN for N/V  · Cont  Pro-biotic blend PO 1 tablet daily  · Cont  Triamterene-hydrochlorothiazide 37 5-25mg per capsule 1 capsule daily PO  · Cont  freestyle lancets  · Cont  Glucose monitoring kit QID  · Cont  Blood glucose monitoring suppl kit in the morning

## 2023-06-10 NOTE — RESPIRATORY THERAPY NOTE
RT Protocol Note  Julio Cesar Barakat 58 y o  female MRN: [id number]  Unit/Bed#: CRB Encounter: [id number]    Assessment    Principal Problem:    Shortness of breath  Active Problems:    Diabetes mellitus ([facility] )    Mixed hyperlipidemia      Home Pulmonary Medications:  Ventolin       Past Medical History:   Diagnosis Date    Diabetes mellitus ([facility] )     Vertigo      Social History     Socioeconomic History    Marital status: Single     Spouse name: None    Number of children: None    Years of education: None    Highest education level: None   Occupational History    None   Tobacco Use    Smoking status: Every Day     Packs/day: 0 25     Types: Cigarettes    Smokeless tobacco: Never    Tobacco comments:     1-6 cigarettes a day   Vaping Use    Vaping Use: Never used   Substance and Sexual Activity    Alcohol use: Yes     Comment: socially     Drug use: Never    Sexual activity: Not Currently     Partners: Male     Birth control/protection: Post-menopausal   Other Topics Concern    None   Social History Narrative    Former smoker - As per [employer]    Inadequate exercise    Physically able to work    Sedentary lifestyle     from significant other    Tobacco use - As per [employer], looking for work      Social Determinants of Health     Financial Resource Strain: [address] [facility] (2/24/2023)    Overall Financial Resource Strain (CARDIA)     Difficulty of Paying Living Expenses: Hard   Food Insecurity: No Food Insecurity (2/24/2023)    Hunger Vital Sign     Worried About Running Out of Food in the Last Year: Never true     Ran Out of Food in the Last Year: Never true   Transportation Needs: No Transportation Needs (2/24/2023)    PRAPARE - Transportation     Lack of Transportation (Medical): No     Lack of Transportation (Non-Medical):  No   Physical Activity: Inactive (11/22/2021)    Exercise Vital Sign     Days of Exercise per Week: 0 days     Minutes of Exercise per Session: 0 min   Stress: Not on file Social Connections: Socially Isolated (11/22/2021)    Social Connection and Isolation Panel [NHANES]     Frequency of Communication with Friends and Family: Once a week     Frequency of Social Gatherings with Friends and Family: Twice a week     Attends Baptism Services: Never     Active Member of Clubs or Organizations: No     Attends Club or Organization Meetings: Never     Marital Status:    Intimate Partner Violence: Not At Risk (11/22/2021)    Humiliation, Afraid, Rape, and Kick questionnaire     Fear of Current or Ex-Partner: No     Emotionally Abused: No     Physically Abused: No     Sexually Abused: No   Housing Stability: Low Risk  (5/25/2022)    Housing Stability Vital Sign     Unable to Pay for Housing in the Last Year: No     Number of Places Lived in the Last Year: 1     Unstable Housing in the Last Year: No       Subjective         Objective    Physical Exam:   Assessment Type: Assess only  General Appearance: Drowsy  Respiratory Pattern: Shallow  Chest Assessment: Chest expansion symmetrical  Bilateral Breath Sounds: Expiratory wheezes  L Breath Sounds: Diminished (Left Lower Lobe Pneumonia)  Location Specific: Yes  Cough: Non-productive  O2 Device: 2 lpm NC    Vitals:  Blood pressure 101/66, pulse 87, temperature 98 °F (36 7 °C), temperature source Temporal, resp  rate 20, weight 68 kg (150 lb), last menstrual period 10/01/2010, SpO2 91 %, not currently breastfeeding  Imaging and other studies:  Results Pending    O2 Device: 2 lpm NC     Plan    Respiratory Plan: Moderate/Severe Distress pathway        Resp Comments: 58-year-old female, with a history significant for chronic bronchitis, diabetes, tobacco use, who presents to the ED today due to dyspnea  Patient states that her symptoms began two weeks ago with a productive cough that had been improving in course until recently when she has had significant worsening of her symptoms    Patient reports associated wheezing, subjective fever, diaphoresis, abdominal bloating, chest pain characterized as heaviness/tightness has been present since last Friday  She says that she did quit smoking about 2 weeks ago  Found to have possible LLL infiltrate on CXR (pending official read) with associated crackles and wheezes on physical exam  Patient has significant rales and expiratory wheezing on exam   She reports significant symptomatic improvement with DuoNebs and IV Decadron  Only home pulm med is Ventolin MDI  Currently has Q6 hr DuoNeb ordered by Dr Desi Wyatt (Internal Medicine) with Ventolin MDI for PRN usage  Per policy will change Q6 hr meds to our Xopenex / Atrovent combination  Pt remaining for now on her 2 lpm NC  Will continue to follow per RCP

## 2023-06-10 NOTE — H&P
INTERNAL MEDICINE RESIDENCY ADMISSION H&P     Name: Ingris Lai   Age & Sex: 58 y o  female   MRN: [id number]  Unit/Bed#: CRB   Encounter: [id number]  Primary Care Provider: Mercedes Ricketts DO    Code Status: Level 1 - Full Code  Admission Status: OBSERVATION  Disposition: Patient requires Med/Surg    Admit to team: SOD Team C     ASSESSMENT/PLAN     Principal Problem:    Shortness of breath  Active Problems:    Diabetes mellitus ([address] )    Mixed hyperlipidemia      * Shortness of breath  Assessment & Plan  Patient initially presented to ED with dyspnea, mild orthopnea, rhinorrhea, productive cough for the last 2 weeks with interval improvement about 1 week ago  Patient has attempted several OTC allergy medications to relieve her symptoms with no significant relief  Found to have possible LLL infiltrate on CXR (pending official read) with associated crackles and wheezes on physical exam   Patient's symptoms significantly improved after receiving DuoNebs, Decadron in ED  Admitted for suspected CAP given SIRS criteria met on admission (HR>90, RR>20), as well as possible acute asthma/COPD (although patient has never had spirometry performed outpatient)      Plan  · Ceftriaxone 1 g every 24 hours  · Azithromycin 500 mg every 24 hours  · Solu-Medrol 40 mg every 24 hours  · Respiratory protocol  · Scheduled DuoNebs  · Albuterol as needed  · Wean supplemental O2 as needed to keep SPO2 >90%  · Urine strep, Legionella pending  · A m  procalcitonin  · Follow sputum cultures    Mixed hyperlipidemia  Assessment & Plan  Continue home statin    Diabetes mellitus [facility])  Assessment & Plan  Lab Results   Component Value Date    HGBA1C 5 8 05/25/2022       Controlled on metformin at home  SSI inpatient, goal -180        VTE Pharmacologic Prophylaxis: Enoxaparin (Lovenox)  VTE Mechanical Prophylaxis: sequential compression device    CHIEF COMPLAINT     Chief Complaint   Patient presents with   • Shortness of Breath Pt reports a cough & SOB for a couple of weeks  Per pt thought it was getting better last weekend, Went to work Monday to Wednesday and unable to work yesterday  Pt reports worsening SOB when lying down and chest tightness with cough  • Cough      HISTORY OF PRESENT ILLNESS     63yo F with history of Meniere disease, DM2, HLD, MDD presents with dyspnea and productive cough  She notes that she first began having URI symptoms (rhinorrhea, sore throat, productive cough with tightness) about 2 weeks ago  Her symptoms continue to worsen for about 1 week after which they improved, but never resolved  She noted that her mildly productive cough has persisted throughout  Yesterday she woke up again with significantly worse congestion (at the level of her previous symptoms were state)  This time she was again having productive cough as well as new orthopnea and dyspnea associated with the cough  She tried to use her home albuterol inhaler, OTC allergy and cold/flu medications (does not remember the name) without significant improvement, prompting ED presentation  She does admit to tobacco abuse; says that 1 pack lasts about 2 weeks but she has smoked at least a few cigarettes every day for the last >30 years  She has never been formally diagnosed with COPD or asthma via spirometry  In the ED, patient is hemodynamically stable  Labs show mild leukocytosis at 11 8, otherwise grossly normal   CXR wet read in ED shows possible LLL infiltrate (official read pending)  Patient has significant rales and expiratory wheezing on exam   She reports significant symptomatic improvement with DuoNebs and IV Decadron  Was given ceftriaxone and azithromycin x1 in the ED  She has not required supplemental O2 since arrival     REVIEW OF SYSTEMS     Review of Systems - 12 point ROS performed and negative unless stated above      OBJECTIVE     Vitals:    06/09/23 1806 06/09/23 1827 06/09/23 2000   BP: 127/81  118/72   BP Location: Left arm  Right arm   Pulse: 99 104 90   Resp: (!) 26  20   Temp: 98 °F (36 7 °C)     TempSrc: Temporal     SpO2: 92%  92%   Weight: 68 kg (150 lb)        Temperature:   Temp (24hrs), Av °F (36 7 °C), Min:98 °F (36 7 °C), Max:98 °F (36 7 °C)    Temperature: 98 °F (36 7 °C)  Intake & Output:  I/O     None        Weights:        Body mass index is 29 29 kg/m²  Weight (last 2 days)     Date/Time Weight    23 1806 68 (150)        Physical Exam  Vitals and nursing note reviewed  Constitutional:       General: She is not in acute distress  Appearance: Normal appearance  She is well-developed  She is not ill-appearing  HENT:      Head: Normocephalic and atraumatic  Eyes:      Conjunctiva/sclera: Conjunctivae normal    Cardiovascular:      Rate and Rhythm: Normal rate and regular rhythm  Heart sounds: S1 normal and S2 normal  No murmur heard  Pulmonary:      Effort: Pulmonary effort is normal  No respiratory distress  Breath sounds: Examination of the right-middle field reveals wheezing  Examination of the left-middle field reveals wheezing  Examination of the right-lower field reveals rales  Examination of the left-lower field reveals rales  Wheezing and rales present  Abdominal:      General: Bowel sounds are normal  There is no distension  Palpations: Abdomen is soft  Tenderness: There is no abdominal tenderness  Musculoskeletal:      Cervical back: Neck supple  Right lower leg: No edema  Left lower leg: No edema  Skin:     General: Skin is warm and dry  Neurological:      Mental Status: She is alert and oriented to person, place, and time  Psychiatric:         Mood and Affect: Mood normal          Thought Content:  Thought content normal        PAST MEDICAL HISTORY     Past Medical History:   Diagnosis Date   • Diabetes mellitus ([facility] )    • Vertigo      PAST SURGICAL HISTORY     Past Surgical History:   Procedure Laterality Date   • BREAST BIOPSY Bilateral 12/01/2021    stereo x2   • BREAST BIOPSY Right 12/01/2021   • MAMMO STEREOTACTIC BREAST BIOPSY LEFT (ALL INC) Left 12/1/2021   • MAMMO STEREOTACTIC BREAST BIOPSY RIGHT (ALL INC) Right 12/1/2021   • TUMOR REMOVAL      right leg- benign      SOCIAL & FAMILY HISTORY     Social History     Substance and Sexual Activity   Alcohol Use Yes    Comment: socially      Substance and Sexual Activity   Alcohol Use Yes    Comment: socially         Substance and Sexual Activity   Drug Use Never     Social History     Tobacco Use   Smoking Status Every Day   • Packs/day: 0 25   • Types: Cigarettes   Smokeless Tobacco Never   Tobacco Comments    1-6 cigarettes a day     Family History   Problem Relation Age of Onset   • Stroke Mother    • Hypertension Mother    • Diabetes Mother    • Throat cancer Brother    • Ovarian cancer Family         2 maternal aunts and 2 cousins    • Stroke Father    • Thyroid cancer Sister    • Heart disease Sister    • Uterine cancer Sister    • No Known Problems Son    • Heart attack Maternal Grandmother 43   • Stroke Maternal Grandfather 58   • Heart attack Paternal Grandmother    • Leukemia Paternal Grandfather    • No Known Problems Sister    • No Known Problems Son    • Breast cancer Cousin 36   • Breast cancer Maternal Aunt 26   • Uterine cancer Maternal Aunt    • Uterine cancer Cousin    • No Known Problems Paternal Aunt      LABORATORY DATA     Labs: I have personally reviewed pertinent reports      Results from last 7 days   Lab Units 06/09/23  1836   EOS PCT % 6   HEMATOCRIT % 46 4*   HEMOGLOBIN g/dL 15 8*   MONOS PCT % 6   NEUTROS PCT % 59   PLATELETS Thousands/uL 324   WBC Thousand/uL 11 87*      Results from last 7 days   Lab Units 06/09/23  1836   ALK PHOS U/L 72   ALT U/L 51   AST U/L 24   BUN mg/dL 12   CALCIUM mg/dL 9 5   CHLORIDE mmol/L 107   CO2 mmol/L 24   CREATININE mg/dL 0 81   POTASSIUM mmol/L 4 0              Results from last 7 days   Lab Units 06/09/23  1836   INR  0 90   PTT "seconds 30     Results from last 7 days   Lab Units 06/09/23  1836   LACTIC ACID mmol/L 1 9         Micro:  No results found for: \"BLOODCX\", \"SPUTUMCULTUR\", \"URINECX\", \"WOUNDCULT\"  IMAGING & DIAGNOSTIC TESTS     Imaging: I have personally reviewed pertinent reports  No results found  EKG, Pathology, and Other Studies: I have personally reviewed pertinent reports  ALLERGIES   No Known Allergies  MEDICATIONS PRIOR TO ARRIVAL     Prior to Admission medications    Medication Sig Start Date End Date Taking?  Authorizing Provider   albuterol (PROVENTIL HFA,VENTOLIN HFA) 90 mcg/act inhaler TAKE 2 PUFFS BY MOUTH EVERY 4 HOURS AS NEEDED FOR WHEEZE 6/9/23   Kyle Ricketts DO   atorvastatin (LIPITOR) 40 mg tablet Take 1 tablet (40 mg total) by mouth daily 1/30/23   Jeana Ramos MD   Blood Glucose Monitoring Suppl KIT Use in the morning 2/23/23   Kyle Ricketts DO   glucose blood (FREESTYLE LITE) test strip Use as instructed 9/29/21   Kyle Ricketts DO   glucose monitoring kit (FREESTYLE) monitoring kit Apply 1 m topically 4 (four) times a day as needed Use as directed 2/22/23   Kyle Ricketts DO   Lancets (freestyle) lancets Use as instructed 2/22/23   Kyle Ricketts DO   metFORMIN (GLUCOPHAGE-XR) 750 mg 24 hr tablet TAKE 2 TABLETS BY MOUTH EVERY DAY WITH DINNER 10/17/22   Kyle Ricketts DO   naproxen (Naprosyn) 500 mg tablet Take 1 tablet (500 mg total) by mouth 2 (two) times a day with meals 2/24/23   Latoya Gonzales PA-C   ondansetron (ZOFRAN-ODT) 4 mg disintegrating tablet Take 1 tablet (4 mg total) by mouth every 6 (six) hours as needed for nausea or vomiting 5/11/23   Miquel Lozano DO   Probiotic Product (PRO-BIOTIC BLEND PO) Take 1 tablet by mouth daily    Historical Provider, MD   triamterene-hydrochlorothiazide (DYAZIDE) 37 5-25 mg per capsule Take 1 capsule by mouth daily 1/30/23   Jeana Ramos MD   albuterol (PROVENTIL HFA,VENTOLIN HFA) 90 mcg/act inhaler Inhale 2 puffs every 4 (four) hours as " needed for wheezing 10/11/22 6/9/23  Shakira Ricketts DO     MEDICATIONS ADMINISTERED IN LAST 24 HOURS     Medication Administration - last 24 hours from 06/08/2023 2118 to 06/09/2023 2118       Date/Time Order Dose Route Action Action by     06/09/2023 1844 EDT sodium chloride 0 9 % bolus 500 mL 500 mL Intravenous [address] [facility], RN     06/09/2023 1847 EDT dexamethasone (PF) (DECADRON) injection 10 mg 10 mg Intravenous Given Nadine Izaguirre RN     06/09/2023 2030 EDT cefTRIAXone (ROCEPHIN) 1,000 mg in dextrose 5 % 50 mL IVPB 0 mg Intravenous Stopped Elke Katz RN     06/09/2023 2000 EDT cefTRIAXone (ROCEPHIN) 1,000 mg in dextrose 5 % 50 mL IVPB 1,000 mg Intravenous [address] Elke Katz RN     06/09/2023 2042 EDT azithromycin (ZITHROMAX) 500 mg in sodium chloride 0 9 % 250 mL IVPB 500 mg Intravenous [address] Elke Katz RN     06/09/2023 1847 EDT albuterol inhalation solution 2 5 mg 2 5 mg Nebulization Given Nadine Izaguirre RN     06/09/2023 1847 EDT ipratropium (ATROVENT) 0 02 % inhalation solution 0 5 mg 0 5 mg Nebulization Given Nadine Izaguirre RN        CURRENT MEDICATIONS     Current Facility-Administered Medications   Medication Dose Route Frequency Provider Last Rate   • albuterol  2 puff Inhalation Q6H PRN Lexy Nowak DO     • [START ON 6/10/2023] atorvastatin  40 mg Oral Daily Lexy Nowak DO     • azithromycin  500 mg Intravenous Once Lisa Villa  mg (06/09/23 2042)   • [START ON 6/10/2023] azithromycin  500 mg Intravenous Q24H Lexy Nowak DO     • [START ON 6/10/2023] cefTRIAXone  1,000 mg Intravenous Q24H Lexy Nowak DO     • [START ON 6/10/2023] enoxaparin  40 mg Subcutaneous Daily Lexy Nowak DO     • [START ON 6/10/2023] insulin lispro  1-5 Units Subcutaneous TID WIL Lexy Nowak DO     • insulin lispro  1-5 Units Subcutaneous HS Lexy Nowak DO     • ipratropium-albuterol  3 mL "Nebulization Q6H Lupillo Garces DO     • [START ON 6/10/2023] methylPREDNISolone sodium succinate  40 mg Intravenous Daily Lupillo Garces DO     • [START ON 6/10/2023] nicotine  1 patch Transdermal Daily Lupillo Garces DO     • [START ON 6/10/2023] triamterene-hydrochlorothiazide  1 tablet Oral Daily Lupillo Garces DO          albuterol, 2 puff, Q6H PRN        Admission Time  I spent 45 minutes admitting the patient  This involved direct patient contact where I performed a full history and physical, reviewing previous records, and reviewing laboratory and other diagnostic studies  Portions of the record may have been created with voice recognition software  Occasional wrong word or \"sound a like\" substitutions may have occurred due to the inherent limitations of voice recognition software    Read the chart carefully and recognize, using context, where substitutions have occurred     ==  Lupillo Garces, [facility]  Internal Medicine Residency PGY-2    "

## 2023-06-10 NOTE — ASSESSMENT & PLAN NOTE
Lab Results   Component Value Date    HGBA1C 5 8 05/25/2022       Controlled on metformin at home  SSI inpatient, goal -180

## 2023-06-10 NOTE — PLAN OF CARE
Problem: INFECTION - ADULT  Goal: Absence or prevention of progression during hospitalization  Description: INTERVENTIONS:  - Assess and monitor for signs and symptoms of infection  - Monitor lab/diagnostic results  - Monitor all insertion sites, i e  indwelling lines, tubes, and drains  - Monitor endotracheal if appropriate and nasal secretions for changes in amount and color  - [place] appropriate cooling/warming therapies per order  - Administer medications as ordered  - Instruct and encourage patient and family to use good hand hygiene technique  - Identify and instruct in appropriate isolation precautions for identified infection/condition  Outcome: Progressing

## 2023-06-11 LAB
ANION GAP SERPL CALCULATED.3IONS-SCNC: 6 MMOL/L (ref 4–13)
BASOPHILS # BLD AUTO: 0.02 THOUSANDS/ÂΜL (ref 0–0.1)
BASOPHILS NFR BLD AUTO: 0 % (ref 0–1)
BUN SERPL-MCNC: 10 MG/DL (ref 5–25)
CALCIUM SERPL-MCNC: 9.1 MG/DL (ref 8.3–10.1)
CHLORIDE SERPL-SCNC: 107 MMOL/L (ref 96–108)
CO2 SERPL-SCNC: 24 MMOL/L (ref 21–32)
CREAT SERPL-MCNC: 0.56 MG/DL (ref 0.6–1.3)
EOSINOPHIL # BLD AUTO: 0.02 THOUSAND/ÂΜL (ref 0–0.61)
EOSINOPHIL NFR BLD AUTO: 0 % (ref 0–6)
ERYTHROCYTE [DISTWIDTH] IN BLOOD BY AUTOMATED COUNT: 12.9 % (ref 11.6–15.1)
GFR SERPL CREATININE-BSD FRML MDRD: 100 ML/MIN/1.73SQ M
GLUCOSE SERPL-MCNC: 140 MG/DL (ref 65–140)
GLUCOSE SERPL-MCNC: 142 MG/DL (ref 65–140)
GLUCOSE SERPL-MCNC: 155 MG/DL (ref 65–140)
GLUCOSE SERPL-MCNC: 259 MG/DL (ref 65–140)
GLUCOSE SERPL-MCNC: 333 MG/DL (ref 65–140)
HCT VFR BLD AUTO: 42.5 % (ref 34.8–46.1)
HGB BLD-MCNC: 14.5 G/DL (ref 11.5–15.4)
IMM GRANULOCYTES # BLD AUTO: 0.08 THOUSAND/UL (ref 0–0.2)
IMM GRANULOCYTES NFR BLD AUTO: 1 % (ref 0–2)
LYMPHOCYTES # BLD AUTO: 1.95 THOUSANDS/ÂΜL (ref 0.6–4.47)
LYMPHOCYTES NFR BLD AUTO: 13 % (ref 14–44)
MCH RBC QN AUTO: 32.9 PG (ref 26.8–34.3)
MCHC RBC AUTO-ENTMCNC: 34.1 G/DL (ref 31.4–37.4)
MCV RBC AUTO: 96 FL (ref 82–98)
MONOCYTES # BLD AUTO: 0.77 THOUSAND/ÂΜL (ref 0.17–1.22)
MONOCYTES NFR BLD AUTO: 5 % (ref 4–12)
NEUTROPHILS # BLD AUTO: 12.14 THOUSANDS/ÂΜL (ref 1.85–7.62)
NEUTS SEG NFR BLD AUTO: 81 % (ref 43–75)
NRBC BLD AUTO-RTO: 0 /100 WBCS
PLATELET # BLD AUTO: 293 THOUSANDS/UL (ref 149–390)
PMV BLD AUTO: 10.3 FL (ref 8.9–12.7)
POTASSIUM SERPL-SCNC: 3.9 MMOL/L (ref 3.5–5.3)
PROCALCITONIN SERPL-MCNC: <0.05 NG/ML
RBC # BLD AUTO: 4.41 MILLION/UL (ref 3.81–5.12)
SODIUM SERPL-SCNC: 137 MMOL/L (ref 135–147)
WBC # BLD AUTO: 14.98 THOUSAND/UL (ref 4.31–10.16)

## 2023-06-11 PROCEDURE — 94640 AIRWAY INHALATION TREATMENT: CPT

## 2023-06-11 PROCEDURE — 94664 DEMO&/EVAL PT USE INHALER: CPT

## 2023-06-11 PROCEDURE — 94760 N-INVAS EAR/PLS OXIMETRY 1: CPT

## 2023-06-11 PROCEDURE — 85025 COMPLETE CBC W/AUTO DIFF WBC: CPT

## 2023-06-11 PROCEDURE — 80048 BASIC METABOLIC PNL TOTAL CA: CPT

## 2023-06-11 PROCEDURE — 82948 REAGENT STRIP/BLOOD GLUCOSE: CPT

## 2023-06-11 PROCEDURE — 84145 PROCALCITONIN (PCT): CPT | Performed by: STUDENT IN AN ORGANIZED HEALTH CARE EDUCATION/TRAINING PROGRAM

## 2023-06-11 PROCEDURE — 99233 SBSQ HOSP IP/OBS HIGH 50: CPT | Performed by: INTERNAL MEDICINE

## 2023-06-11 RX ORDER — GUAIFENESIN 600 MG/1
600 TABLET, EXTENDED RELEASE ORAL EVERY 12 HOURS SCHEDULED
Status: DISCONTINUED | OUTPATIENT
Start: 2023-06-11 | End: 2023-06-13 | Stop reason: HOSPADM

## 2023-06-11 RX ADMIN — IPRATROPIUM BROMIDE 0.5 MG: 0.5 SOLUTION RESPIRATORY (INHALATION) at 14:13

## 2023-06-11 RX ADMIN — LEVALBUTEROL HYDROCHLORIDE 1.25 MG: 1.25 SOLUTION RESPIRATORY (INHALATION) at 08:44

## 2023-06-11 RX ADMIN — LEVALBUTEROL HYDROCHLORIDE 1.25 MG: 1.25 SOLUTION RESPIRATORY (INHALATION) at 14:13

## 2023-06-11 RX ADMIN — NICOTINE 1 PATCH: 7 PATCH, EXTENDED RELEASE TRANSDERMAL at 08:27

## 2023-06-11 RX ADMIN — INSULIN LISPRO 2 UNITS: 100 INJECTION, SOLUTION INTRAVENOUS; SUBCUTANEOUS at 17:44

## 2023-06-11 RX ADMIN — GUAIFENESIN 600 MG: 600 TABLET, EXTENDED RELEASE ORAL at 21:46

## 2023-06-11 RX ADMIN — IPRATROPIUM BROMIDE 0.5 MG: 0.5 SOLUTION RESPIRATORY (INHALATION) at 02:31

## 2023-06-11 RX ADMIN — ENOXAPARIN SODIUM 40 MG: 40 INJECTION SUBCUTANEOUS at 08:27

## 2023-06-11 RX ADMIN — METHYLPREDNISOLONE SODIUM SUCCINATE 40 MG: 40 INJECTION, POWDER, FOR SOLUTION INTRAMUSCULAR; INTRAVENOUS at 08:28

## 2023-06-11 RX ADMIN — LEVALBUTEROL HYDROCHLORIDE 1.25 MG: 1.25 SOLUTION RESPIRATORY (INHALATION) at 20:13

## 2023-06-11 RX ADMIN — CEFTRIAXONE SODIUM 1000 MG: 10 INJECTION, POWDER, FOR SOLUTION INTRAVENOUS at 21:46

## 2023-06-11 RX ADMIN — MELATONIN 3 MG: at 21:46

## 2023-06-11 RX ADMIN — ATORVASTATIN CALCIUM 40 MG: 40 TABLET, FILM COATED ORAL at 08:28

## 2023-06-11 RX ADMIN — TRIAMTERENE AND HYDROCHLOROTHIAZIDE 1 TABLET: 37.5; 25 TABLET ORAL at 08:27

## 2023-06-11 RX ADMIN — IPRATROPIUM BROMIDE 0.5 MG: 0.5 SOLUTION RESPIRATORY (INHALATION) at 08:44

## 2023-06-11 RX ADMIN — GUAIFENESIN 600 MG: 600 TABLET, EXTENDED RELEASE ORAL at 12:17

## 2023-06-11 RX ADMIN — LEVALBUTEROL HYDROCHLORIDE 1.25 MG: 1.25 SOLUTION RESPIRATORY (INHALATION) at 02:31

## 2023-06-11 RX ADMIN — IPRATROPIUM BROMIDE 0.5 MG: 0.5 SOLUTION RESPIRATORY (INHALATION) at 20:13

## 2023-06-11 RX ADMIN — INSULIN LISPRO 4 UNITS: 100 INJECTION, SOLUTION INTRAVENOUS; SUBCUTANEOUS at 21:48

## 2023-06-11 NOTE — PROGRESS NOTES
INTERNAL MEDICINE RESIDENCY PROGRESS NOTE     Name: Rissa Coppola   Age & Sex: 58 y o  female   MRN: [id number]  Unit/Bed#: -01   Encounter: [id number]  Team: SOD Team C     PATIENT INFORMATION     Name: Rissa Coppola   Age & Sex: 58 y o  female   MRN: [id number]  Hospital Stay Days: 1    ASSESSMENT/PLAN     Principal Problem:    Community acquired pneumonia of left lower lobe of lung  Active Problems:    Diabetes mellitus ([facility] )    Mixed hyperlipidemia    Tobacco use    Sepsis ([facility] )      * Community acquired pneumonia of left lower lobe of lung  Assessment & Plan  Patient initially presented to ED with dyspnea, mild orthopnea, rhinorrhea, productive cough for the last 2 weeks with interval improvement about 1 week ago  Patient has attempted several OTC allergy medications to relieve her symptoms with no significant relief  Found to have possible LLL infiltrate on CXR (pending official read) with associated crackles and wheezes on physical exam   Patient's symptoms significantly improved after receiving DuoNebs, Decadron in ED  Admitted for suspected CAP given SIRS criteria met on admission (HR>90, RR>20), as well as possible acute asthma/COPD (although patient has never had spirometry performed outpatient)  Plan  · Ceftriaxone 1 g every 24 hours -consider switching to p o  antibiotic tomorrow if she continues to improve  · Urinary Legionella and strep negative - discontinue azithromycin  · Start Mucinex 600 mg twice daily  · Solu-Medrol 40 mg every 24 hours - consider switching her to oral tomorrow if patient continues to improve  · Respiratory protocol  · Scheduled DuoNebs  · Albuterol as needed  · Wean supplemental O2 as needed to keep SPO2 >90%  · A m  procalcitonin  · Follow sputum cultures  · Blood cultures NGTD    Sepsis ([facility] )  Assessment & Plan  See plan under community acquired pna    Tobacco use  Assessment & Plan  Pt smokes 1-6 cigarettes per day for the past 30 years       Mixed hyperlipidemia  Assessment & Plan  Continue home statin    Diabetes mellitus [facility])  Assessment & Plan  Lab Results   Component Value Date    HGBA1C 5 8 2022       Controlled on metformin at home  SSI inpatient, goal -180        Disposition: Continue inpatient management, possible discharge tomorrow if patient continues to improve clinically    SUBJECTIVE     Patient seen and examined  No acute events overnight  States that congestion returned  Still has productive cough but overall feels better than yesterday  She plans on showering and walking the hallway today  Denies chest pain, shortness of breath, abdominal pain, nausea, vomiting, difficulty eating or drinking  Ambulating to the bathroom independently  OBJECTIVE     Vitals:    06/10/23 2203 23 0234 23 0701 23 0847   BP: 102/80  118/74    BP Location:   Right arm    Pulse: 95  71    Resp:   18    Temp: 98 3 °F (36 8 °C)  97 8 °F (36 6 °C)    TempSrc:   Oral    SpO2: 94% 94% 96% 94%   Weight:          Temperature:   Temp (24hrs), Av 1 °F (36 7 °C), Min:97 8 °F (36 6 °C), Max:98 3 °F (36 8 °C)    Temperature: 97 8 °F (36 6 °C)  Intake & Output:  I/O        0701  06/10 0700 06/10 0701   0700  0701   0700    P  O   360 180    IV Piggyback 750      Total Intake(mL/kg) 750 (11) 360 (5 3) 180 (2 6)    Net +750 +360 +180               Weights:        Body mass index is 29 29 kg/m²  Weight (last 2 days)     Date/Time Weight    23 1806 68 (150)        Physical Exam  Vitals reviewed  Constitutional:       General: She is not in acute distress  HENT:      Head: Normocephalic and atraumatic  Nose: No rhinorrhea  Eyes:      General: No scleral icterus  Cardiovascular:      Rate and Rhythm: Normal rate and regular rhythm  Pulses: Normal pulses  Heart sounds: Normal heart sounds  No murmur heard  No friction rub  No gallop     Pulmonary:      Effort: Pulmonary effort is normal  No respiratory distress  Breath sounds: Wheezing and rhonchi present  No rales  Abdominal:      General: Bowel sounds are normal  There is no distension  Palpations: Abdomen is soft  Tenderness: There is no abdominal tenderness  There is no guarding or rebound  Musculoskeletal:      Right lower leg: No edema  Left lower leg: No edema  Skin:     General: Skin is warm and dry  Coloration: Skin is not jaundiced  Neurological:      Mental Status: She is alert  Cranial Nerves: No dysarthria  Comments: CN 2-12 grossly intact, moves all 4 extremities   Psychiatric:         Mood and Affect: Mood normal          Behavior: Behavior normal        LABORATORY DATA     Labs: I have personally reviewed pertinent reports  Results from last 7 days   Lab Units 06/11/23  0551 06/10/23  0456 06/09/23  1836   EOS PCT % 0 0 6   HEMATOCRIT % 42 5 43 3 46 4*   HEMOGLOBIN g/dL 14 5 15 0 15 8*   MONOS PCT % 5 1* 6   NEUTROS PCT % 81* 89* 59   PLATELETS Thousands/uL 293 283 324   WBC Thousand/uL 14 98* 10 74* 11 87*      Results from last 7 days   Lab Units 06/11/23  0551 06/10/23  0456 06/09/23  1836   ALK PHOS U/L  --   --  72   ALT U/L  --   --  51   AST U/L  --   --  24   BUN mg/dL 10 11 12   CALCIUM mg/dL 9 1 9 2 9 5   CHLORIDE mmol/L 107 107 107   CO2 mmol/L 24 26 24   CREATININE mg/dL 0 56* 0 80 0 81   POTASSIUM mmol/L 3 9 3 8 4 0              Results from last 7 days   Lab Units 06/09/23  1836   INR  0 90   PTT seconds 30     Results from last 7 days   Lab Units 06/09/23  1836   LACTIC ACID mmol/L 1 9           IMAGING & DIAGNOSTIC TESTING     Radiology Results: I have personally reviewed pertinent reports  XR chest 2 views    Result Date: 6/10/2023  Impression: Linear opacity in the left base on the frontal projection only, question atelectasis or pneumonia  This corresponds with the preliminary interpretation by the emergency department clinician   Workstation performed: [id number]     Other "Diagnostic Testing: I have personally reviewed pertinent reports  ACTIVE MEDICATIONS     Current Facility-Administered Medications   Medication Dose Route Frequency   • acetaminophen (TYLENOL) tablet 650 mg  650 mg Oral Q6H PRN   • albuterol (PROVENTIL HFA,VENTOLIN HFA) inhaler 2 puff  2 puff Inhalation Q6H PRN   • atorvastatin (LIPITOR) tablet 40 mg  40 mg Oral Daily   • cefTRIAXone (ROCEPHIN) 1,000 mg in dextrose 5 % 50 mL IVPB  1,000 mg Intravenous Q24H   • enoxaparin (LOVENOX) subcutaneous injection 40 mg  40 mg Subcutaneous Daily   • guaiFENesin (MUCINEX) 12 hr tablet 600 mg  600 mg Oral Q12H BRIAN   • insulin lispro (HumaLOG) 100 units/mL subcutaneous injection 1-5 Units  1-5 Units Subcutaneous TID AC   • insulin lispro (HumaLOG) 100 units/mL subcutaneous injection 1-5 Units  1-5 Units Subcutaneous HS   • ipratropium (ATROVENT) 0 02 % inhalation solution 0 5 mg  0 5 mg Nebulization Q6H   • levalbuterol (XOPENEX) inhalation solution 1 25 mg  1 25 mg Nebulization Q6H   • melatonin tablet 3 mg  3 mg Oral HS   • methylPREDNISolone sodium succinate (Solu-MEDROL) injection 40 mg  40 mg Intravenous Daily   • nicotine (NICODERM CQ) 7 mg/24hr TD 24 hr patch 1 patch  1 patch Transdermal Daily   • triamterene-hydrochlorothiazide (MAXZIDE-25) 37 5-25 mg per tablet 1 tablet  1 tablet Oral Daily       VTE Pharmacologic Prophylaxis: Enoxaparin (Lovenox)  VTE Mechanical Prophylaxis: sequential compression device    Portions of the record may have been created with voice recognition software  Occasional wrong word or \"sound a like\" substitutions may have occurred due to the inherent limitations of voice recognition software    Read the chart carefully and recognize, using context, where substitutions have occurred   ==  Vivi Morgan DO  [address]  Internal Medicine Residency PGY-2       "

## 2023-06-11 NOTE — QUICK NOTE
Patient's family contact, Toya Garibay (former ), was reached by phone and updated with the current state of the patient's treatment and plan going forward  All of the family contact's questions were addressed and answered 
4

## 2023-06-11 NOTE — CASE MANAGEMENT
Case Management Assessment & Discharge Planning Note    Patient name Aisha Paul  Location [address] [id number] MRN [id number]  : 1960 Date 2023       Current Admission Date: 2023  Current Admission Diagnosis:Community acquired pneumonia of left lower lobe of lung   Patient Active Problem List    Diagnosis Date Noted   • Tobacco use 06/10/2023   • Sepsis ([facility] [address] ) 06/10/2023   • Community acquired pneumonia of left lower lobe of lung 2023   • Mixed hyperlipidemia 2023   • Chronic pain of both knees 2023   • Anxiety 2021   • Diabetes mellitus ([facility] [address] ) 2019   • Meniere disease 2019      LOS (days): 1  Geometric Mean LOS (GMLOS) (days):   Days to GMLOS:     OBJECTIVE:    Risk of Unplanned Readmission Score: 6 8         Current admission status: Inpatient       Preferred Pharmacy:   [address] [place] [id number]  Phone: [phone] Fax: [phone]    Primary Care Provider: Heather Aparicio DO    Primary Insurance: [facility]  Secondary Insurance:     ASSESSMENT:  Dana Rice Proxies    There are no active Health Care Proxies on file  Readmission Root Cause  30 Day Readmission: No    Patient Information  Mental Status: Alert  During Assessment patient was accompanied by: Not accompanied during assessment  Assessment information provided by[de-identified] Patient  Primary Caregiver: Self  Support Systems: Friend, [address] of Residence: [address] [place] [address] do you live in?: [address] entry access options   Select all that apply : Stairs  Number of steps to enter home : 3  Do the steps have railings?: No  Type of Current Residence: Apartment  Floor Level: 2  Upon entering residence, is there a bedroom on the main floor (no further steps)?: Yes  Upon entering residence, is there a bathroom on the main floor (no further steps)?: Yes  In the last 12 months, was there a time when you were not able to pay the mortgage or rent on time?: No  In the last 12 months, how many places have you lived?: 1  In the last 12 months, was there a time when you did not have a steady place to sleep or slept in a shelter (including now)?: No  Homeless/housing insecurity resource given?: N/A  Living Arrangements: Lives Alone  Is patient a ?: No    Activities of Daily Living Prior to Admission  Functional Status: Independent  Completes ADLs independently?: Yes  Ambulates independently?: Yes  Does patient use assisted devices?: No  Does patient currently own DME?: No  Does patient have a history of Outpatient Therapy (PT/OT)?: No  Does the patient have a history of Short-Term Rehab?: No  Does patient have a history of HHC?: No  Does patient currently have XMLAW ?: No         Patient Information Continued  Income Source: Employed  Does patient have prescription coverage?: Yes  Within the past 12 months, you worried that your food would run out before you got the money to buy more : Never true  Within the past 12 months, the food you bought just didn't last and you didn't have money to get more : Never true  Food insecurity resource given?: N/A  Does patient receive dialysis treatments?: No  Does patient have a history of substance abuse?: No  Does patient have a history of Mental Health Diagnosis?: No         Means of Transportation  Means of Transport to Appts[de-identified] Drives Self  In the past 12 months, has lack of transportation kept you from medical appointments or from getting medications?: No  In the past 12 months, has lack of transportation kept you from meetings, work, or from getting things needed for daily living?: No  Was application for public transport provided?: N/A        DISCHARGE DETAILS:    Discharge planning discussed with[de-identified] bedside with pt  Freedom of Choice: Yes  Comments - Freedom of Choice: Discussed FOC  CM contacted family/caregiver?: No- see comments (Pt is A&O)             Contacts  Patient Contacts: Sabi Goodman  Relationship to Patient[de-identified] Family (spouse)  Contact Method: Phone  Phone Number: [phone]  Reason/Outcome: Emergency [address]         Is the patient interested in [facility] at discharge?: No    DME Referral Provided  Referral made for DME?: No

## 2023-06-12 LAB
ANION GAP SERPL CALCULATED.3IONS-SCNC: 3 MMOL/L (ref 4–13)
BACTERIA UR QL AUTO: ABNORMAL /HPF
BASOPHILS # BLD AUTO: 0.07 THOUSANDS/ÂΜL (ref 0–0.1)
BASOPHILS NFR BLD AUTO: 1 % (ref 0–1)
BILIRUB UR QL STRIP: NEGATIVE
BUN SERPL-MCNC: 15 MG/DL (ref 5–25)
CALCIUM SERPL-MCNC: 9.2 MG/DL (ref 8.3–10.1)
CHLORIDE SERPL-SCNC: 105 MMOL/L (ref 96–108)
CLARITY UR: CLEAR
CO2 SERPL-SCNC: 29 MMOL/L (ref 21–32)
COLOR UR: YELLOW
CREAT SERPL-MCNC: 0.64 MG/DL (ref 0.6–1.3)
EOSINOPHIL # BLD AUTO: 0.64 THOUSAND/ÂΜL (ref 0–0.61)
EOSINOPHIL NFR BLD AUTO: 5 % (ref 0–6)
ERYTHROCYTE [DISTWIDTH] IN BLOOD BY AUTOMATED COUNT: 13.2 % (ref 11.6–15.1)
GFR SERPL CREATININE-BSD FRML MDRD: 95 ML/MIN/1.73SQ M
GLUCOSE SERPL-MCNC: 104 MG/DL (ref 65–140)
GLUCOSE SERPL-MCNC: 106 MG/DL (ref 65–140)
GLUCOSE SERPL-MCNC: 266 MG/DL (ref 65–140)
GLUCOSE SERPL-MCNC: 307 MG/DL (ref 65–140)
GLUCOSE SERPL-MCNC: 87 MG/DL (ref 65–140)
GLUCOSE UR STRIP-MCNC: NEGATIVE MG/DL
HCT VFR BLD AUTO: 41.1 % (ref 34.8–46.1)
HGB BLD-MCNC: 14.2 G/DL (ref 11.5–15.4)
HGB UR QL STRIP.AUTO: NEGATIVE
IMM GRANULOCYTES # BLD AUTO: 0.06 THOUSAND/UL (ref 0–0.2)
IMM GRANULOCYTES NFR BLD AUTO: 0 % (ref 0–2)
KETONES UR STRIP-MCNC: NEGATIVE MG/DL
LEUKOCYTE ESTERASE UR QL STRIP: ABNORMAL
LYMPHOCYTES # BLD AUTO: 4.16 THOUSANDS/ÂΜL (ref 0.6–4.47)
LYMPHOCYTES NFR BLD AUTO: 30 % (ref 14–44)
MCH RBC QN AUTO: 33.4 PG (ref 26.8–34.3)
MCHC RBC AUTO-ENTMCNC: 34.5 G/DL (ref 31.4–37.4)
MCV RBC AUTO: 97 FL (ref 82–98)
MONOCYTES # BLD AUTO: 1.02 THOUSAND/ÂΜL (ref 0.17–1.22)
MONOCYTES NFR BLD AUTO: 7 % (ref 4–12)
MUCOUS THREADS UR QL AUTO: ABNORMAL
NEUTROPHILS # BLD AUTO: 8.02 THOUSANDS/ÂΜL (ref 1.85–7.62)
NEUTS SEG NFR BLD AUTO: 57 % (ref 43–75)
NITRITE UR QL STRIP: NEGATIVE
NON-SQ EPI CELLS URNS QL MICRO: ABNORMAL /HPF
NRBC BLD AUTO-RTO: 0 /100 WBCS
PH UR STRIP.AUTO: 5.5 [PH]
PLATELET # BLD AUTO: 273 THOUSANDS/UL (ref 149–390)
PMV BLD AUTO: 10 FL (ref 8.9–12.7)
POTASSIUM SERPL-SCNC: 3.2 MMOL/L (ref 3.5–5.3)
PROT UR STRIP-MCNC: ABNORMAL MG/DL
RBC # BLD AUTO: 4.25 MILLION/UL (ref 3.81–5.12)
RBC #/AREA URNS AUTO: ABNORMAL /HPF
SODIUM SERPL-SCNC: 137 MMOL/L (ref 135–147)
SP GR UR STRIP.AUTO: 1.02 (ref 1–1.03)
UROBILINOGEN UR STRIP-ACNC: <2 MG/DL
WBC # BLD AUTO: 13.97 THOUSAND/UL (ref 4.31–10.16)
WBC #/AREA URNS AUTO: ABNORMAL /HPF

## 2023-06-12 PROCEDURE — 81001 URINALYSIS AUTO W/SCOPE: CPT

## 2023-06-12 PROCEDURE — 94640 AIRWAY INHALATION TREATMENT: CPT

## 2023-06-12 PROCEDURE — 94760 N-INVAS EAR/PLS OXIMETRY 1: CPT

## 2023-06-12 PROCEDURE — 85025 COMPLETE CBC W/AUTO DIFF WBC: CPT

## 2023-06-12 PROCEDURE — 99232 SBSQ HOSP IP/OBS MODERATE 35: CPT | Performed by: INTERNAL MEDICINE

## 2023-06-12 PROCEDURE — 94664 DEMO&/EVAL PT USE INHALER: CPT

## 2023-06-12 PROCEDURE — 80048 BASIC METABOLIC PNL TOTAL CA: CPT

## 2023-06-12 PROCEDURE — 82948 REAGENT STRIP/BLOOD GLUCOSE: CPT

## 2023-06-12 RX ORDER — POTASSIUM CHLORIDE 20 MEQ/1
40 TABLET, EXTENDED RELEASE ORAL ONCE
Status: COMPLETED | OUTPATIENT
Start: 2023-06-12 | End: 2023-06-12

## 2023-06-12 RX ORDER — PREDNISONE 20 MG/1
40 TABLET ORAL DAILY
Status: DISCONTINUED | OUTPATIENT
Start: 2023-06-13 | End: 2023-06-13 | Stop reason: HOSPADM

## 2023-06-12 RX ORDER — HYDROXYZINE HYDROCHLORIDE 10 MG/1
10 TABLET, FILM COATED ORAL ONCE
Status: DISCONTINUED | OUTPATIENT
Start: 2023-06-12 | End: 2023-06-13 | Stop reason: HOSPADM

## 2023-06-12 RX ORDER — CEFDINIR 300 MG/1
300 CAPSULE ORAL EVERY 12 HOURS SCHEDULED
Status: DISCONTINUED | OUTPATIENT
Start: 2023-06-12 | End: 2023-06-13 | Stop reason: HOSPADM

## 2023-06-12 RX ADMIN — IPRATROPIUM BROMIDE 0.5 MG: 0.5 SOLUTION RESPIRATORY (INHALATION) at 21:29

## 2023-06-12 RX ADMIN — TRIAMTERENE AND HYDROCHLOROTHIAZIDE 1 TABLET: 37.5; 25 TABLET ORAL at 09:19

## 2023-06-12 RX ADMIN — LEVALBUTEROL HYDROCHLORIDE 1.25 MG: 1.25 SOLUTION RESPIRATORY (INHALATION) at 14:17

## 2023-06-12 RX ADMIN — ATORVASTATIN CALCIUM 40 MG: 40 TABLET, FILM COATED ORAL at 09:18

## 2023-06-12 RX ADMIN — CEFDINIR 300 MG: 300 CAPSULE ORAL at 22:24

## 2023-06-12 RX ADMIN — IPRATROPIUM BROMIDE 0.5 MG: 0.5 SOLUTION RESPIRATORY (INHALATION) at 08:16

## 2023-06-12 RX ADMIN — NICOTINE 1 PATCH: 7 PATCH, EXTENDED RELEASE TRANSDERMAL at 09:18

## 2023-06-12 RX ADMIN — INSULIN LISPRO 3 UNITS: 100 INJECTION, SOLUTION INTRAVENOUS; SUBCUTANEOUS at 22:24

## 2023-06-12 RX ADMIN — LEVALBUTEROL HYDROCHLORIDE 1.25 MG: 1.25 SOLUTION RESPIRATORY (INHALATION) at 01:55

## 2023-06-12 RX ADMIN — GUAIFENESIN 600 MG: 600 TABLET, EXTENDED RELEASE ORAL at 22:24

## 2023-06-12 RX ADMIN — INSULIN LISPRO 2 UNITS: 100 INJECTION, SOLUTION INTRAVENOUS; SUBCUTANEOUS at 17:30

## 2023-06-12 RX ADMIN — IPRATROPIUM BROMIDE 0.5 MG: 0.5 SOLUTION RESPIRATORY (INHALATION) at 14:17

## 2023-06-12 RX ADMIN — GUAIFENESIN 600 MG: 600 TABLET, EXTENDED RELEASE ORAL at 09:18

## 2023-06-12 RX ADMIN — POTASSIUM CHLORIDE 40 MEQ: 1500 TABLET, EXTENDED RELEASE ORAL at 11:49

## 2023-06-12 RX ADMIN — LEVALBUTEROL HYDROCHLORIDE 1.25 MG: 1.25 SOLUTION RESPIRATORY (INHALATION) at 08:16

## 2023-06-12 RX ADMIN — IPRATROPIUM BROMIDE 0.5 MG: 0.5 SOLUTION RESPIRATORY (INHALATION) at 01:55

## 2023-06-12 RX ADMIN — MELATONIN 3 MG: at 22:24

## 2023-06-12 RX ADMIN — LEVALBUTEROL HYDROCHLORIDE 1.25 MG: 1.25 SOLUTION RESPIRATORY (INHALATION) at 21:29

## 2023-06-12 RX ADMIN — ENOXAPARIN SODIUM 40 MG: 40 INJECTION SUBCUTANEOUS at 09:18

## 2023-06-12 RX ADMIN — METHYLPREDNISOLONE SODIUM SUCCINATE 40 MG: 40 INJECTION, POWDER, FOR SOLUTION INTRAMUSCULAR; INTRAVENOUS at 09:20

## 2023-06-12 NOTE — PROGRESS NOTES
INTERNAL MEDICINE RESIDENCY PROGRESS NOTE     Name: Shlomo Love   Age & Sex: 58 y o  female   MRN: [id number]  Unit/Bed#: -01   Encounter: [id number]  Team: SOD Team C     PATIENT INFORMATION     Name: Shlomo Love   Age & Sex: 58 y o  female   MRN: [id number]  Hospital Stay Days: 2    ASSESSMENT/PLAN     Principal Problem:    Community acquired pneumonia of left lower lobe of lung  Active Problems:    Diabetes mellitus ([facility] [address] )    Mixed hyperlipidemia    Tobacco use    Sepsis ([facility] [address] )      Sepsis ([facility] [address] )  Assessment & Plan  See plan under community acquired pna    Tobacco use  Assessment & Plan  Pt smokes 1-6 cigarettes per day for the past 30 years  Mixed hyperlipidemia  Assessment & Plan  Continue home statin    Diabetes mellitus [facility])  Assessment & Plan  Lab Results   Component Value Date    HGBA1C 5 8 05/25/2022       Controlled on metformin at home  SSI inpatient, goal -180      * Community acquired pneumonia of left lower lobe of lung  Assessment & Plan  Patient initially presented to ED with dyspnea, mild orthopnea, rhinorrhea, productive cough for the last 2 weeks with interval improvement about 1 week ago  Patient has attempted several OTC allergy medications to relieve her symptoms with no significant relief  Found to have possible LLL infiltrate on CXR (pending official read) with associated crackles and wheezes on physical exam   Patient's symptoms significantly improved after receiving DuoNebs, Decadron in ED  Admitted for suspected CAP given SIRS criteria met on admission (HR>90, RR>20), as well as possible acute asthma/COPD (although patient has never had spirometry performed outpatient)      Plan  · Ceftriaxone 1 g every 24 hours -consider switching to p o  antibiotic tomorrow if she continues to improve  · Urinary Legionella and strep negative - discontinue azithromycin  · Start Mucinex 600 mg twice daily  · Solu-Medrol 40 mg every 24 hours - consider switching her to oral tomorrow if patient continues to improve  · Respiratory protocol  · Scheduled DuoNebs  · Albuterol as needed  · Wean supplemental O2 as needed to keep SPO2 >90%  · A m  procalcitonin  · Follow sputum cultures  · Blood cultures NGTD  ·  - P/S procalcitonin - <0 05 (normal)  ·  - P/S blood cultures from left arm and right hand: No growth at 48 hours  ·  - Switching ceftriaxone to Cefdinir 300 mg PO q12h  · Switching Solu-Medrol to prednisone 40 mg PO daily      Disposition: Continue inpatient management  Possible discharge tomorrow as patient continues to improve clinically  SUBJECTIVE     Patient seen and examined  No acute events overnight  States still feeling congested  Still has productive cough  Has been able to ambulate through the hallway and to the bathroom independently  Denies abdominal pain, nausea, vomiting  Patient mentioned some chest pain, SOB, and some difficulty eating but attributes it all to coughing  OBJECTIVE     Vitals:    23 2203 23 2203 23 0155 23 0639   BP: 135/75 135/75  132/78   BP Location:       Pulse: 91 90  79   Resp:  20     Temp: 98 2 °F (36 8 °C) 98 2 °F (36 8 °C)  98 °F (36 7 °C)   TempSrc:       SpO2: 94% 94% 95% 92%   Weight:          Temperature:   Temp (24hrs), Av 1 °F (36 7 °C), Min:98 °F (36 7 °C), Max:98 2 °F (36 8 °C)    Temperature: 98 °F (36 7 °C)  Intake & Output:  I/O       06/10 07 07 07 07 0701   0700    P  O  360 660 118    IV Piggyback       Total Intake(mL/kg) 360 (5 3) 660 (9 7) 118 (1 7)    Net +360 +660 +118               Weights:        Body mass index is 29 29 kg/m²  Weight (last 2 days)     None        Physical Exam  Constitutional:       General: She is not in acute distress  HENT:      Head: Normocephalic and atraumatic  Nose: No rhinorrhea  Eyes:      General: No scleral icterus  Cardiovascular:      Rate and Rhythm: Normal rate and regular rhythm        Pulses: Normal pulses  Heart sounds: Normal heart sounds  Pulmonary:      Effort: Pulmonary effort is normal  No respiratory distress  Breath sounds: Wheezing and rhonchi present  Abdominal:      General: There is no distension  Palpations: Abdomen is soft  Tenderness: There is no abdominal tenderness  There is no guarding  Musculoskeletal:         General: Normal range of motion  Right lower leg: Swelling present  Left lower leg: Swelling present  Skin:     General: Skin is warm and dry  Capillary Refill: Capillary refill takes less than 2 seconds  Coloration: Skin is not jaundiced  Neurological:      Mental Status: She is alert and oriented to person, place, and time  Cranial Nerves: Cranial nerves 2-12 are intact  Psychiatric:         Mood and Affect: Mood normal          Behavior: Behavior normal          Thought Content: Thought content normal          Judgment: Judgment normal        LABORATORY DATA     Labs: I have personally reviewed pertinent reports    Results from last 7 days   Lab Units 06/12/23  1004 06/11/23  0551 06/10/23  0456   EOS PCT % 5 0 0   HEMATOCRIT % 41 1 42 5 43 3   HEMOGLOBIN g/dL 14 2 14 5 15 0   MONOS PCT % 7 5 1*   NEUTROS PCT % 57 81* 89*   PLATELETS Thousands/uL 273 293 283   WBC Thousand/uL 13 97* 14 98* 10 74*      Results from last 7 days   Lab Units 06/12/23  1004 06/11/23  0551 06/10/23  0456 06/09/23  1836   ALK PHOS U/L  --   --   --  72   ALT U/L  --   --   --  51   AST U/L  --   --   --  24   BUN mg/dL 15 10 11 12   CALCIUM mg/dL 9 2 9 1 9 2 9 5   CHLORIDE mmol/L 105 107 107 107   CO2 mmol/L 29 24 26 24   CREATININE mg/dL 0 64 0 56* 0 80 0 81   POTASSIUM mmol/L 3 2* 3 9 3 8 4 0              Results from last 7 days   Lab Units 06/09/23  1836   INR  0 90   PTT seconds 30     Results from last 7 days   Lab Units 06/09/23  1836   LACTIC ACID mmol/L 1 9           IMAGING & DIAGNOSTIC TESTING     Radiology Results: I have personally "reviewed pertinent reports  XR chest 2 views    Result Date: 6/10/2023  Impression: Linear opacity in the left base on the frontal projection only, question atelectasis or pneumonia  This corresponds with the preliminary interpretation by the emergency department clinician  Workstation performed: [id number]     Other Diagnostic Testing: I have personally reviewed pertinent reports  ACTIVE MEDICATIONS     Current Facility-Administered Medications   Medication Dose Route Frequency   • acetaminophen (TYLENOL) tablet 650 mg  650 mg Oral Q6H PRN   • albuterol (PROVENTIL HFA,VENTOLIN HFA) inhaler 2 puff  2 puff Inhalation Q6H PRN   • atorvastatin (LIPITOR) tablet 40 mg  40 mg Oral Daily   • cefdinir (OMNICEF) capsule 300 mg  300 mg Oral Q12H [facility]   • enoxaparin (LOVENOX) subcutaneous injection 40 mg  40 mg Subcutaneous Daily   • guaiFENesin (MUCINEX) 12 hr tablet 600 mg  600 mg Oral Q12H BRIAN   • hydrOXYzine HCL (ATARAX) tablet 10 mg  10 mg Oral Once   • insulin lispro (HumaLOG) 100 units/mL subcutaneous injection 1-5 Units  1-5 Units Subcutaneous TID AC   • insulin lispro (HumaLOG) 100 units/mL subcutaneous injection 1-5 Units  1-5 Units Subcutaneous HS   • ipratropium (ATROVENT) 0 02 % inhalation solution 0 5 mg  0 5 mg Nebulization Q6H   • levalbuterol (XOPENEX) inhalation solution 1 25 mg  1 25 mg Nebulization Q6H   • melatonin tablet 3 mg  3 mg Oral HS   • nicotine (NICODERM CQ) 7 mg/24hr TD 24 hr patch 1 patch  1 patch Transdermal Daily   • [START ON 6/13/2023] predniSONE tablet 40 mg  40 mg Oral Daily   • triamterene-hydrochlorothiazide (MAXZIDE-25) 37 5-25 mg per tablet 1 tablet  1 tablet Oral Daily       VTE Pharmacologic Prophylaxis: Enoxaparin (Lovenox)  VTE Mechanical Prophylaxis: sequential compression device    Portions of the record may have been created with voice recognition software    Occasional wrong word or \"sound a like\" substitutions may have occurred due to the inherent limitations of voice " recognition software    Read the chart carefully and recognize, using context, where substitutions have occurred   ==  [address], [address] [facility] PGY-1

## 2023-06-13 VITALS
RESPIRATION RATE: 24 BRPM | SYSTOLIC BLOOD PRESSURE: 118 MMHG | OXYGEN SATURATION: 90 % | TEMPERATURE: 97.9 F | BODY MASS INDEX: 29.29 KG/M2 | HEART RATE: 85 BPM | DIASTOLIC BLOOD PRESSURE: 77 MMHG | WEIGHT: 150 LBS

## 2023-06-13 LAB
ANION GAP SERPL CALCULATED.3IONS-SCNC: 5 MMOL/L (ref 4–13)
BASOPHILS # BLD AUTO: 0.05 THOUSANDS/ÂΜL (ref 0–0.1)
BASOPHILS NFR BLD AUTO: 0 % (ref 0–1)
BUN SERPL-MCNC: 13 MG/DL (ref 5–25)
CALCIUM SERPL-MCNC: 9.3 MG/DL (ref 8.3–10.1)
CHLORIDE SERPL-SCNC: 107 MMOL/L (ref 96–108)
CO2 SERPL-SCNC: 27 MMOL/L (ref 21–32)
CREAT SERPL-MCNC: 0.56 MG/DL (ref 0.6–1.3)
EOSINOPHIL # BLD AUTO: 0.46 THOUSAND/ÂΜL (ref 0–0.61)
EOSINOPHIL NFR BLD AUTO: 4 % (ref 0–6)
ERYTHROCYTE [DISTWIDTH] IN BLOOD BY AUTOMATED COUNT: 13 % (ref 11.6–15.1)
GFR SERPL CREATININE-BSD FRML MDRD: 100 ML/MIN/1.73SQ M
GLUCOSE SERPL-MCNC: 104 MG/DL (ref 65–140)
GLUCOSE SERPL-MCNC: 94 MG/DL (ref 65–140)
GLUCOSE SERPL-MCNC: 96 MG/DL (ref 65–140)
HCT VFR BLD AUTO: 43.7 % (ref 34.8–46.1)
HGB BLD-MCNC: 14.2 G/DL (ref 11.5–15.4)
IMM GRANULOCYTES # BLD AUTO: 0.1 THOUSAND/UL (ref 0–0.2)
IMM GRANULOCYTES NFR BLD AUTO: 1 % (ref 0–2)
LYMPHOCYTES # BLD AUTO: 3.68 THOUSANDS/ÂΜL (ref 0.6–4.47)
LYMPHOCYTES NFR BLD AUTO: 28 % (ref 14–44)
MCH RBC QN AUTO: 32.1 PG (ref 26.8–34.3)
MCHC RBC AUTO-ENTMCNC: 32.5 G/DL (ref 31.4–37.4)
MCV RBC AUTO: 99 FL (ref 82–98)
MONOCYTES # BLD AUTO: 0.82 THOUSAND/ÂΜL (ref 0.17–1.22)
MONOCYTES NFR BLD AUTO: 6 % (ref 4–12)
NEUTROPHILS # BLD AUTO: 7.84 THOUSANDS/ÂΜL (ref 1.85–7.62)
NEUTS SEG NFR BLD AUTO: 61 % (ref 43–75)
NRBC BLD AUTO-RTO: 0 /100 WBCS
PLATELET # BLD AUTO: 270 THOUSANDS/UL (ref 149–390)
PMV BLD AUTO: 10.2 FL (ref 8.9–12.7)
POTASSIUM SERPL-SCNC: 3.4 MMOL/L (ref 3.5–5.3)
RBC # BLD AUTO: 4.42 MILLION/UL (ref 3.81–5.12)
SODIUM SERPL-SCNC: 139 MMOL/L (ref 135–147)
WBC # BLD AUTO: 12.95 THOUSAND/UL (ref 4.31–10.16)

## 2023-06-13 PROCEDURE — 99238 HOSP IP/OBS DSCHRG MGMT 30/<: CPT | Performed by: INTERNAL MEDICINE

## 2023-06-13 PROCEDURE — 82948 REAGENT STRIP/BLOOD GLUCOSE: CPT

## 2023-06-13 PROCEDURE — 80048 BASIC METABOLIC PNL TOTAL CA: CPT

## 2023-06-13 PROCEDURE — 94640 AIRWAY INHALATION TREATMENT: CPT

## 2023-06-13 PROCEDURE — 94760 N-INVAS EAR/PLS OXIMETRY 1: CPT

## 2023-06-13 PROCEDURE — 85025 COMPLETE CBC W/AUTO DIFF WBC: CPT

## 2023-06-13 RX ORDER — CEFDINIR 300 MG/1
300 CAPSULE ORAL EVERY 12 HOURS SCHEDULED
Qty: 5 CAPSULE | Refills: 0 | Status: CANCELLED | OUTPATIENT
Start: 2023-06-13 | End: 2023-06-16

## 2023-06-13 RX ORDER — POTASSIUM CHLORIDE 20 MEQ/1
40 TABLET, EXTENDED RELEASE ORAL ONCE
Status: COMPLETED | OUTPATIENT
Start: 2023-06-13 | End: 2023-06-13

## 2023-06-13 RX ORDER — PREDNISONE 20 MG/1
TABLET ORAL
Qty: 15 TABLET | Refills: 0 | Status: SHIPPED | OUTPATIENT
Start: 2023-06-13 | End: 2023-06-25

## 2023-06-13 RX ORDER — BENZONATATE 100 MG/1
100 CAPSULE ORAL 3 TIMES DAILY PRN
Qty: 20 CAPSULE | Refills: 0 | Status: SHIPPED | OUTPATIENT
Start: 2023-06-13

## 2023-06-13 RX ORDER — PREDNISONE 20 MG/1
40 TABLET ORAL DAILY
Qty: 2 TABLET | Refills: 0 | Status: CANCELLED | OUTPATIENT
Start: 2023-06-14 | End: 2023-06-15

## 2023-06-13 RX ORDER — GUAIFENESIN 600 MG/1
600 TABLET, EXTENDED RELEASE ORAL EVERY 12 HOURS SCHEDULED
Qty: 30 TABLET | Refills: 0 | Status: SHIPPED | OUTPATIENT
Start: 2023-06-13

## 2023-06-13 RX ORDER — VARENICLINE TARTRATE 25 MG
KIT ORAL
Qty: 53 EACH | Refills: 0 | Status: SHIPPED | OUTPATIENT
Start: 2023-06-13 | End: 2023-08-19

## 2023-06-13 RX ADMIN — PREDNISONE 40 MG: 20 TABLET ORAL at 08:32

## 2023-06-13 RX ADMIN — TRIAMTERENE AND HYDROCHLOROTHIAZIDE 1 TABLET: 37.5; 25 TABLET ORAL at 08:32

## 2023-06-13 RX ADMIN — GUAIFENESIN 600 MG: 600 TABLET, EXTENDED RELEASE ORAL at 08:32

## 2023-06-13 RX ADMIN — NICOTINE 1 PATCH: 7 PATCH, EXTENDED RELEASE TRANSDERMAL at 08:37

## 2023-06-13 RX ADMIN — IPRATROPIUM BROMIDE 0.5 MG: 0.5 SOLUTION RESPIRATORY (INHALATION) at 03:02

## 2023-06-13 RX ADMIN — LEVALBUTEROL HYDROCHLORIDE 1.25 MG: 1.25 SOLUTION RESPIRATORY (INHALATION) at 08:06

## 2023-06-13 RX ADMIN — IPRATROPIUM BROMIDE 0.5 MG: 0.5 SOLUTION RESPIRATORY (INHALATION) at 08:06

## 2023-06-13 RX ADMIN — ATORVASTATIN CALCIUM 40 MG: 40 TABLET, FILM COATED ORAL at 08:32

## 2023-06-13 RX ADMIN — POTASSIUM CHLORIDE 40 MEQ: 1500 TABLET, EXTENDED RELEASE ORAL at 08:32

## 2023-06-13 RX ADMIN — ENOXAPARIN SODIUM 40 MG: 40 INJECTION SUBCUTANEOUS at 08:31

## 2023-06-13 RX ADMIN — LEVALBUTEROL HYDROCHLORIDE 1.25 MG: 1.25 SOLUTION RESPIRATORY (INHALATION) at 03:02

## 2023-06-13 RX ADMIN — CEFDINIR 300 MG: 300 CAPSULE ORAL at 08:32

## 2023-06-13 NOTE — DISCHARGE SUMMARY
INTERNAL MEDICINE RESIDENCY DISCHARGE SUMMARY     Harris Woody   58 y o  female  MRN: [id number]  Room/Bed: /MS [address]    Encounter: [id number]    Principal Problem:    Community acquired pneumonia of left lower lobe of lung  Active Problems:    Diabetes mellitus ([facility] [address] )    Mixed hyperlipidemia    Tobacco use    Sepsis ([facility] [address] )      Sepsis ([facility] [address] )  Assessment & Plan  See plan under community acquired pna    Tobacco use  Assessment & Plan  Pt smokes 1-6 cigarettes per day for the past 30 years  Plan:  Discharge home with Chantix starter pack    Mixed hyperlipidemia  Assessment & Plan  Continue home statin    Diabetes mellitus [facility])  Assessment & Plan  Lab Results   Component Value Date    HGBA1C 5 8 05/25/2022       Controlled on metformin at home  SSI inpatient, goal -180      * Community acquired pneumonia of left lower lobe of lung  Assessment & Plan  Patient initially presented to ED with dyspnea, mild orthopnea, rhinorrhea, productive cough for the last 2 weeks with interval improvement about 1 week ago  Patient has attempted several OTC allergy medications to relieve her symptoms with no significant relief  Found to have possible LLL infiltrate on CXR (pending official read) with associated crackles and wheezes on physical exam   Patient's symptoms significantly improved after receiving DuoNebs, Decadron in ED  Admitted for suspected CAP given SIRS criteria met on admission (HR>90, RR>20), as well as possible acute asthma/COPD (although patient has never had spirometry performed outpatient)      · 06/12 - P/S procalcitonin - <0 05 (normal)  · 06/12 - P/S blood cultures from left arm and right hand: No growth at 48 hours    Plan  · Urinary Legionella and strep negative - discontinue azithromycin  · Start Mucinex 600 mg twice daily  · 06/12 - Switching ceftriaxone to Cefdinir 300 mg PO q12h  · Switching Solu-Medrol to prednisone 40 mg PO daily  · 06/13 Abx D5; prednisone D4  · 06/13 Discharged with  · Prednisone 20mg tablet PO starting on 06/13/23 (with taper)  · Guaifenisin (Mucinex) 600mg q12h PO PRN  · Benzonatate (TESALON PERLES) 100 mg TID PO PRN for cough  · Cont  Albuterol inhaler 90 mcg/act; 2 puffs q4h PRN        [address] COURSE     58 y o female with past medical history of Méniére's disease, diabetes mellitus type 2, hyperlipidemia, nicotine dependence, and major depressive disorder presented to the ED with dyspnea and productive cough  Patient was admitted with sepsis secondary to left lower lobe community-acquired pneumonia  At the ED, patient was hemodynamically stable  Labs showed mild leukocytosis at 11 8  Chest X-ray showed linear opacity in the left base, consistent with pneumonia  Patient had significant rales and expiratory wheezing  Patient saw improvement with DuoNebs and IV Decadron and was given ceftriaxone and azithromycin x1 in the ED  Sepsis had resolved on 06/09/23  During admission, aptient was treated with duonebs, prednisone, and was successfully transitioned to cefdinir  Patient was seen and examined on the morning of discharge, 06/13/23  Patient was satting at 90% and above on room air  Patient did report productive cough  Patient has been able to ambulate through the hallway and to the bathroom independently  Patient denied abdominal pain, nausea, or vomiting  Patient was discharged with oral medications (benzonatate 100 mg capsule TID PRN, guaifenesin 600 mg q12h PO, prednisone 20 mg tablet PO with tape starting on 06/13/23 as instructed, and varenicline 0 5mg to 1mg PO as instructed)  Patient was interested in smoking cessation options of nicotine patch and Chantix were discussed, with shared decision making she decided to pursue Chantix      Patient was counseled to consider taking the pneumococcal vaccine and complete ambulatory PFT for concern of underlying lung disease such as COPD with tobacco usage  Visit Vitals  /77   Pulse 85   Temp 97 9 °F (36 6 °C)   Resp (!) 24   Wt 68 kg (150 lb)   LMP 10/01/2010 (Approximate)   SpO2 90%   BMI 29 29 kg/m²   OB Status Postmenopausal   Smoking Status Every Day   BSA 1 65 m²         Physical Exam  Constitutional:       General: She is not in acute distress  HENT:      Head: Normocephalic and atraumatic  Nose: Congestion present  Eyes:      General: No scleral icterus  Extraocular Movements: Extraocular movements intact  Cardiovascular:      Rate and Rhythm: Normal rate and regular rhythm  Pulses: Normal pulses  Heart sounds: Normal heart sounds  No murmur heard  Pulmonary:      Effort: Pulmonary effort is normal       Breath sounds: Rales present  Abdominal:      General: Bowel sounds are normal  There is no distension  Musculoskeletal:         General: Normal range of motion  Skin:     General: Skin is warm and dry  Capillary Refill: Capillary refill takes less than 2 seconds  Coloration: Skin is not jaundiced  Neurological:      Mental Status: She is alert and oriented to person, place, and time  Psychiatric:         Mood and Affect: Mood normal          Behavior: Behavior normal          Thought Content: Thought content normal          Judgment: Judgment normal            DISCHARGE INFORMATION     PCP at Discharge: Jimbo Terrazas DO    Admitting Provider:  Treasure Abbasi MD  Admission Date: 6/9/2023    Discharge Provider: No att  providers found  Discharge Date: 06/13/2023    Discharge Disposition: Home/Self Care  Discharge Condition: good  Discharge with Lines: no    Discharge Diet: none  Activity Restrictions: none  Test Results Pending at Discharge: none    Discharge Diagnoses:  Principal Problem:    Community acquired pneumonia of left lower lobe of lung  Active Problems:    Diabetes mellitus ([facility] [address] )    Mixed hyperlipidemia    Tobacco use    Sepsis ([facility] )  Resolved Problems:    * No resolved hospital problems  *      Consulting Providers:      Diagnostic & Therapeutic Procedures Performed:  XR chest 2 views    Result Date: 6/10/2023  Impression: Linear opacity in the left base on the frontal projection only, question atelectasis or pneumonia  This corresponds with the preliminary interpretation by the emergency department clinician  Workstation performed: [id number]       Code Status: Level 1 - Full Code  Advance Directive & Living Will: <no information>  Power of :    POLST:      Medications:  Discharge Medication List as of 6/13/2023 12:09 PM        Discharge Medication List as of 6/13/2023 12:09 PM      START taking these medications    Details   benzonatate (TESSALON PERLES) 100 mg capsule Take 1 capsule (100 mg total) by mouth 3 (three) times a day as needed for cough, Starting Tue 6/13/2023, Normal      guaiFENesin (MUCINEX) 600 mg 12 hr tablet Take 1 tablet (600 mg total) by mouth every 12 (twelve) hours, Starting Tue 6/13/2023, Normal      predniSONE 20 mg tablet Multiple Dosages:Starting Tue 6/13/2023, Until Thu 6/15/2023 at 2359, THEN Starting Fri 6/16/2023, Until Sun 6/18/2023 at 2359, THEN Starting Mon 6/19/2023, Until Wed 6/21/2023 at 2359, THEN Starting Thu 6/22/2023, Until Sat 6/24/2023 at 2359Take 2  tablets (40 mg total) by mouth daily for 3 days, THEN 1 5 tablets (30 mg total) daily for 3 days, THEN 1 tablet (20 mg total) daily for 3 days, THEN 0 5 tablets (10 mg total) daily for 3 days  , Normal      varenicline 0 5 MG X 11 & 1 MG X 42 tablet therapy pack Multiple Dosages:Starting Tue 6/13/2023, Until Thu 6/15/2023 at 2359, THEN Starting Fri 6/16/2023, Until Mon 6/19/2023 at 2359, THEN Starting Tue 6/20/2023, Until Fri 8/18/2023 at 2359Take 0 5 mg by mouth daily for 3 days, THEN 0 5 mg 2 (two) times a  day for 4 days, THEN 1 mg 2 (two) times a day , Normal           Discharge Medication List as of 6/13/2023 12:09 PM      CONTINUE these medications which have NOT CHANGED    Details albuterol (PROVENTIL HFA,VENTOLIN HFA) 90 mcg/act inhaler TAKE 2 PUFFS BY MOUTH EVERY 4 HOURS AS NEEDED FOR WHEEZE, Normal      atorvastatin (LIPITOR) 40 mg tablet Take 1 tablet (40 mg total) by mouth daily, Starting Mon 1/30/2023, Normal      !! Blood Glucose Monitoring Suppl KIT Use in the morning, Starting Thu 2/23/2023, Normal      glucose blood (FREESTYLE LITE) test strip Use as instructed, Normal      !! glucose monitoring kit (FREESTYLE) monitoring kit Apply 1 m topically 4 (four) times a day as needed Use as directed, Normal      Lancets (freestyle) lancets Use as instructed, Normal      metFORMIN (GLUCOPHAGE-XR) 750 mg 24 hr tablet TAKE 2 TABLETS BY MOUTH EVERY DAY WITH DINNER, Normal      naproxen (Naprosyn) 500 mg tablet Take 1 tablet (500 mg total) by mouth 2 (two) times a day with meals, Starting Fri 2/24/2023, Normal      ondansetron (ZOFRAN-ODT) 4 mg disintegrating tablet Take 1 tablet (4 mg total) by mouth every 6 (six) hours as needed for nausea or vomiting, Starting Thu 5/11/2023, Normal      Probiotic Product (PRO-BIOTIC BLEND PO) Take 1 tablet by mouth daily, Historical Med      triamterene-hydrochlorothiazide (DYAZIDE) 37 5-25 mg per capsule Take 1 capsule by mouth daily, Starting Mon 1/30/2023, Normal       !! - Potential duplicate medications found  Please discuss with provider  Allergies:  No Known Allergies    FOLLOW-UP     PCP Outpatient Follow-up: Kip Dance, DO    Consulting Providers Follow-up:  none required     Active Issues Requiring Follow-up:   Concern for underlying lung disease  PFT's ordered to be completed after resolution of pneumonia  Patient wanted tobacco cessation and with shared decision-making, patient discharged on Chantix starter pack  Pt will need pneumococcal vaccine  Discharge Statement:   I spent 1 hour minutes discharging the patient  This time was spent on the day of discharge  I had direct contact with the patient on the day of discharge  "Additional documentation is required if more than 30 minutes were spent on discharge  Portions of the record may have been created with voice recognition software  Occasional wrong word or \"sound a like\" substitutions may have occurred due to the inherent limitations of voice recognition software    Read the chart carefully and recognize, using context, where substitutions have occurred     ==  Angelica Grant, DO PGY-1  [address]        "

## 2023-06-13 NOTE — DISCHARGE INSTR - AVS FIRST PAGE
Follow up with your primary care doctor in 7-14 days  Complete pulmonary function test once your pneumonia resolves

## 2023-06-13 NOTE — PLAN OF CARE
Problem: PAIN - ADULT  Goal: Verbalizes/displays adequate comfort level or baseline comfort level  Description: Interventions:  - Encourage patient to monitor pain and request assistance  - Assess pain using appropriate pain scale  - Administer analgesics based on type and severity of pain and evaluate response  - Implement non-pharmacological measures as appropriate and evaluate response  - Consider cultural and social influences on pain and pain management  - Notify physician/advanced practitioner if interventions unsuccessful or patient reports new pain  Outcome: Progressing     Problem: INFECTION - ADULT  Goal: Absence or prevention of progression during hospitalization  Description: INTERVENTIONS:  - Assess and monitor for signs and symptoms of infection  - Monitor lab/diagnostic results  - Monitor all insertion sites, i e  indwelling lines, tubes, and drains  - Monitor endotracheal if appropriate and nasal secretions for changes in amount and color  - [place] appropriate cooling/warming therapies per order  - Administer medications as ordered  - Instruct and encourage patient and family to use good hand hygiene technique  - Identify and instruct in appropriate isolation precautions for identified infection/condition  Outcome: Progressing     Problem: SAFETY ADULT  Goal: Patient will remain free of falls  Description: INTERVENTIONS:  - Educate patient/family on patient safety including physical limitations  - Instruct patient to call for assistance with activity   - Consult OT/PT to assist with strengthening/mobility   - Keep Call bell within reach  - Keep bed low and locked with side rails adjusted as appropriate  - Keep care items and personal belongings within reach  - Initiate and maintain comfort rounds  - Make Fall Risk Sign visible to staff  - Offer Toileting every  Hours, in advance of need  - Initiate/Maintain alarm  - Obtain necessary fall risk management equipment:   - Apply yellow socks and bracelet for high fall risk patients  - Consider moving patient to room near nurses station  Outcome: Progressing

## 2023-06-14 NOTE — UTILIZATION REVIEW
NOTIFICATION OF ADMISSION DISCHARGE   This is a Notification of Discharge from [facility]  Please be advised that this patient has been discharge from our facility  Below you will find the admission and discharge date and time including the patient’s disposition  UTILIZATION REVIEW CONTACT:  Va [facility]  Utilization   Network Utilization Review Department  Phone: [phone] x carefully listen to the prompts  All voicemails are confidential   Email: [email or web address]     ADMISSION INFORMATION  PRESENTATION DATE: 6/9/2023  6:14 PM  OBERVATION ADMISSION DATE:   INPATIENT ADMISSION DATE: 6/10/23  1:40 PM   DISCHARGE DATE: 6/13/2023  1:02 PM   DISPOSITION:Home/Self Care    IMPORTANT INFORMATION:  Send all requests for admission clinical reviews, approved or denied determinations and any other requests to dedicated fax number below belonging to the campus where the patient is receiving treatment   List of dedicated fax numbers:  [address] DENIALS (Administrative/Medical Necessity) [phone]   [address] (Maternity/NICU/Pediatrics) [phone]   [facility] [phone]   [place] [address] [phone]   [address] [phone]   220 [facility] [phone]   90 [facility] [phone]   [address] [place] [address] 119 [phone]   [facility]  [phone]   [facility] [phone]   412 [facility] 850 [facility] [phone]

## 2023-06-15 ENCOUNTER — TRANSITIONAL CARE MANAGEMENT (OUTPATIENT)
Dept: INTERNAL MEDICINE CLINIC | Facility: CLINIC | Age: 63
End: 2023-06-15

## 2023-06-15 LAB
BACTERIA BLD CULT: NORMAL
BACTERIA BLD CULT: NORMAL

## 2023-06-20 ENCOUNTER — TELEPHONE (OUTPATIENT)
Dept: INTERNAL MEDICINE CLINIC | Facility: CLINIC | Age: 63
End: 2023-06-20

## 2023-06-20 DIAGNOSIS — H81.09 MENIERE'S DISEASE, UNSPECIFIED LATERALITY: ICD-10-CM

## 2023-06-20 DIAGNOSIS — E11.69 TYPE 2 DIABETES MELLITUS WITH OTHER SPECIFIED COMPLICATION, WITHOUT LONG-TERM CURRENT USE OF INSULIN (HCC): ICD-10-CM

## 2023-06-20 NOTE — TELEPHONE ENCOUNTER
Please fax over the required documents to [facility] at [phone] so that so the patients leave can be processed     -diagnosis  -reported and observed symptoms   - test results  - dates you certify the patient out of work  -date of surgery (if applicable)  -name of surgery/procedure preformed (if applicable)   -follow up appointment date    (for more info please refer to scanned document)

## 2023-06-21 NOTE — TELEPHONE ENCOUNTER
Called and spoke to [place] at [facility]  There should be a form that needs to be filled out prior to obtaining all this information  Patient has also not been seen for this here in our office  Joanie will be faxing over form that will need to be completed at patients appointment on 6/85/36 by Dr Ott  Please watch for form and update this task with form information

## 2023-06-23 ENCOUNTER — OFFICE VISIT (OUTPATIENT)
Dept: INTERNAL MEDICINE CLINIC | Facility: CLINIC | Age: 63
End: 2023-06-23

## 2023-06-23 VITALS
WEIGHT: 163.5 LBS | TEMPERATURE: 98.6 F | BODY MASS INDEX: 31.93 KG/M2 | HEART RATE: 87 BPM | OXYGEN SATURATION: 98 % | SYSTOLIC BLOOD PRESSURE: 133 MMHG | DIASTOLIC BLOOD PRESSURE: 83 MMHG

## 2023-06-23 DIAGNOSIS — Z76.89 ENCOUNTER FOR SUPPORT AND COORDINATION OF TRANSITION OF CARE: Primary | ICD-10-CM

## 2023-06-23 DIAGNOSIS — J18.9 COMMUNITY ACQUIRED PNEUMONIA OF LEFT LOWER LOBE OF LUNG: ICD-10-CM

## 2023-06-23 DIAGNOSIS — Z72.0 TOBACCO USE: ICD-10-CM

## 2023-06-23 PROCEDURE — 99495 TRANSJ CARE MGMT MOD F2F 14D: CPT | Performed by: STUDENT IN AN ORGANIZED HEALTH CARE EDUCATION/TRAINING PROGRAM

## 2023-06-23 RX ORDER — VARENICLINE TARTRATE 1 MG/1
1 TABLET, FILM COATED ORAL 2 TIMES DAILY
Qty: 108 TABLET | Refills: 0 | Status: SHIPPED | OUTPATIENT
Start: 2023-06-23 | End: 2023-08-16

## 2023-06-23 NOTE — PROGRESS NOTES
Assessment & Plan     1  Encounter for support and coordination of transition of care    2  Community acquired pneumonia of left lower lobe of lung  S/p Abx and prednisone taper  Finishing steroid taper today  Symptoms mostly resolved with some residual cough  Quit smoking since admission to the hospital  Continue Chantix for smoking cessation  Keep follow up visit scheduled in 1 week to reassess diabetes  Subjective     Transitional Care Management Review:   Richie Blanco is a 58 y o  female here for TCM follow up  During the TCM phone call patient stated:  TCM Call     Date and time call was made  6/15/2023  8:34 AM    Hospital care reviewed  Records reviewed    Patient was hospitialized at  [facility]    Date of Admission  06/09/23    Date of discharge  06/13/23    Diagnosis  Community acquired pneumonia of left lower lobe of lung (R06 02)    Disposition  Home    Current Symptoms  Cough    Cough Severity  Mild      TCM Call     Post hospital issues  None    Should patient be enrolled in anticoag monitoring? No    Scheduled for follow up? Yes    Did you obtain your prescribed medications  Yes    Do you need help managing your prescriptions or medications  No    Is transportation to your appointment needed  No    I have advised the patient to call PCP with any new or worsening symptoms  Jeanmarie Jose MA        58-year-old female with past medical history of type 2 diabetes mellitus, hyperlipidemia, Ménière disease, anxiety presenting to the hospital for hospital follow-up  Patient was hospitalized for 4 days due to community-acquired pneumonia of left lower lung  In the hospital, she received 4 days of ceftriaxone with plan to switch to cefdinir to complete 7 days  Patient was also discharged on prednisone 20 mg with taper Mucinex  Patient was also started on Chantix to help with smoking cessation  Today, patient reports doing well    She does still have occasional cough with some sputum, which she says is improving  Today will be her last day of prednisone  Patient also continues taking Mucinex  Patient denies fever, chills, chest pain, or any other symptoms  During hospitalization, blood sugars were uncontrolled secondary to steroids  Patient is due for repeat A1c, however, she has an appointment in 1 week for diabetes follow up  Review of Systems   Constitutional: Negative for chills and fever  HENT: Negative for ear pain and sore throat  Eyes: Negative for pain and visual disturbance  Respiratory: Positive for cough  Negative for shortness of breath  Cardiovascular: Negative for chest pain and palpitations  Gastrointestinal: Negative for abdominal pain and vomiting  Genitourinary: Negative for dysuria and hematuria  Musculoskeletal: Negative for arthralgias and back pain  Skin: Negative for color change and rash  Neurological: Negative for seizures and syncope  All other systems reviewed and are negative  Objective     /83 (BP Location: Right arm, Patient Position: Sitting, Cuff Size: Standard)   Pulse 87   Temp 98 6 °F (37 °C) (Temporal)   Wt 74 2 kg (163 lb 8 oz)   LMP 10/01/2010 (Approximate)   SpO2 98%   BMI 31 93 kg/m²      Physical Exam  Vitals and nursing note reviewed  Constitutional:       General: She is not in acute distress  Appearance: She is well-developed  HENT:      Head: Normocephalic and atraumatic  Eyes:      Conjunctiva/sclera: Conjunctivae normal    Cardiovascular:      Rate and Rhythm: Normal rate and regular rhythm  Heart sounds: No murmur heard  Pulmonary:      Effort: Pulmonary effort is normal  No respiratory distress  Breath sounds: Normal breath sounds  Abdominal:      Palpations: Abdomen is soft  Tenderness: There is no abdominal tenderness  Musculoskeletal:         General: No swelling  Cervical back: Neck supple  Skin:     General: Skin is warm and dry        Capillary Refill: Capillary refill takes less than 2 seconds  Neurological:      Mental Status: She is alert     Psychiatric:         Mood and Affect: Mood normal        Medications have been reviewed by provider in current encounter    Donnell Ott MD

## 2023-06-23 NOTE — LETTER
June 23, 2023     Patient: Juan A Howard  YOB: 1960  Date of Visit: 6/23/2023      To Whom it May Concern:    Haile Bryan is under my professional care  Patient was hospitalized from 6/9-6/13/2023  Mitchel Jean was seen in my office on 6/23/2023  Mitchel Jean may return to work on 6/26/23  If you have any questions or concerns, please don't hesitate to call           Sincerely,          Danelle Ott MD        CC: No Recipients

## 2023-06-28 NOTE — TELEPHONE ENCOUNTER
Only thing we received was a form that requested a lot of information and when [place] called she informed the [facility] rep that we would need an actual form to be filled out and they said that they would fax them but we never received it

## 2023-07-01 ENCOUNTER — APPOINTMENT (EMERGENCY)
Dept: RADIOLOGY | Facility: HOSPITAL | Age: 63
End: 2023-07-01
Payer: COMMERCIAL

## 2023-07-01 ENCOUNTER — HOSPITAL ENCOUNTER (EMERGENCY)
Facility: HOSPITAL | Age: 63
Discharge: HOME | End: 2023-07-01
Attending: EMERGENCY MEDICINE
Payer: COMMERCIAL

## 2023-07-01 VITALS
DIASTOLIC BLOOD PRESSURE: 98 MMHG | RESPIRATION RATE: 23 BRPM | SYSTOLIC BLOOD PRESSURE: 155 MMHG | OXYGEN SATURATION: 92 % | TEMPERATURE: 97.8 F | HEART RATE: 84 BPM

## 2023-07-01 DIAGNOSIS — J40 BRONCHITIS: Primary | ICD-10-CM

## 2023-07-01 LAB
ANION GAP SERPL CALCULATED.3IONS-SCNC: 6 MMOL/L
BASOPHILS # BLD AUTO: 0.06 THOUSANDS/ÂΜL (ref 0–0.1)
BASOPHILS NFR BLD AUTO: 1 % (ref 0–1)
BUN SERPL-MCNC: 13 MG/DL (ref 5–25)
CALCIUM SERPL-MCNC: 9.6 MG/DL (ref 8.3–10.1)
CARDIAC TROPONIN I PNL SERPL HS: 4 NG/L
CHLORIDE SERPL-SCNC: 106 MMOL/L (ref 96–108)
CO2 SERPL-SCNC: 29 MMOL/L (ref 21–32)
CREAT SERPL-MCNC: 0.65 MG/DL (ref 0.6–1.3)
EOSINOPHIL # BLD AUTO: 0.65 THOUSAND/ÂΜL (ref 0–0.61)
EOSINOPHIL NFR BLD AUTO: 6 % (ref 0–6)
ERYTHROCYTE [DISTWIDTH] IN BLOOD BY AUTOMATED COUNT: 12.5 % (ref 11.6–15.1)
GFR SERPL CREATININE-BSD FRML MDRD: 95 ML/MIN/1.73SQ M
GLUCOSE SERPL-MCNC: 123 MG/DL (ref 65–140)
HCT VFR BLD AUTO: 43.2 % (ref 34.8–46.1)
HGB BLD-MCNC: 14.6 G/DL (ref 11.5–15.4)
IMM GRANULOCYTES # BLD AUTO: 0.05 THOUSAND/UL (ref 0–0.2)
IMM GRANULOCYTES NFR BLD AUTO: 1 % (ref 0–2)
LYMPHOCYTES # BLD AUTO: 2.36 THOUSANDS/ÂΜL (ref 0.6–4.47)
LYMPHOCYTES NFR BLD AUTO: 23 % (ref 14–44)
MCH RBC QN AUTO: 31.9 PG (ref 26.8–34.3)
MCHC RBC AUTO-ENTMCNC: 33.8 G/DL (ref 31.4–37.4)
MCV RBC AUTO: 94 FL (ref 82–98)
MONOCYTES # BLD AUTO: 0.63 THOUSAND/ÂΜL (ref 0.17–1.22)
MONOCYTES NFR BLD AUTO: 6 % (ref 4–12)
NEUTROPHILS # BLD AUTO: 6.35 THOUSANDS/ÂΜL (ref 1.85–7.62)
NEUTS SEG NFR BLD AUTO: 63 % (ref 43–75)
NRBC BLD AUTO-RTO: 0 /100 WBCS
PLATELET # BLD AUTO: 262 THOUSANDS/UL (ref 149–390)
PMV BLD AUTO: 10.6 FL (ref 8.9–12.7)
POTASSIUM SERPL-SCNC: 3.4 MMOL/L (ref 3.5–5.3)
RBC # BLD AUTO: 4.58 MILLION/UL (ref 3.81–5.12)
SODIUM SERPL-SCNC: 141 MMOL/L (ref 135–147)
WBC # BLD AUTO: 10.1 THOUSAND/UL (ref 4.31–10.16)

## 2023-07-01 PROCEDURE — 85025 COMPLETE CBC W/AUTO DIFF WBC: CPT

## 2023-07-01 PROCEDURE — 99285 EMERGENCY DEPT VISIT HI MDM: CPT

## 2023-07-01 PROCEDURE — 84484 ASSAY OF TROPONIN QUANT: CPT

## 2023-07-01 PROCEDURE — 80048 BASIC METABOLIC PNL TOTAL CA: CPT

## 2023-07-01 PROCEDURE — 71046 X-RAY EXAM CHEST 2 VIEWS: CPT

## 2023-07-01 PROCEDURE — 96365 THER/PROPH/DIAG IV INF INIT: CPT

## 2023-07-01 PROCEDURE — 93005 ELECTROCARDIOGRAM TRACING: CPT

## 2023-07-01 PROCEDURE — 94640 AIRWAY INHALATION TREATMENT: CPT

## 2023-07-01 PROCEDURE — 36415 COLL VENOUS BLD VENIPUNCTURE: CPT

## 2023-07-01 RX ORDER — IPRATROPIUM BROMIDE AND ALBUTEROL SULFATE 2.5; .5 MG/3ML; MG/3ML
3 SOLUTION RESPIRATORY (INHALATION) ONCE
Status: COMPLETED | OUTPATIENT
Start: 2023-07-01 | End: 2023-07-01

## 2023-07-01 RX ORDER — ALBUTEROL SULFATE 90 UG/1
2 AEROSOL, METERED RESPIRATORY (INHALATION) ONCE
Status: DISCONTINUED | OUTPATIENT
Start: 2023-07-01 | End: 2023-07-01

## 2023-07-01 RX ORDER — MAGNESIUM SULFATE 1 G/100ML
1 INJECTION INTRAVENOUS ONCE
Status: COMPLETED | OUTPATIENT
Start: 2023-07-01 | End: 2023-07-01

## 2023-07-01 RX ADMIN — IPRATROPIUM BROMIDE AND ALBUTEROL SULFATE 3 ML: .5; 3 SOLUTION RESPIRATORY (INHALATION) at 20:28

## 2023-07-01 RX ADMIN — MAGNESIUM SULFATE HEPTAHYDRATE 1 G: 1 INJECTION, SOLUTION INTRAVENOUS at 20:56

## 2023-07-02 LAB
ATRIAL RATE: 88 BPM
P AXIS: 71 DEGREES
PR INTERVAL: 132 MS
QRS AXIS: 57 DEGREES
QRSD INTERVAL: 80 MS
QT INTERVAL: 388 MS
QTC INTERVAL: 469 MS
T WAVE AXIS: 36 DEGREES
VENTRICULAR RATE: 88 BPM

## 2023-07-02 PROCEDURE — 93010 ELECTROCARDIOGRAM REPORT: CPT | Performed by: INTERNAL MEDICINE

## 2023-07-02 NOTE — DISCHARGE INSTRUCTIONS
You were seen in the emergency room for shortness of breath. This episode of shortness of breath and cough and increased sputum production is likely caused by irritation and inflammation of your airways due to the recent poor air quality in the setting of you having recently quit smoking. We did not see any pneumonia on your chest x-ray, however, the official radiology read is pending. If there are any additional findings of concern on that x-ray, we will call you with the results. In the meantime, I will be giving you an albuterol inhaler with a spacer. This works just as well as a nebulizer. For this, you can pump two or more pumps into the spacer prior to inhaling. You can use Mucinex as directed on the bottle. Please return to the emergency room if your shortness of breath gets worse. You should also call your family doctor or return to the emergency room if you find you are using the inhaler very often.

## 2023-07-02 NOTE — ED PROVIDER NOTES
History  Chief Complaint   Patient presents with   • Shortness of Breath     Pt has been feeling SOB, audible wheezing since yesterday, pt c/o hard for her to lay down as it is worse. the patient just here 2 weeks ago discharged for pneumonia, pt just finished her antibiotics last week & was feeling great until yesterday. 57-year-old female with history of hypertension, diabetes, hyperlipidemia, former smoker, recent hospitalization for bacterial pneumonia who presents to the ED with 1 day of worsening shortness of breath. She was discharged from the hospital about 3 weeks ago and completed a course of oral antibiotics and completely returned to baseline and had been feeling well. Last night, she got home from work and spent about a half hour outside talking with her neighbor. After she came inside, she soon began to cough a lot and bringing up a lot of sputum that she describes as cloudy yellow with occasional brown flakes. She was also feeling shortness of breath. Overnight she could not lie flat and did not sleep well due to shortness of breath. The next day, she did her nebulizer which did help her feel better, however, later in the day she got more severe short of breath and her nebulizer did not help as much which prompted her to come to the ED. She states that she has not smoked since her hospitalization. Prior to Admission Medications   Prescriptions Last Dose Informant Patient Reported? Taking?    Blood Glucose Monitoring Suppl KIT   No No   Sig: Use in the morning   Lancets (freestyle) lancets   No No   Sig: Use as instructed   Probiotic Product (PRO-BIOTIC BLEND PO)   Yes No   Sig: Take 1 tablet by mouth daily   albuterol (PROVENTIL HFA,VENTOLIN HFA) 90 mcg/act inhaler   No No   Sig: TAKE 2 PUFFS BY MOUTH EVERY 4 HOURS AS NEEDED FOR WHEEZE   atorvastatin (LIPITOR) 40 mg tablet   No No   Sig: Take 1 tablet (40 mg total) by mouth daily   benzonatate (TESSALON PERLES) 100 mg capsule   No No   Sig: Take 1 capsule (100 mg total) by mouth 3 (three) times a day as needed for cough   glucose blood (FREESTYLE LITE) test strip   No No   Sig: Use as instructed   glucose monitoring kit (FREESTYLE) monitoring kit   No No   Sig: Apply 1 m topically 4 (four) times a day as needed Use as directed   guaiFENesin (MUCINEX) 600 mg 12 hr tablet   No No   Sig: Take 1 tablet (600 mg total) by mouth every 12 (twelve) hours   metFORMIN (GLUCOPHAGE-XR) 750 mg 24 hr tablet   No No   Sig: TAKE 2 TABLETS BY MOUTH EVERY DAY WITH DINNER   naproxen (Naprosyn) 500 mg tablet   No No   Sig: Take 1 tablet (500 mg total) by mouth 2 (two) times a day with meals   ondansetron (ZOFRAN-ODT) 4 mg disintegrating tablet   No No   Sig: Take 1 tablet (4 mg total) by mouth every 6 (six) hours as needed for nausea or vomiting   triamterene-hydrochlorothiazide (DYAZIDE) 37.5-25 mg per capsule   No No   Sig: Take 1 capsule by mouth daily   varenicline (CHANTIX) 1 mg tablet   No No   Sig: Take 1 tablet (1 mg total) by mouth 2 (two) times a day   varenicline 0.5 MG X 11 & 1 MG X 42 tablet therapy pack   No No   Sig: Take 0.5 mg by mouth daily for 3 days, THEN 0.5 mg 2 (two) times a day for 4 days, THEN 1 mg 2 (two) times a day.       Facility-Administered Medications: None       Past Medical History:   Diagnosis Date   • Diabetes mellitus ([address])    • Vertigo        Past Surgical History:   Procedure Laterality Date   • BREAST BIOPSY Bilateral 12/01/2021    stereo x2   • BREAST BIOPSY Right 12/01/2021   • MAMMO STEREOTACTIC BREAST BIOPSY LEFT (ALL INC) Left 12/1/2021   • MAMMO STEREOTACTIC BREAST BIOPSY RIGHT (ALL INC) Right 12/1/2021   • TUMOR REMOVAL      right leg- benign        Family History   Problem Relation Age of Onset   • Stroke Mother    • Hypertension Mother    • Diabetes Mother    • Throat cancer Brother    • Ovarian cancer Family         2 maternal aunts and 2 cousins    • Stroke Father    • Thyroid cancer Sister    • Heart disease Sister    • Uterine cancer Sister    • No Known Problems Son    • Heart attack Maternal Grandmother 43   • Stroke Maternal Grandfather 58   • Heart attack Paternal Grandmother    • Leukemia Paternal Grandfather    • No Known Problems Sister    • No Known Problems Son    • Breast cancer Cousin 36   • Breast cancer Maternal Aunt 26   • Uterine cancer Maternal Aunt    • Uterine cancer Cousin    • No Known Problems Paternal Aunt      I have reviewed and agree with the history as documented. E-Cigarette/Vaping   • E-Cigarette Use Never User      E-Cigarette/Vaping Substances   • Nicotine No    • THC No    • CBD No    • Flavoring No    • Other No    • Unknown No      Social History     Tobacco Use   • Smoking status: Every Day     Packs/day: 0.25     Types: Cigarettes   • Smokeless tobacco: Never   • Tobacco comments:     1-6 cigarettes a day   Vaping Use   • Vaping Use: Never used   Substance Use Topics   • Alcohol use: Yes     Comment: socially    • Drug use: Never        Review of Systems   Constitutional: Negative for chills and fever. HENT: Negative for congestion, rhinorrhea and sore throat. Eyes: Negative for visual disturbance. Respiratory: Positive for cough, shortness of breath and wheezing. Cardiovascular: Negative for chest pain and leg swelling. Gastrointestinal: Negative for abdominal pain, diarrhea, nausea and vomiting. Physical Exam  ED Triage Vitals [07/01/23 1918]   Temperature Pulse Respirations Blood Pressure SpO2   97.8 °F (36.6 °C) 101 (!) 23 155/98 95 %      Temp Source Heart Rate Source Patient Position - Orthostatic VS BP Location FiO2 (%)   Oral Monitor Sitting Right arm --      Pain Score       --             Orthostatic Vital Signs  Vitals:    07/01/23 1918 07/01/23 2115   BP: 155/98    Pulse: 101 84   Patient Position - Orthostatic VS: Sitting        Physical Exam  Constitutional:       General: She is in acute distress (Increased work of breathing).       Appearance: She is not toxic-appearing. HENT:      Head: Normocephalic and atraumatic. Mouth/Throat:      Mouth: Mucous membranes are moist.      Pharynx: Oropharynx is clear. No oropharyngeal exudate. Eyes:      Extraocular Movements: Extraocular movements intact. Conjunctiva/sclera: Conjunctivae normal.   Cardiovascular:      Rate and Rhythm: Normal rate and regular rhythm. Pulses: Normal pulses. Heart sounds: Normal heart sounds. Pulmonary:      Effort: Respiratory distress present. Breath sounds: Wheezing and rhonchi present. Abdominal:      General: There is no distension. Palpations: Abdomen is soft. Tenderness: There is no abdominal tenderness. Musculoskeletal:      Cervical back: Normal range of motion and neck supple. Right lower leg: No edema. Left lower leg: No edema. Skin:     General: Skin is warm and dry. Capillary Refill: Capillary refill takes less than 2 seconds. Neurological:      General: No focal deficit present. Mental Status: She is alert and oriented to person, place, and time. Psychiatric:         Mood and Affect: Mood normal.         Behavior: Behavior normal.         Thought Content:  Thought content normal.         ED Medications  Medications   ipratropium-albuterol (DUO-NEB) 0.5-2.5 mg/3 mL inhalation solution 3 mL (3 mL Nebulization Given 7/1/23 2028)   magnesium sulfate IVPB (premix) SOLN 1 g (1 g Intravenous New Bag 7/1/23 2056)       Diagnostic Studies  Results Reviewed     Procedure Component Value Units Date/Time    HS Troponin 0hr (reflex protocol) [[id number]]  (Normal) Collected: 07/01/23 2023    Lab Status: Final result Specimen: Blood from Arm, Left Updated: 07/01/23 2140     hs TnI 0hr 4 ng/L     Basic metabolic panel [[id number]]  (Abnormal) Collected: 07/01/23 2023    Lab Status: Final result Specimen: Blood from Arm, Left Updated: 07/01/23 2127     Sodium 141 mmol/L      Potassium 3.4 mmol/L      Chloride 106 mmol/L CO2 29 mmol/L      ANION GAP 6 mmol/L      BUN 13 mg/dL      Creatinine 0.65 mg/dL      Glucose 123 mg/dL      Calcium 9.6 mg/dL      eGFR 95 ml/min/1.73sq m     Narrative:      [place] guidelines for Chronic Kidney Disease (CKD):   •  Stage 1 with normal or high GFR (GFR > 90 mL/min/1.73 square meters)  •  Stage 2 Mild CKD (GFR = 60-89 mL/min/1.73 square meters)  •  Stage 3A Moderate CKD (GFR = 45-59 mL/min/1.73 square meters)  •  Stage 3B Moderate CKD (GFR = 30-44 mL/min/1.73 square meters)  •  Stage 4 Severe CKD (GFR = 15-29 mL/min/1.73 square meters)  •  Stage 5 End Stage CKD (GFR <15 mL/min/1.73 square meters)  Note: GFR calculation is accurate only with a steady state creatinine    CBC and differential [[id number]]  (Abnormal) Collected: 07/01/23 2023    Lab Status: Final result Specimen: Blood from Arm, Left Updated: 07/01/23 2044     WBC 10.10 Thousand/uL      RBC 4.58 Million/uL      Hemoglobin 14.6 g/dL      Hematocrit 43.2 %      MCV 94 fL      MCH 31.9 pg      MCHC 33.8 g/dL      RDW 12.5 %      MPV 10.6 fL      Platelets 487 Thousands/uL      nRBC 0 /100 WBCs      Neutrophils Relative 63 %      Immat GRANS % 1 %      Lymphocytes Relative 23 %      Monocytes Relative 6 %      Eosinophils Relative 6 %      Basophils Relative 1 %      Neutrophils Absolute 6.35 Thousands/µL      Immature Grans Absolute 0.05 Thousand/uL      Lymphocytes Absolute 2.36 Thousands/µL      Monocytes Absolute 0.63 Thousand/µL      Eosinophils Absolute 0.65 Thousand/µL      Basophils Absolute 0.06 Thousands/µL                  XR chest 2 views   ED Interpretation by Bri Swanson DO (07/01 2048)   No infiltrates, effusions, pneumothorax noted. Procedures  Procedures      ED Course  ED Course as of 07/01/23 2224   Sat Jul 01, 2023 2048 XR chest 2 views  No acute findings. 2048 WBC: 10.10  High normal WBC   2131 Reassessed patient after DuoNeb and magnesium sulfate IV.   She is feeling much better and appears to be breathing easier, though still has some mildly increased work of breathing. Her oxygen saturations have slightly improved. She does have persistent wheezing, though much improved. 2146 Patient is appropriate for discharge at this time. We will send her with albuterol inhaler with spacer. Discussed strict return precautions. Patient is in agreement with this plan. 2150 Potassium(!): 3.4  Mild hypokalemia. Likely secondary to albuterol use. Medical Decision Making  35-year-old female with multiple medical comorbidities who is recent former smoker and recently discharged from hospital due to bacterial pneumonia presents to the ED with shortness of breath and cough and increased sputum production. Differential includes recurrent pneumonia, acute bronchitis, undiagnosed reactive airway disease. Very low concern for PE and ACS given natural history. This episode is likely triggered by recent poor air quality in the setting of someone who quit smoking about 3 weeks ago. We will order chest x-ray and basic labs as well as give DuoNeb and magnesium sulfate IV. Please see ED course for additional information and MDM. Amount and/or Complexity of Data Reviewed  Labs: ordered. Decision-making details documented in ED Course. Radiology: ordered and independent interpretation performed. Decision-making details documented in ED Course. Risk  Prescription drug management. Disposition  Final diagnoses:   Bronchitis     Time reflects when diagnosis was documented in both MDM as applicable and the Disposition within this note     Time User Action Codes Description Comment    7/1/2023  9:48 PM David Hines Add [J40] Bronchitis       ED Disposition     ED Disposition   Discharge    Condition   Stable    Date/Time   Sat Jul 1, 2023  9:46 PM    Comment   [address] discharge to home/self care.                Follow-up Information     Follow up With Specialties Details Why Contact Info    PCP    Please call to schedule an appointment within the next 2 weeks. Patient's Medications   Discharge Prescriptions    No medications on file     No discharge procedures on file. PDMP Review     None           ED Provider  Attending physically available and evaluated Autumn Angel. I managed the patient along with the ED Attending. Electronically Signed by      This note was partially created with speech to text software and may contain phonetic mistakes.      [address], DO  07/01/23 4592

## 2023-07-02 NOTE — ED ATTENDING ATTESTATION
7/1/2023  IBea DO, saw and evaluated the patient. I have discussed the patient with the resident/non-physician practitioner and agree with the resident's/non-physician practitioner's findings, Plan of Care, and MDM as documented in the resident's/non-physician practitioner's note, except where noted. All available labs and Radiology studies were reviewed. I was present for key portions of any procedure(s) performed by the resident/non-physician practitioner and I was immediately available to provide assistance. At this point I agree with the current assessment done in the Emergency Department. I have conducted an independent evaluation of this patient a history and physical is as follows:      59 yo female with long prior smoking history 1-6cig/daily/30 yeras, recently hospitalized for sepsis from PNA, treated appropriately and felt completely better. During this admission she was started on chantix and has not smoked since last month. Last night she came home from work and was talking to someone outside for about 30 minutes. Developed some dyspnea, worse with exertion and speaking. Cough w/cloudy yellow phlegm production. She has tried nebulizer tx at home without much improvement. No fever, leg pain or swelling. Imp: dyspnea, recent PNA, recently stopped smoking. Plan: CXR, tx sx, reassess.       ED Course         Critical Care Time  Procedures Adequate: hears normal conversation without difficulty

## 2023-07-03 ENCOUNTER — OFFICE VISIT (OUTPATIENT)
Dept: INTERNAL MEDICINE CLINIC | Facility: CLINIC | Age: 63
End: 2023-07-03

## 2023-07-03 VITALS
BODY MASS INDEX: 31.41 KG/M2 | WEIGHT: 160 LBS | DIASTOLIC BLOOD PRESSURE: 79 MMHG | HEART RATE: 109 BPM | HEIGHT: 60 IN | OXYGEN SATURATION: 91 % | TEMPERATURE: 98.1 F | SYSTOLIC BLOOD PRESSURE: 121 MMHG

## 2023-07-03 DIAGNOSIS — J18.9 COMMUNITY ACQUIRED PNEUMONIA OF LEFT LOWER LOBE OF LUNG: Primary | ICD-10-CM

## 2023-07-03 PROCEDURE — 99214 OFFICE O/P EST MOD 30 MIN: CPT | Performed by: PHYSICIAN ASSISTANT

## 2023-07-03 RX ORDER — AZITHROMYCIN 250 MG/1
TABLET, FILM COATED ORAL
Qty: 6 TABLET | Refills: 0 | Status: SHIPPED | OUTPATIENT
Start: 2023-07-03 | End: 2023-07-07

## 2023-07-03 RX ORDER — PREDNISONE 20 MG/1
TABLET ORAL
Qty: 21 TABLET | Refills: 0 | Status: ON HOLD | OUTPATIENT
Start: 2023-07-03

## 2023-07-03 NOTE — ASSESSMENT & PLAN NOTE
She was originally hospitalized on June 9 with community-acquired pneumonia. Patient possibly has underlying asthma and/or COPD. She has not completed her pulmonary function testing yet. Her chest x-ray during hospitalization was consistent with a left lower lobe infiltrate. She did go to the ED after symptoms returned on July 1. Chest x-ray was clear. Due to persistency of symptoms recommend repeat antibiotics and prednisone. Patient was agreeable. Start azithromycin. Start prednisone taper. Continue Mucinex daily as needed. Albuterol as needed. Strict ER precautions were given. Patient understood and agreed to plan. She was stable at checkout.

## 2023-07-03 NOTE — PROGRESS NOTES
Name: Manav Brown      : 1960      MRN: [id number]  Encounter Provider: Mino Santos PA-C  Encounter Date: 7/3/2023   Encounter department: [facility]    Assessment & Plan     1. Community acquired pneumonia of left lower lobe of lung  Assessment & Plan:  She was originally hospitalized on  with community-acquired pneumonia. Patient possibly has underlying asthma and/or COPD. She has not completed her pulmonary function testing yet. Her chest x-ray during hospitalization was consistent with a left lower lobe infiltrate. She did go to the ED after symptoms returned on . Chest x-ray was clear. Due to persistency of symptoms recommend repeat antibiotics and prednisone. Patient was agreeable. Start azithromycin. Start prednisone taper. Continue Mucinex daily as needed. Albuterol as needed. Strict ER precautions were given. Patient understood and agreed to plan. She was stable at checkout. Orders:  -     predniSONE 20 mg tablet; Take 3 tablets for 4 days then two tablets for 3 days then one tablet for 3 days. -     azithromycin (Zithromax) 250 mg tablet; Take two tablets on day one, then one tablet daily until gone. Subjective      Patient is a 70-year-old female presenting for same-day appointment. She states she has had cough, wheezing, and shortness of breath for approximately 3 days. States she was originally hospitalized  through  for pneumonia. She was given a course of antibiotics and steroids which helped. She states she was feeling much better up until  when she started feeling the same symptoms again. Denies fever or chills. Admits to productive cough. She is unsure of the phlegm color. Admits to wheezing throughout the day. Admits to feeling short of breath. Denies chest pain or palpitations. States she feels she cannot breathe well. This is never happened before. She did recently quit smoking. No prior lung issues. Review of Systems   Constitutional: Negative for appetite change, chills, diaphoresis, fatigue, fever and unexpected weight change. HENT: Positive for congestion. Negative for postnasal drip, rhinorrhea, sinus pressure, sinus pain, sneezing, sore throat, tinnitus and trouble swallowing. Eyes: Negative for visual disturbance. Respiratory: Positive for cough, chest tightness, shortness of breath and wheezing. Cardiovascular: Negative for chest pain, palpitations and leg swelling. Gastrointestinal: Negative for abdominal pain, blood in stool, constipation, diarrhea, nausea and vomiting. Endocrine: Negative for cold intolerance, heat intolerance, polydipsia, polyphagia and polyuria. Musculoskeletal: Positive for arthralgias and joint swelling. Negative for myalgias. Skin: Negative for rash. Neurological: Negative for dizziness, tremors, weakness, light-headedness, numbness and headaches. Hematological: Negative for adenopathy.        Current Outpatient Medications on File Prior to Visit   Medication Sig   • albuterol (PROVENTIL HFA,VENTOLIN HFA) 90 mcg/act inhaler TAKE 2 PUFFS BY MOUTH EVERY 4 HOURS AS NEEDED FOR WHEEZE   • atorvastatin (LIPITOR) 40 mg tablet Take 1 tablet (40 mg total) by mouth daily   • glucose blood (FREESTYLE LITE) test strip Use as instructed   • glucose monitoring kit (FREESTYLE) monitoring kit Apply 1 m topically 4 (four) times a day as needed Use as directed   • guaiFENesin (MUCINEX) 600 mg 12 hr tablet Take 1 tablet (600 mg total) by mouth every 12 (twelve) hours   • Lancets (freestyle) lancets Use as instructed   • metFORMIN (GLUCOPHAGE-XR) 750 mg 24 hr tablet TAKE 2 TABLETS BY MOUTH EVERY DAY WITH DINNER   • naproxen (Naprosyn) 500 mg tablet Take 1 tablet (500 mg total) by mouth 2 (two) times a day with meals   • Probiotic Product (PRO-BIOTIC BLEND PO) Take 1 tablet by mouth daily   • triamterene-hydrochlorothiazide (DYAZIDE) 37.5-25 mg per capsule Take 1 capsule by mouth daily   • [DISCONTINUED] benzonatate (TESSALON PERLES) 100 mg capsule Take 1 capsule (100 mg total) by mouth 3 (three) times a day as needed for cough (Patient not taking: Reported on 7/3/2023)   • [DISCONTINUED] Blood Glucose Monitoring Suppl KIT Use in the morning (Patient not taking: Reported on 7/3/2023)   • [DISCONTINUED] ondansetron (ZOFRAN-ODT) 4 mg disintegrating tablet Take 1 tablet (4 mg total) by mouth every 6 (six) hours as needed for nausea or vomiting (Patient not taking: Reported on 7/3/2023)   • [DISCONTINUED] varenicline (CHANTIX) 1 mg tablet Take 1 tablet (1 mg total) by mouth 2 (two) times a day (Patient not taking: Reported on 7/3/2023)   • [DISCONTINUED] varenicline 0.5 MG X 11 & 1 MG X 42 tablet therapy pack Take 0.5 mg by mouth daily for 3 days, THEN 0.5 mg 2 (two) times a day for 4 days, THEN 1 mg 2 (two) times a day. (Patient not taking: Reported on 7/3/2023)       Objective     /79 (BP Location: Left arm, Patient Position: Sitting, Cuff Size: Large)   Pulse (!) 109   Temp 98.1 °F (36.7 °C) (Temporal)   Ht 5' (1.524 m)   Wt 72.6 kg (160 lb)   LMP 10/01/2010 (Approximate)   SpO2 91%   BMI 31.25 kg/m²     Physical Exam  Vitals and nursing note reviewed. Constitutional:       General: She is awake. She is not in acute distress. Appearance: Normal appearance. She is well-developed and well-groomed. She is obese. She is not ill-appearing. HENT:      Head: Normocephalic and atraumatic. Right Ear: Tympanic membrane, ear canal and external ear normal.      Left Ear: Tympanic membrane, ear canal and external ear normal.      Nose: Nose normal.      Mouth/Throat:      Mouth: Mucous membranes are moist.      Pharynx: Oropharynx is clear. No oropharyngeal exudate or posterior oropharyngeal erythema. Eyes:      General: No scleral icterus.      Conjunctiva/sclera: Conjunctivae normal.   Cardiovascular:      Rate and Rhythm: Normal rate and regular rhythm. Pulses: Normal pulses. Heart sounds: Normal heart sounds. No murmur heard. Pulmonary:      Effort: Respiratory distress (mild) present. Breath sounds: Normal air entry. No decreased air movement. Wheezing (end expiratory wheezing throughout lung fields) present. No decreased breath sounds, rhonchi or rales. Comments: Dry coughing  Abdominal:      General: Abdomen is flat. Bowel sounds are normal. There is no distension. Palpations: Abdomen is soft. There is no mass. Tenderness: There is no abdominal tenderness. There is no right CVA tenderness, left CVA tenderness, guarding or rebound. Hernia: No hernia is present. Musculoskeletal:         General: Normal range of motion. Cervical back: Neck supple. Right lower leg: Normal. No edema. Left lower leg: Normal. No edema. Lymphadenopathy:      Cervical: No cervical adenopathy. Skin:     General: Skin is warm. Capillary Refill: Capillary refill takes less than 2 seconds. Coloration: Skin is not jaundiced. Findings: No rash. Neurological:      General: No focal deficit present. Mental Status: She is alert and oriented to person, place, and time. Mental status is at baseline. Coordination: Coordination is intact. Gait: Gait is intact. Psychiatric:         Attention and Perception: Attention normal.         Mood and Affect: Affect normal. Mood is anxious (mild). Speech: Speech normal.         Behavior: Behavior normal. Behavior is cooperative.          Cognition and Memory: Cognition normal.       Susan Escobar PA-C

## 2023-07-10 ENCOUNTER — OFFICE VISIT (OUTPATIENT)
Dept: INTERNAL MEDICINE CLINIC | Facility: CLINIC | Age: 63
End: 2023-07-10

## 2023-07-10 VITALS
HEART RATE: 82 BPM | TEMPERATURE: 97.6 F | DIASTOLIC BLOOD PRESSURE: 74 MMHG | BODY MASS INDEX: 31.33 KG/M2 | WEIGHT: 160.4 LBS | OXYGEN SATURATION: 98 % | SYSTOLIC BLOOD PRESSURE: 116 MMHG

## 2023-07-10 DIAGNOSIS — Z72.0 TOBACCO USE: ICD-10-CM

## 2023-07-10 DIAGNOSIS — J18.9 COMMUNITY ACQUIRED PNEUMONIA OF LEFT LOWER LOBE OF LUNG: Primary | ICD-10-CM

## 2023-07-10 PROCEDURE — 99214 OFFICE O/P EST MOD 30 MIN: CPT | Performed by: PHYSICIAN ASSISTANT

## 2023-07-10 NOTE — LETTER
July 10, 2023     Patient: Jacqueline Ortega  YOB: 1960  Date of Visit: 7/10/2023      To Whom it May Concern:    Taylor Wellington is under my professional care. Kelley Rachel was seen in my office on 7/10/2023. Kelley Rachel may return to work on 7/11/23 . Please excuse 7/3/10/23. If you have any questions or concerns, please don't hesitate to call. Sincerely,          Rohit Cole PA-C        CC: Luz Elena Sherman

## 2023-07-12 PROBLEM — A41.9 SEPSIS (HCC): Status: RESOLVED | Noted: 2023-06-10 | Resolved: 2023-07-12

## 2023-07-12 NOTE — PROGRESS NOTES
Name: Robert Peña      : 1960      MRN: [id number]  Encounter Provider: Matthew Prabhakar PA-C  Encounter Date: 7/10/2023   Encounter department: [address]     1. Community acquired pneumonia of left lower lobe of lung  Assessment & Plan:  Significant improvement. Finished course of azithromycin. Will finish course of prednisone soon. Continue albuterol as needed. Rest as needed. Increase hydration. Discussed the possibly of COPD, patient is agreeable to evaluation for this. ER precautions given. [facility] paperwork completed. Orders:  -     Complete PFT with post bronchodilator; Future    2. Tobacco use  Assessment & Plan:  Patient likely has COPD. She is agreeable to PFTs. Also eligible for CT lung cancer screening. Will discuss at next visit. Subjective      Patient is a 80-year-old female presenting for follow up on pneumonia. States she was originally hospitalized  through  for pneumonia. She was given a course of antibiotics and steroids which helped. She states she was feeling much better up until  when she started feeling the same symptoms again. Denies fever or chills. She developed a productive cough, wheezing, and SOB. She did go to the ER on , but was told she was okay and sent home without medications. Her symptoms worsened. She presented to us on 7/3 for this. She was started on a course of prednisone and azithromycin. She finished the course of azithromycin, but has a couple days of the prednisone left. She is doing much better. Only an occasional cough and congestion. Denies chest tightness, wheezing, or SOB. She did quit smoking recently, but was a long time smoker. Review of Systems   Constitutional: Negative for appetite change, chills, diaphoresis, fatigue, fever and unexpected weight change. HENT: Positive for congestion.  Negative for postnasal drip, rhinorrhea, sinus pressure, sinus pain, sneezing, sore throat, tinnitus and trouble swallowing. Eyes: Negative for visual disturbance. Respiratory: Positive for cough. Negative for chest tightness, shortness of breath and wheezing. Cardiovascular: Negative for chest pain, palpitations and leg swelling. Gastrointestinal: Negative for abdominal pain, blood in stool, constipation, diarrhea, nausea and vomiting. Endocrine: Negative for cold intolerance, heat intolerance, polydipsia, polyphagia and polyuria. Skin: Negative for rash. Neurological: Negative for dizziness, tremors, weakness, light-headedness, numbness and headaches. Hematological: Negative for adenopathy. Current Outpatient Medications on File Prior to Visit   Medication Sig   • albuterol (PROVENTIL HFA,VENTOLIN HFA) 90 mcg/act inhaler TAKE 2 PUFFS BY MOUTH EVERY 4 HOURS AS NEEDED FOR WHEEZE   • atorvastatin (LIPITOR) 40 mg tablet Take 1 tablet (40 mg total) by mouth daily   • glucose blood (FREESTYLE LITE) test strip Use as instructed   • glucose monitoring kit (FREESTYLE) monitoring kit Apply 1 m topically 4 (four) times a day as needed Use as directed   • guaiFENesin (MUCINEX) 600 mg 12 hr tablet Take 1 tablet (600 mg total) by mouth every 12 (twelve) hours   • Lancets (freestyle) lancets Use as instructed   • metFORMIN (GLUCOPHAGE-XR) 750 mg 24 hr tablet TAKE 2 TABLETS BY MOUTH EVERY DAY WITH DINNER   • naproxen (Naprosyn) 500 mg tablet Take 1 tablet (500 mg total) by mouth 2 (two) times a day with meals   • predniSONE 20 mg tablet Take 3 tablets for 4 days then two tablets for 3 days then one tablet for 3 days.    • Probiotic Product (PRO-BIOTIC BLEND PO) Take 1 tablet by mouth daily   • triamterene-hydrochlorothiazide (DYAZIDE) 37.5-25 mg per capsule Take 1 capsule by mouth daily       Objective     /74 (BP Location: Right arm, Patient Position: Sitting, Cuff Size: Standard)   Pulse 82   Temp 97.6 °F (36.4 °C) (Temporal)   Wt 72.8 kg (160 lb 6.4 oz)   LMP 10/01/2010 (Approximate)   SpO2 98%   BMI 31.33 kg/m²     Physical Exam  Vitals and nursing note reviewed. Constitutional:       General: She is awake. She is not in acute distress. Appearance: Normal appearance. She is well-developed and well-groomed. She is obese. She is not ill-appearing. HENT:      Head: Normocephalic and atraumatic. Right Ear: Tympanic membrane, ear canal and external ear normal.      Left Ear: Tympanic membrane, ear canal and external ear normal.      Nose: Nose normal.      Mouth/Throat:      Mouth: Mucous membranes are moist.      Pharynx: Oropharynx is clear. No oropharyngeal exudate or posterior oropharyngeal erythema. Eyes:      General: No scleral icterus. Conjunctiva/sclera: Conjunctivae normal.   Cardiovascular:      Rate and Rhythm: Normal rate and regular rhythm. Pulses: Normal pulses. Heart sounds: Normal heart sounds. No murmur heard. Pulmonary:      Effort: Pulmonary effort is normal. No respiratory distress. Breath sounds: Normal breath sounds and air entry. No decreased air movement. No decreased breath sounds, wheezing, rhonchi or rales. Abdominal:      General: Abdomen is flat. Bowel sounds are normal. There is no distension. Palpations: Abdomen is soft. There is no mass. Tenderness: There is no abdominal tenderness. There is no right CVA tenderness, left CVA tenderness, guarding or rebound. Hernia: No hernia is present. Musculoskeletal:         General: Normal range of motion. Cervical back: Neck supple. Right lower leg: Normal. No edema. Left lower leg: Normal. No edema. Lymphadenopathy:      Cervical: No cervical adenopathy. Skin:     General: Skin is warm. Capillary Refill: Capillary refill takes less than 2 seconds. Coloration: Skin is not jaundiced. Findings: No rash. Neurological:      General: No focal deficit present.       Mental Status: She is alert and oriented to person, place, and time. Mental status is at baseline. Coordination: Coordination is intact. Gait: Gait is intact. Psychiatric:         Attention and Perception: Attention normal.         Mood and Affect: Mood and affect normal.         Speech: Speech normal.         Behavior: Behavior normal. Behavior is cooperative.          Cognition and Memory: Cognition normal.       Melquiades Martinez PA-C

## 2023-07-12 NOTE — ASSESSMENT & PLAN NOTE
Patient likely has COPD. She is agreeable to PFTs. Also eligible for CT lung cancer screening. Will discuss at next visit.

## 2023-07-12 NOTE — ASSESSMENT & PLAN NOTE
Significant improvement. Finished course of azithromycin. Will finish course of prednisone soon. Continue albuterol as needed. Rest as needed. Increase hydration. Discussed the possibly of COPD, patient is agreeable to evaluation for this. ER precautions given. [place] paperwork completed.

## 2023-07-16 ENCOUNTER — HOSPITAL ENCOUNTER (INPATIENT)
Facility: HOSPITAL | Age: 63
LOS: 2 days | Discharge: HOME | DRG: 140 | End: 2023-07-18
Attending: EMERGENCY MEDICINE | Admitting: INTERNAL MEDICINE
Payer: COMMERCIAL

## 2023-07-16 ENCOUNTER — APPOINTMENT (EMERGENCY)
Dept: RADIOLOGY | Facility: HOSPITAL | Age: 63
DRG: 140 | End: 2023-07-16
Payer: COMMERCIAL

## 2023-07-16 DIAGNOSIS — R06.02 SOB (SHORTNESS OF BREATH): ICD-10-CM

## 2023-07-16 DIAGNOSIS — J44.1 COPD WITH ACUTE EXACERBATION (HCC): ICD-10-CM

## 2023-07-16 DIAGNOSIS — J43.2 CENTRILOBULAR EMPHYSEMA (HCC): Primary | ICD-10-CM

## 2023-07-16 DIAGNOSIS — J44.1 COPD EXACERBATION (HCC): ICD-10-CM

## 2023-07-16 DIAGNOSIS — R06.00 DYSPNEA: ICD-10-CM

## 2023-07-16 PROBLEM — I10 HYPERTENSION: Status: ACTIVE | Noted: 2023-07-16

## 2023-07-16 PROBLEM — R65.10 SIRS (SYSTEMIC INFLAMMATORY RESPONSE SYNDROME) (HCC): Status: ACTIVE | Noted: 2023-07-16

## 2023-07-16 LAB
2HR DELTA HS TROPONIN: 0 NG/L
ALBUMIN SERPL BCP-MCNC: 3.9 G/DL (ref 3.5–5)
ALP SERPL-CCNC: 54 U/L (ref 46–116)
ALT SERPL W P-5'-P-CCNC: 41 U/L (ref 12–78)
ANION GAP SERPL CALCULATED.3IONS-SCNC: 5 MMOL/L
AST SERPL W P-5'-P-CCNC: 13 U/L (ref 5–45)
ATRIAL RATE: 110 BPM
BASOPHILS # BLD AUTO: 0.06 THOUSANDS/ÂΜL (ref 0–0.1)
BASOPHILS NFR BLD AUTO: 1 % (ref 0–1)
BILIRUB SERPL-MCNC: 0.5 MG/DL (ref 0.2–1)
BNP SERPL-MCNC: 11 PG/ML (ref 0–100)
BUN SERPL-MCNC: 9 MG/DL (ref 5–25)
CALCIUM SERPL-MCNC: 9 MG/DL (ref 8.3–10.1)
CARDIAC TROPONIN I PNL SERPL HS: 4 NG/L
CARDIAC TROPONIN I PNL SERPL HS: 4 NG/L
CHLORIDE SERPL-SCNC: 109 MMOL/L (ref 96–108)
CO2 SERPL-SCNC: 28 MMOL/L (ref 21–32)
CREAT SERPL-MCNC: 0.63 MG/DL (ref 0.6–1.3)
D DIMER PPP FEU-MCNC: 0.28 UG/ML FEU
EOSINOPHIL # BLD AUTO: 0.88 THOUSAND/ÂΜL (ref 0–0.61)
EOSINOPHIL NFR BLD AUTO: 8 % (ref 0–6)
ERYTHROCYTE [DISTWIDTH] IN BLOOD BY AUTOMATED COUNT: 12.9 % (ref 11.6–15.1)
GFR SERPL CREATININE-BSD FRML MDRD: 95 ML/MIN/1.73SQ M
GLUCOSE SERPL-MCNC: 123 MG/DL (ref 65–140)
HCT VFR BLD AUTO: 41.9 % (ref 34.8–46.1)
HGB BLD-MCNC: 13.8 G/DL (ref 11.5–15.4)
IMM GRANULOCYTES # BLD AUTO: 0.04 THOUSAND/UL (ref 0–0.2)
IMM GRANULOCYTES NFR BLD AUTO: 0 % (ref 0–2)
LYMPHOCYTES # BLD AUTO: 2.5 THOUSANDS/ÂΜL (ref 0.6–4.47)
LYMPHOCYTES NFR BLD AUTO: 23 % (ref 14–44)
MCH RBC QN AUTO: 32 PG (ref 26.8–34.3)
MCHC RBC AUTO-ENTMCNC: 32.9 G/DL (ref 31.4–37.4)
MCV RBC AUTO: 97 FL (ref 82–98)
MONOCYTES # BLD AUTO: 0.63 THOUSAND/ÂΜL (ref 0.17–1.22)
MONOCYTES NFR BLD AUTO: 6 % (ref 4–12)
NEUTROPHILS # BLD AUTO: 6.86 THOUSANDS/ÂΜL (ref 1.85–7.62)
NEUTS SEG NFR BLD AUTO: 62 % (ref 43–75)
NRBC BLD AUTO-RTO: 0 /100 WBCS
P AXIS: 70 DEGREES
PLATELET # BLD AUTO: 210 THOUSANDS/UL (ref 149–390)
PLATELET # BLD AUTO: 233 THOUSANDS/UL (ref 149–390)
PMV BLD AUTO: 10.1 FL (ref 8.9–12.7)
PMV BLD AUTO: 9.9 FL (ref 8.9–12.7)
POTASSIUM SERPL-SCNC: 3.4 MMOL/L (ref 3.5–5.3)
PR INTERVAL: 124 MS
PROT SERPL-MCNC: 7.3 G/DL (ref 6.4–8.4)
QRS AXIS: 40 DEGREES
QRSD INTERVAL: 64 MS
QT INTERVAL: 340 MS
QTC INTERVAL: 460 MS
RBC # BLD AUTO: 4.31 MILLION/UL (ref 3.81–5.12)
SODIUM SERPL-SCNC: 142 MMOL/L (ref 135–147)
T WAVE AXIS: 48 DEGREES
VENTRICULAR RATE: 110 BPM
WBC # BLD AUTO: 10.97 THOUSAND/UL (ref 4.31–10.16)

## 2023-07-16 PROCEDURE — 96365 THER/PROPH/DIAG IV INF INIT: CPT

## 2023-07-16 PROCEDURE — 96375 TX/PRO/DX INJ NEW DRUG ADDON: CPT

## 2023-07-16 PROCEDURE — 85379 FIBRIN DEGRADATION QUANT: CPT | Performed by: EMERGENCY MEDICINE

## 2023-07-16 PROCEDURE — 94640 AIRWAY INHALATION TREATMENT: CPT

## 2023-07-16 PROCEDURE — 99285 EMERGENCY DEPT VISIT HI MDM: CPT

## 2023-07-16 PROCEDURE — 85025 COMPLETE CBC W/AUTO DIFF WBC: CPT

## 2023-07-16 PROCEDURE — 71046 X-RAY EXAM CHEST 2 VIEWS: CPT

## 2023-07-16 PROCEDURE — 87040 BLOOD CULTURE FOR BACTERIA: CPT

## 2023-07-16 PROCEDURE — 36415 COLL VENOUS BLD VENIPUNCTURE: CPT

## 2023-07-16 PROCEDURE — 80053 COMPREHEN METABOLIC PANEL: CPT

## 2023-07-16 PROCEDURE — 94644 CONT INHLJ TX 1ST HOUR: CPT

## 2023-07-16 PROCEDURE — 93005 ELECTROCARDIOGRAM TRACING: CPT

## 2023-07-16 PROCEDURE — 99223 1ST HOSP IP/OBS HIGH 75: CPT | Performed by: INTERNAL MEDICINE

## 2023-07-16 PROCEDURE — 93010 ELECTROCARDIOGRAM REPORT: CPT | Performed by: INTERNAL MEDICINE

## 2023-07-16 PROCEDURE — 94664 DEMO&/EVAL PT USE INHALER: CPT

## 2023-07-16 PROCEDURE — 83880 ASSAY OF NATRIURETIC PEPTIDE: CPT

## 2023-07-16 PROCEDURE — 85049 AUTOMATED PLATELET COUNT: CPT | Performed by: STUDENT IN AN ORGANIZED HEALTH CARE EDUCATION/TRAINING PROGRAM

## 2023-07-16 PROCEDURE — 96367 TX/PROPH/DG ADDL SEQ IV INF: CPT

## 2023-07-16 PROCEDURE — 84484 ASSAY OF TROPONIN QUANT: CPT

## 2023-07-16 RX ORDER — AZITHROMYCIN 250 MG/1
250 TABLET, FILM COATED ORAL EVERY 24 HOURS
Status: DISCONTINUED | OUTPATIENT
Start: 2023-07-17 | End: 2023-07-18 | Stop reason: HOSPADM

## 2023-07-16 RX ORDER — SODIUM CHLORIDE, SODIUM GLUCONATE, SODIUM ACETATE, POTASSIUM CHLORIDE, MAGNESIUM CHLORIDE, SODIUM PHOSPHATE, DIBASIC, AND POTASSIUM PHOSPHATE .53; .5; .37; .037; .03; .012; .00082 G/100ML; G/100ML; G/100ML; G/100ML; G/100ML; G/100ML; G/100ML
1000 INJECTION, SOLUTION INTRAVENOUS ONCE
Status: COMPLETED | OUTPATIENT
Start: 2023-07-16 | End: 2023-07-16

## 2023-07-16 RX ORDER — ACETAMINOPHEN 325 MG/1
650 TABLET ORAL EVERY 6 HOURS PRN
Status: DISCONTINUED | OUTPATIENT
Start: 2023-07-16 | End: 2023-07-18 | Stop reason: HOSPADM

## 2023-07-16 RX ORDER — ALBUTEROL SULFATE 90 UG/1
1 AEROSOL, METERED RESPIRATORY (INHALATION) EVERY 4 HOURS PRN
Status: DISCONTINUED | OUTPATIENT
Start: 2023-07-16 | End: 2023-07-18 | Stop reason: HOSPADM

## 2023-07-16 RX ORDER — AZITHROMYCIN 250 MG/1
500 TABLET, FILM COATED ORAL EVERY 24 HOURS
Status: COMPLETED | OUTPATIENT
Start: 2023-07-16 | End: 2023-07-16

## 2023-07-16 RX ORDER — METHYLPREDNISOLONE SODIUM SUCCINATE 40 MG/ML
40 INJECTION, POWDER, LYOPHILIZED, FOR SOLUTION INTRAMUSCULAR; INTRAVENOUS EVERY 8 HOURS
Status: DISCONTINUED | OUTPATIENT
Start: 2023-07-16 | End: 2023-07-16

## 2023-07-16 RX ORDER — METHYLPREDNISOLONE SODIUM SUCCINATE 125 MG/2ML
125 INJECTION, POWDER, LYOPHILIZED, FOR SOLUTION INTRAMUSCULAR; INTRAVENOUS ONCE
Status: COMPLETED | OUTPATIENT
Start: 2023-07-16 | End: 2023-07-16

## 2023-07-16 RX ORDER — INSULIN LISPRO 100 [IU]/ML
1-5 INJECTION, SOLUTION INTRAVENOUS; SUBCUTANEOUS
Status: DISCONTINUED | OUTPATIENT
Start: 2023-07-17 | End: 2023-07-18 | Stop reason: HOSPADM

## 2023-07-16 RX ORDER — METHYLPREDNISOLONE SODIUM SUCCINATE 40 MG/ML
40 INJECTION, POWDER, LYOPHILIZED, FOR SOLUTION INTRAMUSCULAR; INTRAVENOUS EVERY 8 HOURS
Status: DISCONTINUED | OUTPATIENT
Start: 2023-07-17 | End: 2023-07-17

## 2023-07-16 RX ORDER — SODIUM CHLORIDE FOR INHALATION 0.9 %
12 VIAL, NEBULIZER (ML) INHALATION ONCE
Status: COMPLETED | OUTPATIENT
Start: 2023-07-16 | End: 2023-07-16

## 2023-07-16 RX ORDER — BUDESONIDE 0.5 MG/2ML
0.5 INHALANT ORAL
Status: DISCONTINUED | OUTPATIENT
Start: 2023-07-16 | End: 2023-07-17

## 2023-07-16 RX ORDER — ENOXAPARIN SODIUM 100 MG/ML
40 INJECTION SUBCUTANEOUS DAILY
Status: DISCONTINUED | OUTPATIENT
Start: 2023-07-17 | End: 2023-07-18 | Stop reason: HOSPADM

## 2023-07-16 RX ORDER — GUAIFENESIN 100 MG/5ML
200 SOLUTION ORAL EVERY 4 HOURS PRN
Status: DISCONTINUED | OUTPATIENT
Start: 2023-07-16 | End: 2023-07-18 | Stop reason: HOSPADM

## 2023-07-16 RX ORDER — MAGNESIUM SULFATE HEPTAHYDRATE 40 MG/ML
2 INJECTION, SOLUTION INTRAVENOUS ONCE
Status: COMPLETED | OUTPATIENT
Start: 2023-07-16 | End: 2023-07-16

## 2023-07-16 RX ORDER — SODIUM CHLORIDE FOR INHALATION 0.9 %
3 VIAL, NEBULIZER (ML) INHALATION ONCE
Status: COMPLETED | OUTPATIENT
Start: 2023-07-16 | End: 2023-07-16

## 2023-07-16 RX ORDER — ATORVASTATIN CALCIUM 40 MG/1
40 TABLET, FILM COATED ORAL DAILY
Status: DISCONTINUED | OUTPATIENT
Start: 2023-07-17 | End: 2023-07-18 | Stop reason: HOSPADM

## 2023-07-16 RX ADMIN — ISODIUM CHLORIDE 12 ML: 0.03 SOLUTION RESPIRATORY (INHALATION) at 21:25

## 2023-07-16 RX ADMIN — IPRATROPIUM BROMIDE 1 MG: 0.5 SOLUTION RESPIRATORY (INHALATION) at 21:25

## 2023-07-16 RX ADMIN — MAGNESIUM SULFATE IN WATER 2 G: 40 INJECTION, SOLUTION INTRAVENOUS at 20:29

## 2023-07-16 RX ADMIN — ALBUTEROL SULFATE 10 MG: 2.5 SOLUTION RESPIRATORY (INHALATION) at 21:25

## 2023-07-16 RX ADMIN — SODIUM CHLORIDE, SODIUM GLUCONATE, SODIUM ACETATE, POTASSIUM CHLORIDE, MAGNESIUM CHLORIDE, SODIUM PHOSPHATE, DIBASIC, AND POTASSIUM PHOSPHATE 1000 ML: .53; .5; .37; .037; .03; .012; .00082 INJECTION, SOLUTION INTRAVENOUS at 21:10

## 2023-07-16 RX ADMIN — GUAIFENESIN 200 MG: 200 SOLUTION ORAL at 23:51

## 2023-07-16 RX ADMIN — METHYLPREDNISOLONE SODIUM SUCCINATE 125 MG: 125 INJECTION, POWDER, FOR SOLUTION INTRAMUSCULAR; INTRAVENOUS at 20:29

## 2023-07-16 RX ADMIN — IPRATROPIUM BROMIDE 1 MG: 0.5 SOLUTION RESPIRATORY (INHALATION) at 20:27

## 2023-07-16 RX ADMIN — AZITHROMYCIN MONOHYDRATE 500 MG: 250 TABLET ORAL at 23:51

## 2023-07-16 RX ADMIN — Medication 3 ML: at 20:27

## 2023-07-16 RX ADMIN — ALBUTEROL SULFATE 10 MG: 2.5 SOLUTION RESPIRATORY (INHALATION) at 20:27

## 2023-07-17 ENCOUNTER — APPOINTMENT (INPATIENT)
Dept: NON INVASIVE DIAGNOSTICS | Facility: HOSPITAL | Age: 63
DRG: 140 | End: 2023-07-17
Payer: COMMERCIAL

## 2023-07-17 ENCOUNTER — APPOINTMENT (INPATIENT)
Dept: RADIOLOGY | Facility: HOSPITAL | Age: 63
DRG: 140 | End: 2023-07-17
Payer: COMMERCIAL

## 2023-07-17 PROBLEM — J43.2 CENTRILOBULAR EMPHYSEMA (HCC): Status: ACTIVE | Noted: 2023-07-17

## 2023-07-17 LAB
4HR DELTA HS TROPONIN: 0 NG/L
ALBUMIN SERPL BCP-MCNC: 3.6 G/DL (ref 3.5–5)
ALP SERPL-CCNC: 54 U/L (ref 46–116)
ALT SERPL W P-5'-P-CCNC: 38 U/L (ref 12–78)
ANION GAP SERPL CALCULATED.3IONS-SCNC: 7 MMOL/L
AORTIC ROOT: 3 CM
APICAL FOUR CHAMBER EJECTION FRACTION: 63 %
ASCENDING AORTA: 3.6 CM
AST SERPL W P-5'-P-CCNC: 10 U/L (ref 5–45)
ATRIAL RATE: 99 BPM
BASOPHILS # BLD AUTO: 0.01 THOUSANDS/ÂΜL (ref 0–0.1)
BASOPHILS NFR BLD AUTO: 0 % (ref 0–1)
BILIRUB SERPL-MCNC: 0.39 MG/DL (ref 0.2–1)
BUN SERPL-MCNC: 9 MG/DL (ref 5–25)
CALCIUM SERPL-MCNC: 8.8 MG/DL (ref 8.3–10.1)
CARDIAC TROPONIN I PNL SERPL HS: 4 NG/L
CHLORIDE SERPL-SCNC: 113 MMOL/L (ref 96–108)
CO2 SERPL-SCNC: 23 MMOL/L (ref 21–32)
CREAT SERPL-MCNC: 0.82 MG/DL (ref 0.6–1.3)
E WAVE DECELERATION TIME: 197 MS
EOSINOPHIL # BLD AUTO: 0.02 THOUSAND/ÂΜL (ref 0–0.61)
EOSINOPHIL NFR BLD AUTO: 0 % (ref 0–6)
ERYTHROCYTE [DISTWIDTH] IN BLOOD BY AUTOMATED COUNT: 13.1 % (ref 11.6–15.1)
FRACTIONAL SHORTENING: 44 % (ref 28–44)
GFR SERPL CREATININE-BSD FRML MDRD: 76 ML/MIN/1.73SQ M
GLUCOSE SERPL-MCNC: 162 MG/DL (ref 65–140)
GLUCOSE SERPL-MCNC: 187 MG/DL (ref 65–140)
GLUCOSE SERPL-MCNC: 194 MG/DL (ref 65–140)
GLUCOSE SERPL-MCNC: 205 MG/DL (ref 65–140)
GLUCOSE SERPL-MCNC: 241 MG/DL (ref 65–140)
HCT VFR BLD AUTO: 40.9 % (ref 34.8–46.1)
HGB BLD-MCNC: 13.6 G/DL (ref 11.5–15.4)
IMM GRANULOCYTES # BLD AUTO: 0.04 THOUSAND/UL (ref 0–0.2)
IMM GRANULOCYTES NFR BLD AUTO: 0 % (ref 0–2)
INTERVENTRICULAR SEPTUM IN DIASTOLE (PARASTERNAL SHORT AXIS VIEW): 0.7 CM
INTERVENTRICULAR SEPTUM: 0.7 CM (ref 0.6–1.1)
LAAS-AP2: 15 CM2
LAAS-AP4: 16.3 CM2
LEFT ATRIUM SIZE: 3.8 CM
LEFT ATRIUM VOLUME (MOD BIPLANE): 39 ML
LEFT INTERNAL DIMENSION IN SYSTOLE: 2.7 CM (ref 2.1–4)
LEFT VENTRICULAR INTERNAL DIMENSION IN DIASTOLE: 4.8 CM (ref 3.5–6)
LEFT VENTRICULAR POSTERIOR WALL IN END DIASTOLE: 0.7 CM
LEFT VENTRICULAR STROKE VOLUME: 81 ML
LVSV (TEICH): 81 ML
LYMPHOCYTES # BLD AUTO: 0.67 THOUSANDS/ÂΜL (ref 0.6–4.47)
LYMPHOCYTES NFR BLD AUTO: 6 % (ref 14–44)
MCH RBC QN AUTO: 32.5 PG (ref 26.8–34.3)
MCHC RBC AUTO-ENTMCNC: 33.3 G/DL (ref 31.4–37.4)
MCV RBC AUTO: 98 FL (ref 82–98)
MONOCYTES # BLD AUTO: 0.07 THOUSAND/ÂΜL (ref 0.17–1.22)
MONOCYTES NFR BLD AUTO: 1 % (ref 4–12)
MV E'TISSUE VEL-SEP: 13 CM/S
MV PEAK A VEL: 1.2 M/S
MV PEAK E VEL: 93 CM/S
MV STENOSIS PRESSURE HALF TIME: 57 MS
MV VALVE AREA P 1/2 METHOD: 3.86 CM2
NEUTROPHILS # BLD AUTO: 9.76 THOUSANDS/ÂΜL (ref 1.85–7.62)
NEUTS SEG NFR BLD AUTO: 93 % (ref 43–75)
NRBC BLD AUTO-RTO: 0 /100 WBCS
P AXIS: 70 DEGREES
PLATELET # BLD AUTO: 242 THOUSANDS/UL (ref 149–390)
PMV BLD AUTO: 10.4 FL (ref 8.9–12.7)
POTASSIUM SERPL-SCNC: 3.9 MMOL/L (ref 3.5–5.3)
PR INTERVAL: 132 MS
PROCALCITONIN SERPL-MCNC: <0.05 NG/ML
PROT SERPL-MCNC: 7.1 G/DL (ref 6.4–8.4)
QRS AXIS: 32 DEGREES
QRSD INTERVAL: 76 MS
QT INTERVAL: 360 MS
QTC INTERVAL: 462 MS
RBC # BLD AUTO: 4.19 MILLION/UL (ref 3.81–5.12)
RIGHT ATRIUM AREA SYSTOLE A4C: 11.4 CM2
RIGHT VENTRICLE ID DIMENSION: 3.1 CM
SL CV LEFT ATRIUM LENGTH A2C: 5.1 CM
SL CV LV EF: 60
SL CV PED ECHO LEFT VENTRICLE DIASTOLIC VOLUME (MOD BIPLANE) 2D: 108 ML
SL CV PED ECHO LEFT VENTRICLE SYSTOLIC VOLUME (MOD BIPLANE) 2D: 28 ML
SODIUM SERPL-SCNC: 143 MMOL/L (ref 135–147)
T WAVE AXIS: 35 DEGREES
TR MAX PG: 24 MMHG
TR PEAK VELOCITY: 2.4 M/S
TRICUSPID ANNULAR PLANE SYSTOLIC EXCURSION: 2.2 CM
TRICUSPID VALVE PEAK REGURGITATION VELOCITY: 2.44 M/S
VENTRICULAR RATE: 99 BPM
WBC # BLD AUTO: 10.57 THOUSAND/UL (ref 4.31–10.16)

## 2023-07-17 PROCEDURE — G1004 CDSM NDSC: HCPCS

## 2023-07-17 PROCEDURE — 97166 OT EVAL MOD COMPLEX 45 MIN: CPT

## 2023-07-17 PROCEDURE — 97163 PT EVAL HIGH COMPLEX 45 MIN: CPT

## 2023-07-17 PROCEDURE — 94760 N-INVAS EAR/PLS OXIMETRY 1: CPT

## 2023-07-17 PROCEDURE — 93306 TTE W/DOPPLER COMPLETE: CPT | Performed by: INTERNAL MEDICINE

## 2023-07-17 PROCEDURE — 84145 PROCALCITONIN (PCT): CPT | Performed by: STUDENT IN AN ORGANIZED HEALTH CARE EDUCATION/TRAINING PROGRAM

## 2023-07-17 PROCEDURE — 93010 ELECTROCARDIOGRAM REPORT: CPT | Performed by: INTERNAL MEDICINE

## 2023-07-17 PROCEDURE — 71250 CT THORAX DX C-: CPT

## 2023-07-17 PROCEDURE — NC001 PR NO CHARGE: Performed by: INTERNAL MEDICINE

## 2023-07-17 PROCEDURE — 85025 COMPLETE CBC W/AUTO DIFF WBC: CPT | Performed by: STUDENT IN AN ORGANIZED HEALTH CARE EDUCATION/TRAINING PROGRAM

## 2023-07-17 PROCEDURE — 80053 COMPREHEN METABOLIC PANEL: CPT | Performed by: STUDENT IN AN ORGANIZED HEALTH CARE EDUCATION/TRAINING PROGRAM

## 2023-07-17 PROCEDURE — 99238 HOSP IP/OBS DSCHRG MGMT 30/<: CPT | Performed by: INTERNAL MEDICINE

## 2023-07-17 PROCEDURE — 82948 REAGENT STRIP/BLOOD GLUCOSE: CPT

## 2023-07-17 PROCEDURE — 94640 AIRWAY INHALATION TREATMENT: CPT

## 2023-07-17 PROCEDURE — 93306 TTE W/DOPPLER COMPLETE: CPT

## 2023-07-17 PROCEDURE — 94664 DEMO&/EVAL PT USE INHALER: CPT

## 2023-07-17 PROCEDURE — 93005 ELECTROCARDIOGRAM TRACING: CPT

## 2023-07-17 PROCEDURE — 84484 ASSAY OF TROPONIN QUANT: CPT | Performed by: STUDENT IN AN ORGANIZED HEALTH CARE EDUCATION/TRAINING PROGRAM

## 2023-07-17 RX ORDER — BUDESONIDE AND FORMOTEROL FUMARATE DIHYDRATE 160; 4.5 UG/1; UG/1
2 AEROSOL RESPIRATORY (INHALATION) 2 TIMES DAILY
Qty: 10.2 G | Refills: 1 | Status: SHIPPED | OUTPATIENT
Start: 2023-07-17 | End: 2023-07-18

## 2023-07-17 RX ORDER — FORMOTEROL FUMARATE 20 UG/2ML
20 SOLUTION RESPIRATORY (INHALATION)
Status: DISCONTINUED | OUTPATIENT
Start: 2023-07-17 | End: 2023-07-17

## 2023-07-17 RX ORDER — BUDESONIDE AND FORMOTEROL FUMARATE DIHYDRATE 80; 4.5 UG/1; UG/1
2 AEROSOL RESPIRATORY (INHALATION) 2 TIMES DAILY
Status: DISCONTINUED | OUTPATIENT
Start: 2023-07-17 | End: 2023-07-17

## 2023-07-17 RX ORDER — PREDNISONE 20 MG/1
40 TABLET ORAL DAILY
Status: DISCONTINUED | OUTPATIENT
Start: 2023-07-17 | End: 2023-07-18 | Stop reason: HOSPADM

## 2023-07-17 RX ORDER — POTASSIUM CHLORIDE 20 MEQ/1
40 TABLET, EXTENDED RELEASE ORAL ONCE
Status: COMPLETED | OUTPATIENT
Start: 2023-07-17 | End: 2023-07-17

## 2023-07-17 RX ADMIN — METHYLPREDNISOLONE SODIUM SUCCINATE 40 MG: 40 INJECTION, POWDER, FOR SOLUTION INTRAMUSCULAR; INTRAVENOUS at 08:58

## 2023-07-17 RX ADMIN — ENOXAPARIN SODIUM 40 MG: 40 INJECTION SUBCUTANEOUS at 08:59

## 2023-07-17 RX ADMIN — INSULIN LISPRO 1 UNITS: 100 INJECTION, SOLUTION INTRAVENOUS; SUBCUTANEOUS at 09:04

## 2023-07-17 RX ADMIN — ACETAMINOPHEN 650 MG: 325 TABLET, FILM COATED ORAL at 17:08

## 2023-07-17 RX ADMIN — PREDNISONE 40 MG: 20 TABLET ORAL at 12:05

## 2023-07-17 RX ADMIN — IPRATROPIUM BROMIDE 0.5 MG: 0.5 SOLUTION RESPIRATORY (INHALATION) at 19:40

## 2023-07-17 RX ADMIN — BUDESONIDE 0.5 MG: 0.5 INHALANT RESPIRATORY (INHALATION) at 07:14

## 2023-07-17 RX ADMIN — AZITHROMYCIN MONOHYDRATE 250 MG: 250 TABLET ORAL at 23:13

## 2023-07-17 RX ADMIN — UMECLIDINIUM BROMIDE AND VILANTEROL TRIFENATATE 1 PUFF: 62.5; 25 POWDER RESPIRATORY (INHALATION) at 12:05

## 2023-07-17 RX ADMIN — POTASSIUM CHLORIDE 40 MEQ: 1500 TABLET, EXTENDED RELEASE ORAL at 08:59

## 2023-07-17 RX ADMIN — ATORVASTATIN CALCIUM 40 MG: 40 TABLET, FILM COATED ORAL at 08:59

## 2023-07-17 RX ADMIN — INSULIN LISPRO 1 UNITS: 100 INJECTION, SOLUTION INTRAVENOUS; SUBCUTANEOUS at 12:05

## 2023-07-17 RX ADMIN — INSULIN LISPRO 1 UNITS: 100 INJECTION, SOLUTION INTRAVENOUS; SUBCUTANEOUS at 17:05

## 2023-07-17 RX ADMIN — IPRATROPIUM BROMIDE 0.5 MG: 0.5 SOLUTION RESPIRATORY (INHALATION) at 13:42

## 2023-07-17 RX ADMIN — IPRATROPIUM BROMIDE 0.5 MG: 0.5 SOLUTION RESPIRATORY (INHALATION) at 07:14

## 2023-07-17 NOTE — UTILIZATION REVIEW
NOTIFICATION OF INPATIENT ADMISSION   AUTHORIZATION REQUEST   SERVICING FACILITY:   [facility]  Address: [address] [facility], [address] [address] [address]  Tax ID: [id number]  NPI: [id number] ATTENDING PROVIDER:  Attending Name and NPI#: Amol Curtis Md [[id number]]  Address: [address] [facility], [address] [place] [address]  Phone: [phone]   ADMISSION INFORMATION:  Place of Service: [facility]patient [facility]   Place of Service Code: 21  Inpatient Admission Date/Time: 7/16/23 10:31 PM  Discharge Date/Time: No discharge date for patient encounter. Admitting Diagnosis Code/Description:  Shortness of breath [R06.02]  Dyspnea [R06.00]  SOB (shortness of breath) [R06.02]  COPD exacerbation ([facility]) [J44.1]  COPD with acute exacerbation ([facility]) [J44.1]     UTILIZATION REVIEW CONTACT:  Neil Adkins Utilization   Network Utilization Review Department  Phone: [phone]  Fax: [phone]  Email: [email or web address]  Contact for approvals/pending authorizations, clinical reviews, and discharge. PHYSICIAN ADVISORY SERVICES:  Medical Necessity Denial & [facility] Review  Phone: [phone]  Fax: [phone]  Email: [email or web address]

## 2023-07-17 NOTE — PROGRESS NOTES
INTERNAL MEDICINE RESIDENCY PROGRESS NOTE     Name: Frank Kohler   Age & Sex: 61 y.o. female   MRN: [id number]  Unit/Bed#: [facility] 530-01   Encounter: [id number]  Team: SOD Team B     PATIENT INFORMATION     Name: Frank Kohler   Age & Sex: 61 y.o. female   MRN: [id number]  Hospital Stay Days: 1    ASSESSMENT/PLAN     Principal Problem:    Shortness of breath  Active Problems:    Diabetes mellitus ([address])    Anxiety    Centrilobular emphysema (HCC)    Class 2 severe obesity due to excess calories with serious comorbidity in adult [facility])    Mixed hyperlipidemia    Tobacco use    Hypertension    SIRS (systemic inflammatory response syndrome) (HCC)      * Shortness of breath  Assessment & Plan  Patient reporting of shortness of breath and dyspnea with exertion but has progressively become worse with recent hospitalizations including including an admission for community-acquired pneumonia and June 2023 followed by another ED visit with shortness of breath and multiple clinic visits due to related shortness of breath incidents and has been trialed on intermittent steroid therapies with somewhat improvement in her breathing. Patient has not had any PFTs done in the past and does not follow with pulmonary. She is a smoker, reports of smoking 5-6 cigarettes/day and started at the age of [age]. She reports of recently quitting following multiple hospitalizations for difficulty breathing. Denies any environmental or work chemical exposure. Does not have any pets. Does have carpet at home. Has reduced exercise tolerance with baseline being about 100 feet of walking before having to stop to catch her breath and now reduced to difficulty breathing with ambulating out of bed. Also reporting of change in sputum production, with baseline sputum that appears to be clear and currently a little cloudy. On exam, patient has very audible wheezing along with pulse ox desaturations to 85-87 percent as she talks.   I suspect her symptoms are likely related to her smoking history and she probably has COPD but she will need a formal evaluation before a definitive diagnosis can be made. She may also benefit from home oxygen evaluation eventually along with long-term follow-up with pulmonary.    -Started umeclidinium-vilanterol neb with first dose Mon 7/17  -On atrovent nebs  -Due to change in sputum production, receiving azithromycin: Day 2/5   -Decreased prednisone to 40 mg PO daily   -Albuterol inhaler and oral guaifenesin prn   -Chest CT demonstrated "Subtle upper lobe centrilobular emphysema."  -Maintain SpO2> 88%  -No formal PFTs on chart, will have patient f/u outpatient for testing and pulmonary management  -On respiratory protocol  -Airway clearance protocol  -PE ruled out (Wells score for PE=3, neg d-dimer)      Diabetes mellitus ([address])  Assessment & Plan  Lab Results   Component Value Date    HGBA1C 5.8 05/25/2022       No results for input(s): "POCGLU" in the last 72 hours. Blood Sugar Average: Last 72 hrs:  Patient with history of Diabetes Mellitus type 2, last HbA1c at 5.8. Plan  - Patient takes metformin 750 mg twice daily at home  - Currently on SSI, algorithm 1  - Patient will likely need up titration of SSI regimen due to ongoing steroid treatment  - Will adjust insulin regimen as necessary to maintain blood glucose goal 140-180 while inpatient  - Carb consistent diet, level 2  - POCT glucose checks  - Hypoglycemic protocol    Anxiety  Assessment & Plan  Patient with history of anxiety, on citalopram and hydroxyzine at baseline.  -She has QTc of 460 ms and will likely require further monitoring in the future if going to be maintained on citalopram since citalopram more likely to prolong QTc than other SSRIs  -Can use hydroxyzine on as-needed basis    SIRS (systemic inflammatory response syndrome) (HCC)  Assessment & Plan  Patient with HR > 100 and RR > 20 upon admission likely meeting SIRS criteria.  Suspect patients symptoms in the setting of bronchitis or possible COPD exacerbation. VS normalized Monday 7/17    -Likely resolved with treatment of COPD exacerbation, will continue to monitor  -Please refer to our plan under shortness of breath    Hypertension  Assessment & Plan  Noted to have systolics at 710 and 334J respectively at this admission. Likely has hypertension. /68 on Monday 7/17    -Can consider initiation of amlodipine low-dose 2.5 mg daily if BP increases     Tobacco use  Assessment & Plan  Long-term smoking history, 5-6 cigarettes/day, starting at age [age].  -As per the patient, she recently quit a few months ago    Mixed hyperlipidemia  Assessment & Plan      Lab Units 05/12/23  1421   HDL mg/dL 46*   LDL CALC mg/dL 47     Continue to monitor  On atorvastatin 40 mg daily    Class 2 severe obesity due to excess calories with serious comorbidity in [facility])  Assessment & Plan  Patient with BMI of 31.33 kg/m2 likely concerning for class II obesity.   -May benefit from outpatient lifestyle and diet counseling and further work-up from JORDI standpoint      Disposition: Home     SUBJECTIVE     Patient seen and examined this AM at the bedside. No acute events overnight per nursing staff and patient. Patient is tearful during the visit and states she is anxious about her new diagnosis of COPD. However, patient states she was feeling better after counseled on the plan to start new home medications. She admits she is still having increased yellow/green sputum production and cough. She does state that she feels less SOB than on admission. Her physical therapist states she was able to walk in the hallways this morning while maintaing sats in the low 90s with one episode of sat 89% SpO2. Patient is currently on RA which is her baseline oxygen requirement. Denies CP, dizziness, n/v, constipation or diarrhea. Patient has other complaints at this time.      OBJECTIVE     Vitals:    07/16/23 2342 07/17/23 0717 07/17/23 7567 23 0950   BP: 133/69  126/68 126/68   BP Location:   Right arm    Pulse: 102  89 95   Resp: 20  19    Temp: 98.4 °F (36.9 °C)  (!) 96.1 °F (35.6 °C)    TempSrc:   Oral    SpO2: (!) 89% 93% 98%    Weight:    73.9 kg (163 lb)   Height:    5' (1.524 m)      Temperature:   Temp (24hrs), Av.4 °F (36.3 °C), Min:96.1 °F (35.6 °C), Max:98.4 °F (36.9 °C)    Temperature: (!) 96.1 °F (35.6 °C)  Intake & Output:  I/O       07/15 0701   0700  0701   0700  0701   0700    Urine (mL/kg/hr)  400 300 (1)    Total Output  400 300    Net  -400 -300               Weights:   IBW (Ideal Body Weight): 45.5 kg    Body mass index is 31.83 kg/m². Weight (last 2 days)     Date/Time Weight    23 0950 73.9 (163)    23 2330 73.9 (163)          Physical Exam  Constitutional:       Appearance: Normal appearance. She is obese. HENT:      Head: Normocephalic and atraumatic. Nose: Nose normal.      Mouth/Throat:      Mouth: Mucous membranes are moist.      Pharynx: Oropharynx is clear. Eyes:      Extraocular Movements: Extraocular movements intact. Conjunctiva/sclera: Conjunctivae normal.      Pupils: Pupils are equal, round, and reactive to light. Cardiovascular:      Rate and Rhythm: Normal rate and regular rhythm. Pulses: Normal pulses. Heart sounds: Normal heart sounds. No murmur heard. No friction rub. No gallop. Pulmonary:      Breath sounds: Wheezing present. Comments: Patient with increased shortness of breath especially while talking or rising from sitting position. Bilateral upper and lower lung fields with expiratory wheezing. Yellow/green sputum production with cough  Abdominal:      General: Abdomen is flat. Bowel sounds are normal. There is no distension. Palpations: Abdomen is soft. Tenderness: There is no abdominal tenderness. Musculoskeletal:         General: Normal range of motion.       Cervical back: Normal range of motion and neck supple. Right lower leg: No edema. Left lower leg: No edema. Skin:     General: Skin is warm and dry. Capillary Refill: Capillary refill takes less than 2 seconds. Neurological:      General: No focal deficit present. Mental Status: She is alert and oriented to person, place, and time. Mental status is at baseline. Psychiatric:         Behavior: Behavior normal.         Thought Content: Thought content normal.         Judgment: Judgment normal.      Comments: Patient is anxious and tearful            LABORATORY DATA     Labs: I have personally reviewed pertinent reports. Results from last 7 days   Lab Units 07/17/23  0549 07/16/23  2300 07/16/23 2026   WBC Thousand/uL 10.57*  --  10.97*   HEMOGLOBIN g/dL 13.6  --  13.8   HEMATOCRIT % 40.9  --  41.9   PLATELETS Thousands/uL 242 210 233   NEUTROS PCT % 93*  --  62   MONOS PCT % 1*  --  6   EOS PCT % 0  --  8*      Results from last 7 days   Lab Units 07/17/23  0549 07/16/23 2026   POTASSIUM mmol/L 3.9 3.4*   CHLORIDE mmol/L 113* 109*   CO2 mmol/L 23 28   BUN mg/dL 9 9   CREATININE mg/dL 0.82 0.63   CALCIUM mg/dL 8.8 9.0   ALK PHOS U/L 54 54   ALT U/L 38 41   AST U/L 10 13                            IMAGING & DIAGNOSTIC TESTING     Radiology Results: I have personally reviewed pertinent reports. CT chest wo contrast    Result Date: 7/17/2023  Impression: Subtle upper lobe centrilobular emphysema. Mild diffuse bronchial wall thickening which could be due to smoking related bronchitis. Small hiatal hernia Mild coronary artery calcification indicating atherosclerotic heart disease. Workstation performed: [id number]     XR chest 2 views    Result Date: 7/17/2023  Impression: No acute cardiopulmonary disease. Findings are stable Workstation performed: [id number]     Other Diagnostic Testing: I have personally reviewed pertinent reports.     ACTIVE MEDICATIONS     Current Facility-Administered Medications   Medication Dose Route Frequency   • acetaminophen (TYLENOL) tablet 650 mg  650 mg Oral Q6H PRN   • albuterol (PROVENTIL HFA,VENTOLIN HFA) inhaler 1 puff  1 puff Inhalation Q4H PRN   • atorvastatin (LIPITOR) tablet 40 mg  40 mg Oral Daily   • azithromycin (ZITHROMAX) tablet 250 mg  250 mg Oral Q24H   • enoxaparin (LOVENOX) subcutaneous injection 40 mg  40 mg Subcutaneous Daily   • guaiFENesin (ROBITUSSIN) oral liquid 200 mg  200 mg Oral Q4H PRN   • insulin lispro (HumaLOG) 100 units/mL subcutaneous injection 1-5 Units  1-5 Units Subcutaneous TID AC   • ipratropium (ATROVENT) 0.02 % inhalation solution 0.5 mg  0.5 mg Nebulization TID   • predniSONE tablet 40 mg  40 mg Oral Daily   • umeclidinium-vilanterol 62.5-25 mcg/actuation inhaler 1 puff  1 puff Inhalation Daily       VTE Pharmacologic Prophylaxis: Enoxaparin  VTE Mechanical Prophylaxis: sequential compression device    Portions of the record may have been created with voice recognition software. Occasional wrong word or "sound a like" substitutions may have occurred due to the inherent limitations of voice recognition software.   Read the chart carefully and recognize, using context, where substitutions have occurred.  ==  [address] [place] [address]  Internal Medicine Sub-I

## 2023-07-17 NOTE — PHYSICAL THERAPY NOTE
Physical Therapy Evaluation     Patient's Name: Reanna Parker    Admitting Diagnosis  Shortness of breath [R06.02]  Dyspnea [R06.00]  SOB (shortness of breath) [R06.02]  COPD exacerbation (HCC) [J44.1]  COPD with acute exacerbation (HCC) [J44.1]    Problem List  Patient Active Problem List   Diagnosis    Meniere disease    Diabetes mellitus ([address])    Class 2 severe obesity due to excess calories with serious comorbidity in adult [facility])    Anxiety    Mixed hyperlipidemia    Chronic pain of both knees    Community acquired pneumonia of left lower lobe of lung    Tobacco use    Shortness of breath    Hypertension    SIRS (systemic inflammatory response syndrome) (HCC)    Centrilobular emphysema (HCC)       Past Medical History  Past Medical History:   Diagnosis Date    Diabetes mellitus ([address])     Sepsis ([address]) 6/10/2023    Vertigo        Past Surgical History  Past Surgical History:   Procedure Laterality Date    BREAST BIOPSY Bilateral 12/01/2021    stereo x2    BREAST BIOPSY Right 12/01/2021    MAMMO STEREOTACTIC BREAST BIOPSY LEFT (ALL INC) Left 12/1/2021    MAMMO STEREOTACTIC BREAST BIOPSY RIGHT (ALL INC) Right 12/1/2021    TUMOR REMOVAL      right leg- benign         07/17/23 0816   PT Last Visit   PT Visit Date 07/17/23   Note Type   Note type Evaluation  (and d/c inpt PT)   Pain Assessment   Pain Assessment Tool 0-10   Pain Score No Pain   Restrictions/Precautions   Weight Bearing Precautions Per Order No   Braces or Orthoses   (none)   Other Precautions Multiple lines   Home Living   Type of Home Apartment   Home Layout Stairs to enter with rails   Bathroom Shower/Tub Tub/shower unit   [address] [place]   (denies)   Additional Comments Prior to this admission patient resided alone in a one level apartment (FF to enter). At her baseline she is I with mobility (no use of AD), ADLS, and iADLS. +  and full time employment at desk job.    Prior Function   Level of [place] Independent with ADLs; Independent with functional mobility; Independent with IADLS   Lives With Alone   Receives Help From Family   IADLs Independent with driving; Independent with meal prep; Independent with medication management   Falls in the last 6 months 0   Vocational Full time employment   General   Additional Pertinent History 61 y.o. female admitted to [address] [place] [address] on 7/16/2023 with symptoms of: SOB. Patient with PNA 06/2023. Family/Caregiver Present No   Cognition   Overall Cognitive Status WFL   Subjective   Subjective "my breathing is getting better; I don't feel like I am hyper-ventilating anymore"   RUE Assessment   RUE Assessment WFL   LUE Assessment   LUE Assessment WFL   Bed Mobility   Supine to Sit 7  Independent   Sit to Supine Unable to assess   Additional Comments post evaluation patient OOB in chair   Transfers   Sit to Stand 7  Independent   Stand to Sit 7  Independent   Additional Comments no AD   Ambulation/Elevation   Gait pattern   (reduced gait speed, standing rest breaks)   Gait Assistance 5  Supervision   Assistive Device None   Distance 50 feet x 2 (standing rest break)   Balance   Static Sitting Normal   Static Standing Good   Ambulatory Good   Endurance Deficit   Endurance Deficit Yes   Endurance Deficit Description patient on RA. resting sat 92%. ambulating patient did briefly decrease to 89% however recovered to 92% with standing rest break. + DURAND (patient states improved); + coughing   Activity Tolerance   Activity Tolerance Patient tolerated treatment well   Nurse Made Aware venessa to see per RN Yareli   Assessment   Prognosis Good   Problem List Decreased endurance   Assessment PT completed evaluation of 61 y.o. female admitted to [address] [place] [address] on 7/16/2023 with symptoms of: SOB. Patient with PNA 06/2023. Patient's current status instabilities include ongoing SOB, dizziness, continuous O2/HR monitoring, and a regression in function from baseline.   PMH is significant for PNA, nicotine use, anxitety. HTN, and DM. Prior to this admission patient resided alone in a one level apartment (FF to enter). At her baseline she is I with mobility (no use of AD), ADLS, and iADLS. +  and full time employment at desk job. Current impairments include decreased activity tolerance and endurance, gait deviations, and reduced participation in iADLS tasks. During PT evaluation, patient currently is mod-I for for bed mobility and transfers and S level w/ increased time for ambulation. She walked 50 feet x 2 w/ 1 standing rest break (dizziness; DURAND). Gait speed is reduced. Patient was educated to perform shorter/more frequent walks (3-5x/day) rather than single, long walk in the setting of ongoing DURAND. Encouraged OOB to chair and discussed safe performance of stairs upon d/c (non-reciprocal w/ standing rest breaks). Patient has 2 supportive sons to assist with iADLs as needed. Patient denies questions/concerns about d/c home in regards to functional mobility. She does not present with further inpt PT needs and is recommended for d/c home when medically appropriate. Will d/c inpt PT.    Goals   Patient Goals for her breathing to improve   PT Treatment Day 0   Plan   PT Frequency Other (Comment)  (d/c inpt PT)   Recommendation   PT Discharge Recommendation No rehabilitation needs   Equipment Recommended   (none)   AM-PAC Basic Mobility Inpatient   Turning in Flat Bed Without Bedrails 4   Lying on Back to Sitting on Edge of Flat Bed Without Bedrails 4   Moving Bed to Chair 4   Standing Up From Chair Using Arms 4   Walk in Room 4   Climb 3-5 Stairs With Railing 4   Basic Mobility Inpatient Raw Score 24   Basic Mobility Standardized Score 57.68   Highest Level Of Mobility   JH-HLM Goal 8: Walk 250 feet or more   JH-HLM Achieved 7: Walk 25 feet or more     The patient's AM-PAC Basic Mobility Inpatient Standardized Score is greater than 42.9, suggesting this patient may benefit from discharge to home. Please also refer to the recommendation of the Physical Therapist for safe discharge planning        Zaynab Galdamez, PT, DPT

## 2023-07-17 NOTE — ASSESSMENT & PLAN NOTE
Patient with history of anxiety, on citalopram and hydroxyzine at baseline.  -She has QTc of 460 ms and will likely require further monitoring in the future if going to be maintained on citalopram since citalopram more likely to prolong QTc than other SSRIs  -Can use hydroxyzine on as-needed basis

## 2023-07-17 NOTE — ED ATTENDING ATTESTATION
Final Diagnoses:     1. Centrilobular emphysema ([address])    2. COPD exacerbation ([address])    3. COPD with acute exacerbation ([address])    4. SOB (shortness of breath)    5. Dyspnea      ED Course as of 07/17/23 1507   Sun Jul 16, 2023 2012 Procedure Note: EKG  Date/Time: 07/16/23 8:12 PM   Interpreted by: RENETTA Benson   Indications / Diagnosis: dyspnea  ECG reviewed by me, the ED Provider: yes   The EKG demonstrates:  Rhythm: Sinus tachycardia   Intervals: normal intervals  Axis: normal axis  QRS/Blocks: normal QRS  ST Changes: Diffuse ST depressions noted throughout. Terence Quezada MD, saw and evaluated the patient. All available labs and X-rays were ordered by me or the resident / non-physician and have been reviewed by myself. I discussed the patient with the resident / non-physician and agree with the resident's / non-physician practitioner's findings and plan as documented in the resident's / non-physician practicitioner's note, except where noted. At this point, I agree with the current assessment done in the ED. I was present during key portions of all procedures performed unless otherwise stated. Nursing Triage:     Chief Complaint   Patient presents with   • Shortness of Breath     Pt reports having pneumonia about a month ago and states her symptoms have been coming back since her medications were stopped. Pt reports feeling like she is having an asthma attack. Wheezing noted in triage. States she feels "warm"     HPI:   This is a 60 y/o F presenting for SOB. She was on a Z-ofelia recently possible pneumonia. Today though became dyspneic, couldn't catch her breath. Of ntoe, the patient presenting in early June, diagnosed with LLL PNA. She was admitted, DC-ed on 6/13. She was started on cefdinir. She then went home. At some point, her symptoms returned and her PCP started her on Azithromycin and steroids. It seemed like it helped her symptoms. They resumed.   She was told this could be a COPD exacerbation. No sick contacts   No dizzienss  She feels very unwell. Patient has new oxygen requirements currently on 5L NC with some symptomatic improvement. PMH: PNA, DM    ASSESSMENT + PLAN:   Dyspnea  - Given patient's concerns, will do a cardiac workup. - Will do an EKG for arrythmia, strain; troponin for same as per protocol for evaluation of ACS. - CBC for anemia; CMP for kidney function and electrolytes. - Will check CXR for pneumonia, PTX, fluid overload. - Should we pursue PE? I'm not sure. She has a story that really fits with pneumonia vs bronchitis. I think we should rule it out as an alterativey cause. This doesn't make sense. Could be repeated COPD Exacerabtions though. PE risk, Wells score = [1.5]   (0) clinically suspected DVT - 3 points   (0) alternative diagnosis is less likely than PE - 3.0 points   (1.5) tachycardia - 1.5 points   (0) immobilization/surgery in previous four weeks - 1.5 points   (0) history of DVT or PE - 1.5 points   (0) hemoptysis - 1.0 points   (0) malignancy (treatment for within 6 months, palliative) - 1.0 points   Interpretation Ivanna Barnes et al. 2007 Radiology 242:[id number]):   Score >6.0 - High (probability 59% based on pooled data)   Score 2.0 to 6.0 - Moderate (probability 29% based on pooled data)   Score <2.0 - Low (probability 15% based on pooled data)   HEART score:  History 0=Slightly or non-suspicious   ECG 2=Significant ST-depression   Age 1= > 45 - <65 years   Risk Factors 1= 1 or 2 risk factors   Troponin 0= Less than or equal to 12 ng/L   Total 4   - Disposition per workup. Past Medical History:   Diagnosis Date   • Diabetes mellitus ([address])    • Sepsis ([address]) 6/10/2023   • Vertigo      Physical:   General: VS reviewed  Appears unwell  awake, alert. Well-nourished, well-developed. Appears stated age. Speaking normally in full sentences. Head: Normocephalic, atraumatic  Eyes: EOM-I. No diplopia. No hyphema.    No subconjunctival hemorrhages. Symmetrical lids. ENT: Atraumatic external nose and ears. MMM  No malocclusion. No stridor. Normal phonation. No drooling. Normal swallowing. Neck: No JVD. CV: No pallor noted  Tachycardic -130  Chest wall tenderness mildly. Lungs:   tachypnea  +mild respiratory distress  Abd: soft nt nd no rebound/guarding  MSK:   FROM spontaneously  Skin: Dry, intact. Neuro: Awake, alert, GCS15, CN II-XII grossly intact. Motor grossly intact. Psychiatric/Behavioral: interacting normally; appropriate mood/affect.  Exam: deferred    Vitals:    07/17/23 0717 07/17/23 0722 07/17/23 0950 07/17/23 1345   BP:  126/68 126/68    BP Location:  Right arm     Pulse:  89 95    Resp:  19     Temp:  (!) 96.1 °F (35.6 °C)     TempSrc:  Oral     SpO2: 93% 98%  90%   Weight:   73.9 kg (163 lb)    Height:   5' (1.524 m)      - There are no obvious limitations to social determinants of care. - Nursing note reviewed. - Vitals reviewed. - Orders placed by myself and/or advanced practitioner / resident.    - Previous chart was reviewed  - No language barrier.   - History obtained from patient. - There are no limitations to the history obtained:     Critical Care Time:   - Critical care time: 33 minutes. - Critical care time was exclusive of seperately bilable procedures and treating other patients as well as teaching time.    - Critical care was necessary to treat or prevent imminent or life-threatening deterioration of the following condition: dyspnea  - Critical care time was spent personally by me on the following activities as well as the above as per the ED course and rest of chart: blood draw for specimens, obtaining history from patient / surrogate, developement of a treatment plan, discussions with consultants, evaluation of patient's response to the treatment, examination of the patient, ordering/performing treatements and interventions, re-evaluation of the patient's condition, review of old charts, ordering/reviewing laboratory studies and ordering/reviewing of radiographic studies    Past Medical:    has a past medical history of Diabetes mellitus ([address]), Sepsis ([address]) (6/10/2023), and Vertigo. Past Surgical:    has a past surgical history that includes Tumor removal; Mammo stereotactic breast biopsy right (all inc) (Right, 12/1/2021); Mammo stereotactic breast biopsy left (all inc) (Left, 12/1/2021); Breast biopsy (Bilateral, 12/01/2021); and Breast biopsy (Right, 12/01/2021). Social:     Social History     Substance and Sexual Activity   Alcohol Use Yes    Comment: socially      Social History     Tobacco Use   Smoking Status Former   • Packs/day: 0.25   • Types: Cigarettes   Smokeless Tobacco Never   Tobacco Comments    1-6 cigarettes a day     Social History     Substance and Sexual Activity   Drug Use Never       Echo:   No results found for this or any previous visit. No results found for this or any previous visit. Cath:    No results found for this or any previous visit.       Code Status: Level 1 - Full Code  Advance Directive and Living Will:      Power of :    POLST:    Medications   acetaminophen (TYLENOL) tablet 650 mg (has no administration in time range)   guaiFENesin (ROBITUSSIN) oral liquid 200 mg (200 mg Oral Given 7/16/23 2351)   ipratropium (ATROVENT) 0.02 % inhalation solution 0.5 mg (0.5 mg Nebulization Given 7/17/23 1342)   enoxaparin (LOVENOX) subcutaneous injection 40 mg (40 mg Subcutaneous Given 7/17/23 0859)   azithromycin (ZITHROMAX) tablet 500 mg (500 mg Oral Given 7/16/23 2351)     Followed by   azithromycin (ZITHROMAX) tablet 250 mg (has no administration in time range)   atorvastatin (LIPITOR) tablet 40 mg (40 mg Oral Given 7/17/23 0859)   albuterol (PROVENTIL HFA,VENTOLIN HFA) inhaler 1 puff (has no administration in time range)   insulin lispro (HumaLOG) 100 units/mL subcutaneous injection 1-5 Units (1 Units Subcutaneous Given 7/17/23 1205)   predniSONE tablet 40 mg (40 mg Oral Given 7/17/23 1205)   umeclidinium-vilanterol 62.5-25 mcg/actuation inhaler 1 puff (1 puff Inhalation Given 7/17/23 1205)   albuterol inhalation solution 10 mg (10 mg Nebulization Given 7/16/23 2027)     And   ipratropium (ATROVENT) 0.02 % inhalation solution 1 mg (1 mg Nebulization Given 7/16/23 2027)     And   sodium chloride 0.9 % inhalation solution 3 mL (3 mL Nebulization Given 7/16/23 2027)   magnesium sulfate 2 g/50 mL IVPB (premix) 2 g (0 g Intravenous Stopped 7/16/23 2109)   methylPREDNISolone sodium succinate (Solu-MEDROL) injection 125 mg (125 mg Intravenous Given 7/16/23 2029)   multi-electrolyte (ISOLYTE-S PH 7.4) bolus 1,000 mL (0 mL Intravenous Stopped 7/16/23 2258)   albuterol inhalation solution 10 mg (10 mg Nebulization Given 7/16/23 2125)   ipratropium (ATROVENT) 0.02 % inhalation solution 1 mg (1 mg Nebulization Given 7/16/23 2125)   sodium chloride 0.9 % inhalation solution 12 mL (12 mL Nebulization Given 7/16/23 2125)   potassium chloride (K-DUR,KLOR-CON) CR tablet 40 mEq (40 mEq Oral Given 7/17/23 0859)     CT chest wo contrast   Final Result      Subtle upper lobe centrilobular emphysema. Mild diffuse bronchial wall thickening which could be due to smoking related bronchitis. Small hiatal hernia      Mild coronary artery calcification indicating atherosclerotic heart disease. Workstation performed: [id number]         XR chest 2 views   Final Result      No acute cardiopulmonary disease.       Findings are stable            Workstation performed: [id number]           Orders Placed This Encounter   Procedures   • Blood culture #1   • Blood culture #2   • XR chest 2 views   • CT chest wo contrast   • CBC and differential   • Comprehensive metabolic panel   • HS Troponin 0hr (reflex protocol)   • B-Type Natriuretic Peptide(BNP)   • D-Dimer   • HS Troponin I 2hr   • HS Troponin I 4hr   • Procalcitonin, Next Day AM Collection   • Comprehensive metabolic panel   • CBC and differential   • Platelet count   • CBC and differential   • Basic metabolic panel   • Diet Jamal/CHO Controlled; Consistent Carbohydrate Diet Level 2 (5 carb servings/75 grams CHO/meal)   • Insert peripheral IV   • Continuous pulse oximetry   • Nursing communication Continue IV as ordered.    • Notify admitting physician   • Notify admitting physician on arrival   • Vital Signs per unit routine   • Up and OOB as tolerated   • I/O   • Tobacco cessation education   • Insert peripheral IV   • Maintain IV access   • Apply SCD or Foot pumps   • Insulin Subcutaneous Notify Physician   • Insulin Subcutaneous Instruction   • Hypoglycemia Protocol   • Fingerstick Glucose (POCT)   • Level 1-Full Code: all life saving measures are indicated   • Inpatient consult to Case Management   • OT eval and treat   • PT eval and treat   • Respiratory Protocol   • ECG 12 lead   • ECG 12 lead   • Echo complete w/ contrast if indicated   • INPATIENT ADMISSION   • Inpatient Admission     Labs Reviewed   CBC AND DIFFERENTIAL - Abnormal       Result Value Ref Range Status    WBC 10.97 (*) 4.31 - 10.16 Thousand/uL Final    RBC 4.31  3.81 - 5.12 Million/uL Final    Hemoglobin 13.8  11.5 - 15.4 g/dL Final    Hematocrit 41.9  34.8 - 46.1 % Final    MCV 97  82 - 98 fL Final    MCH 32.0  26.8 - 34.3 pg Final    MCHC 32.9  31.4 - 37.4 g/dL Final    RDW 12.9  11.6 - 15.1 % Final    MPV 10.1  8.9 - 12.7 fL Final    Platelets 205  415 - 390 Thousands/uL Final    nRBC 0  /100 WBCs Final    Neutrophils Relative 62  43 - 75 % Final    Immat GRANS % 0  0 - 2 % Final    Lymphocytes Relative 23  14 - 44 % Final    Monocytes Relative 6  4 - 12 % Final    Eosinophils Relative 8 (*) 0 - 6 % Final    Basophils Relative 1  0 - 1 % Final    Neutrophils Absolute 6.86  1.85 - 7.62 Thousands/µL Final    Immature Grans Absolute 0.04  0.00 - 0.20 Thousand/uL Final    Lymphocytes Absolute 2.50  0.60 - 4.47 Thousands/µL Final Monocytes Absolute 0.63  0.17 - 1.22 Thousand/µL Final    Eosinophils Absolute 0.88 (*) 0.00 - 0.61 Thousand/µL Final    Basophils Absolute 0.06  0.00 - 0.10 Thousands/µL Final   COMPREHENSIVE METABOLIC PANEL - Abnormal    Sodium 142  135 - 147 mmol/L Final    Potassium 3.4 (*) 3.5 - 5.3 mmol/L Final    Chloride 109 (*) 96 - 108 mmol/L Final    CO2 28  21 - 32 mmol/L Final    ANION GAP 5  mmol/L Final    BUN 9  5 - 25 mg/dL Final    Creatinine 0.63  0.60 - 1.30 mg/dL Final    Comment: Standardized to IDMS reference method    Glucose 123  65 - 140 mg/dL Final    Comment: If the patient is fasting, the ADA then defines impaired fasting glucose as > 100 mg/dL and diabetes as > or equal to 123 mg/dL. Specimen collection should occur prior to Sulfasalazine administration due to the potential for falsely depressed results. Specimen collection should occur prior to Sulfapyridine administration due to the potential for falsely elevated results. Calcium 9.0  8.3 - 10.1 mg/dL Final    AST 13  5 - 45 U/L Final    Comment: Specimen collection should occur prior to Sulfasalazine administration due to the potential for falsely depressed results. ALT 41  12 - 78 U/L Final    Comment: Specimen collection should occur prior to Sulfasalazine and/or Sulfapyridine administration due to the potential for falsely depressed results. Alkaline Phosphatase 54  46 - 116 U/L Final    Total Protein 7.3  6.4 - 8.4 g/dL Final    Albumin 3.9  3.5 - 5.0 g/dL Final    Total Bilirubin 0.50  0.20 - 1.00 mg/dL Final    Comment: Use of this assay is not recommended for patients undergoing treatment with eltrombopag due to the potential for falsely elevated results.     eGFR 95  ml/min/1.73sq m Final    Narrative:     [place] guidelines for Chronic Kidney Disease (CKD):   •  Stage 1 with normal or high GFR (GFR > 90 mL/min/1.73 square meters)  •  Stage 2 Mild CKD (GFR = 60-89 mL/min/1.73 square meters)  •  Stage 3A Moderate CKD (GFR = 45-59 mL/min/1.73 square meters)  •  Stage 3B Moderate CKD (GFR = 30-44 mL/min/1.73 square meters)  •  Stage 4 Severe CKD (GFR = 15-29 mL/min/1.73 square meters)  •  Stage 5 End Stage CKD (GFR <15 mL/min/1.73 square meters)  Note: GFR calculation is accurate only with a steady state creatinine   HS TROPONIN I 0HR - Normal    hs TnI 0hr 4  "Refer to ACS Flowchart"- see link ng/L Final    Comment:                                              Initial (time 0) result  If >=50 ng/L, Myocardial injury suggested ;  Type of myocardial injury and treatment strategy  to be determined. If 5-49 ng/L, a delta result at 2 hours and or 4 hours will be needed to further evaluate. If <4 ng/L, and chest pain has been >3 hours since onset, patient may qualify for discharge based on the HEART score in the ED. If <5 ng/L and <3hours since onset of chest pain, a delta result at 2 hours will be needed to further evaluate. HS Troponin 99th Percentile URL of a Health Population=12 ng/L with a 95% Confidence Interval of 8-18 ng/L. Second Troponin (time 2 hours)  If calculated delta >= 20 ng/L,  Myocardial injury suggested ; Type of myocardial injury and treatment strategy to be determined. If 5-49 ng/L and the calculated delta is 5-19 ng/L, consult medical service for evaluation. Continue evaluation for ischemia on ecg and other possible etiology and repeat hs troponin at 4 hours. If delta is <5 ng/L at 2 hours, consider discharge based on risk stratification via the HEART score (if in ED), or SONU risk score in IP/Observation. HS Troponin 99th Percentile URL of a Health Population=12 ng/L with a 95% Confidence Interval of 8-18 ng/L.   B-TYPE NATRIURETIC PEPTIDE (BNP) - Normal    BNP 11  0 - 100 pg/mL Final   D-DIMER, QUANTITATIVE - Normal    D-Dimer, Quant 0.28  <0.50 ug/ml FEU Final    Comment: Reference and upper limits to exclude DVT and PE are the same.   Do not use to exclude if clinical symptoms are present. Pregnant women:  1st trimester:  <0.22 - 1.06 ug/ml FEU  2nd trimester:  <0.22 - 1.88 ug/ml FEU  3rd trimester:   0.24 - 3.28 ug/ml FEU    Note: Normal ranges may not apply to patients who are transgender, non-binary, or whose legal sex, sex at birth, and gender identity differ. Narrative: In the evaluation for possible pulmonary embolism, in the appropriate (Well's Score of 4 or less) patient, the age adjusted d-dimer cutoff for this patient can be calculated as:    Age x 0.01 (in ug/mL) for Age-adjusted D-dimer exclusion threshold for a patient over 50 years. PLATELET COUNT - Normal    Platelets 814  209 - 390 Thousands/uL Final    MPV 9.9  8.9 - 12.7 fL Final     Time reflects when diagnosis was documented in both MDM as applicable and the Disposition within this note     Time User Action Codes Description Comment    7/16/2023 10:30 PM Delmyjulius Weller Add [J44.1] COPD exacerbation ([address])     7/16/2023 10:30 PM Delmy Janee Add [J44.1] COPD with acute exacerbation ([address])     7/16/2023 10:30 PM Hari Waters Add [R06.02] SOB (shortness of breath)     7/16/2023 10:30 PM Delmy Weller Add [R06.00] Dyspnea     7/17/2023 10:36 AM Kaelyn Godoy Add [J43.2] Centrilobular emphysema ([address])     7/17/2023 10:36 AM Kaelyn Godoy Modify [J44.1] COPD exacerbation [facility])       ED Disposition     ED Disposition   Admit    Condition   Stable    Date/Time   Sun Jul 16, 2023 10:30 PM    Comment   Case was discussed with Dr. Christian Damon  and the patient's admission status was agreed to be Admission Status: inpatient status to the service of Dr. Tiffanie Monaco .            Follow-up Information     Follow up With Specialties Details Why Contact Info Additional [address] [facility] Pulmonology Call in 1 week(s)  [address] [facility] [address] [phone] [facility] [address] [facility], [address] [place], [place], [address] [phone] Current Discharge Medication List      START taking these medications    Details   budesonide-formoterol (Symbicort) 160-4.5 mcg/act inhaler Inhale 2 puffs 2 (two) times a day Rinse mouth after use. Qty: 10.2 g, Refills: 1    Associated Diagnoses: COPD exacerbation ([address]); SOB (shortness of breath); Dyspnea; Centrilobular emphysema ([address])         CONTINUE these medications which have NOT CHANGED    Details   albuterol (PROVENTIL HFA,VENTOLIN HFA) 90 mcg/act inhaler TAKE 2 PUFFS BY MOUTH EVERY 4 HOURS AS NEEDED FOR WHEEZE  Qty: 6.7 g, Refills: 3    Comments: Substitution to a formulary equivalent within the same pharmaceutical class is authorized. Associated Diagnoses: Acute bronchitis      atorvastatin (LIPITOR) 40 mg tablet Take 1 tablet (40 mg total) by mouth daily  Qty: 90 tablet, Refills: 1    Associated Diagnoses: Type 2 diabetes mellitus with other specified complication, without long-term current use of insulin ([facility])      guaiFENesin (MUCINEX) 600 mg 12 hr tablet Take 1 tablet (600 mg total) by mouth every 12 (twelve) hours  Qty: 30 tablet, Refills: 0    Associated Diagnoses: Pneumonia      metFORMIN (GLUCOPHAGE-XR) 750 mg 24 hr tablet TAKE 2 TABLETS BY MOUTH EVERY DAY WITH DINNER  Qty: 180 tablet, Refills: 3    Associated Diagnoses: Type 2 diabetes mellitus with other specified complication, without long-term current use of insulin ([facility])      naproxen (Naprosyn) 500 mg tablet Take 1 tablet (500 mg total) by mouth 2 (two) times a day with meals  Qty: 60 tablet, Refills: 2    Associated Diagnoses: Chronic pain of both knees      predniSONE 20 mg tablet Take 3 tablets for 4 days then two tablets for 3 days then one tablet for 3 days.   Qty: 21 tablet, Refills: 0    Associated Diagnoses: Community acquired pneumonia of left lower lobe of lung      glucose blood (FREESTYLE LITE) test strip Use as instructed  Qty: 100 each, Refills: 5    Associated Diagnoses: Type 2 diabetes mellitus with other specified complication, without long-term current use of insulin ([facility])      glucose monitoring kit (FREESTYLE) monitoring kit Apply 1 m topically 4 (four) times a day as needed Use as directed  Qty: 1 each, Refills: 0    Associated Diagnoses: Type 2 diabetes mellitus with other specified complication, without long-term current use of insulin ([facility])      Lancets (freestyle) lancets Use as instructed  Qty: 100 each, Refills: 3    Comments: Please dispense Freestyle lite lancets  Associated Diagnoses: Type 2 diabetes mellitus with other specified complication, without long-term current use of insulin ([facility])      Probiotic Product (PRO-BIOTIC BLEND PO) Take 1 tablet by mouth daily      triamterene-hydrochlorothiazide (DYAZIDE) 37.5-25 mg per capsule Take 1 capsule by mouth daily  Qty: 90 capsule, Refills: 1    Associated Diagnoses: Meniere's disease, unspecified laterality           No discharge procedures on file. Prior to Admission Medications   Prescriptions Last Dose Informant Patient Reported? Taking?    Lancets (freestyle) lancets Unknown  No No   Sig: Use as instructed   Probiotic Product (PRO-BIOTIC BLEND PO) Unknown  Yes No   Sig: Take 1 tablet by mouth daily   albuterol (PROVENTIL HFA,VENTOLIN HFA) 90 mcg/act inhaler 7/16/2023  No Yes   Sig: TAKE 2 PUFFS BY MOUTH EVERY 4 HOURS AS NEEDED FOR WHEEZE   atorvastatin (LIPITOR) 40 mg tablet 7/15/2023  No Yes   Sig: Take 1 tablet (40 mg total) by mouth daily   glucose blood (FREESTYLE LITE) test strip Unknown  No No   Sig: Use as instructed   glucose monitoring kit (FREESTYLE) monitoring kit Unknown  No No   Sig: Apply 1 m topically 4 (four) times a day as needed Use as directed   guaiFENesin (MUCINEX) 600 mg 12 hr tablet 7/15/2023  No Yes   Sig: Take 1 tablet (600 mg total) by mouth every 12 (twelve) hours   metFORMIN (GLUCOPHAGE-XR) 750 mg 24 hr tablet 7/15/2023  No Yes   Sig: TAKE 2 TABLETS BY MOUTH EVERY DAY WITH DINNER   naproxen (Naprosyn) 500 mg tablet 7/15/2023  No Yes Sig: Take 1 tablet (500 mg total) by mouth 2 (two) times a day with meals   predniSONE 20 mg tablet 7/15/2023  No Yes   Sig: Take 3 tablets for 4 days then two tablets for 3 days then one tablet for 3 days. triamterene-hydrochlorothiazide (DYAZIDE) 37.5-25 mg per capsule   No No   Sig: Take 1 capsule by mouth daily      Facility-Administered Medications: None                        Portions of the record may have been created with voice recognition software. Occasional wrong word or "sound a like" substitutions may have occurred due to the inherent limitations of voice recognition software. Read the chart carefully and recognize, using context, where substitutions have occurred.     Electronically signed by:  Willene Prader

## 2023-07-17 NOTE — PLAN OF CARE
Problem: INFECTION - ADULT  Goal: Absence or prevention of progression during hospitalization  Description: INTERVENTIONS:  - Assess and monitor for signs and symptoms of infection  - Monitor lab/diagnostic results  - Monitor all insertion sites, i.e. indwelling lines, tubes, and drains  - Monitor endotracheal if appropriate and nasal secretions for changes in amount and color  - [place] appropriate cooling/warming therapies per order  - Administer medications as ordered  - Instruct and encourage patient and family to use good hand hygiene technique  - Identify and instruct in appropriate isolation precautions for identified infection/condition  Outcome: Progressing  Goal: Absence of fever/infection during neutropenic period  Description: INTERVENTIONS:  - Monitor WBC    Outcome: Progressing     Problem: SAFETY ADULT  Goal: Patient will remain free of falls  Description: INTERVENTIONS:  - Educate patient/family on patient safety including physical limitations  - Instruct patient to call for assistance with activity   - Consult OT/PT to assist with strengthening/mobility   - Keep Call bell within reach  - Keep bed low and locked with side rails adjusted as appropriate  - Keep care items and personal belongings within reach  - Initiate and maintain comfort rounds  - Make Fall Risk Sign visible to staff  - Offer Toileting every  Hours, in advance of need  - Initiate/Maintain alarm  - Obtain necessary fall risk management equipment:   - Apply yellow socks and bracelet for high fall risk patients  - Consider moving patient to room near nurses station  Outcome: Progressing  Goal: Maintain or return to baseline ADL function  Description: INTERVENTIONS:  -  Assess patient's ability to carry out ADLs; assess patient's baseline for ADL function and identify physical deficits which impact ability to perform ADLs (bathing, care of mouth/teeth, toileting, grooming, dressing, etc.)  - Assess/evaluate cause of self-care deficits   - Assess range of motion  - Assess patient's mobility; develop plan if impaired  - Assess patient's need for assistive devices and provide as appropriate  - Encourage maximum independence but intervene and supervise when necessary  - Involve family in performance of ADLs  - Assess for home care needs following discharge   - Consider OT consult to assist with ADL evaluation and planning for discharge  - Provide patient education as appropriate  Outcome: Progressing  Goal: Maintains/Returns to pre admission functional level  Description: INTERVENTIONS:  - Perform BMAT or MOVE assessment daily.   - Set and communicate daily mobility goal to care team and patient/family/caregiver. - Collaborate with rehabilitation services on mobility goals if consulted  - Perform Range of Motion  times a day. - Reposition patient every hours. - Dangle patient  times a day  - Stand patient  times a day  - Ambulate patient  times a day  - Out of bed to chair times a day   - Out of bed for meals times a day  - Out of bed for toileting  - Record patient progress and toleration of activity level   Outcome: Progressing     Problem: Knowledge Deficit  Goal: Patient/family/caregiver demonstrates understanding of disease process, treatment plan, medications, and discharge instructions  Description: Complete learning assessment and assess knowledge base.   Interventions:  - Provide teaching at level of understanding  - Provide teaching via preferred learning methods  Outcome: Progressing     Problem: RESPIRATORY - ADULT  Goal: Achieves optimal ventilation and oxygenation  Description: INTERVENTIONS:  - Assess for changes in respiratory status  - Assess for changes in mentation and behavior  - Position to facilitate oxygenation and minimize respiratory effort  - Oxygen administered by appropriate delivery if ordered  - Initiate smoking cessation education as indicated  - Encourage broncho-pulmonary hygiene including cough, deep breathe, Incentive Spirometry  - Assess the need for suctioning and aspirate as needed  - Assess and instruct to report SOB or any respiratory difficulty  - Respiratory Therapy support as indicated  Outcome: Progressing

## 2023-07-17 NOTE — PROGRESS NOTES
Pastoral Care Progress Note    2023  Patient: Estela Duckworth : 1960  Admission Date & Time: 2023  MRN: [id number] CSN: [id number]         23 1500   Clinical Encounter Type   Visited With Patient   Quaker Encounters   Quaker Needs Prayer     Fr Yue Perera met with the pt and provided prayers and blessings. No further needs were expressed at this time. Chaplains still remain available.

## 2023-07-17 NOTE — ASSESSMENT & PLAN NOTE
Lab Results   Component Value Date    HGBA1C 5.8 05/25/2022       No results for input(s): "POCGLU" in the last 72 hours. Blood Sugar Average: Last 72 hrs:  Patient with history of Diabetes Mellitus type 2, last HbA1c at 5.8.    Plan  - Continue metformin 750 mg twice daily at home  - low carb diet

## 2023-07-17 NOTE — ASSESSMENT & PLAN NOTE
Noted to have systolics at 578 and 051E respectively at this admission. Likely has hypertension.   /68 on Monday 7/17, 121/77 on Tuesday 7/18    -resolved while inpatient, however continue to monitor as outpatient

## 2023-07-17 NOTE — OCCUPATIONAL THERAPY NOTE
Occupational Therapy Evaluation     Patient Name: Cecilia Cheney  JLCDZ'X Date: 7/17/2023  Problem List  Principal Problem:    Shortness of breath  Active Problems:    Diabetes mellitus ([address])    Class 2 severe obesity due to excess calories with serious comorbidity in adult [facility])    Anxiety    Mixed hyperlipidemia    Tobacco use    Hypertension    SIRS (systemic inflammatory response syndrome) (HCC)    Centrilobular emphysema (HCC)    Past Medical History  Past Medical History:   Diagnosis Date    Diabetes mellitus ([address])     Sepsis ([address]) 6/10/2023    Vertigo      Past Surgical History  Past Surgical History:   Procedure Laterality Date    BREAST BIOPSY Bilateral 12/01/2021    stereo x2    BREAST BIOPSY Right 12/01/2021    MAMMO STEREOTACTIC BREAST BIOPSY LEFT (ALL INC) Left 12/1/2021    MAMMO STEREOTACTIC BREAST BIOPSY RIGHT (ALL INC) Right 12/1/2021    TUMOR REMOVAL      right leg- benign          07/17/23 0800   OT Last Visit   OT Visit Date 07/17/23   Note Type   Note type Evaluation   Pain Assessment   Pain Assessment Tool 0-10   Pain Score No Pain   Patient's Stated Pain Goal No pain   Hospital Pain Intervention(s) Repositioned; Ambulation/increased activity; Emotional support   Restrictions/Precautions   Weight Bearing Precautions Per Order No   Other Precautions Multiple lines   Home Living   Type of Home Apartment   Home Layout Able to live on main level with bedroom/bathroom;Stairs to enter with rails  (FF TO ENTER 2ND FLOOR APT)   Bathroom Shower/Tub Tub/shower unit   Bathroom Toilet Standard   Additional Comments NO USE OF DME AT BASELINE   Prior Function   Level of [place] Independent with ADLs; Independent with functional mobility; Independent with IADLS   Lives With Alone   Receives Help From Family   IADLs Independent with driving; Independent with meal prep; Independent with medication management   Falls in the last 6 months 0   Vocational Full time employment   Lifestyle   [employer] PT REPORTS BEING I WITH ADLS/IADLS/DRIVING PTA   Reciprocal Relationships LIVES ALONE. SUPPORTIVE SONS ARE ABLE TO CHECK IN AS NEEDED   Service to Others WORKS FULL TIME; OFFICE JOB   Intrinsic Gratification ENJOYS WORKING   Subjective   Subjective "MY BREATHING IS BETTER AFTER MY BREATHING TREATMENT"   ADL   Eating Assistance 7  Independent   Grooming Assistance 7  Independent   UB Bathing Assistance 5  Supervision/Setup   LB Bathing Assistance 5  Supervision/Setup   UB Dressing Assistance 5  Supervision/Setup   LB Dressing Assistance 5  Supervision/Setup   Toileting Assistance  5  Supervision/Setup   Functional Assistance 5  Supervision/Setup   Bed Mobility   Supine to Sit 7  Independent   Transfers   Sit to Stand 6  Modified independent   Stand to Sit 6  Modified independent   Functional Mobility   Functional Mobility 6  Modified independent   Additional Comments INCREASED TIME COMPARED TO BASELINE. 1 REST BREAK REQUIRED   Balance   Static Sitting Normal   Static Standing Good   Ambulatory Good   Activity Tolerance   Activity Tolerance Patient tolerated treatment well;Patient limited by fatigue   Medical Staff Made Aware PT SEEN FOR CO-SESSION WITH SKILLED PHYSICAL THERAPIST 2' CLINICALLY UNSTABLE PRESENTATION, NEW PRECAUTIONS/LIMITATIONS, LIMITED ACTIVITY TOLERANCE AND PRESENT IMPAIRMENTS WHICH ARE A REGRESSION FROM THE PT'S BASELINE AND IMPACTING OVERALL OCCUPATIONAL PERFORMANCE. Nurse Made Aware APPROPRIATE TO SEE   RUE Assessment   RUE Assessment WFL   LUE Assessment   LUE Assessment WFL   Hand Function   Gross Motor Coordination Functional   Fine Motor Coordination Functional   Cognition   Overall Cognitive Status WFL   Arousal/Participation Alert; Cooperative   Attention Within functional limits   Orientation Level Oriented X4   Memory Within functional limits   Following Commands Follows all commands and directions without difficulty   Comments PT IS PLEASANT AND COOPERATIVE.    Assessment   Assessment 62 YO Female SEEN FOR INITIAL OCCUPATIONAL THERAPY EVALUATION FOLLOWING ADMISSION TO [facility] WITH SOB AND DURAND FOLLOWING PNA DX. PROBLEMS LIST/PMH INCLUDES Diabetes mellitus ([address] ), Sepsis ([address] ), and Vertigo. PT IS FROM A 2ND FLOOR APT ALONE WHERE SHE REPORTS BEING INDEPENDENT WITH ADLS/IADLS/DRIVING PTA. PT CURRENTLY FUNCTIONING AT AN OVERALL SUPERVISION-INDEPENDENT LEVEL WITH ADLS, TRANSFERS AND FUNCTIONAL MOBILITY WITHOUT USE OF AD. PT WITH +SOB WITH TALKING AND ACTIVITY WITH O2 SATS RANGING FROM 89%-93% ON RA T/O SESSION. PT IS EXPERIENCING EXPECTED LIMITATIONS 2' FATIGUE, IMPAIRED BALANCE, OVERALL WEAKNESS/DECONDITIONING , DIZZINESS WITH CHANGE OF POSITIONING , SOB and OVERALL LIMITED ACTIVITY TOLERANCE. PT EDUCATED ON DEEP BREATHING TECHNIQUES T/O ACTIVITY, SLOWING OF PACE, ENERGY CONSERVATION TECHNIQUES FOR CARRY OVER UPON D/C, INCREASED FAMILY SUPPORT and CONTINUE PARTICIPATION IN SELF-CARE/MOBILITY WITH STAFF [facility] [employer] . The patient's raw score on the AM-PAC Daily Activity Inpatient Short Form is 20. A raw score of greater than or equal to 19 suggests the patient may benefit from discharge to home. Please refer to the recommendation of the Occupational Therapist for safe discharge planning. FROM AN OCCUPATIONAL THERAPY PERSPECTIVE, PT CAN RETURN HOME WITH INCREASED FAMILY SUPPORT WHEN MEDICALLY CLEARED. DME RECS INCLUDE SC PRN. ALL QUESTIONS/CONCERNS ADDRESSED AT THIS TIME. NO ADDITIONAL ACUTE CARE OT NEEDS. D/C OT.    Goals   Patient Goals TO GET BETTER   Recommendation   OT Discharge Recommendation No rehabilitation needs   Equipment Recommended Shower/Tub chair with back ($)   AM-PAC Daily Activity Inpatient   Lower Body Dressing 3   Bathing 3   Toileting 3   Upper Body Dressing 3   Grooming 4   Eating 4   Daily Activity Raw Score 20   Daily Activity Standardized Score (Calc for Raw Score >=11) 42.03   AM-PAC Applied Cognition Inpatient   Following a Speech/Presentation 4   Understanding Ordinary Conversation 4   Taking Medications 4   Remembering Where Things Are Placed or Put Away 4   Remembering List of 4-5 Errands 4   Taking Care of Complicated Tasks 4   Applied Cognition Raw Score 24   Applied Cognition Standardized Score 62.21   Modified [place] Scale   Modified [place] Scale 2       Documentation completed by VANITA Pérez, OTR/L  [facility] OF THE [facility] Certified ID# [id number]

## 2023-07-17 NOTE — ASSESSMENT & PLAN NOTE
Long-term smoking history, 5-6 cigarettes/day, starting at age [age].  -As per the patient, she recently quit a few months ago

## 2023-07-17 NOTE — DISCHARGE INSTR - AVS FIRST PAGE
Please complete out patient pulmonary function tests prior seeing pulmonologist. Call the Pulmonary office (number below) to make an appoinment to establish care with them. Take the new inhaler umeclidinium-vilanterol twice daily, use your rescue inhaler (albuterol) only as needed every 4 hours.    Make an appointment with your primary care doctor in 7-10 days

## 2023-07-17 NOTE — UTILIZATION REVIEW
Initial Clinical Review    Admission: Date/Time/Statement:   Admission Orders (From admission, onward)     Ordered        07/16/23 2231  INPATIENT ADMISSION  Once                      Orders Placed This Encounter   Procedures   • INPATIENT ADMISSION     Standing Status:   Standing     Number of Occurrences:   1     Order Specific Question:   Level of Care     Answer:   Med Surg [16]     Order Specific Question:   Estimated length of stay     Answer:   More than 2 Midnights     Order Specific Question:   Certification     Answer:   I certify that inpatient services are medically necessary for this patient for a duration of greater than two midnights. See H&P and MD Progress Notes for additional information about the patient's course of treatment. ED Arrival Information     Expected   -    Arrival   7/16/2023 19:48    Acuity   Emergent            Means of arrival   Wheelchair    Escorted by   Friend    Service   SOD-B Medicine    Admission type   Emergency            Arrival complaint   sob, chest pain, possbile ashtma attack           Chief Complaint   Patient presents with   • Shortness of Breath     Pt reports having pneumonia about a month ago and states her symptoms have been coming back since her medications were stopped. Pt reports feeling like she is having an asthma attack. Wheezing noted in triage. States she feels "warm"       Initial Presentation: 61 y.o. female with PMH of DM, HTN, HLD, former smoker, Ménière's disease, recurrent bronchitis, and recent pneumonia presented to the ED from home w/ SOB. Pt started to have SOB 2 days ago. It progressed to inability to walk even 1 step d/t severe SOB and audible wheezing w/o relief from Albuterol. Also w/ increased coughing w/ cloudy sputum. She becomes lightheaded and diaphoretic during coughing episodes. She becomes SOB even w/ talking. Using several pillows to sleep at night. In the ED, , RR 24, /82. , K3.4. WBC 10.97. D-dimer neg. Cardiac trops neg. EKG showing sinus tachycardia and prolonged QTc. Chest x-ray with no acute cardiopulmonary disease and resolution of pneumonia findings from prior. On exam, aaox3, anxious,diaphoretic, tachycardia, expiratory wheezes audible across room (and diffusely on auscultation). She is in [address] [facility] position. De-sats to high 80s and becomes more SOB while talking. Received DuoNebs x2, Solu-Medrol 125mg, and 1 L bolus of Isolyte IV fluids. Admitted as Inpatient for SOB, SIRS. Plan: IV solumedrol 40 mg Q8H. Cont nebs. initiate azithromycin at this time, monitor QTCs. CT chest. Maintain SpO2> 88%. respiratory protocol. Airway clearance protocol    Date: 07/17 Day 2: Pt reports still having increased yellow/green sputum production and cough. SOB improving. Chest CT demonstrated "Subtle upper lobe centrilobular emphysema."  Started umeclidinium-vilanterol neb with first dose Mon 7/17. Cont Atrovent. Decrease prednisone to 40 mg po daily. Albuterol inhaler and oral guaifenesin prn. Maintain SpO2> 88%. resp protocol. will likely need up titration of SSI regimen due to ongoing steroid treatment. adjust insulin regimen as necessary to maintain blood glucose goal 140-180 while inpatient. PE: Pt aaox3, anxious and tearful. increased shortness of breath especially while talking or rising from sitting position. Bilateral upper and lower lung fields with expiratory wheezing.  Yellow/green sputum production with cough    ED Triage Vitals   Temperature Pulse Respirations Blood Pressure SpO2   07/16/23 1954 07/16/23 1954 07/16/23 1954 07/16/23 1954 07/16/23 1954   97.7 °F (36.5 °C) (!) 131 (!) 24 164/82 93 %      Temp Source Heart Rate Source Patient Position - Orthostatic VS BP Location FiO2 (%)   07/16/23 1954 07/16/23 1954 07/16/23 1954 07/16/23 1954 --   Oral Monitor Sitting Left arm       Pain Score       07/16/23 2330       No Pain          Wt Readings from Last 1 Encounters:   07/17/23 73.9 kg (163 lb) Additional Vital Signs:   Date/Time Temp Pulse Resp BP MAP (mmHg) SpO2 O2 Device Patient Position - Orthostatic VS   07/17/23 15:37:54 99.3 °F (37.4 °C) 108 Abnormal  19 137/74 95 90 % -- --   07/17/23 1345 -- -- -- -- -- 90 % -- --   07/17/23 0950 -- 95 -- 126/68 -- -- -- --   07/17/23 07:22:39 96.1 °F (35.6 °C) Abnormal  89 19 126/68 87 98 % None (Room air) Lying   07/17/23 0717 -- -- -- -- -- 93 % -- --   07/16/23 23:42:15 98.4 °F (36.9 °C) 102 20 133/69 90 89 % Abnormal  -- --   07/16/23 2330 -- -- -- -- -- -- None (Room air) --   07/16/23 2210 -- 100 22 142/76 102 99 % None (Room air) Lying       Pertinent Labs/Diagnostic Test Results:   CT chest wo contrast   Final Result by Dominguez Bardales MD (07/17 0302)      Subtle upper lobe centrilobular emphysema. Mild diffuse bronchial wall thickening which could be due to smoking related bronchitis. Small hiatal hernia      Mild coronary artery calcification indicating atherosclerotic heart disease. Workstation performed: [id number]         XR chest 2 views   ED Interpretation by Jason Jalloh MD (07/17 0404)   No pneumonia. Final Result by Sarah Saul MD (07/17 5016)      No acute cardiopulmonary disease. Findings are stable            Workstation performed: [id number]           07/16 EKG result: Sinus tachycardia  Low voltage QRS  Nonspecific ST abnormality    07/17 ECHO:   •  Left Ventricle: Left ventricular cavity size is normal. Wall thickness is normal. The left ventricular ejection fraction is 60%.  Systolic function is normal. Wall motion is normal. Diastolic function is normal.  •  Right Ventricle: Right ventricular cavity size is normal. Systolic function is normal.          Results from last 7 days   Lab Units 07/17/23  0549 07/16/23  2300 07/16/23 2026   WBC Thousand/uL 10.57*  --  10.97*   HEMOGLOBIN g/dL 13.6  --  13.8   HEMATOCRIT % 40.9  --  41.9   PLATELETS Thousands/uL 242 210 233   NEUTROS ABS Thousands/µL 9.76*  --  6.86         Results from last 7 days   Lab Units 07/17/23  0549 07/16/23 2026   SODIUM mmol/L 143 142   POTASSIUM mmol/L 3.9 3.4*   CHLORIDE mmol/L 113* 109*   CO2 mmol/L 23 28   ANION GAP mmol/L 7 5   BUN mg/dL 9 9   CREATININE mg/dL 0.82 0.63   EGFR ml/min/1.73sq m 76 95   CALCIUM mg/dL 8.8 9.0     Results from last 7 days   Lab Units 07/17/23  0549 07/16/23 2026   AST U/L 10 13   ALT U/L 38 41   ALK PHOS U/L 54 54   TOTAL PROTEIN g/dL 7.1 7.3   ALBUMIN g/dL 3.6 3.9   TOTAL BILIRUBIN mg/dL 0.39 0.50     Results from last 7 days   Lab Units 07/17/23  1053 07/17/23  0641   POC GLUCOSE mg/dl 205* 194*     Results from last 7 days   Lab Units 07/17/23  0549 07/16/23 2026   GLUCOSE RANDOM mg/dL 241* 123           Results from last 7 days   Lab Units 07/17/23  0058 07/16/23  2300 07/16/23 2108   HS TNI 0HR ng/L  --   --  4   HS TNI 2HR ng/L  --  4  --    HSTNI D2 ng/L  --  0  --    HS TNI 4HR ng/L 4  --   --    HSTNI D4 ng/L 0  --   --      Results from last 7 days   Lab Units 07/16/23 2108   D-DIMER QUANTITATIVE ug/ml FEU 0.28             Results from last 7 days   Lab Units 07/17/23  0549   PROCALCITONIN ng/ml <0.05                 Results from last 7 days   Lab Units 07/16/23 2026   BNP pg/mL 11       Results from last 7 days   Lab Units 07/16/23 2026   BLOOD CULTURE  Received in Microbiology Lab. Culture in Progress. Received in Microbiology Lab. Culture in Progress.                    ED Treatment:   Medication Administration from 07/16/2023 1948 to 07/16/2023 2329       Date/Time Order Dose Route Action     07/16/2023 2027 EDT albuterol inhalation solution 10 mg 10 mg Nebulization Given     07/16/2023 2027 EDT ipratropium (ATROVENT) 0.02 % inhalation solution 1 mg 1 mg Nebulization Given     07/16/2023 2027 EDT sodium chloride 0.9 % inhalation solution 3 mL 3 mL Nebulization Given     07/16/2023 2109 EDT magnesium sulfate 2 g/50 mL IVPB (premix) 2 g 0 g Intravenous Stopped 07/16/2023 2029 EDT magnesium sulfate 2 g/50 mL IVPB (premix) 2 g 2 g Intravenous New Bag     07/16/2023 2029 EDT methylPREDNISolone sodium succinate (Solu-MEDROL) injection 125 mg 125 mg Intravenous Given     07/16/2023 2258 EDT multi-electrolyte (ISOLYTE-S PH 7.4) bolus 1,000 mL 0 mL Intravenous Stopped     07/16/2023 2110 EDT multi-electrolyte (ISOLYTE-S PH 7.4) bolus 1,000 mL 1,000 mL Intravenous New Bag     07/16/2023 2125 EDT albuterol inhalation solution 10 mg 10 mg Nebulization Given     07/16/2023 2125 EDT ipratropium (ATROVENT) 0.02 % inhalation solution 1 mg 1 mg Nebulization Given     07/16/2023 2125 EDT sodium chloride 0.9 % inhalation solution 12 mL 12 mL Nebulization Given        Past Medical History:   Diagnosis Date   • Diabetes mellitus ([address])    • Sepsis ([address]) 6/10/2023   • Vertigo      Present on Admission:  • Diabetes mellitus (HCC)  • Anxiety  • Class 2 severe obesity due to excess calories with serious comorbidity in adult [facility])  • Mixed hyperlipidemia  • Tobacco use      Admitting Diagnosis: Shortness of breath [R06.02]  Dyspnea [R06.00]  SOB (shortness of breath) [R06.02]  COPD exacerbation (HCC) [J44.1]  COPD with acute exacerbation (HCC) [J44.1]  Age/Sex: 61 y.o. female  Admission Orders:  SCD  I/O    Scheduled Medications:  atorvastatin, 40 mg, Oral, Daily  azithromycin, 250 mg, Oral, Q24H  enoxaparin, 40 mg, Subcutaneous, Daily  insulin lispro, 1-5 Units, Subcutaneous, TID AC  ipratropium, 0.5 mg, Nebulization, TID  predniSONE, 40 mg, Oral, Daily  umeclidinium-vilanterol, 1 puff, Inhalation, Daily  methylPREDNISolone sodium succinate (Solu-MEDROL) injection 40 mg  Dose: 40 mg  Freq: Every 8 hours Route: IV  Start: 07/17/23 0800 End: 07/17/23 1106    Continuous IV Infusions: none     PRN Meds:  acetaminophen, 650 mg, Oral, Q6H PRN  albuterol, 1 puff, Inhalation, Q4H PRN  guaiFENesin, 200 mg, Oral, Q4H PRN 07/16 x 1        IP CONSULT TO CASE MANAGEMENT    Network Utilization Review Department  ATTENTION: Please call with any questions or concerns to [phone] and carefully listen to the prompts so that you are directed to the right person. All voicemails are confidential.  Linsey Melton all requests for admission clinical reviews, approved or denied determinations and any other requests to dedicated fax number below belonging to the campus where the patient is receiving treatment.  List of dedicated fax numbers for the Facilities:  [place] DENIALS (Administrative/Medical Necessity) [phone] [address] [facility] (Maternity/NICU/Pediatrics) [phone]   [address] [place] [address] [phone]   [facility] [address] [facility] [phone]   [address] [place] [address] [address] [facility] [address] [phone]   [address] [facility] [address] [phone]

## 2023-07-17 NOTE — ASSESSMENT & PLAN NOTE
Patient with BMI of 31.33 kg/m2 likely concerning for class II obesity.   -May benefit from outpatient lifestyle and diet counseling and further work-up from JORDI standpoint  -STOP-BANG score 4 points: mod-severe risk JORDI  - f/up with [facility]

## 2023-07-17 NOTE — ASSESSMENT & PLAN NOTE
Lab Units 05/12/23  1421   HDL mg/dL 46*   LDL CALC mg/dL 47     Continue to monitor  Continue atorvastatin 40 mg daily

## 2023-07-17 NOTE — ED PROVIDER NOTES
History  Chief Complaint   Patient presents with   • Shortness of Breath     Pt reports having pneumonia about a month ago and states her symptoms have been coming back since her medications were stopped. Pt reports feeling like she is having an asthma attack. Wheezing noted in triage. States she feels "warm"     HPI  Patient is a 61 y.o. female with a h/o Diabetes, vertigo, pneumonia, bronchitis  presenting to the emergency department for shortness of breath. Patient states that she was seen approximately 2 weeks ago for bronchitis given a Z-Tarun. When she finished. Prior to that she had pneumonia which she was hospitalized for the beginning of June. Patient states that she has never fully recovered from that. Patient states that today she had acute worsening of her shortness of breath and attempted to use her albuterol inhaler which provided no relief. Patient states that she is very short of breath and has pain in her ribs from breathing so much. Patient states that she also has a cough. Patient denies fever chills nausea vomiting diarrhea abdominal pain, chest pain, headache, dizziness, blurry vision. .     Prior to Admission Medications   Prescriptions Last Dose Informant Patient Reported? Taking?    Lancets (freestyle) lancets   No No   Sig: Use as instructed   Probiotic Product (PRO-BIOTIC BLEND PO)   Yes No   Sig: Take 1 tablet by mouth daily   albuterol (PROVENTIL HFA,VENTOLIN HFA) 90 mcg/act inhaler   No No   Sig: TAKE 2 PUFFS BY MOUTH EVERY 4 HOURS AS NEEDED FOR WHEEZE   atorvastatin (LIPITOR) 40 mg tablet   No No   Sig: Take 1 tablet (40 mg total) by mouth daily   glucose blood (FREESTYLE LITE) test strip   No No   Sig: Use as instructed   glucose monitoring kit (FREESTYLE) monitoring kit   No No   Sig: Apply 1 m topically 4 (four) times a day as needed Use as directed   guaiFENesin (MUCINEX) 600 mg 12 hr tablet   No No   Sig: Take 1 tablet (600 mg total) by mouth every 12 (twelve) hours   metFORMIN (GLUCOPHAGE-XR) 750 mg 24 hr tablet   No No   Sig: TAKE 2 TABLETS BY MOUTH EVERY DAY WITH DINNER   naproxen (Naprosyn) 500 mg tablet   No No   Sig: Take 1 tablet (500 mg total) by mouth 2 (two) times a day with meals   predniSONE 20 mg tablet   No No   Sig: Take 3 tablets for 4 days then two tablets for 3 days then one tablet for 3 days. triamterene-hydrochlorothiazide (DYAZIDE) 37.5-25 mg per capsule   No No   Sig: Take 1 capsule by mouth daily      Facility-Administered Medications: None       Past Medical History:   Diagnosis Date   • Diabetes mellitus ([address])    • Sepsis ([address]) 6/10/2023   • Vertigo        Past Surgical History:   Procedure Laterality Date   • BREAST BIOPSY Bilateral 12/01/2021    stereo x2   • BREAST BIOPSY Right 12/01/2021   • MAMMO STEREOTACTIC BREAST BIOPSY LEFT (ALL INC) Left 12/1/2021   • MAMMO STEREOTACTIC BREAST BIOPSY RIGHT (ALL INC) Right 12/1/2021   • TUMOR REMOVAL      right leg- benign        Family History   Problem Relation Age of Onset   • Stroke Mother    • Hypertension Mother    • Diabetes Mother    • Throat cancer Brother    • Ovarian cancer Family         2 maternal aunts and 2 cousins    • Stroke Father    • Thyroid cancer Sister    • Heart disease Sister    • Uterine cancer Sister    • No Known Problems Son    • Heart attack Maternal Grandmother 43   • Stroke Maternal Grandfather 58   • Heart attack Paternal Grandmother    • Leukemia Paternal Grandfather    • No Known Problems Sister    • No Known Problems Son    • Breast cancer Cousin 36   • Breast cancer Maternal Aunt 26   • Uterine cancer Maternal Aunt    • Uterine cancer Cousin    • No Known Problems Paternal Aunt      I have reviewed and agree with the history as documented.     E-Cigarette/Vaping   • E-Cigarette Use Never User      E-Cigarette/Vaping Substances   • Nicotine No    • THC No    • CBD No    • Flavoring No    • Other No    • Unknown No      Social History     Tobacco Use   • Smoking status: Former Packs/day: 0.25     Types: Cigarettes   • Smokeless tobacco: Never   • Tobacco comments:     1-6 cigarettes a day   Vaping Use   • Vaping Use: Never used   Substance Use Topics   • Alcohol use: Yes     Comment: socially    • Drug use: Never        Review of Systems   Constitutional: Negative for chills and fever. HENT: Negative for ear pain and sore throat. Eyes: Negative for pain and visual disturbance. Respiratory: Positive for cough, chest tightness, shortness of breath and wheezing. Cardiovascular: Negative for chest pain and palpitations. Gastrointestinal: Negative for abdominal pain and vomiting. Genitourinary: Negative for dysuria and hematuria. Musculoskeletal: Negative for arthralgias and back pain. Skin: Negative for color change and rash. Neurological: Negative for seizures and syncope. All other systems reviewed and are negative. Physical Exam  ED Triage Vitals [07/16/23 1954]   Temperature Pulse Respirations Blood Pressure SpO2   97.7 °F (36.5 °C) (!) 131 (!) 24 164/82 93 %      Temp Source Heart Rate Source Patient Position - Orthostatic VS BP Location FiO2 (%)   Oral Monitor Sitting Left arm --      Pain Score       --             Orthostatic Vital Signs  Vitals:    07/16/23 1954 07/16/23 2210   BP: 164/82 142/76   Pulse: (!) 131 100   Patient Position - Orthostatic VS: Sitting Lying       Physical Exam  Vitals and nursing note reviewed. Constitutional:       General: She is not in acute distress. Appearance: She is well-developed. She is ill-appearing. She is not toxic-appearing. HENT:      Head: Normocephalic and atraumatic. Eyes:      Conjunctiva/sclera: Conjunctivae normal.   Cardiovascular:      Rate and Rhythm: Normal rate and regular rhythm. Heart sounds: No murmur heard. Pulmonary:      Effort: Tachypnea and accessory muscle usage present. No bradypnea or respiratory distress. Breath sounds: No stridor. Wheezing present.    Abdominal: Palpations: Abdomen is soft. Tenderness: There is no abdominal tenderness. Musculoskeletal:         General: No swelling. Cervical back: Normal range of motion and neck supple. Skin:     General: Skin is warm and dry. Capillary Refill: Capillary refill takes less than 2 seconds. Neurological:      General: No focal deficit present. Mental Status: She is alert and oriented to person, place, and time.          ED Medications  Medications   acetaminophen (TYLENOL) tablet 650 mg (has no administration in time range)   guaiFENesin (ROBITUSSIN) oral liquid 200 mg (has no administration in time range)   budesonide (PULMICORT) inhalation solution 0.5 mg (has no administration in time range)   ipratropium (ATROVENT) 0.02 % inhalation solution 0.5 mg (has no administration in time range)   enoxaparin (LOVENOX) subcutaneous injection 40 mg (has no administration in time range)   methylPREDNISolone sodium succinate (Solu-MEDROL) injection 40 mg (has no administration in time range)   albuterol inhalation solution 10 mg (10 mg Nebulization Given 7/16/23 2027)     And   ipratropium (ATROVENT) 0.02 % inhalation solution 1 mg (1 mg Nebulization Given 7/16/23 2027)     And   sodium chloride 0.9 % inhalation solution 3 mL (3 mL Nebulization Given 7/16/23 2027)   magnesium sulfate 2 g/50 mL IVPB (premix) 2 g (0 g Intravenous Stopped 7/16/23 2109)   methylPREDNISolone sodium succinate (Solu-MEDROL) injection 125 mg (125 mg Intravenous Given 7/16/23 2029)   multi-electrolyte (ISOLYTE-S PH 7.4) bolus 1,000 mL (1,000 mL Intravenous New Bag 7/16/23 2110)   albuterol inhalation solution 10 mg (10 mg Nebulization Given 7/16/23 2125)   ipratropium (ATROVENT) 0.02 % inhalation solution 1 mg (1 mg Nebulization Given 7/16/23 2125)   sodium chloride 0.9 % inhalation solution 12 mL (12 mL Nebulization Given 7/16/23 2125)       Diagnostic Studies  Results Reviewed     Procedure Component Value Units Date/Time    Platelet count [[id number]]     Lab Status: No result Specimen: Blood     B-Type Natriuretic Peptide(BNP) [[id number]]  (Normal) Collected: 07/16/23 2026    Lab Status: Final result Specimen: Blood from Arm, Left Updated: 07/16/23 2237     BNP 11 pg/mL     HS Troponin 0hr (reflex protocol) [[id number]]  (Normal) Collected: 07/16/23 2108    Lab Status: Final result Specimen: Blood from Arm, Left Updated: 07/16/23 2142     hs TnI 0hr 4 ng/L     HS Troponin I 2hr [[id number]]     Lab Status: No result Specimen: Blood     D-Dimer [[id number]]  (Normal) Collected: 07/16/23 2108    Lab Status: Final result Specimen: Blood from Arm, Left Updated: 07/16/23 2132     D-Dimer, Quant 0.28 ug/ml FEU     Narrative: In the evaluation for possible pulmonary embolism, in the appropriate (Well's Score of 4 or less) patient, the age adjusted d-dimer cutoff for this patient can be calculated as:    Age x 0.01 (in ug/mL) for Age-adjusted D-dimer exclusion threshold for a patient over 50 years.     Comprehensive metabolic panel [[id number]]  (Abnormal) Collected: 07/16/23 2026    Lab Status: Final result Specimen: Blood from Arm, Left Updated: 07/16/23 2130     Sodium 142 mmol/L      Potassium 3.4 mmol/L      Chloride 109 mmol/L      CO2 28 mmol/L      ANION GAP 5 mmol/L      BUN 9 mg/dL      Creatinine 0.63 mg/dL      Glucose 123 mg/dL      Calcium 9.0 mg/dL      AST 13 U/L      ALT 41 U/L      Alkaline Phosphatase 54 U/L      Total Protein 7.3 g/dL      Albumin 3.9 g/dL      Total Bilirubin 0.50 mg/dL      eGFR 95 ml/min/1.73sq m     Narrative:      [place] guidelines for Chronic Kidney Disease (CKD):   •  Stage 1 with normal or high GFR (GFR > 90 mL/min/1.73 square meters)  •  Stage 2 Mild CKD (GFR = 60-89 mL/min/1.73 square meters)  •  Stage 3A Moderate CKD (GFR = 45-59 mL/min/1.73 square meters)  •  Stage 3B Moderate CKD (GFR = 30-44 mL/min/1.73 square meters)  •  Stage 4 Severe CKD (GFR = 15-29 mL/min/1.73 square meters)  •  Stage 5 End Stage CKD (GFR <15 mL/min/1.73 square meters)  Note: GFR calculation is accurate only with a steady state creatinine    CBC and differential [[id number]]  (Abnormal) Collected: 07/16/23 2026    Lab Status: Final result Specimen: Blood from Arm, Left Updated: 07/16/23 2047     WBC 10.97 Thousand/uL      RBC 4.31 Million/uL      Hemoglobin 13.8 g/dL      Hematocrit 41.9 %      MCV 97 fL      MCH 32.0 pg      MCHC 32.9 g/dL      RDW 12.9 %      MPV 10.1 fL      Platelets 021 Thousands/uL      nRBC 0 /100 WBCs      Neutrophils Relative 62 %      Immat GRANS % 0 %      Lymphocytes Relative 23 %      Monocytes Relative 6 %      Eosinophils Relative 8 %      Basophils Relative 1 %      Neutrophils Absolute 6.86 Thousands/µL      Immature Grans Absolute 0.04 Thousand/uL      Lymphocytes Absolute 2.50 Thousands/µL      Monocytes Absolute 0.63 Thousand/µL      Eosinophils Absolute 0.88 Thousand/µL      Basophils Absolute 0.06 Thousands/µL     Blood culture #1 [[id number]] Collected: 07/16/23 2026    Lab Status: In process Specimen: Blood from Arm, Left Updated: 07/16/23 2043    Blood culture #2 [[id number]] Collected: 07/16/23 2026    Lab Status: In process Specimen: Blood from Arm, Left Updated: 07/16/23 2043                 XR chest 2 views    (Results Pending)         Procedures  Procedures      ED Course  ED Course as of 07/16/23 2243   Sun Jul 16, 2023   2102 ECG 12 lead   2214 Spoke to patient regarding admission. Patient minimally improved after multiple DuoNebs. Patient desats on room air. Patient is agreeable to admission. Patient is very scared because she has a first-degree relative who passed away from a COPD exacerbation                                       Medical Decision Making  Amount and/or Complexity of Data Reviewed  Labs: ordered. Risk  Prescription drug management.         Patient is a 61 y.o. female with PMH of pneumonia and bronchitis who presents to the ED with shortness of breath. Vital signs tachypneic, tachycardic. On exam patient has wheezing diffusely with tachypnea, tachycardia, not moving great air, speaking in full sentences on 5 L nasal cannula. No abdominal pain, no edema,. History and physical exam most consistent with pneumonia versus COPD exacerbation. However, differential diagnosis included but not limited to asthma, pneumothorax, ACS, heart failure. Plan chest x-ray, DuoNeb, fluids, magnesium, steroids, troponin, CBC, CMP, blood cultures, BMP. View ED course above for further discussion on patient workup. On review of previous records patient quit smoking approximately 5 weeks ago, was seen for bronchitis which was improving with DuoNeb and magnesium in the ED and discharged home. All labs reviewed and utilized in the medical decision making process  All radiology studies independently viewed by me and interpreted by the radiologist.  I reviewed all testing with the patient. Upon re-evaluation patient still is wheezing with mild to no improvement with lung sounds. Patient still feels short of breath. Patient feels short of breath with tachypnea upon removal of oxygen. Patient amenable to admission. .    Disposition  Final diagnoses:   COPD exacerbation ([address])   COPD with acute exacerbation ([address])   SOB (shortness of breath)   Dyspnea     Time reflects when diagnosis was documented in both MDM as applicable and the Disposition within this note     Time User Action Codes Description Comment    7/16/2023 10:30 PM Lina Mays Add [J44.1] COPD exacerbation ([address])     7/16/2023 10:30 PM Lina Mays Add [J44.1] COPD with acute exacerbation ([address])     7/16/2023 10:30 PM Hari Bui Add [R06.02] SOB (shortness of breath)     7/16/2023 10:30 PM Lina Mays Add [R06.00] Dyspnea       ED Disposition     ED Disposition   Admit    Condition   Stable    Date/Time   Sun Jul 16, 2023 10:30 PM    Comment   Case was discussed with Dr. Hua Ron  and the patient's admission status was agreed to be Admission Status: inpatient status to the service of Dr. Roxie Lema . Follow-up Information    None         Patient's Medications   Discharge Prescriptions    No medications on file     No discharge procedures on file. PDMP Review     None           ED Provider  Attending physically available and evaluated Segundo Arzola. I managed the patient along with the ED Attending.     Electronically Signed by         Loni Chowdhury MD  07/16/23 8562

## 2023-07-17 NOTE — DISCHARGE SUMMARY
INTERNAL MEDICINE RESIDENCY DISCHARGE SUMMARY     Gómez Medina   61 y.o. female  MRN: [id number]  Room/Bed: [place] 530/[place] 530-01     [facility] SCAN   Encounter: [id number]    Principal Problem:    COPD exacerbation [facility])  Active Problems:    Diabetes mellitus ([facility])    Anxiety    Centrilobular emphysema ([facility])    Class 2 severe obesity due to excess calories with serious comorbidity in adult [facility])    Mixed hyperlipidemia    Tobacco use    Hypertension    SIRS (systemic inflammatory response syndrome) (HCC)      * COPD exacerbation ([facility])  Assessment & Plan  Patient reporting of shortness of breath and dyspnea with exertion but has progressively become worse with recent hospitalizations including including an admission for community-acquired pneumonia and June 2023 followed by another ED visit with shortness of breath and multiple clinic visits due to related shortness of breath incidents and has been trialed on intermittent steroid therapies with somewhat improvement in her breathing. Patient has not had any PFTs done in the past and does not follow with pulmonary. She is a smoker, reports of smoking 5-6 cigarettes/day and started at the age of [age]. She reports of recently quitting following multiple hospitalizations for difficulty breathing. Denies any environmental or work chemical exposure. Does not have any pets. Does have carpet at home. Has reduced exercise tolerance with baseline being about 100 feet of walking before having to stop to catch her breath and now reduced to difficulty breathing with ambulating out of bed. Also reporting of change in sputum production, with baseline sputum that appears to be clear and currently a little cloudy. On exam, patient has very audible wheezing along with pulse ox desaturations to 85-87 percent as she talks.   I suspect her symptoms are likely related to her smoking history and she probably has COPD but she will need a formal evaluation before a definitive diagnosis can be made. She may also benefit from home oxygen evaluation eventually along with long-term follow-up with pulmonary. CT chest:"Subtle upper lobe centrilobular emphysema."  -PE ruled out (Wells score for PE=3, neg d-dimer)    Plan:   -start umeclidinium-vilanterol inhaler bid  -albuterol for rescue  -continue azithromycin: Day 3/5, will continue for 2 days after discharge  -Prednisone to 40 mg PO daily for 5 total days   oral guaifenesin prn   - OP PFTs; f/up with pulm        Diabetes mellitus ([address])  Assessment & Plan  Lab Results   Component Value Date    HGBA1C 5.8 05/25/2022       No results for input(s): "POCGLU" in the last 72 hours. Blood Sugar Average: Last 72 hrs:  Patient with history of Diabetes Mellitus type 2, last HbA1c at 5.8. Plan  - Continue metformin 750 mg twice daily at home  - low carb diet      Anxiety  Assessment & Plan  Patient with history of anxiety, on citalopram and hydroxyzine at baseline.  -She has QTc of 460 ms and will likely require further monitoring in the future if going to be maintained on citalopram since citalopram more likely to prolong QTc than other SSRIs  -Can use hydroxyzine on as-needed basis    Centrilobular emphysema (HCC)  Assessment & Plan  See plan under COPD exacerbation    SIRS (systemic inflammatory response syndrome) (HCC)  Assessment & Plan  Patient with HR > 100 and RR > 20 upon admission likely meeting SIRS criteria. Suspect patients symptoms in the setting of bronchitis or possible COPD exacerbation. VS normalized Monday 7/17    -Likely resolved with treatment of COPD exacerbation, will continue to monitor  -Please refer to our plan under shortness of breath    Hypertension  Assessment & Plan  Noted to have systolics at 297 and 925R respectively at this admission. Likely has hypertension.   /68 on Monday 7/17, 121/77 on Tuesday 7/18    -resolved while inpatient, however continue to monitor as outpatient     Tobacco use  Assessment & Plan  Long-term smoking history, 5-6 cigarettes/day, starting at age [age].  -As per the patient, she recently quit a few months ago    Mixed hyperlipidemia  Assessment & Plan      Lab Units 05/12/23  1421   HDL mg/dL 46*   LDL CALC mg/dL 47     Continue to monitor  Continue atorvastatin 40 mg daily    Class 2 severe obesity due to excess calories with serious comorbidity in adult [facility])  Assessment & Plan  Patient with BMI of 31.33 kg/m2 likely concerning for class II obesity.   -May benefit from outpatient lifestyle and diet counseling and further work-up from JORDI standpoint  -STOP-BANG score 4 points: mod-severe risk JORDI  - f/up with [address]     H&P per Dr. Ankur Rodríguez:    Patient is 60-year-old female history of diabetes, hypertension, hyperlipidemia, former smoker, Ménière's disease, recurrent bronchitis, and recent pneumonia treated with antibiotics, prednisone and albuterol presenting for shortness of breath which developed 2 days ago, 7/14/23. Her SOB increased to the point of decreased inability to walk, baseline walking  ft before stopping to catch breath. Admits to increased productive cough with cloudy sputum over past 2 days. Albuterol does not relieve her SOB. Denies chills, fever, CP, or palpitations.       In the ED today her initial vitals were: Temp 97.7 °F, , RR 24, /82, SPO2 93 on room air. Initial CMP showed: , K3.4, , CO2 28, BUN 9, CR 0.63, glucose 123, AST 13, ALT 41. CBC showed: WBC 10.97, RBC 4.31, Hgb 13.8. D-dimer negative. Cardiac tropes negative. BNP 11. EKG showing sinus tachycardia and prolonged QTc. Chest x-ray with no acute cardiopulmonary disease and resolution of pneumonia findings from prior. She received DuoNebs x2, Solu-Medrol 125mg, and 1 L bolus of Isolyte IV fluids.   She reports improvement in respiratory function with those meds.      Patient will be admitted to [facility] management of respiratory distress likely due to COPD exacerbation. Since admission to [facility], patient received CT chest which demonstrated "subtle upper lobe centrilobular emphysema." She continued to have yellow/green sputum production for which she is being treated with oral azithromycin (day 3/5 on discharge) and prn guaifenesin. For her COPD, she began treatments of LABA/LAMA nebulizer on Monday 7/17. She is also receiving atrovent nebulizer and 40 mg oral prednisone daily. She was educated on the importance of using the LABA/LAMA as her mainstay of treatment, and using her albuterol inhaler on a more as-needed basis. She was reminded of proper inhaler technique and encouraged to maintain her current state of smoking cessation. Per physical therapy, patient continues to improve walking distance and is now able to walk the halls while maintaining O2 sats in the low 90s on RA. Patient has never had formal PFTs, and will be referred for testing and pulmonology f/u outpatient to further evaluate her new COPD diagnosis. On day of discharge (7/18): Patient was seen at bedside this AM, states she is doing well and feels comfortable with the plan to discharge today and her sister is flying in to help her with care at home. States her shortness of breath has significantly improved since admission and treatment with new LABA/LAMA. Patient encouraged to maintain her current smoking cessation and educated on proper use of new breathing treatments. Denies CP, N/V, constipation, diarrhea. No other complaints at this time. DISCHARGE INFORMATION     Visit Vitals  /77 (BP Location: Right arm)   Pulse 77   Temp 98.5 °F (36.9 °C) (Oral)   Resp 17   Ht 5' (1.524 m)   Wt 73.9 kg (163 lb)   LMP 10/01/2010 (Approximate)   SpO2 97%   BMI 31.83 kg/m²   OB Status Postmenopausal   Smoking Status Former   BSA 1.71 m²     Physical Exam  Constitutional:       Appearance: Normal appearance. She is obese.    HENT: Head: Normocephalic and atraumatic. Nose: Nose normal.      Mouth/Throat:      Mouth: Mucous membranes are moist.      Pharynx: Oropharynx is clear. Eyes:      Extraocular Movements: Extraocular movements intact. Conjunctiva/sclera: Conjunctivae normal.      Pupils: Pupils are equal, round, and reactive to light. Cardiovascular:      Rate and Rhythm: Normal rate and regular rhythm. Pulses: Normal pulses. Heart sounds: Normal heart sounds. No murmur heard. No gallop. Pulmonary:      Effort: Pulmonary effort is normal.      Breath sounds: Normal breath sounds. No wheezing, rhonchi or rales. Abdominal:      General: Abdomen is flat. Bowel sounds are normal.      Palpations: Abdomen is soft. Tenderness: There is no abdominal tenderness. Musculoskeletal:         General: Normal range of motion. Cervical back: Normal range of motion and neck supple. Right lower leg: No edema. Left lower leg: No edema. Skin:     General: Skin is warm and dry. Capillary Refill: Capillary refill takes less than 2 seconds. Neurological:      General: No focal deficit present. Mental Status: She is alert and oriented to person, place, and time. Mental status is at baseline. Psychiatric:         Behavior: Behavior normal.         Thought Content:  Thought content normal.         Judgment: Judgment normal.      Comments: Patient is anxious          PCP at Discharge: Aranza Valle DO    Admitting Provider: Diaz Lao MD  Admission Date: 7/16/2023    Discharge Provider: Diaz Lao MD   Discharge Date: 7/18/2023    Discharge Disposition: Home/Self Care  Discharge Condition: good  Discharge with Lines: no    Discharge Diet: diabetic diet  Activity Restrictions: none  Test Results Pending at Discharge: none    Discharge Diagnoses:  Principal Problem:    COPD exacerbation ([address] )  Active Problems:    Diabetes mellitus ([address])    Anxiety    Centrilobular emphysema ([address]) Class 2 severe obesity due to excess calories with serious comorbidity in adult [facility])    Mixed hyperlipidemia    Tobacco use    Hypertension    SIRS (systemic inflammatory response syndrome) ([address])  Resolved Problems:    * No resolved hospital problems. *    Diagnostic & Therapeutic Procedures Performed:  CT chest wo contrast    Result Date: 7/17/2023  Impression: Subtle upper lobe centrilobular emphysema. Mild diffuse bronchial wall thickening which could be due to smoking related bronchitis. Small hiatal hernia Mild coronary artery calcification indicating atherosclerotic heart disease. Workstation performed: [id number]     XR chest 2 views    Result Date: 7/16/2023  Impression: No acute cardiopulmonary disease. Findings are stable Workstation performed: [id number]       Code Status: Level 1 - Full Code  Advance Directive & Living Will: <no information>  Power of :    POLST:      Medications:       Medication List      START taking these medications    • azithromycin 250 mg tablet; Commonly known as: ZITHROMAX; Take 1 tablet   (250 mg total) by mouth every 24 hours for 4 doses  • umeclidinium-vilanterol 62.5-25 mcg/actuation inhaler; Inhale 1 puff   daily     CHANGE how you take these medications    • predniSONE 20 mg tablet; Take 2 tablets (40 mg total) by mouth daily for   2 doses Do not start before July 19, 2023.; Start taking on: July 19, 2023; What changed: how much to take, how to take this, when to take this,   additional instructions     CONTINUE taking these medications    • albuterol 90 mcg/act inhaler; Commonly known as: PROVENTIL HFA,VENTOLIN   HFA; TAKE 2 PUFFS BY MOUTH EVERY 4 HOURS AS NEEDED FOR WHEEZE  • atorvastatin 40 mg tablet; Commonly known as: LIPITOR; Take 1 tablet (40   mg total) by mouth daily  • freestyle lancets; Use as instructed  • FREESTYLE LITE test strip; Generic drug: glucose blood; Use as   instructed  • glucose monitoring kit monitoring kit;  Apply 1 m topically 4 (four) times a day as needed Use as directed  • guaiFENesin 600 mg 12 hr tablet; Commonly known as: Lowanda Geoffrey; Take 1   tablet (600 mg total) by mouth every 12 (twelve) hours  • metFORMIN 750 mg 24 hr tablet; Commonly known as: GLUCOPHAGE-XR; TAKE 2   TABLETS BY MOUTH EVERY DAY WITH DINNER  • naproxen 500 mg tablet; Commonly known as: Naprosyn; Take 1 tablet (500   mg total) by mouth 2 (two) times a day with meals  • PRO-BIOTIC BLEND PO  • triamterene-hydrochlorothiazide 37.5-25 mg per capsule; Commonly known   as: DYAZIDE; Take 1 capsule by mouth daily      Current Discharge Medication List      CONTINUE these medications which have NOT CHANGED    Details   albuterol (PROVENTIL HFA,VENTOLIN HFA) 90 mcg/act inhaler TAKE 2 PUFFS BY MOUTH EVERY 4 HOURS AS NEEDED FOR WHEEZE  Qty: 6.7 g, Refills: 3    Comments: Substitution to a formulary equivalent within the same pharmaceutical class is authorized. Associated Diagnoses: Acute bronchitis      atorvastatin (LIPITOR) 40 mg tablet Take 1 tablet (40 mg total) by mouth daily  Qty: 90 tablet, Refills: 1    Associated Diagnoses: Type 2 diabetes mellitus with other specified complication, without long-term current use of insulin ([facility])      guaiFENesin (MUCINEX) 600 mg 12 hr tablet Take 1 tablet (600 mg total) by mouth every 12 (twelve) hours  Qty: 30 tablet, Refills: 0    Associated Diagnoses: Pneumonia      metFORMIN (GLUCOPHAGE-XR) 750 mg 24 hr tablet TAKE 2 TABLETS BY MOUTH EVERY DAY WITH DINNER  Qty: 180 tablet, Refills: 3    Associated Diagnoses: Type 2 diabetes mellitus with other specified complication, without long-term current use of insulin ([facility])      naproxen (Naprosyn) 500 mg tablet Take 1 tablet (500 mg total) by mouth 2 (two) times a day with meals  Qty: 60 tablet, Refills: 2    Associated Diagnoses: Chronic pain of both knees      predniSONE 20 mg tablet Take 3 tablets for 4 days then two tablets for 3 days then one tablet for 3 days.   Qty: 21 tablet, Refills: 0 Associated Diagnoses: Community acquired pneumonia of left lower lobe of lung      glucose blood (FREESTYLE LITE) test strip Use as instructed  Qty: 100 each, Refills: 5    Associated Diagnoses: Type 2 diabetes mellitus with other specified complication, without long-term current use of insulin ([facility])      glucose monitoring kit (FREESTYLE) monitoring kit Apply 1 m topically 4 (four) times a day as needed Use as directed  Qty: 1 each, Refills: 0    Associated Diagnoses: Type 2 diabetes mellitus with other specified complication, without long-term current use of insulin ([facility])      Lancets (freestyle) lancets Use as instructed  Qty: 100 each, Refills: 3    Comments: Please dispense Freestyle lite lancets  Associated Diagnoses: Type 2 diabetes mellitus with other specified complication, without long-term current use of insulin ([facility])      Probiotic Product (PRO-BIOTIC BLEND PO) Take 1 tablet by mouth daily      triamterene-hydrochlorothiazide (DYAZIDE) 37.5-25 mg per capsule Take 1 capsule by mouth daily  Qty: 90 capsule, Refills: 1    Associated Diagnoses: Meniere's disease, unspecified laterality            Current Discharge Medication List      START taking these medications    Details   budesonide-formoterol (Symbicort) 160-4.5 mcg/act inhaler Inhale 2 puffs 2 (two) times a day Rinse mouth after use. Qty: 10.2 g, Refills: 1    Associated Diagnoses: COPD exacerbation ([address]); SOB (shortness of breath); Dyspnea; Centrilobular emphysema (HCC)              Allergies:  No Known Allergies    FOLLOW-UP     PCP Outpatient Follow-up:  Patient is to f/u with PCP, Aranza Valle, for further management of medical problems including new diagnosis of emphysema. Patient should receive outpatient PFT's prior to f/u with pulmonology. Patient may also benefit from outpatient lifestyle and diet counseling for class II obesity and work-up for JORDI (STOP-BANG score mod-severe risk).  Patient should be continuously encouraged to maintain her smoking cessation. Consulting Providers Follow-up:  F/U with [facility] pulmonology team 2 weeks after discharge date. Would like patient to receive PFT's prior to f/u with pulmonology. Active Issues Requiring Follow-up:   New emphysema/COPD diagnosis  Diabetes mellitus  Anxiety  Class 2 severe obesity  Mixed hyperlipidemia  Hypertension    Discharge Statement:   I spent 1 hour discharging the patient. This time was spent on the day of discharge. I had direct contact with the patient on the day of discharge. Additional documentation is required if more than 30 minutes were spent on discharge. Portions of the record may have been created with voice recognition software. Occasional wrong word or "sound a like" substitutions may have occurred due to the inherent limitations of voice recognition software.   Read the chart carefully and recognize, using context, where substitutions have occurred.    ==  [address] [place] [address]  Internal Medicine Sub-I

## 2023-07-17 NOTE — ASSESSMENT & PLAN NOTE
Patient with HR > 100 and RR > 20 upon admission likely meeting SIRS criteria. Suspect patients symptoms in the setting of bronchitis or possible COPD exacerbation.   VS normalized Monday 7/17    -Likely resolved with treatment of COPD exacerbation, will continue to monitor  -Please refer to our plan under shortness of breath

## 2023-07-17 NOTE — ASSESSMENT & PLAN NOTE
Patient reporting of shortness of breath and dyspnea with exertion but has progressively become worse with recent hospitalizations including including an admission for community-acquired pneumonia and June 2023 followed by another ED visit with shortness of breath and multiple clinic visits due to related shortness of breath incidents and has been trialed on intermittent steroid therapies with somewhat improvement in her breathing. Patient has not had any PFTs done in the past and does not follow with pulmonary. She is a smoker, reports of smoking 5-6 cigarettes/day and started at the age of [age]. She reports of recently quitting following multiple hospitalizations for difficulty breathing. Denies any environmental or work chemical exposure. Does not have any pets. Does have carpet at home. Has reduced exercise tolerance with baseline being about 100 feet of walking before having to stop to catch her breath and now reduced to difficulty breathing with ambulating out of bed. Also reporting of change in sputum production, with baseline sputum that appears to be clear and currently a little cloudy. On exam, patient has very audible wheezing along with pulse ox desaturations to 85-87 percent as she talks. I suspect her symptoms are likely related to her smoking history and she probably has COPD but she will need a formal evaluation before a definitive diagnosis can be made. She may also benefit from home oxygen evaluation eventually along with long-term follow-up with pulmonary.     CT chest:"Subtle upper lobe centrilobular emphysema."  -PE ruled out (Wells score for PE=3, neg d-dimer)    Plan:   -start umeclidinium-vilanterol inhaler bid  -albuterol for rescue  -continue azithromycin: Day 3/5, will continue for 2 days after discharge  -Prednisone to 40 mg PO daily for 5 total days   oral guaifenesin prn   - OP PFTs; f/up with pulm

## 2023-07-17 NOTE — H&P
INTERNAL MEDICINE RESIDENCY ADMISSION H&P     Name: Kwabena Sosa   Age & Sex: 61 y.o. female   MRN: [id number]  Unit/Bed#: [facility] 530-01   Encounter: [id number]  Primary Care Provider: Lisa Ricketts DO    Code Status: Level 1 - Full Code  Admission Status: INPATIENT   Disposition: Patient requires Med/Surg    Admit to team: SOD Team B     ASSESSMENT/PLAN     Principal Problem:    Shortness of breath  Active Problems:    SIRS (systemic inflammatory response syndrome) ([address])    Diabetes mellitus ([address])    Anxiety    Hypertension    Mixed hyperlipidemia    Class 2 severe obesity due to excess calories with serious comorbidity in [facility])    Tobacco use      * Shortness of breath  Assessment & Plan  Patient reporting of shortness of breath and dyspnea with exertion but has progressively become worse with recent hospitalizations including including an admission for community-acquired pneumonia and June 2023 followed by another ED visit with shortness of breath and multiple clinic visits due to related shortness of breath incidents and has been trialed on intermittent steroid therapies with somewhat improvement in her breathing. Patient has not had any PFTs done in the past and does not follow with pulmonary. She is a smoker, reports of smoking 5-6 cigarettes/day and started at the age of [age]. She reports of recently quitting following multiple hospitalizations for difficulty breathing. Denies any environmental or work chemical exposure. Does not have any pets. Does have carpet at home. Has reduced exercise tolerance with baseline being about 100 feet of walking before having to stop to catch her breath and now reduced to difficulty breathing with ambulating out of bed. Also reporting of change in sputum production, with baseline sputum that appears to be clear and currently a little cloudy. On exam, patient has very audible wheezing along with pulse ox desaturations to 85-87 percent as she talks.   I suspect her symptoms are likely related to her smoking history and she probably has COPD but she will need a formal evaluation before a definitive diagnosis can be made. She may also benefit from home oxygen evaluation eventually along with long-term follow-up with pulmonary.    -On solumedrol 40 mg Q8H,  Given 125 mg of IV methylprednisolone in ED  -On atrovent and budesonide nebs  -Due to change in sputum production, will also initiate azithromycin at this time, monitor QTCs  -Chest CT ordered to see if patient has emphysematous changes or changes that could possibly explain current symptoms  -Maintain SpO2> 88%  -No formal PFTs on chart  -On respiratory protocol  -Airway clearance protocol  -Can consider inpatient versus outpatient pulmonary evaluation but seeing that patient does not have a PFT at this time, may likely benefit from outpatient pulmonary eval following PFT results    SIRS (systemic inflammatory response syndrome) ([address])  Assessment & Plan  Patient with HR > 100 and RR > 20 upon admission likely meeting SIRS criteria. Suspect patients symptoms in the setting of bronchitis or possible COPD exacerbation.  -Will continue to monitor  -Please refer to our plan under shortness of breath    Diabetes mellitus [facility])  Assessment & Plan  Lab Results   Component Value Date    HGBA1C 5.8 05/25/2022       No results for input(s): "POCGLU" in the last 72 hours. Blood Sugar Average: Last 72 hrs:  Patient with history of Diabetes Mellitus type 2, last HbA1c at 5.8.    Plan  - Patient takes metformin 750 mg twice daily at home  - Currently on SSI, algorithm 1  - Patient will likely need up titration of SSI regimen due to ongoing steroid treatment  - Will adjust insulin regimen as necessary to maintain blood glucose goal 140-180 while inpatient  - Carb consistent diet, level 2  - POCT glucose checks  - Hypoglycemic protocol    Hypertension  Assessment & Plan  Noted to have systolics at 479 and 408L respectively at this admission. Likely has hypertension.  -Can consider initiation of amlodipine low-dose 2.5 mg daily    Anxiety  Assessment & Plan  Patient with history of anxiety, on citalopram and hydroxyzine at baseline.  -She has QTc of 460 ms and will likely require further monitoring in the future if going to be maintained on citalopram since citalopram more likely to prolong QTc than other SSRIs  -Can use hydroxyzine on as-needed basis    Mixed hyperlipidemia  Assessment & Plan      Lab Units 05/12/23  1421   HDL mg/dL 46*   LDL CALC mg/dL 47     Continue to monitor  On atorvastatin 40 mg daily    Class 2 severe obesity due to excess calories with serious comorbidity in adult [facility])  Assessment & Plan  Patient with BMI of 31.33 kg/m2 likely concerning for class II obesity.   -May benefit from outpatient lifestyle and diet counseling and further work-up from JORDI standpoint    Tobacco use  Assessment & Plan  Long-term smoking history, 5-6 cigarettes/day, starting at age [age].  -As per the patient, she recently quit a few months ago      VTE Pharmacologic Prophylaxis: Enoxaparin (Lovenox)  VTE Mechanical Prophylaxis: sequential compression device    CHIEF COMPLAINT     Chief Complaint   Patient presents with   • Shortness of Breath     Pt reports having pneumonia about a month ago and states her symptoms have been coming back since her medications were stopped. Pt reports feeling like she is having an asthma attack. Wheezing noted in triage. States she feels "warm"      HISTORY OF PRESENT ILLNESS   Patient is 77-year-old female history of diabetes, hypertension, hyperlipidemia, former smoker, Ménière's disease, recurrent bronchitis, and recent pneumonia presenting for shortness of breath. She reports 2 days ago, 7/14/23, she began having shortness of breath.   This progressed over the past 2 days to the point where she decided to come to the ED for inability to walk even 1 step due to severe shortness of breath and audible wheezing without relief from albuterol. At baseline she can walk 50 to 100 feet before having to catch her breath. She has increased coughing during the past 2 days with cloudy sputum. During these coughing episodes she becomes lightheaded and diaphoretic. She has obvious decreased exercise tolerance and even becomes short of breath from talking. She denies chills, fever, chest pain or palpitations. She reports using several pillows to sleep at night, but she previously slept flat before her pulmonary symptoms developed in June. She recently hospitalized for community acquired pneumonia in early June and was treated with antibiotics, prednisone, and albuterol. She reports feeling better while on these medicines after discharge and was able to perform her daily activities with no problems. About 2 days after finishing medications at home she began feeling short of breath with audible wheezing and cough with sputum production. Patient was seen at [facility] clinic and possibility of underlying asthma and/or COPD was discussed and she was recommended to restart azithromycin and prednisone with use of albuterol as needed. Need for pulmonary function test was explained at that time and patient was agreeable to proceed once she returned to baseline pulmonary function. Smoking history: She began smoking in her 25s and quit several months ago. Smoking quantity inconsistent though at most she would smoke 6 cigarettes/day. She has no recent environmental changes. Works in an office as a . Lives alone in apartment with old carpet. For the past 3 years she has developed new seasonal allergies. In the ED today her initial vitals were: Temp 97.7 °F, , RR 24, /82, SPO2 93 on room air. Initial CMP showed: , K3.4, , CO2 28, BUN 9, CR 0.63, glucose 123, AST 13, ALT 41. CBC showed: WBC 10.97, RBC 4.31, Hgb 13.8. D-dimer negative. Cardiac tropes negative. BNP 11. EKG showing sinus tachycardia and prolonged QTc. Chest x-ray with no acute cardiopulmonary disease and resolution of pneumonia findings from prior. She received DuoNebs x2, Solu-Medrol 125mg, and 1 L bolus of Isolyte IV fluids. She reports improvement in respiratory function with those meds. Patient will be admitted to [facility] management of respiratory distress likely due to COPD exacerbation. REVIEW OF SYSTEMS     Review of Systems   Constitutional: Positive for activity change and diaphoresis. Negative for chills, fever and unexpected weight change. No appetite change but decreased PO intake   HENT: Negative. Eyes: Negative. Respiratory: Positive for cough, chest tightness, shortness of breath and wheezing. Cardiovascular: Negative. Negative for chest pain, palpitations and leg swelling. Gastrointestinal: Negative. Genitourinary: Negative. Musculoskeletal: Positive for back pain. Negative for myalgias and neck stiffness. New back pain attributed to coughing (below shoulder blades)   Skin: Negative. Allergic/Immunologic: Positive for environmental allergies. Has developed seasonal allergies within the past 3 years   Neurological: Positive for light-headedness. Light headed during coughing spells   Psychiatric/Behavioral: Negative. OBJECTIVE     Vitals:    23 1954 23 2210 23 2330 23 2342   BP: 164/82 142/76  133/69   BP Location: Left arm Right arm     Pulse: (!) 131 100  102   Resp: (!) 24 22  20   Temp: 97.7 °F (36.5 °C)   98.4 °F (36.9 °C)   TempSrc: Oral      SpO2: 93% 99%  (!) 89%   Weight:   73.9 kg (163 lb)    Height:   5' (1.524 m)       Temperature:   Temp (24hrs), Av.1 °F (36.7 °C), Min:97.7 °F (36.5 °C), Max:98.4 °F (36.9 °C)    Temperature: 98.4 °F (36.9 °C)  Intake & Output:  I/O     None        Weights:   IBW (Ideal Body Weight): 45.5 kg    Body mass index is 31.83 kg/m².   Weight (last 2 days)     Date/Time Weight    07/16/23 2330 73.9 (163)        Physical Exam  Constitutional:       General: She is not in acute distress. Appearance: Normal appearance. She is ill-appearing and diaphoretic. She is not toxic-appearing. HENT:      Head: Normocephalic and atraumatic. Right Ear: External ear normal.      Left Ear: External ear normal.      Nose: No rhinorrhea. Mouth/Throat:      Mouth: Mucous membranes are moist.   Eyes:      Extraocular Movements: Extraocular movements intact. Pupils: Pupils are equal, round, and reactive to light. Cardiovascular:      Rate and Rhythm: Regular rhythm. Tachycardia present. Pulses: Normal pulses. Pulmonary:      Breath sounds: Wheezing present. Comments: Expiratory wheezes audible across room (and diffusely on auscultation)  [address]  De-sats to high 80s and becomes more SOB while talking  Abdominal:      General: Abdomen is flat. There is no distension. Palpations: Abdomen is soft. Tenderness: There is no abdominal tenderness. There is no guarding. Musculoskeletal:         General: Normal range of motion. Cervical back: Normal range of motion and neck supple. Skin:     General: Skin is warm. Neurological:      General: No focal deficit present. Mental Status: She is alert and oriented to person, place, and time. Mental status is at baseline. Psychiatric:         Mood and Affect: Mood normal.         Behavior: Behavior normal.         Thought Content:  Thought content normal.         Judgment: Judgment normal.      Comments: Seems anxious       PAST MEDICAL HISTORY     Past Medical History:   Diagnosis Date   • Diabetes mellitus ([address])    • Sepsis ([address]) 6/10/2023   • Vertigo      PAST SURGICAL HISTORY     Past Surgical History:   Procedure Laterality Date   • BREAST BIOPSY Bilateral 12/01/2021    stereo x2   • BREAST BIOPSY Right 12/01/2021   • MAMMO STEREOTACTIC BREAST BIOPSY LEFT (ALL INC) Left 12/1/2021 • MAMMO STEREOTACTIC BREAST BIOPSY RIGHT (ALL INC) Right 12/1/2021   • TUMOR REMOVAL      right leg- benign      SOCIAL & FAMILY HISTORY     Social History     Substance and Sexual Activity   Alcohol Use Yes    Comment: socially      Substance and Sexual Activity   Alcohol Use Yes    Comment: socially         Substance and Sexual Activity   Drug Use Never     Social History     Tobacco Use   Smoking Status Former   • Packs/day: 0.25   • Types: Cigarettes   Smokeless Tobacco Never   Tobacco Comments    1-6 cigarettes a day     Family History   Problem Relation Age of Onset   • Stroke Mother    • Hypertension Mother    • Diabetes Mother    • Throat cancer Brother    • Ovarian cancer Family         2 maternal aunts and 2 cousins    • Stroke Father    • Thyroid cancer Sister    • Heart disease Sister    • Uterine cancer Sister    • No Known Problems Son    • Heart attack Maternal Grandmother 43   • Stroke Maternal Grandfather 58   • Heart attack Paternal Grandmother    • Leukemia Paternal Grandfather    • No Known Problems Sister    • No Known Problems Son    • Breast cancer Cousin 36   • Breast cancer Maternal Aunt 26   • Uterine cancer Maternal Aunt    • Uterine cancer Cousin    • No Known Problems Paternal Aunt      LABORATORY DATA     Labs: I have personally reviewed pertinent reports. Results from last 7 days   Lab Units 07/16/23  2300 07/16/23 2026   WBC Thousand/uL  --  10.97*   HEMOGLOBIN g/dL  --  13.8   HEMATOCRIT %  --  41.9   PLATELETS Thousands/uL 210 233   NEUTROS PCT %  --  62   MONOS PCT %  --  6   EOS PCT %  --  8*      Results from last 7 days   Lab Units 07/16/23 2026   POTASSIUM mmol/L 3.4*   CHLORIDE mmol/L 109*   CO2 mmol/L 28   BUN mg/dL 9   CREATININE mg/dL 0.63   CALCIUM mg/dL 9.0   ALK PHOS U/L 54   ALT U/L 41   AST U/L 13                          Micro:  Lab Results   Component Value Date    BLOODCX Received in Microbiology Lab. Culture in Progress.  07/16/2023    BLOODCX Received in Microbiology Lab. Culture in Progress. 07/16/2023    BLOODCX No Growth After 5 Days. 06/09/2023     IMAGING & DIAGNOSTIC TESTS     Imaging: I have personally reviewed pertinent reports. No results found. EKG, Pathology, and Other Studies: I have personally reviewed pertinent reports. ALLERGIES   No Known Allergies  MEDICATIONS PRIOR TO ARRIVAL     Prior to Admission medications    Medication Sig Start Date End Date Taking? Authorizing Provider   albuterol (PROVENTIL HFA,VENTOLIN HFA) 90 mcg/act inhaler TAKE 2 PUFFS BY MOUTH EVERY 4 HOURS AS NEEDED FOR WHEEZE 6/9/23   Yosvany Ricketts DO   atorvastatin (LIPITOR) 40 mg tablet Take 1 tablet (40 mg total) by mouth daily 1/30/23   Jorge A Vela MD   glucose blood (FREESTYLE LITE) test strip Use as instructed 9/29/21   Yosvany Ricketts DO   glucose monitoring kit (FREESTYLE) monitoring kit Apply 1 m topically 4 (four) times a day as needed Use as directed 2/22/23   Yosvany Ricketts DO   guaiFENesin (MUCINEX) 600 mg 12 hr tablet Take 1 tablet (600 mg total) by mouth every 12 (twelve) hours 6/13/23   Fabio Godoy DO   Lancets (freestyle) lancets Use as instructed 2/22/23   Yosvany Ricketts DO   metFORMIN (GLUCOPHAGE-XR) 750 mg 24 hr tablet TAKE 2 TABLETS BY MOUTH EVERY DAY WITH DINNER 10/17/22   Yosvany Ricketts DO   naproxen (Naprosyn) 500 mg tablet Take 1 tablet (500 mg total) by mouth 2 (two) times a day with meals 2/24/23   Suhas Polo PA-C   predniSONE 20 mg tablet Take 3 tablets for 4 days then two tablets for 3 days then one tablet for 3 days.  7/3/23   Suhas Polo PA-C   Probiotic Product (PRO-BIOTIC BLEND PO) Take 1 tablet by mouth daily    Historical Provider, MD   triamterene-hydrochlorothiazide (DYAZIDE) 37.5-25 mg per capsule Take 1 capsule by mouth daily 1/30/23   Jorge A Vela MD     MEDICATIONS ADMINISTERED IN LAST 24 HOURS     Medication Administration - last 24 hours from 07/16/2023 0007 to 07/17/2023 0007       Date/Time Order Dose Route Action Action by     07/16/2023 2027 EDT albuterol inhalation solution 10 mg 10 mg Nebulization Given Donnie Osler, RN     07/16/2023 2027 EDT ipratropium (ATROVENT) 0.02 % inhalation solution 1 mg 1 mg Nebulization Given Donnie Osler, RN     07/16/2023 2027 EDT sodium chloride 0.9 % inhalation solution 3 mL 3 mL Nebulization Given Donnie Osler, RN     07/16/2023 2109 EDT magnesium sulfate 2 g/50 mL IVPB (premix) 2 g 0 g Intravenous Stopped Donnie Osler, RN     07/16/2023 2029 EDT magnesium sulfate 2 g/50 mL IVPB (premix) 2 g 2 g Intravenous [address] Donnie Osler, RN     07/16/2023 2029 EDT methylPREDNISolone sodium succinate (Solu-MEDROL) injection 125 mg 125 mg Intravenous Given Genny Lawrence, ANGELES     07/16/2023 2258 EDT multi-electrolyte (ISOLYTE-S PH 7.4) bolus 1,000 mL 0 mL Intravenous Stopped Donnie Osler, RN     07/16/2023 2110 EDT multi-electrolyte (ISOLYTE-S PH 7.4) bolus 1,000 mL 1,000 mL Intravenous [address] Donnie Osler, RN     07/16/2023 2125 EDT albuterol inhalation solution 10 mg 10 mg Nebulization Given Dieldrine Kassidy, RT     07/16/2023 2125 EDT ipratropium (ATROVENT) 0.02 % inhalation solution 1 mg 1 mg Nebulization Given Dieldrine Kassidy, RT     07/16/2023 2125 EDT sodium chloride 0.9 % inhalation solution 12 mL 12 mL Nebulization Given Dieldrine Kassidy, RT     07/16/2023 2351 EDT guaiFENesin (ROBITUSSIN) oral liquid 200 mg 200 mg Oral Given Diorra Abdouche     07/16/2023 2317 EDT budesonide (PULMICORT) inhalation solution 0.5 mg -- Nebulization Canceled Entry Dieldrine Kassidy, RT     07/16/2023 2317 EDT ipratropium (ATROVENT) 0.02 % inhalation solution 0.5 mg -- Nebulization Canceled Entry Dieldrine Kassidy, RT     07/16/2023 7064 EDT azithromycin (ZITHROMAX) tablet 500 mg 500 mg Oral Given Glo Galaviz        CURRENT MEDICATIONS            Admission Time  I spent 45 minutes admitting the patient.   This involved direct patient contact where I performed a full history and physical, reviewing previous records, and reviewing laboratory and other diagnostic studies. Portions of the record may have been created with voice recognition software. Occasional wrong word or "sound a like" substitutions may have occurred due to the inherent limitations of voice recognition software.   Read the chart carefully and recognize, using context, where substitutions have occurred.    ==  Wanda Colindres MD  Internal Medicine Residency PGY-I  [address] [place] [address]

## 2023-07-18 VITALS
OXYGEN SATURATION: 97 % | TEMPERATURE: 98.5 F | RESPIRATION RATE: 17 BRPM | HEART RATE: 77 BPM | BODY MASS INDEX: 32 KG/M2 | HEIGHT: 60 IN | WEIGHT: 163 LBS | SYSTOLIC BLOOD PRESSURE: 121 MMHG | DIASTOLIC BLOOD PRESSURE: 77 MMHG

## 2023-07-18 PROBLEM — J44.1 COPD EXACERBATION (HCC): Status: ACTIVE | Noted: 2023-07-16

## 2023-07-18 LAB
ANION GAP SERPL CALCULATED.3IONS-SCNC: 6 MMOL/L
BASOPHILS # BLD AUTO: 0.02 THOUSANDS/ÂΜL (ref 0–0.1)
BASOPHILS NFR BLD AUTO: 0 % (ref 0–1)
BUN SERPL-MCNC: 13 MG/DL (ref 5–25)
CALCIUM SERPL-MCNC: 8.9 MG/DL (ref 8.3–10.1)
CHLORIDE SERPL-SCNC: 112 MMOL/L (ref 96–108)
CO2 SERPL-SCNC: 24 MMOL/L (ref 21–32)
CREAT SERPL-MCNC: 0.63 MG/DL (ref 0.6–1.3)
EOSINOPHIL # BLD AUTO: 0.14 THOUSAND/ÂΜL (ref 0–0.61)
EOSINOPHIL NFR BLD AUTO: 1 % (ref 0–6)
ERYTHROCYTE [DISTWIDTH] IN BLOOD BY AUTOMATED COUNT: 13.2 % (ref 11.6–15.1)
GFR SERPL CREATININE-BSD FRML MDRD: 95 ML/MIN/1.73SQ M
GLUCOSE SERPL-MCNC: 123 MG/DL (ref 65–140)
GLUCOSE SERPL-MCNC: 123 MG/DL (ref 65–140)
GLUCOSE SERPL-MCNC: 155 MG/DL (ref 65–140)
HCT VFR BLD AUTO: 39 % (ref 34.8–46.1)
HGB BLD-MCNC: 12.7 G/DL (ref 11.5–15.4)
IMM GRANULOCYTES # BLD AUTO: 0.14 THOUSAND/UL (ref 0–0.2)
IMM GRANULOCYTES NFR BLD AUTO: 1 % (ref 0–2)
LYMPHOCYTES # BLD AUTO: 2.51 THOUSANDS/ÂΜL (ref 0.6–4.47)
LYMPHOCYTES NFR BLD AUTO: 14 % (ref 14–44)
MCH RBC QN AUTO: 32.1 PG (ref 26.8–34.3)
MCHC RBC AUTO-ENTMCNC: 32.6 G/DL (ref 31.4–37.4)
MCV RBC AUTO: 99 FL (ref 82–98)
MONOCYTES # BLD AUTO: 1.13 THOUSAND/ÂΜL (ref 0.17–1.22)
MONOCYTES NFR BLD AUTO: 6 % (ref 4–12)
NEUTROPHILS # BLD AUTO: 14.34 THOUSANDS/ÂΜL (ref 1.85–7.62)
NEUTS SEG NFR BLD AUTO: 78 % (ref 43–75)
NRBC BLD AUTO-RTO: 0 /100 WBCS
PLATELET # BLD AUTO: 239 THOUSANDS/UL (ref 149–390)
PMV BLD AUTO: 10.4 FL (ref 8.9–12.7)
POTASSIUM SERPL-SCNC: 4 MMOL/L (ref 3.5–5.3)
RBC # BLD AUTO: 3.96 MILLION/UL (ref 3.81–5.12)
SODIUM SERPL-SCNC: 142 MMOL/L (ref 135–147)
WBC # BLD AUTO: 18.28 THOUSAND/UL (ref 4.31–10.16)

## 2023-07-18 PROCEDURE — 80048 BASIC METABOLIC PNL TOTAL CA: CPT

## 2023-07-18 PROCEDURE — 94640 AIRWAY INHALATION TREATMENT: CPT

## 2023-07-18 PROCEDURE — 82948 REAGENT STRIP/BLOOD GLUCOSE: CPT

## 2023-07-18 PROCEDURE — 94664 DEMO&/EVAL PT USE INHALER: CPT

## 2023-07-18 PROCEDURE — 85025 COMPLETE CBC W/AUTO DIFF WBC: CPT

## 2023-07-18 PROCEDURE — 94760 N-INVAS EAR/PLS OXIMETRY 1: CPT

## 2023-07-18 RX ORDER — LANOLIN ALCOHOL/MO/W.PET/CERES
3 CREAM (GRAM) TOPICAL
Status: COMPLETED | OUTPATIENT
Start: 2023-07-18 | End: 2023-07-18

## 2023-07-18 RX ORDER — AZITHROMYCIN 250 MG/1
250 TABLET, FILM COATED ORAL EVERY 24 HOURS
Qty: 4 TABLET | Refills: 0 | Status: SHIPPED | OUTPATIENT
Start: 2023-07-18 | End: 2023-07-22

## 2023-07-18 RX ORDER — PREDNISONE 20 MG/1
40 TABLET ORAL DAILY
Qty: 4 TABLET | Refills: 0 | Status: SHIPPED | OUTPATIENT
Start: 2023-07-19 | End: 2023-07-20 | Stop reason: SDUPTHER

## 2023-07-18 RX ADMIN — MELATONIN 3 MG: at 00:30

## 2023-07-18 RX ADMIN — UMECLIDINIUM BROMIDE AND VILANTEROL TRIFENATATE 1 PUFF: 62.5; 25 POWDER RESPIRATORY (INHALATION) at 09:05

## 2023-07-18 RX ADMIN — ENOXAPARIN SODIUM 40 MG: 40 INJECTION SUBCUTANEOUS at 09:03

## 2023-07-18 RX ADMIN — IPRATROPIUM BROMIDE 0.5 MG: 0.5 SOLUTION RESPIRATORY (INHALATION) at 07:29

## 2023-07-18 RX ADMIN — ACETAMINOPHEN 650 MG: 325 TABLET, FILM COATED ORAL at 09:08

## 2023-07-18 RX ADMIN — PREDNISONE 40 MG: 20 TABLET ORAL at 09:03

## 2023-07-18 RX ADMIN — ATORVASTATIN CALCIUM 40 MG: 40 TABLET, FILM COATED ORAL at 09:03

## 2023-07-18 RX ADMIN — IPRATROPIUM BROMIDE 0.5 MG: 0.5 SOLUTION RESPIRATORY (INHALATION) at 13:21

## 2023-07-19 ENCOUNTER — TRANSITIONAL CARE MANAGEMENT (OUTPATIENT)
Dept: INTERNAL MEDICINE CLINIC | Facility: CLINIC | Age: 63
End: 2023-07-19

## 2023-07-19 DIAGNOSIS — Z71.89 COMPLEX CARE COORDINATION: Primary | ICD-10-CM

## 2023-07-19 NOTE — UTILIZATION REVIEW
NOTIFICATION OF ADMISSION DISCHARGE   This is a Notification of Discharge from [facility]. Please be advised that this patient has been discharge from our facility. Below you will find the admission and discharge date and time including the patient’s disposition. UTILIZATION REVIEW CONTACT:  Fernanda Alva  Utilization   Network Utilization Review Department  Phone: [phone] x carefully listen to the prompts. All voicemails are confidential.  Email: [email or web address]     ADMISSION INFORMATION  PRESENTATION DATE: 7/16/2023  7:59 PM  OBERVATION ADMISSION DATE:   INPATIENT ADMISSION DATE: 7/16/23 10:31 PM   DISCHARGE DATE: 7/18/2023  3:38 PM   DISPOSITION:Home/Self Care    IMPORTANT INFORMATION:  Send all requests for admission clinical reviews, approved or denied determinations and any other requests to dedicated fax number below belonging to the campus where the patient is receiving treatment.  List of dedicated fax numbers:  [place] DENIALS (Administrative/Medical Necessity) [phone] [address] [facility] (Maternity/NICU/Pediatrics) [phone]   [facility] [phone]   [facility] [phone] [address] [phone]   [address] [place] [address] [phone]   [facility] [phone]   [address] [place] [address] [address] [facility] [phone]   [address] [place] [address] [phone] [address] [facility] [phone] [address] [facility] [address] [facility] [address] [phone]

## 2023-07-20 ENCOUNTER — PATIENT OUTREACH (OUTPATIENT)
Dept: INTERNAL MEDICINE CLINIC | Facility: CLINIC | Age: 63
End: 2023-07-20

## 2023-07-20 DIAGNOSIS — J44.1 COPD EXACERBATION (HCC): ICD-10-CM

## 2023-07-20 DIAGNOSIS — J43.2 CENTRILOBULAR EMPHYSEMA (HCC): ICD-10-CM

## 2023-07-20 RX ORDER — PREDNISONE 20 MG/1
40 TABLET ORAL DAILY
Qty: 4 TABLET | Refills: 0 | Status: SHIPPED | OUTPATIENT
Start: 2023-07-20 | End: 2023-07-22

## 2023-07-20 NOTE — PROGRESS NOTES
I sent a refill of the 4 tablets of prednisone. She can add two more days of the 2 tablets (20 mg) per day. Thank you!

## 2023-07-20 NOTE — PROGRESS NOTES
Outpatient Care Management Note:    Patient referred to outpatient nurse care management as she was identified as a high risk for readmission (HRR). Patient was at [facility] from 7/16-7/18/23 for COPD exacerbation. Patient was discharged home and to follow up with PCP, complete PFT's and establish care with pulmonary. Patient was started on umeclidinium-vilanterol (Anoro) inhaler 1 puff twice a day, albuterol as needed, to continue azithromycin and prednisone and Mucinex as needed. I called patient and explained my role and reason for outreach. She reports someone from the office did reach out to her yesterday and she called today regarding instructions for her inhaler needing to be changed from once a day to twice a day. Patient aware new script was sent but pharmacy will not fill until 8/9 per patient. Patient reports she did  an inhaler after she was discharged and instruction say only once a day. I did instruct patient she can use that inhaler twice a day and I called [facility] and spoke with pharmacist and they received new script with instructions for twice a day and they will need to reach out to insurance to get sooner refill authorized. Patient made aware of this. Patient confirms she has all of there medication at this time. She reports she feels better but still has some congestion and wheezing at times and tightness on her left side. She reports coughing and bringing up sputum. Reviewed that Albuterol inhaler can be used every 4 hours if needed for shortness of breath or wheezing. I did make her aware if she is needing it more often than that to call PCP office to make us aware and will need to be evaluated sooner as that is a red flag. She reports she is not smoking and stopped several months ago and several other family members have also stopped smoking. Patient reports she is taking varenicline/Chantix to help with cessation. Patient is scheduled Monday 7/24 for PFT.  I did review with her what to expect for this test and ensured she is aware that she must stop her umeclidinium-vilanterol inhaler 48 hours prior and albuterol 4-6 hours before test.     She is aware if she has questions or concerns over the weekend she can call main office number [phone] and be put through to [employer] to speak to a nurse if needed. Patient aware of PCP appointment on Thursday 7/24 @ 8:30 am.     I did encouraged patient to call pulmonary office to establish care. She confirmed she has phone number and will call to schedule. Patient reports her sister flew in from [place] and is staying with her awhile. She is hoping to attend a 3year old birthday party on Saturday. She did go out to the drug store and [employer] and reports did ok. She has central air where she lives. Patient does not have any further questions, concerns, or other needs at this time. Pt has my contact # and PCP office # if needed. Patient declining need for further outreach at this time.

## 2023-07-20 NOTE — PROGRESS NOTES
I called and spoke with patient and made her aware an additional 2 days of prednisone was sent to pharmacy for her to take. She will follow up with pharmacy. She reports she did receive a call back from pharmacist regarding her Anoro inhaler and that pharmacist tried reaching out to insurance and was unable to get through to them and left message with PCP office requesting if PCP office can further follow up with insurance and left contact number in message. Will send to clinical staff to further address. (patient will need refill on inhaler now that it is 1 puff twice a day sooner than what insurance will authorize next refill).

## 2023-07-21 LAB
BACTERIA BLD CULT: NORMAL
BACTERIA BLD CULT: NORMAL

## 2023-07-24 ENCOUNTER — HOSPITAL ENCOUNTER (OUTPATIENT)
Dept: PULMONOLOGY | Facility: HOSPITAL | Age: 63
Discharge: HOME | End: 2023-07-24
Payer: COMMERCIAL

## 2023-07-24 DIAGNOSIS — J43.2 CENTRILOBULAR EMPHYSEMA (HCC): ICD-10-CM

## 2023-07-24 DIAGNOSIS — J44.1 COPD EXACERBATION (HCC): ICD-10-CM

## 2023-07-24 DIAGNOSIS — J18.9 COMMUNITY ACQUIRED PNEUMONIA OF LEFT LOWER LOBE OF LUNG: ICD-10-CM

## 2023-07-24 PROCEDURE — 94729 DIFFUSING CAPACITY: CPT

## 2023-07-24 PROCEDURE — 94060 EVALUATION OF WHEEZING: CPT

## 2023-07-24 PROCEDURE — 94726 PLETHYSMOGRAPHY LUNG VOLUMES: CPT | Performed by: INTERNAL MEDICINE

## 2023-07-24 PROCEDURE — 94060 EVALUATION OF WHEEZING: CPT | Performed by: INTERNAL MEDICINE

## 2023-07-24 PROCEDURE — 94760 N-INVAS EAR/PLS OXIMETRY 1: CPT

## 2023-07-24 PROCEDURE — 94729 DIFFUSING CAPACITY: CPT | Performed by: INTERNAL MEDICINE

## 2023-07-24 PROCEDURE — 94726 PLETHYSMOGRAPHY LUNG VOLUMES: CPT

## 2023-07-24 RX ORDER — ALBUTEROL SULFATE 2.5 MG/3ML
2.5 SOLUTION RESPIRATORY (INHALATION) ONCE
Status: COMPLETED | OUTPATIENT
Start: 2023-07-24 | End: 2023-07-24

## 2023-07-24 RX ADMIN — ALBUTEROL SULFATE 2.5 MG: 2.5 SOLUTION RESPIRATORY (INHALATION) at 09:00

## 2023-07-27 ENCOUNTER — PATIENT OUTREACH (OUTPATIENT)
Dept: INTERNAL MEDICINE CLINIC | Facility: CLINIC | Age: 63
End: 2023-07-27

## 2023-07-27 ENCOUNTER — OFFICE VISIT (OUTPATIENT)
Dept: INTERNAL MEDICINE CLINIC | Facility: CLINIC | Age: 63
End: 2023-07-27

## 2023-07-27 ENCOUNTER — APPOINTMENT (OUTPATIENT)
Dept: LAB | Facility: CLINIC | Age: 63
End: 2023-07-27
Payer: COMMERCIAL

## 2023-07-27 VITALS
SYSTOLIC BLOOD PRESSURE: 112 MMHG | HEART RATE: 81 BPM | OXYGEN SATURATION: 97 % | WEIGHT: 162.8 LBS | TEMPERATURE: 98.5 F | BODY MASS INDEX: 31.79 KG/M2 | DIASTOLIC BLOOD PRESSURE: 81 MMHG

## 2023-07-27 DIAGNOSIS — D72.828 OTHER ELEVATED WHITE BLOOD CELL (WBC) COUNT: ICD-10-CM

## 2023-07-27 DIAGNOSIS — E78.2 MIXED HYPERLIPIDEMIA: ICD-10-CM

## 2023-07-27 DIAGNOSIS — I10 PRIMARY HYPERTENSION: ICD-10-CM

## 2023-07-27 DIAGNOSIS — R65.10 SIRS (SYSTEMIC INFLAMMATORY RESPONSE SYNDROME) (HCC): ICD-10-CM

## 2023-07-27 DIAGNOSIS — J43.2 CENTRILOBULAR EMPHYSEMA (HCC): ICD-10-CM

## 2023-07-27 DIAGNOSIS — Z87.891 PERSONAL HISTORY OF NICOTINE DEPENDENCE: ICD-10-CM

## 2023-07-27 DIAGNOSIS — E11.69 TYPE 2 DIABETES MELLITUS WITH OTHER SPECIFIED COMPLICATION, WITHOUT LONG-TERM CURRENT USE OF INSULIN (HCC): ICD-10-CM

## 2023-07-27 DIAGNOSIS — J44.1 COPD EXACERBATION (HCC): Primary | ICD-10-CM

## 2023-07-27 PROBLEM — J18.9 COMMUNITY ACQUIRED PNEUMONIA OF LEFT LOWER LOBE OF LUNG: Status: RESOLVED | Noted: 2023-06-09 | Resolved: 2023-07-27

## 2023-07-27 LAB
BASOPHILS # BLD AUTO: 0.06 THOUSANDS/ÂΜL (ref 0–0.1)
BASOPHILS NFR BLD AUTO: 1 % (ref 0–1)
EOSINOPHIL # BLD AUTO: 1.33 THOUSAND/ÂΜL (ref 0–0.61)
EOSINOPHIL NFR BLD AUTO: 14 % (ref 0–6)
ERYTHROCYTE [DISTWIDTH] IN BLOOD BY AUTOMATED COUNT: 12.4 % (ref 11.6–15.1)
HCT VFR BLD AUTO: 42.3 % (ref 34.8–46.1)
HGB BLD-MCNC: 14.1 G/DL (ref 11.5–15.4)
IMM GRANULOCYTES # BLD AUTO: 0.03 THOUSAND/UL (ref 0–0.2)
IMM GRANULOCYTES NFR BLD AUTO: 0 % (ref 0–2)
LYMPHOCYTES # BLD AUTO: 2.47 THOUSANDS/ÂΜL (ref 0.6–4.47)
LYMPHOCYTES NFR BLD AUTO: 27 % (ref 14–44)
MCH RBC QN AUTO: 32.3 PG (ref 26.8–34.3)
MCHC RBC AUTO-ENTMCNC: 33.3 G/DL (ref 31.4–37.4)
MCV RBC AUTO: 97 FL (ref 82–98)
MONOCYTES # BLD AUTO: 0.54 THOUSAND/ÂΜL (ref 0.17–1.22)
MONOCYTES NFR BLD AUTO: 6 % (ref 4–12)
NEUTROPHILS # BLD AUTO: 4.82 THOUSANDS/ÂΜL (ref 1.85–7.62)
NEUTS SEG NFR BLD AUTO: 52 % (ref 43–75)
NRBC BLD AUTO-RTO: 0 /100 WBCS
PLATELET # BLD AUTO: 218 THOUSANDS/UL (ref 149–390)
PMV BLD AUTO: 10.9 FL (ref 8.9–12.7)
RBC # BLD AUTO: 4.37 MILLION/UL (ref 3.81–5.12)
SL AMB POCT HEMOGLOBIN AIC: 6.8 (ref ?–6.5)
WBC # BLD AUTO: 9.25 THOUSAND/UL (ref 4.31–10.16)

## 2023-07-27 PROCEDURE — 83036 HEMOGLOBIN GLYCOSYLATED A1C: CPT | Performed by: PHYSICIAN ASSISTANT

## 2023-07-27 PROCEDURE — 85025 COMPLETE CBC W/AUTO DIFF WBC: CPT

## 2023-07-27 PROCEDURE — 99496 TRANSJ CARE MGMT HIGH F2F 7D: CPT | Performed by: PHYSICIAN ASSISTANT

## 2023-07-27 PROCEDURE — 36415 COLL VENOUS BLD VENIPUNCTURE: CPT

## 2023-07-27 RX ORDER — FLUTICASONE PROPIONATE 100 UG/1
2 POWDER, METERED RESPIRATORY (INHALATION) 2 TIMES DAILY
Qty: 120 BLISTER | Refills: 5 | Status: SHIPPED | OUTPATIENT
Start: 2023-07-27 | End: 2023-07-31 | Stop reason: SDUPTHER

## 2023-07-27 RX ORDER — GUAIFENESIN 600 MG/1
600 TABLET, EXTENDED RELEASE ORAL EVERY 12 HOURS SCHEDULED
Qty: 60 TABLET | Refills: 0 | Status: SHIPPED | OUTPATIENT
Start: 2023-07-27

## 2023-07-27 NOTE — PROGRESS NOTES
Received call from patient with contact number for Janina Handy, nurse care manager at [facility]. Direct number is [phone]. She also gave the [facility] physician pharmacy support line [phone]. I called Janina Handy twice and no answer and left message with reason for call and requesting call back and left my contact number and main office number. I then call pharmacy support line and they also spoke with the resource department. Prior Harlo Jw will still be needed for Anoro Ellipta (umeclidinium-vilanterol)  inhaler at 1 puff twice a day as it exceeds quantity allowed. They will not do an override when I called. For the fluticasone (Flovent) inhaler this also needs prior auth as this also exceeds quantity allowed. Per insurance 50 mcg blisters would be covered. Will send to clinical team to address prior auth(s) and to further follow up with patient and pharmacy.

## 2023-07-27 NOTE — PROGRESS NOTES
Outpatient Care Management Note:    Patient at PCP office today for hospital follow up appointment. I followed up with [facility] pharmacy on status of Anoro Ellipta and per pharmacy insurance is not covering inhaler as dose prescribed is over the recommended dose. Recommended dose is for once a day and it is prescribed for twice a day. Per pharmacy will require prior auth. PCP made aware. Per calculations patient will be out of her currently inhaler by 8/2/23 as she has been using it twice a day and it only has 30 doses and will only last 15 days. I met with patient after her appointment. Present with her is her sister visiting from [place]. Patient reports she spoke with a nurse care manager named Kumar Brady from [facility] and she call me with her contact information as she can further help with the inhaler issue. Flovent inhaler added to regimen today by provider. Patient scheduled with pulmonary on 8/22/23 and completed PFT on 7/24.

## 2023-07-28 ENCOUNTER — TELEPHONE (OUTPATIENT)
Dept: INTERNAL MEDICINE CLINIC | Facility: CLINIC | Age: 63
End: 2023-07-28

## 2023-07-28 RX ORDER — FLUTICASONE PROPIONATE 100 UG/1
POWDER, METERED RESPIRATORY (INHALATION)
Qty: 120 EACH | Refills: 5 | OUTPATIENT
Start: 2023-07-28

## 2023-07-28 NOTE — TELEPHONE ENCOUNTER
PA for Anoro Ellipta (umeclidinium-vilanterol) was submitted through cover my meds. Will follow up in 2-3 days.     Anoro Inhaler- Key: [id number]     Flovent- Key: [id number]

## 2023-07-29 PROBLEM — D72.829 LEUCOCYTOSIS: Status: ACTIVE | Noted: 2023-07-29

## 2023-07-29 PROBLEM — Z87.891 PERSONAL HISTORY OF NICOTINE DEPENDENCE: Status: ACTIVE | Noted: 2023-06-10

## 2023-07-29 NOTE — ASSESSMENT & PLAN NOTE
Patient does admit to occasional bronchitis prior to these last few months. She has a long history of smoking. Quit two months ago when her lung issues started. She does not feel it is COPD. We did review the CT lung findings. There was signs of restriction on her PFTs as well. She has an initial consult with pulmonology on 8/22/23. She was prescribed Anoro once daily in the hospital. There was some confusion as the patient states she was told to use it BID and the discharge note states BID, but once daily use was sent to the pharmacy. Typically this inhaler is only QD. Unclear what the patient should be on. Regardless she has been using it BID and is still symptomatic. Discussed adding on an ICS to help lessen the severity of her symptoms and avoid oral prednisone prior to her pulmonology consult. She was agreeable. Also restart guaifenesin 600 mg BID as needed. Continue Anoro QD and add Flovent BID. Rinse mouth after use. ER precautions given.      PFTs from 7/10/23:   Interpretation:     • Non-specific ventilatory defect likely due to patient effort; suggestive of restriction     • No significant improvement in airflow or forced vital capacity in response to the administration to bronchodilator per ATS standards.      • Low-normal lung volumes     • Normal diffusion capacity     • Restricted flow-volume loop

## 2023-07-29 NOTE — ASSESSMENT & PLAN NOTE
Patient was concerned about an elevated WBC during her hospitalization. Discussed that this was likely secondary to her prednisone use during her hospitalization. Will recheck CBC today.

## 2023-07-29 NOTE — ASSESSMENT & PLAN NOTE
Lab Results   Component Value Date    CHOLESTEROL 140 05/12/2023    CHOLESTEROL 129 11/19/2021    CHOLESTEROL 278 (H) 09/10/2019     Lab Results   Component Value Date    HDL 46 (L) 05/12/2023    HDL 40 (L) 11/19/2021    HDL 41 09/10/2019     Lab Results   Component Value Date    TRIG 236 (H) 05/12/2023    TRIG 143 11/19/2021    TRIG 255 (H) 09/10/2019     Lab Results   Component Value Date    NONHDLC 94 05/12/2023    [address] 89 11/19/2021    [address] 237 09/10/2019     Lab Results   Component Value Date    LDLCALC 47 05/12/2023     Continue atorvastatin 40 mg daily. Low fat diet.

## 2023-07-29 NOTE — PROGRESS NOTES
Assessment & Plan     1. COPD exacerbation ([address])  Assessment & Plan:  Patient does admit to occasional bronchitis prior to these last few months. She has a long history of smoking. Quit two months ago when her lung issues started. She does not feel it is COPD. We did review the CT lung findings. There was signs of restriction on her PFTs as well. She has an initial consult with pulmonology on 8/22/23. She was prescribed Anoro once daily in the hospital. There was some confusion as the patient states she was told to use it BID and the discharge note states BID, but once daily use was sent to the pharmacy. Typically this inhaler is only QD. Unclear what the patient should be on. Regardless she has been using it BID and is still symptomatic. Discussed adding on an ICS to help lessen the severity of her symptoms and avoid oral prednisone prior to her pulmonology consult. She was agreeable. Also restart guaifenesin 600 mg BID as needed. Continue Anoro QD and add Flovent BID. Rinse mouth after use. ER precautions given. PFTs from 7/10/23:   Interpretation:     • Non-specific ventilatory defect likely due to patient effort; suggestive of restriction     • No significant improvement in airflow or forced vital capacity in response to the administration to bronchodilator per ATS standards.      • Low-normal lung volumes     • Normal diffusion capacity     • Restricted flow-volume loop        2. Type 2 diabetes mellitus with other specified complication, without long-term current use of insulin ([facility])  Assessment & Plan:    Lab Results   Component Value Date    HGBA1C 6.8 (A) 07/27/2023     A1c did increase from 5.8. Likely secondary to frequent prednisone use recently, over the past 2 months. Will continue metformin 750 mg Bid. Reviewed nutrition and exercise recommendations. Due for DM eye exam. She states she will schedule. Up to date on foot exam. Follow up in 3 months, sooner if needed.     Orders:  -     POCT hemoglobin A1c    3. Centrilobular emphysema ([address])  Assessment & Plan:  CT chest wo contrast 7/17/23: Subtle upper lobe centrilobular emphysema. Orders:  -     guaiFENesin (MUCINEX) 600 mg 12 hr tablet; Take 1 tablet (600 mg total) by mouth every 12 (twelve) hours  -     fluticasone (Flovent Diskus) 100 mcg/actuation diskus inhaler; Inhale 2 puffs 2 (two) times a day Rinse mouth after use. 4. SIRS (systemic inflammatory response syndrome) (HCC)  Assessment & Plan:  Patient presented to the ED with tachycardia, tachypnea, in the setting of SIRS criteria. Likely secondary to COPD exacerbation. Symptoms resolved during hospitalization. 5. Primary hypertension  Assessment & Plan:  BP Readings from Last 3 Encounters:   07/27/23 112/81   07/18/23 121/77   07/10/23 116/74     Continue triamterene-HCTZ 37.5-25 mg daily. Limit sodium and salt intake. Avoid alcohol and caffeine. 6. Other elevated white blood cell (WBC) count  Assessment & Plan:  Patient was concerned about an elevated WBC during her hospitalization. Discussed that this was likely secondary to her prednisone use during her hospitalization. Will recheck CBC today. Orders:  -     CBC and differential; Future    7. Mixed hyperlipidemia  Assessment & Plan:  Lab Results   Component Value Date    CHOLESTEROL 140 05/12/2023    CHOLESTEROL 129 11/19/2021    CHOLESTEROL 278 (H) 09/10/2019     Lab Results   Component Value Date    HDL 46 (L) 05/12/2023    HDL 40 (L) 11/19/2021    HDL 41 09/10/2019     Lab Results   Component Value Date    TRIG 236 (H) 05/12/2023    TRIG 143 11/19/2021    TRIG 255 (H) 09/10/2019     Lab Results   Component Value Date    [address] 94 05/12/2023    [address] 89 11/19/2021    [address] 237 09/10/2019     Lab Results   Component Value Date    LDLCALC 47 05/12/2023     Continue atorvastatin 40 mg daily. Low fat diet. 8. Personal history of nicotine dependence  Assessment & Plan:  Quit smoking 2 months ago.       BMI Counseling: Body mass index is 31.79 kg/m². The BMI is above normal. Nutrition recommendations include decreasing portion sizes, encouraging healthy choices of fruits and vegetables, decreasing fast food intake, consuming healthier snacks, limiting drinks that contain sugar, moderation in carbohydrate intake, increasing intake of lean protein, reducing intake of saturated and trans fat and reducing intake of cholesterol. Exercise recommendations include moderate physical activity 150 minutes/week, exercising 3-5 times per week and strength training exercises. No pharmacotherapy was ordered. Rationale for BMI follow-up plan is due to patient being overweight or obese. Subjective     Transitional Care Management Review:   Catherine Bennett is a 61 y.o. female here for TCM follow up. During the TCM phone call patient stated:  TCM Call     Date and time call was made  7/20/2023  8:53 AM    Hospital care reviewed  Records reviewed    Patient was hospitialized at  [facility]    Date of Admission  07/16/23    Date of discharge  07/18/23    Diagnosis  COPD exacerbation    Disposition  Home    Were the patients medications reviewed and updated  Yes    Current Symptoms  Fever    Cough Severity  Mild      TCM Call     Post hospital issues  None    Should patient be enrolled in anticoag monitoring? No    Scheduled for follow up? Yes    Did you obtain your prescribed medications  Yes    Do you need help managing your prescriptions or medications  No    Is transportation to your appointment needed  No    I have advised the patient to call PCP with any new or worsening symptoms  Ann Zavaleta MA        Patient is 80-year-old female history of diabetes, hypertension, hyperlipidemia, former smoker, Ménière's disease, and recurrent bronchitis presenting for transition of care. She presenting to the ER on 7/16/23 with SOB for 2 days. She was unable to walk due to the severity of the SOB.  This is the third time this has happened in the past 2 months. She was hospitalized the first time, ER visit the second, and hospitalized again this time. She admits to occasional bronchitis prior to this, but nothing this severe, and nothing that required hospitalization. She did quit smoking 2 months ago. She has been using Anoro BID and albuterol as needed. She finished her prednisone almost a week ago. Her symptoms have gradually been returning. This happened the first two times as well. As soon as she finished the prednisone, she can't breath again. She is complaining of intermittent wheezing, coughing, and SOB. Her cough is productive of white to clear phlegm. Denies chest pain or palpitations. Denies fever or chills. Review of Systems   Constitutional: Negative for appetite change, chills, diaphoresis, fatigue, fever and unexpected weight change. HENT: Positive for congestion. Negative for postnasal drip, rhinorrhea, sinus pressure, sinus pain, sneezing, sore throat, tinnitus and trouble swallowing. Eyes: Negative for visual disturbance. Respiratory: Positive for cough, chest tightness, shortness of breath and wheezing. Cardiovascular: Negative for chest pain, palpitations and leg swelling. Gastrointestinal: Negative for abdominal pain, blood in stool, constipation, diarrhea, nausea and vomiting. Endocrine: Negative for cold intolerance, heat intolerance, polydipsia, polyphagia and polyuria. Skin: Negative for rash. Neurological: Negative for dizziness, tremors, weakness, light-headedness, numbness and headaches. Hematological: Negative for adenopathy. Objective     /81 (BP Location: Right arm, Patient Position: Sitting, Cuff Size: Standard)   Pulse 81   Temp 98.5 °F (36.9 °C) (Temporal)   Wt 73.8 kg (162 lb 12.8 oz)   LMP 10/01/2010 (Approximate)   SpO2 97%   BMI 31.79 kg/m²      Physical Exam  Vitals and nursing note reviewed. Constitutional:       General: She is awake.  She is not in acute distress. Appearance: Normal appearance. She is well-developed and well-groomed. She is obese. She is not ill-appearing. HENT:      Head: Normocephalic and atraumatic. Right Ear: Tympanic membrane, ear canal and external ear normal.      Left Ear: Tympanic membrane, ear canal and external ear normal.      Nose: Nose normal.      Mouth/Throat:      Lips: Pink. Mouth: Mucous membranes are moist.      Pharynx: Oropharynx is clear. Uvula midline. No oropharyngeal exudate or posterior oropharyngeal erythema. Eyes:      General: No scleral icterus. Conjunctiva/sclera: Conjunctivae normal.   Cardiovascular:      Rate and Rhythm: Normal rate and regular rhythm. Pulses: Normal pulses. Heart sounds: Normal heart sounds. No murmur heard. Pulmonary:      Effort: Pulmonary effort is normal. No respiratory distress. Breath sounds: Normal air entry. No decreased air movement. Wheezing (expiratory throughout lung fields bilaterally) present. No decreased breath sounds, rhonchi or rales. Musculoskeletal:         General: Normal range of motion. Cervical back: Neck supple. Lymphadenopathy:      Cervical: No cervical adenopathy. Skin:     General: Skin is warm. Coloration: Skin is not jaundiced. Findings: No rash. Neurological:      General: No focal deficit present. Mental Status: She is alert and oriented to person, place, and time. Mental status is at baseline. Motor: Motor function is intact. Coordination: Coordination is intact. Gait: Gait is intact. Psychiatric:         Attention and Perception: Attention normal.         Mood and Affect: Mood and affect normal.         Speech: Speech normal.         Behavior: Behavior normal. Behavior is cooperative.          Cognition and Memory: Cognition normal.       Medications have been reviewed by provider in current encounter    Mendoza Pete PA-C

## 2023-07-29 NOTE — ASSESSMENT & PLAN NOTE
BP Readings from Last 3 Encounters:   07/27/23 112/81   07/18/23 121/77   07/10/23 116/74     Continue triamterene-HCTZ 37.5-25 mg daily. Limit sodium and salt intake. Avoid alcohol and caffeine.

## 2023-07-29 NOTE — ASSESSMENT & PLAN NOTE
Patient presented to the ED with tachycardia, tachypnea, in the setting of SIRS criteria. Likely secondary to COPD exacerbation. Symptoms resolved during hospitalization.

## 2023-07-29 NOTE — ASSESSMENT & PLAN NOTE
Lab Results   Component Value Date    HGBA1C 6.8 (A) 07/27/2023     A1c did increase from 5.8. Likely secondary to frequent prednisone use recently, over the past 2 months. Will continue metformin 750 mg Bid. Reviewed nutrition and exercise recommendations. Due for DM eye exam. She states she will schedule. Up to date on foot exam. Follow up in 3 months, sooner if needed.

## 2023-07-31 ENCOUNTER — HOSPITAL ENCOUNTER (EMERGENCY)
Facility: HOSPITAL | Age: 63
Discharge: HOME | End: 2023-07-31
Attending: EMERGENCY MEDICINE
Payer: COMMERCIAL

## 2023-07-31 ENCOUNTER — APPOINTMENT (EMERGENCY)
Dept: RADIOLOGY | Facility: HOSPITAL | Age: 63
End: 2023-07-31
Payer: COMMERCIAL

## 2023-07-31 VITALS
RESPIRATION RATE: 37 BRPM | SYSTOLIC BLOOD PRESSURE: 142 MMHG | HEART RATE: 95 BPM | OXYGEN SATURATION: 97 % | DIASTOLIC BLOOD PRESSURE: 82 MMHG | TEMPERATURE: 97.5 F

## 2023-07-31 DIAGNOSIS — J44.1 COPD EXACERBATION (HCC): ICD-10-CM

## 2023-07-31 DIAGNOSIS — J43.2 CENTRILOBULAR EMPHYSEMA (HCC): ICD-10-CM

## 2023-07-31 LAB
ANION GAP SERPL CALCULATED.3IONS-SCNC: 4 MMOL/L
ATRIAL RATE: 99 BPM
BASOPHILS # BLD AUTO: 0.08 THOUSANDS/ÂΜL (ref 0–0.1)
BASOPHILS NFR BLD AUTO: 1 % (ref 0–1)
BUN SERPL-MCNC: 13 MG/DL (ref 5–25)
CALCIUM SERPL-MCNC: 9 MG/DL (ref 8.3–10.1)
CARDIAC TROPONIN I PNL SERPL HS: <2 NG/L
CHLORIDE SERPL-SCNC: 109 MMOL/L (ref 96–108)
CO2 SERPL-SCNC: 23 MMOL/L (ref 21–32)
CREAT SERPL-MCNC: 0.67 MG/DL (ref 0.6–1.3)
D DIMER PPP FEU-MCNC: <0.27 UG/ML FEU
EOSINOPHIL # BLD AUTO: 1.06 THOUSAND/ÂΜL (ref 0–0.61)
EOSINOPHIL NFR BLD AUTO: 11 % (ref 0–6)
ERYTHROCYTE [DISTWIDTH] IN BLOOD BY AUTOMATED COUNT: 12.6 % (ref 11.6–15.1)
GFR SERPL CREATININE-BSD FRML MDRD: 93 ML/MIN/1.73SQ M
GLUCOSE SERPL-MCNC: 147 MG/DL (ref 65–140)
HCT VFR BLD AUTO: 41.2 % (ref 34.8–46.1)
HGB BLD-MCNC: 14 G/DL (ref 11.5–15.4)
IMM GRANULOCYTES # BLD AUTO: 0.03 THOUSAND/UL (ref 0–0.2)
IMM GRANULOCYTES NFR BLD AUTO: 0 % (ref 0–2)
LYMPHOCYTES # BLD AUTO: 2.33 THOUSANDS/ÂΜL (ref 0.6–4.47)
LYMPHOCYTES NFR BLD AUTO: 24 % (ref 14–44)
MCH RBC QN AUTO: 32.1 PG (ref 26.8–34.3)
MCHC RBC AUTO-ENTMCNC: 34 G/DL (ref 31.4–37.4)
MCV RBC AUTO: 95 FL (ref 82–98)
MONOCYTES # BLD AUTO: 0.56 THOUSAND/ÂΜL (ref 0.17–1.22)
MONOCYTES NFR BLD AUTO: 6 % (ref 4–12)
NEUTROPHILS # BLD AUTO: 5.56 THOUSANDS/ÂΜL (ref 1.85–7.62)
NEUTS SEG NFR BLD AUTO: 58 % (ref 43–75)
NRBC BLD AUTO-RTO: 0 /100 WBCS
P AXIS: 48 DEGREES
PLATELET # BLD AUTO: 226 THOUSANDS/UL (ref 149–390)
PMV BLD AUTO: 10.8 FL (ref 8.9–12.7)
POTASSIUM SERPL-SCNC: 4 MMOL/L (ref 3.5–5.3)
PR INTERVAL: 100 MS
QRS AXIS: 60 DEGREES
QRSD INTERVAL: 64 MS
QT INTERVAL: 354 MS
QTC INTERVAL: 454 MS
RBC # BLD AUTO: 4.36 MILLION/UL (ref 3.81–5.12)
SODIUM SERPL-SCNC: 136 MMOL/L (ref 135–147)
T WAVE AXIS: 66 DEGREES
VENTRICULAR RATE: 99 BPM
WBC # BLD AUTO: 9.62 THOUSAND/UL (ref 4.31–10.16)

## 2023-07-31 PROCEDURE — 96374 THER/PROPH/DIAG INJ IV PUSH: CPT

## 2023-07-31 PROCEDURE — 85379 FIBRIN DEGRADATION QUANT: CPT

## 2023-07-31 PROCEDURE — 84484 ASSAY OF TROPONIN QUANT: CPT

## 2023-07-31 PROCEDURE — 99284 EMERGENCY DEPT VISIT MOD MDM: CPT | Performed by: EMERGENCY MEDICINE

## 2023-07-31 PROCEDURE — 80048 BASIC METABOLIC PNL TOTAL CA: CPT

## 2023-07-31 PROCEDURE — 36415 COLL VENOUS BLD VENIPUNCTURE: CPT

## 2023-07-31 PROCEDURE — 99285 EMERGENCY DEPT VISIT HI MDM: CPT

## 2023-07-31 PROCEDURE — 71046 X-RAY EXAM CHEST 2 VIEWS: CPT

## 2023-07-31 PROCEDURE — 94644 CONT INHLJ TX 1ST HOUR: CPT

## 2023-07-31 PROCEDURE — 93010 ELECTROCARDIOGRAM REPORT: CPT | Performed by: INTERNAL MEDICINE

## 2023-07-31 PROCEDURE — 85025 COMPLETE CBC W/AUTO DIFF WBC: CPT

## 2023-07-31 PROCEDURE — 93005 ELECTROCARDIOGRAM TRACING: CPT

## 2023-07-31 RX ORDER — DEXAMETHASONE SODIUM PHOSPHATE 10 MG/ML
10 INJECTION, SOLUTION INTRAMUSCULAR; INTRAVENOUS ONCE
Status: COMPLETED | OUTPATIENT
Start: 2023-07-31 | End: 2023-07-31

## 2023-07-31 RX ORDER — PREDNISONE 20 MG/1
40 TABLET ORAL DAILY
Qty: 10 TABLET | Refills: 0 | Status: SHIPPED | OUTPATIENT
Start: 2023-07-31 | End: 2023-08-05

## 2023-07-31 RX ORDER — ALBUTEROL SULFATE 2.5 MG/3ML
2.5 SOLUTION RESPIRATORY (INHALATION) EVERY 6 HOURS PRN
Qty: 75 ML | Refills: 0 | Status: SHIPPED | OUTPATIENT
Start: 2023-07-31

## 2023-07-31 RX ORDER — SODIUM CHLORIDE FOR INHALATION 0.9 %
12 VIAL, NEBULIZER (ML) INHALATION ONCE
Status: COMPLETED | OUTPATIENT
Start: 2023-07-31 | End: 2023-07-31

## 2023-07-31 RX ORDER — FLUTICASONE PROPIONATE 100 UG/1
2 POWDER, METERED RESPIRATORY (INHALATION) 2 TIMES DAILY
Qty: 28 EACH | Refills: 5 | Status: SHIPPED | OUTPATIENT
Start: 2023-07-31 | End: 2023-08-30

## 2023-07-31 RX ADMIN — UMECLIDINIUM BROMIDE AND VILANTEROL TRIFENATATE 1 PUFF: 62.5; 25 POWDER RESPIRATORY (INHALATION) at 13:44

## 2023-07-31 RX ADMIN — ALBUTEROL SULFATE 10 MG: 2.5 SOLUTION RESPIRATORY (INHALATION) at 10:25

## 2023-07-31 RX ADMIN — DEXAMETHASONE SODIUM PHOSPHATE 10 MG: 10 INJECTION, SOLUTION INTRAMUSCULAR; INTRAVENOUS at 13:12

## 2023-07-31 RX ADMIN — IPRATROPIUM BROMIDE 1 MG: 0.5 SOLUTION RESPIRATORY (INHALATION) at 10:25

## 2023-07-31 RX ADMIN — Medication 12 ML: at 10:25

## 2023-07-31 NOTE — TELEPHONE ENCOUNTER
PA's for Flovent Diskus and Anoro Inhaler was denied b/c insurance will only cover 1 inhaler per month for each. Please send updated Rx for 1 inhaler per month.

## 2023-07-31 NOTE — DISCHARGE INSTRUCTIONS
You were evaluated in the emergency department for shortness of breath likely from COPD exacerbation. Labs were within normal limits. Chest xray was normal. A prescription for albuterol nebulizer solution, nebulizer supplies, and 5 day course of prednisone were provided. Please follow up with your pulmonologist and PCP within the next few days if possible for re-evaluation. Return to the nearest ER if symptoms worsen or if new symptoms develop such as fever, chills, changes in your sputum, chest pain.

## 2023-07-31 NOTE — ED PROVIDER NOTES
History  Chief Complaint   Patient presents with   • Shortness of Breath     Pt c/o SOB since last night, worsening today. Hx of emphysema, recently admitted. HPI     63-year-old female past medical history significant for diabetes, Ménière's disease, recent diagnosis of emphysema presents to the ED for worsening productive cough since last night. States the character of her sputum has been unchanged. Denies increased shortness of breath or chest pain. Patient endorses chest tightness especially when she coughs. Has tried using her rescue inhaler once without much relief. Denies any fever, chills, abdominal pain, nausea, vomiting, diarrhea. Was recently admitted for COPD exacerbation 2 weeks ago and states she has been doing well at home after being discharged from the hospital.       Prior to Admission Medications   Prescriptions Last Dose Informant Patient Reported? Taking? Lancets (freestyle) lancets   No No   Sig: Use as instructed   Probiotic Product (PRO-BIOTIC BLEND PO)   Yes No   Sig: Take 1 tablet by mouth daily   albuterol (PROVENTIL HFA,VENTOLIN HFA) 90 mcg/act inhaler   No No   Sig: TAKE 2 PUFFS BY MOUTH EVERY 4 HOURS AS NEEDED FOR WHEEZE   atorvastatin (LIPITOR) 40 mg tablet   No No   Sig: Take 1 tablet (40 mg total) by mouth daily   fluticasone (Flovent Diskus) 100 mcg/actuation diskus inhaler   No No   Sig: Inhale 2 puffs 2 (two) times a day Rinse mouth after use.    glucose blood (FREESTYLE LITE) test strip   No No   Sig: Use as instructed   glucose monitoring kit (FREESTYLE) monitoring kit   No No   Sig: Apply 1 m topically 4 (four) times a day as needed Use as directed   guaiFENesin (MUCINEX) 600 mg 12 hr tablet   No No   Sig: Take 1 tablet (600 mg total) by mouth every 12 (twelve) hours   metFORMIN (GLUCOPHAGE-XR) 750 mg 24 hr tablet   No No   Sig: TAKE 2 TABLETS BY MOUTH EVERY DAY WITH DINNER   naproxen (Naprosyn) 500 mg tablet   No No   Sig: Take 1 tablet (500 mg total) by mouth 2 (two) times a day with meals   triamterene-hydrochlorothiazide (DYAZIDE) 37.5-25 mg per capsule   No No   Sig: Take 1 capsule by mouth daily   umeclidinium-vilanterol 62.5-25 mcg/actuation inhaler   No No   Sig: Inhale 1 puff daily      Facility-Administered Medications: None       Past Medical History:   Diagnosis Date   • Community acquired pneumonia of left lower lobe of lung 2023   • Diabetes mellitus ([address])    • Sepsis ([address]) 6/10/2023   • Vertigo        Past Surgical History:   Procedure Laterality Date   • BREAST BIOPSY Bilateral 2021    stereo x2   • BREAST BIOPSY Right 2021   • MAMMO STEREOTACTIC BREAST BIOPSY LEFT (ALL INC) Left 2021   • MAMMO STEREOTACTIC BREAST BIOPSY RIGHT (ALL INC) Right 2021   • TUMOR REMOVAL      right leg- benign        Family History   Problem Relation Age of Onset   • Stroke Mother    • Hypertension Mother    • Diabetes Mother    • Throat cancer Brother    • Ovarian cancer Family         2 maternal aunts and 2 cousins    • Stroke Father    • Thyroid cancer Sister    • Heart disease Sister    • Uterine cancer Sister    • No Known Problems Son    • Heart attack Maternal Grandmother 43   • Stroke Maternal Grandfather 58   • Heart attack Paternal Grandmother    • Leukemia Paternal Grandfather    • No Known Problems Sister    • No Known Problems Son    • Breast cancer Cousin 36   • Breast cancer Maternal Aunt 26   • Uterine cancer Maternal Aunt    • Uterine cancer Cousin    • No Known Problems Paternal Aunt      I have reviewed and agree with the history as documented.     E-Cigarette/Vaping   • E-Cigarette Use Never User      E-Cigarette/Vaping Substances   • Nicotine No    • THC No    • CBD No    • Flavoring No    • Other No    • Unknown No      Social History     Tobacco Use   • Smoking status: Former     Packs/day: 0.25     Years: 30.00     Total pack years: 7.50     Types: Cigarettes     Quit date: 2023     Years since quittin.2   • Smokeless tobacco: Never   • Tobacco comments:     1-6 cigarettes a day   Vaping Use   • Vaping Use: Never used   Substance Use Topics   • Alcohol use: Yes     Comment: socially    • Drug use: Never        Review of Systems   Constitutional: Negative for chills and fever. HENT: Negative for ear pain and sore throat. Eyes: Negative for pain and visual disturbance. Respiratory: Positive for cough, chest tightness and shortness of breath. Cardiovascular: Negative for chest pain and palpitations. Gastrointestinal: Negative for abdominal pain, diarrhea, nausea and vomiting. Genitourinary: Negative for dysuria and hematuria. Musculoskeletal: Negative for arthralgias and back pain. Skin: Negative for color change and rash. Neurological: Negative for dizziness, seizures, syncope and headaches. All other systems reviewed and are negative. Physical Exam  ED Triage Vitals   Temperature Pulse Respirations Blood Pressure SpO2   07/31/23 0942 07/31/23 0941 07/31/23 0941 07/31/23 0942 07/31/23 0941   97.5 °F (36.4 °C) 69 (!) 28 134/90 93 %      Temp Source Heart Rate Source Patient Position - Orthostatic VS BP Location FiO2 (%)   07/31/23 0942 07/31/23 0941 07/31/23 0942 07/31/23 0942 --   Temporal Monitor Sitting Left arm       Pain Score       --                    Orthostatic Vital Signs  Vitals:    07/31/23 0941 07/31/23 0942 07/31/23 0957 07/31/23 1115   BP:  134/90 142/82    Pulse: 69  95 95   Patient Position - Orthostatic VS:  Sitting         Physical Exam  Vitals and nursing note reviewed. Constitutional:       General: She is in acute distress. Appearance: Normal appearance. She is well-developed. She is not ill-appearing. HENT:      Head: Normocephalic and atraumatic. Mouth/Throat:      Mouth: Mucous membranes are moist.   Eyes:      Conjunctiva/sclera: Conjunctivae normal.   Cardiovascular:      Rate and Rhythm: Normal rate and regular rhythm. Pulses: Normal pulses.       Heart sounds: Normal heart sounds. No murmur heard. Pulmonary:      Effort: Tachypnea and respiratory distress present. Breath sounds: Decreased breath sounds and wheezing present. Abdominal:      Palpations: Abdomen is soft. Tenderness: There is no abdominal tenderness. There is no guarding or rebound. Musculoskeletal:         General: No swelling. Cervical back: Neck supple. Right lower leg: No edema. Left lower leg: No edema. Skin:     General: Skin is warm and dry. Capillary Refill: Capillary refill takes less than 2 seconds. Neurological:      General: No focal deficit present. Mental Status: She is alert and oriented to person, place, and time. Psychiatric:         Mood and Affect: Mood normal.         ED Medications  Medications   albuterol inhalation solution 10 mg (10 mg Nebulization Given 7/31/23 1025)   ipratropium (ATROVENT) 0.02 % inhalation solution 1 mg (1 mg Nebulization Given 7/31/23 1025)   sodium chloride 0.9 % inhalation solution 12 mL (12 mL Nebulization Given 7/31/23 1025)   dexamethasone (PF) (DECADRON) injection 10 mg (10 mg Intravenous Given 7/31/23 1312)       Diagnostic Studies  Results Reviewed     Procedure Component Value Units Date/Time    HS Troponin 0hr (reflex protocol) [[id number]]  (Normal) Collected: 07/31/23 1024    Lab Status: Final result Specimen: Blood from Arm, Right Updated: 07/31/23 1119     hs TnI 0hr <2 ng/L     D-dimer, quantitative [[id number]]  (Normal) Collected: 07/31/23 1024    Lab Status: Final result Specimen: Blood from Arm, Right Updated: 07/31/23 1110     D-Dimer, Quant <0.27 ug/ml FEU     Narrative: In the evaluation for possible pulmonary embolism, in the appropriate (Well's Score of 4 or less) patient, the age adjusted d-dimer cutoff for this patient can be calculated as:    Age x 0.01 (in ug/mL) for Age-adjusted D-dimer exclusion threshold for a patient over 50 years.     Basic metabolic panel [[id number]]  (Abnormal) Collected: 07/31/23 1024    Lab Status: Final result Specimen: Blood from Arm, Right Updated: 07/31/23 1052     Sodium 136 mmol/L      Potassium 4.0 mmol/L      Chloride 109 mmol/L      CO2 23 mmol/L      ANION GAP 4 mmol/L      BUN 13 mg/dL      Creatinine 0.67 mg/dL      Glucose 147 mg/dL      Calcium 9.0 mg/dL      eGFR 93 ml/min/1.73sq m     Narrative:      [place] guidelines for Chronic Kidney Disease (CKD):   •  Stage 1 with normal or high GFR (GFR > 90 mL/min/1.73 square meters)  •  Stage 2 Mild CKD (GFR = 60-89 mL/min/1.73 square meters)  •  Stage 3A Moderate CKD (GFR = 45-59 mL/min/1.73 square meters)  •  Stage 3B Moderate CKD (GFR = 30-44 mL/min/1.73 square meters)  •  Stage 4 Severe CKD (GFR = 15-29 mL/min/1.73 square meters)  •  Stage 5 End Stage CKD (GFR <15 mL/min/1.73 square meters)  Note: GFR calculation is accurate only with a steady state creatinine    CBC and differential [[id number]]  (Abnormal) Collected: 07/31/23 1024    Lab Status: Final result Specimen: Blood from Arm, Right Updated: 07/31/23 1034     WBC 9.62 Thousand/uL      RBC 4.36 Million/uL      Hemoglobin 14.0 g/dL      Hematocrit 41.2 %      MCV 95 fL      MCH 32.1 pg      MCHC 34.0 g/dL      RDW 12.6 %      MPV 10.8 fL      Platelets 253 Thousands/uL      nRBC 0 /100 WBCs      Neutrophils Relative 58 %      Immat GRANS % 0 %      Lymphocytes Relative 24 %      Monocytes Relative 6 %      Eosinophils Relative 11 %      Basophils Relative 1 %      Neutrophils Absolute 5.56 Thousands/µL      Immature Grans Absolute 0.03 Thousand/uL      Lymphocytes Absolute 2.33 Thousands/µL      Monocytes Absolute 0.56 Thousand/µL      Eosinophils Absolute 1.06 Thousand/µL      Basophils Absolute 0.08 Thousands/µL                  XR chest 2 views   Final Result by Aparna Maki MD (07/31 1300)      No acute cardiopulmonary disease.                   Workstation performed: [id number]               Procedures  Procedures      ED Course  ED Course as of 07/31/23 1654   Mon Jul 31, 2023   1007 Blood Pressure: 142/82   1007 Temperature: 97.5 °F (36.4 °C)   1007 Temp Source: Temporal   1007 Pulse: 95   1007 Respirations(!): 28   1007 SpO2: 94 %  On 2L NC    1040 WBC: 9.62  No leukocytosis    1040 Hemoglobin: 14.0  No anemia    1117 On reassessment, patient feels improved. LOPEZ neb running. Faint wheezing noted as well a rhonchi. Pending trop and CXR    1118 D-Dimer, Quant: <0.27  Low suspicion for PE    1135 hs TnI 0hr: <2   1304 XR chest 2 views  No acute cardiopulmonary disease.      1325 Patient requesting for Anora inhaler to be given in the ED - ordered. Can dc after she gets inhaler                                        [facility]     59-year-old female with past medical history significant for diabetes, Ménière's disease, recent diagnosis of emphysema presents to the ED for worsening productive cough since last night. Patient character has been unchanged. Denies fever, chills, shortness of breath. Was recently admitted for COPD exacerbation 2 weeks ago. Patient appears in respiratory distress with respiratory rate in mid 20s. Other vitals within normal limits. Patient is afebrile. Physical exam shows decreased breath sounds and wheezing throughout. No peripheral edema noted. CBC, BMP, D-dimer within normal limits. Troponin negative. Negative chest x-ray. Patient feels much improved after LOPEZ neb. Stable for discharge home with close follow-up with PCP and pulmonology. 5-day course of prednisone provided. Return precautions provided. Patient verbalized understanding and agrees with the plan of care.       Disposition  Final diagnoses:   COPD exacerbation ([address])     Time reflects when diagnosis was documented in both MDM as applicable and the Disposition within this note     Time User Action Codes Description Comment    7/31/2023  1:00 PM Erika SALINAS Add [J44.1] COPD exacerbation ([address])     7/31/2023  1:03 PM Erika SALINAS Modify [J44.1] COPD exacerbation [facility])       ED Disposition     ED Disposition   Discharge    Condition   Stable    Date/Time   Mon Jul 31, 2023  1:00 PM    Comment   Marguerite Coles discharge to home/self care.                Follow-up Information     Follow up With Specialties Details Why Contact Info Additional [address] Azeem Stevens PA-C Physician Assistant   [address] [facility]  [phone]       [address] [place] [address] Emergency Department Emergency Medicine  If symptoms worsen [address] Critical access hospital Emergency Department, [address], [place], [place], [facility]          Discharge Medication List as of 7/31/2023  1:08 PM      START taking these medications    Details   albuterol (2.5 mg/3 mL) 0.083 % nebulizer solution Take 3 mL (2.5 mg total) by nebulization every 6 (six) hours as needed for wheezing or shortness of breath, Starting Mon 7/31/2023, Normal      predniSONE 20 mg tablet Take 2 tablets (40 mg total) by mouth daily for 5 days, Starting Mon 7/31/2023, Until Sat 8/5/2023, Normal      fluticasone (Flovent Diskus) 100 mcg/actuation diskus inhaler Inhale 2 puffs 2 (two) times a day Rinse mouth after use., Starting Mon 7/31/2023, Until Wed 8/30/2023, Normal      umeclidinium-vilanterol 62.5-25 mcg/actuation inhaler Inhale 1 puff daily, Starting Mon 7/31/2023, Normal         CONTINUE these medications which have NOT CHANGED    Details   albuterol (PROVENTIL HFA,VENTOLIN HFA) 90 mcg/act inhaler TAKE 2 PUFFS BY MOUTH EVERY 4 HOURS AS NEEDED FOR WHEEZE, Normal      atorvastatin (LIPITOR) 40 mg tablet Take 1 tablet (40 mg total) by mouth daily, Starting Mon 1/30/2023, Normal      glucose blood (FREESTYLE LITE) test strip Use as instructed, Normal      glucose monitoring kit (FREESTYLE) monitoring kit Apply 1 m topically 4 (four) times a day as needed Use as directed, Normal guaiFENesin (MUCINEX) 600 mg 12 hr tablet Take 1 tablet (600 mg total) by mouth every 12 (twelve) hours, Starting Thu 7/27/2023, Normal      Lancets (freestyle) lancets Use as instructed, Normal      metFORMIN (GLUCOPHAGE-XR) 750 mg 24 hr tablet TAKE 2 TABLETS BY MOUTH EVERY DAY WITH DINNER, Normal      naproxen (Naprosyn) 500 mg tablet Take 1 tablet (500 mg total) by mouth 2 (two) times a day with meals, Starting Fri 2/24/2023, Normal      Probiotic Product (PRO-BIOTIC BLEND PO) Take 1 tablet by mouth daily, Historical Med      triamterene-hydrochlorothiazide (DYAZIDE) 37.5-25 mg per capsule Take 1 capsule by mouth daily, Starting Mon 1/30/2023, Normal           Outpatient Discharge Orders   Nebulizer Supplies     Nebulizer       PDMP Review     None           ED Provider  Attending physically available and evaluated Lynn London. I managed the patient along with the ED Attending.     Electronically Signed by         John Woods MD  07/31/23 5893

## 2023-07-31 NOTE — ED ATTENDING ATTESTATION
7/31/2023  IHayley MD, saw and evaluated the patient. I have discussed the patient with the resident/non-physician practitioner and agree with the resident's/non-physician practitioner's findings, Plan of Care, and MDM as documented in the resident's/non-physician practitioner's note, except where noted. All available labs and Radiology studies were reviewed. I was present for key portions of any procedure(s) performed by the resident/non-physician practitioner and I was immediately available to provide assistance. At this point I agree with the current assessment done in the Emergency Department. I have conducted an independent evaluation of this patient a history and physical is as follows:  Recent diagnosis of emphysema with hospitalization. Patient has a rescue inhaler at home. Patient was using it but over the last several days has been feeling more short of breath. Today patient could not catch her breath at all comes to the emergency department. No chest pain. No fevers. No productive cough. States that she cannot take a deep breath and feels like she cannot catch her breath. No lateralizing edema. No numbness, tingling, or weakness. Review of systems otherwise -12 systems reviewed. On exam the patient is dyspneic and tachypneic. She has a respiratory rate in the high 20s. She has adequate oxygen saturation with a sat of 91% on room air. The patient has increased work of breathing. She has adequate color. She is afebrile with a normal blood pressure. On HEENT exam, she has no conjunctival pallor. Her mucous membranes are moist.  Neck supple and nontender. The patient's heart is regular without murmurs, rubs, or gallops. The patient's lungs are decreased with wheezing throughout and poor air movement. Her abdomen is soft and nontender with no masses, rebound, or guarding. Her extremities are intact without lateralizing edema.   MEDICAL DECISION MAKING    Number and Complexity of Problems  • Differential diagnosis: COPD exacerbation, congestive heart failure, pulmonary embolus with recent admission    Medical Decision Making Data  • External documents reviewed: Patient's admission from July 16 reviewed  • My EKG interpretation:   • My CT interpretation:   • My X-ray interpretation:   • My ultrasound interpretation:     XR chest 2 views   Final Result      No acute cardiopulmonary disease. Workstation performed: [id number]             Labs Reviewed   CBC AND DIFFERENTIAL - Abnormal       Result Value Ref Range Status    WBC 9.62  4.31 - 10.16 Thousand/uL Final    RBC 4.36  3.81 - 5.12 Million/uL Final    Hemoglobin 14.0  11.5 - 15.4 g/dL Final    Hematocrit 41.2  34.8 - 46.1 % Final    MCV 95  82 - 98 fL Final    MCH 32.1  26.8 - 34.3 pg Final    MCHC 34.0  31.4 - 37.4 g/dL Final    RDW 12.6  11.6 - 15.1 % Final    MPV 10.8  8.9 - 12.7 fL Final    Platelets 618  598 - 390 Thousands/uL Final    nRBC 0  /100 WBCs Final    Neutrophils Relative 58  43 - 75 % Final    Immat GRANS % 0  0 - 2 % Final    Lymphocytes Relative 24  14 - 44 % Final    Monocytes Relative 6  4 - 12 % Final    Eosinophils Relative 11 (*) 0 - 6 % Final    Basophils Relative 1  0 - 1 % Final    Neutrophils Absolute 5.56  1.85 - 7.62 Thousands/µL Final    Immature Grans Absolute 0.03  0.00 - 0.20 Thousand/uL Final    Lymphocytes Absolute 2.33  0.60 - 4.47 Thousands/µL Final    Monocytes Absolute 0.56  0.17 - 1.22 Thousand/µL Final    Eosinophils Absolute 1.06 (*) 0.00 - 0.61 Thousand/µL Final    Basophils Absolute 0.08  0.00 - 0.10 Thousands/µL Final   BASIC METABOLIC PANEL - Abnormal    Sodium 136  135 - 147 mmol/L Final    Potassium 4.0  3.5 - 5.3 mmol/L Final    Comment: Slightly Hemolyzed;  Results May be Affected    Chloride 109 (*) 96 - 108 mmol/L Final    CO2 23  21 - 32 mmol/L Final    ANION GAP 4  mmol/L Final    BUN 13  5 - 25 mg/dL Final    Creatinine 0.67  0.60 - 1.30 mg/dL Final Comment: Standardized to IDMS reference method    Glucose 147 (*) 65 - 140 mg/dL Final    Comment: Specimen collection should occur prior to Sulfasalazine administration due to the potential for falsely depressed results. Specimen collection should occur prior to Sulfapyridine administration due to the potential for falsely elevated results. If the patient is fasting, the ADA then defines impaired fasting glucose as > 100 mg/dL and diabetes as > or equal to 123 mg/dL. Calcium 9.0  8.3 - 10.1 mg/dL Final    eGFR 93  ml/min/1.73sq m Final    Narrative:     [place] guidelines for Chronic Kidney Disease (CKD):   •  Stage 1 with normal or high GFR (GFR > 90 mL/min/1.73 square meters)  •  Stage 2 Mild CKD (GFR = 60-89 mL/min/1.73 square meters)  •  Stage 3A Moderate CKD (GFR = 45-59 mL/min/1.73 square meters)  •  Stage 3B Moderate CKD (GFR = 30-44 mL/min/1.73 square meters)  •  Stage 4 Severe CKD (GFR = 15-29 mL/min/1.73 square meters)  •  Stage 5 End Stage CKD (GFR <15 mL/min/1.73 square meters)  Note: GFR calculation is accurate only with a steady state creatinine   D-DIMER, QUANTITATIVE - Normal    D-Dimer, Quant <0.27  <0.50 ug/ml FEU Final    Comment: Reference and upper limits to exclude DVT and PE are the same. Do not use to exclude if clinical symptoms are present. Pregnant women:  1st trimester:  <0.22 - 1.06 ug/ml FEU  2nd trimester:  <0.22 - 1.88 ug/ml FEU  3rd trimester:   0.24 - 3.28 ug/ml FEU    Note: Normal ranges may not apply to patients who are transgender, non-binary, or whose legal sex, sex at birth, and gender identity differ. Narrative: In the evaluation for possible pulmonary embolism, in the appropriate (Well's Score of 4 or less) patient, the age adjusted d-dimer cutoff for this patient can be calculated as:    Age x 0.01 (in ug/mL) for Age-adjusted D-dimer exclusion threshold for a patient over 50 years.    HS TROPONIN I 0HR - Normal    hs TnI 0hr <2 "Refer to ACS Flowchart"- see link ng/L Final    Comment:                                              Initial (time 0) result  If >=50 ng/L, Myocardial injury suggested ;  Type of myocardial injury and treatment strategy  to be determined. If 5-49 ng/L, a delta result at 2 hours and or 4 hours will be needed to further evaluate. If <4 ng/L, and chest pain has been >3 hours since onset, patient may qualify for discharge based on the HEART score in the ED. If <5 ng/L and <3hours since onset of chest pain, a delta result at 2 hours will be needed to further evaluate. HS Troponin 99th Percentile URL of a Health Population=12 ng/L with a 95% Confidence Interval of 8-18 ng/L. Second Troponin (time 2 hours)  If calculated delta >= 20 ng/L,  Myocardial injury suggested ; Type of myocardial injury and treatment strategy to be determined. If 5-49 ng/L and the calculated delta is 5-19 ng/L, consult medical service for evaluation. Continue evaluation for ischemia on ecg and other possible etiology and repeat hs troponin at 4 hours. If delta is <5 ng/L at 2 hours, consider discharge based on risk stratification via the HEART score (if in ED), or SONU risk score in IP/Observation. HS Troponin 99th Percentile URL of a Health Population=12 ng/L with a 95% Confidence Interval of 8-18 ng/L.   HS TROPONIN I 2HR   HS TROPONIN I 4HR       • Labs reviewed by me are significant for: No significant laboratory abnormalities    • Clinical decision rules/scores are significant for:     • Discussed case with:   • Considered admission for:     Treatment and Disposition  ED course: Patient initially in respiratory distress, respiratory rate in the high 30s. Patient received nebs, steroids. Patient with improving symptoms. Still with scattered wheezes.   We will plan to discharge home on steroid pulse, bronchodilators, patient has follow-up with pulmonology in 3 weeks  Shared decision making:   Code status:   1 respiratory failure, 2 COPD exacerbation  ED Course         Critical Care Time  Procedures

## 2023-08-01 NOTE — TELEPHONE ENCOUNTER
Patient called to request if [place] could please call Patient Insurance pharmacy to let them know that the Medication [facility] Instruction to take it twice a day is correct. Once they receive the okay from Provider then they will fill prescription correctly. Insurance Pharmacy number is [phone]     Patient states she only has enough for 3 days of the Anoro because she has been taking it 2x a day.  Please have provider call or clinical team.

## 2023-08-03 NOTE — TELEPHONE ENCOUNTER
Please advise. [phone]. Andree Cardenas, , [address] left a message stating she recommends doing the peer to peer to help get situation resolved.

## 2023-08-03 NOTE — TELEPHONE ENCOUNTER
We did complete a prior authorization for the twice daily use and we tried to justify why. We sent all of the supporting documentation. It was still denied. Did she start the Flovent with the Anoro? She will also see the pulmonologist on 8/22 who may change her regimen altogether.

## 2023-08-04 NOTE — TELEPHONE ENCOUNTER
Spoke to patient, she said she has been using the Flovent with the Anoro and it is helping. I advised her to continue with that and f/u with Pulm on 8/22/23.

## 2023-08-09 ENCOUNTER — OFFICE VISIT (OUTPATIENT)
Dept: INTERNAL MEDICINE CLINIC | Facility: CLINIC | Age: 63
End: 2023-08-09

## 2023-08-09 VITALS
WEIGHT: 165.4 LBS | TEMPERATURE: 98.6 F | BODY MASS INDEX: 32.3 KG/M2 | DIASTOLIC BLOOD PRESSURE: 61 MMHG | SYSTOLIC BLOOD PRESSURE: 109 MMHG | HEART RATE: 81 BPM | OXYGEN SATURATION: 98 %

## 2023-08-09 DIAGNOSIS — J43.2 CENTRILOBULAR EMPHYSEMA (HCC): ICD-10-CM

## 2023-08-09 DIAGNOSIS — J44.1 COPD EXACERBATION (HCC): Primary | ICD-10-CM

## 2023-08-09 PROCEDURE — 99214 OFFICE O/P EST MOD 30 MIN: CPT | Performed by: PHYSICIAN ASSISTANT

## 2023-08-11 NOTE — ASSESSMENT & PLAN NOTE
Patient does admit to occasional bronchitis prior to these last few months. She has a long history of smoking. Quit three months ago when her lung issues started. She does not feel it is COPD. We did review the CT lung findings. There was signs of restriction on her PFTs as well. She has an initial consult with pulmonology on 8/22/23. She finished the prednisone burst the ED started about 5 days ago. She states she has noticed an improvement since starting the Flovent. Usually when she stops the oral prednisone her symptoms come right back. Continue Anoro daily and Flovent BID. Rinse mouth after use. ER precautions given.     PFTs from 7/10/23:   Interpretation:  • Non-specific ventilatory defect likely due to patient effort; suggestive of restriction  • No significant improvement in airflow or forced vital capacity in response to the administration to bronchodilator per ATS standards.    • Low-normal lung volumes  • Normal diffusion capacity  • Restricted flow-volume loop

## 2023-08-22 ENCOUNTER — CONSULT (OUTPATIENT)
Dept: PULMONOLOGY | Facility: CLINIC | Age: 63
End: 2023-08-22
Payer: COMMERCIAL

## 2023-08-22 VITALS
WEIGHT: 167 LBS | BODY MASS INDEX: 32.61 KG/M2 | HEART RATE: 87 BPM | TEMPERATURE: 98.6 F | DIASTOLIC BLOOD PRESSURE: 58 MMHG | OXYGEN SATURATION: 94 % | SYSTOLIC BLOOD PRESSURE: 106 MMHG

## 2023-08-22 DIAGNOSIS — J44.1 COPD EXACERBATION (HCC): ICD-10-CM

## 2023-08-22 DIAGNOSIS — J43.2 CENTRILOBULAR EMPHYSEMA (HCC): ICD-10-CM

## 2023-08-22 DIAGNOSIS — J45.50 SEVERE PERSISTENT ASTHMA WITHOUT COMPLICATION: Primary | ICD-10-CM

## 2023-08-22 PROBLEM — J45.902 ASTHMATIC BRONCHITIS WITH STATUS ASTHMATICUS: Status: ACTIVE | Noted: 2023-08-22

## 2023-08-22 PROCEDURE — 90677 PCV20 VACCINE IM: CPT

## 2023-08-22 PROCEDURE — 99245 OFF/OP CONSLTJ NEW/EST HI 55: CPT | Performed by: INTERNAL MEDICINE

## 2023-08-22 PROCEDURE — 90471 IMMUNIZATION ADMIN: CPT

## 2023-08-22 RX ORDER — FLUTICASONE PROPIONATE AND SALMETEROL 50; 250 UG/1; UG/1
1 POWDER RESPIRATORY (INHALATION) 2 TIMES DAILY
Qty: 180 BLISTER | Refills: 5 | Status: SHIPPED | OUTPATIENT
Start: 2023-08-22 | End: 2023-11-20

## 2023-08-22 NOTE — ASSESSMENT & PLAN NOTE
John Lara and I discussed her most recent events. She had multiple episodes of asthmatic bronchitis requiring steroids for several years and then a severe exacerbation which may or may not been related to bacterial pneumonia but certainly had to do with the air quality on the night she left her windows open. At this point she has a lot of airway inflammation that really has only been responsive to inhaled steroids. She has not had an adequate response albeit much better with inhaled corticosteroids I think she needs LABA ICS combination therapy. I started her on Advair 250, 1 puff twice daily in addition to her albuterol as needed. Her PFTs were within normal limits even her diffusion capacity was normal which I suspect means we will get her breathing back to normal.  We will leave her on inhaled therapy for at least 3 to 6 months before considering de-escalation. She has high eosinophil count which gives us options for injectables if we get in the adequate control on inhaled therapy. She is up-to-date on lung cancer screening CT and needs another one in July of next year. Have given her Prevnar on today's visit.

## 2023-08-22 NOTE — PROGRESS NOTES
August 22, 2023          Stew Oliveira is under my care for severe persistent asthma. She has not been stable and needs sufficient time to recover and avoid sick contacts and airway pollutants. The patient is given a work excuse note until September 18th.  Refer questions to my office  Paresh Palma, [address] [place] [address] [facility]  [facility]

## 2023-08-22 NOTE — PATIENT INSTRUCTIONS
Stop Anoro and Flovent  Start Advair 250 twice a day  Use Proventil and Albuterol as needed  Annual lung cancer screening CT

## 2023-08-22 NOTE — PROGRESS NOTES
Assessment/Plan:    Asthmatic bronchitis with status asthmaticus  Crow and I discussed her most recent events. She had multiple episodes of asthmatic bronchitis requiring steroids for several years and then a severe exacerbation which may or may not been related to bacterial pneumonia but certainly had to do with the air quality on the night she left her windows open. At this point she has a lot of airway inflammation that really has only been responsive to inhaled steroids. She has not had an adequate response albeit much better with inhaled corticosteroids I think she needs LABA ICS combination therapy. I started her on Advair 250, 1 puff twice daily in addition to her albuterol as needed. Her PFTs were within normal limits even her diffusion capacity was normal which I suspect means we will get her breathing back to normal.  We will leave her on inhaled therapy for at least 3 to 6 months before considering de-escalation. She has high eosinophil count which gives us options for injectables if we get in the adequate control on inhaled therapy. She is up-to-date on lung cancer screening CT and needs another one in July of next year. Have given her Prevnar on today's visit. Diagnoses and all orders for this visit:    Severe persistent asthma without complication  -     Fluticasone-Salmeterol (Advair Diskus) 250-50 mcg/dose inhaler; Inhale 1 puff 2 (two) times a day Rinse mouth after use. -     Pneumococcal Conjugate Vaccine 20-valent (Pcv20)    COPD exacerbation (HCC)  -     Ambulatory referral to Pulmonology    Centrilobular emphysema [facility])  -     Ambulatory referral to Pulmonology          Subjective:      Patient ID: Nolberto Potts is a 61 y.o. female. Nazanin Akilkyaw is here for follow-up of her recent hospitalization. She had no problems with her breathing until June of this year when she developed pneumonia.   She developed chest congestion cough and shortness of breath which brought her to the emergency department and she was diagnosed with pneumonia and hospitalized discharged on prednisone and Zithromax. She responded favorably however once stopping these medications she had recurrence of her symptoms on at least 2 separate occasions bring her back to the emergency department within 4 to 6 weeks. She finally was placed on Flovent as she had inadequate response to Anoro. She feels the Flovent helped her but she ran out 3 days ago and noticed increased symptoms since then. She has not used her albuterol very frequently particular since starting the Flovent. Prior to June she was not on any inhaled therapy and notes no limitations with regards to her breathing. She quit smoking about 2 months ago and smoked a pack per week before that. 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 Of note she gets bronchitis at least once a year for the past 5 years and needs to be on prednisone in order to break it. He suffers from seasonal allergies. The following portions of the patient's history were reviewed and updated as appropriate: allergies, current medications, past family history, past medical history, past social history, past surgical history and problem list.    Review of Systems   Constitutional: Negative. Negative for unexpected weight change. HENT: Negative. Negative for postnasal drip. Eyes: Negative. Respiratory: Positive for cough and shortness of breath. Negative for wheezing. Cardiovascular: Negative. Negative for chest pain and leg swelling. Gastrointestinal: Negative. Endocrine: Negative. Genitourinary: Negative. Musculoskeletal: Negative. Allergic/Immunologic: Negative. Neurological: Negative. Hematological: Negative.           Objective:      /58 (BP Location: Right arm, Patient Position: Sitting, Cuff Size: Standard)   Pulse 87   Temp 98.6 °F (37 °C) (Tympanic)   Wt 75.8 kg (167 lb)   LMP 10/01/2010 (Approximate)   SpO2 94%   BMI 32.61 kg/m²          Physical Exam  Constitutional:       Appearance: She is well-developed. HENT:      Head: Normocephalic. Eyes:      Pupils: Pupils are equal, round, and reactive to light. Cardiovascular:      Rate and Rhythm: Normal rate. Heart sounds: No murmur heard. Pulmonary:      Effort: Pulmonary effort is normal. No respiratory distress. Breath sounds: Normal breath sounds. No wheezing or rales. Abdominal:      Palpations: Abdomen is soft. Musculoskeletal:         General: Normal range of motion. Cervical back: Normal range of motion and neck supple. Skin:     General: Skin is warm and dry. Neurological:      Mental Status: She is alert and oriented to person, place, and time.

## 2023-08-23 DIAGNOSIS — H81.09 MENIERE'S DISEASE, UNSPECIFIED LATERALITY: ICD-10-CM

## 2023-08-23 RX ORDER — TRIAMTERENE AND HYDROCHLOROTHIAZIDE 37.5; 25 MG/1; MG/1
1 CAPSULE ORAL DAILY
Qty: 90 CAPSULE | Refills: 1 | Status: SHIPPED | OUTPATIENT
Start: 2023-08-23

## 2023-08-23 RX ORDER — TRIAMTERENE AND HYDROCHLOROTHIAZIDE 37.5; 25 MG/1; MG/1
CAPSULE ORAL
Qty: 90 CAPSULE | Refills: 1 | OUTPATIENT
Start: 2023-08-23

## 2023-08-23 RX ORDER — ATORVASTATIN CALCIUM 40 MG/1
TABLET, FILM COATED ORAL
Qty: 90 TABLET | Refills: 1 | OUTPATIENT
Start: 2023-08-23

## 2023-09-13 ENCOUNTER — TELEPHONE (OUTPATIENT)
Dept: INTERNAL MEDICINE CLINIC | Facility: CLINIC | Age: 63
End: 2023-09-13

## 2023-09-13 ENCOUNTER — OFFICE VISIT (OUTPATIENT)
Dept: INTERNAL MEDICINE CLINIC | Facility: CLINIC | Age: 63
End: 2023-09-13

## 2023-09-13 VITALS
DIASTOLIC BLOOD PRESSURE: 74 MMHG | WEIGHT: 167 LBS | SYSTOLIC BLOOD PRESSURE: 116 MMHG | RESPIRATION RATE: 16 BRPM | HEART RATE: 88 BPM | BODY MASS INDEX: 32.61 KG/M2 | OXYGEN SATURATION: 96 % | TEMPERATURE: 98.6 F

## 2023-09-13 DIAGNOSIS — J45.41 MODERATE PERSISTENT ASTHMATIC BRONCHITIS WITH ACUTE EXACERBATION: Primary | ICD-10-CM

## 2023-09-13 PROBLEM — D72.829 LEUCOCYTOSIS: Status: RESOLVED | Noted: 2023-07-29 | Resolved: 2023-09-13

## 2023-09-13 PROCEDURE — 87811 SARS-COV-2 COVID19 W/OPTIC: CPT | Performed by: PHYSICIAN ASSISTANT

## 2023-09-13 PROCEDURE — 99214 OFFICE O/P EST MOD 30 MIN: CPT | Performed by: PHYSICIAN ASSISTANT

## 2023-09-13 RX ORDER — PREDNISONE 20 MG/1
40 TABLET ORAL DAILY
Qty: 10 TABLET | Refills: 0 | Status: SHIPPED | OUTPATIENT
Start: 2023-09-13 | End: 2023-09-18

## 2023-09-13 RX ORDER — PREDNISONE 20 MG/1
40 TABLET ORAL DAILY
Qty: 10 TABLET | Refills: 0 | Status: SHIPPED | OUTPATIENT
Start: 2023-09-13 | End: 2023-09-13

## 2023-09-13 NOTE — TELEPHONE ENCOUNTER
Patient left a message requesting refills of Prednisone as she has developed a croupy cough. Please refill if appropriate or advise.

## 2023-09-13 NOTE — TELEPHONE ENCOUNTER
Unfortunately prednisone is not something that can just be refilled especially with her previous breathing issues. I would recommend to be seen to check her lungs and oxygen level. She can be scheduled for a same day, or if necessary urgent care.  Thank you

## 2023-09-16 PROBLEM — J45.901 ASTHMATIC BRONCHITIS WITH ACUTE EXACERBATION: Status: ACTIVE | Noted: 2023-08-22

## 2023-09-16 LAB
SARS-COV-2 AG UPPER RESP QL IA: NEGATIVE
VALID CONTROL: NORMAL
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
## 2023-09-16 NOTE — ASSESSMENT & PLAN NOTE
Patient did have 2 hospitalizations and 2 ER visits due to persistent respiratory distress within the past 4 months. Patient had a consult with pulmonary medicine on August 22. It was thought her symptoms were more likely asthmatic in nature and less likely COPD. Although she did have mild emphysematous changes on CT scan. We will treat for asthma exacerbation with burst of prednisone. Continue Advair twice daily. Rinse mouth after use. Continue albuterol as needed. Avoid irritants. Encouraged adequate hydration and rest.  ER precautions were given. Return to care if symptoms worsen or fail to improve.

## 2023-10-02 ENCOUNTER — VBI (OUTPATIENT)
Dept: ADMINISTRATIVE | Facility: OTHER | Age: 63
End: 2023-10-02

## 2023-10-07 DIAGNOSIS — J20.9 ACUTE BRONCHITIS: ICD-10-CM

## 2023-10-09 RX ORDER — ALBUTEROL SULFATE 90 UG/1
AEROSOL, METERED RESPIRATORY (INHALATION)
Qty: 6.7 G | Refills: 3 | Status: SHIPPED | OUTPATIENT
Start: 2023-10-09

## 2023-10-20 ENCOUNTER — TELEPHONE (OUTPATIENT)
Dept: INTERNAL MEDICINE CLINIC | Facility: CLINIC | Age: 63
End: 2023-10-20

## 2023-10-23 DIAGNOSIS — E11.69 TYPE 2 DIABETES MELLITUS WITH OTHER SPECIFIED COMPLICATION, WITHOUT LONG-TERM CURRENT USE OF INSULIN (HCC): ICD-10-CM

## 2023-10-23 RX ORDER — METFORMIN HYDROCHLORIDE 750 MG/1
TABLET, EXTENDED RELEASE ORAL
Qty: 180 TABLET | Refills: 3 | Status: SHIPPED | OUTPATIENT
Start: 2023-10-23

## 2023-11-07 ENCOUNTER — OFFICE VISIT (OUTPATIENT)
Dept: PULMONOLOGY | Facility: CLINIC | Age: 63
End: 2023-11-07
Payer: COMMERCIAL

## 2023-11-07 VITALS
BODY MASS INDEX: 32.85 KG/M2 | TEMPERATURE: 98.9 F | HEIGHT: 61 IN | DIASTOLIC BLOOD PRESSURE: 84 MMHG | OXYGEN SATURATION: 96 % | WEIGHT: 174 LBS | HEART RATE: 96 BPM | RESPIRATION RATE: 18 BRPM | SYSTOLIC BLOOD PRESSURE: 122 MMHG

## 2023-11-07 DIAGNOSIS — J44.1 COPD EXACERBATION (HCC): Primary | ICD-10-CM

## 2023-11-07 DIAGNOSIS — J45.41 MODERATE PERSISTENT ASTHMATIC BRONCHITIS WITH ACUTE EXACERBATION: ICD-10-CM

## 2023-11-07 PROCEDURE — 99215 OFFICE O/P EST HI 40 MIN: CPT | Performed by: INTERNAL MEDICINE

## 2023-11-07 RX ORDER — PREDNISONE 20 MG/1
40 TABLET ORAL DAILY
Qty: 10 TABLET | Refills: 0 | Status: SHIPPED | OUTPATIENT
Start: 2023-11-07

## 2023-11-07 NOTE — ASSESSMENT & PLAN NOTE
María Elena Becerra was doing much better since starting Advair twice daily however she recently had an exacerbation and is still ongoing. In the short-term have given her prednisone and encouraged her to continue on the Advair. She does have significant eosinophilia several CBCs and given the time of year this may be allergy related she is willing to move forward with injectables and will start Lisaa Haritha to help control her asthmatic bronchitis.

## 2023-11-07 NOTE — PROGRESS NOTES
Assessment/Plan:    Asthmatic bronchitis with acute exacerbation  Cecille Tijerina was doing much better since starting Advair twice daily however she recently had an exacerbation and is still ongoing. In the short-term have given her prednisone and encouraged her to continue on the Advair. She does have significant eosinophilia several CBCs and given the time of year this may be allergy related she is willing to move forward with injectables and will start Yeimy Masood to help control her asthmatic bronchitis. Diagnoses and all orders for this visit:    COPD exacerbation ([address])  -     predniSONE 20 mg tablet; Take 2 tablets (40 mg total) by mouth daily    Moderate persistent asthmatic bronchitis with acute exacerbation          Subjective:      Patient ID: Rachel Lopez is a 61 y.o. female. Cecille Tijerina was doing much better since starting on Advair twice a day. Over the past week she has been starting to cough wheeze and have shortness of breath needed to use her rescue inhaler more frequently again. The following portions of the patient's history were reviewed and updated as appropriate: allergies, current medications, past family history, past medical history, past social history, past surgical history, and problem list.    Review of Systems   Constitutional: Negative. Negative for unexpected weight change. HENT: Negative. Negative for postnasal drip. Eyes: Negative. Respiratory: Negative. Negative for cough, shortness of breath and wheezing. Cardiovascular: Negative. Negative for chest pain and leg swelling. Gastrointestinal: Negative. Endocrine: Negative. Genitourinary: Negative. Musculoskeletal: Negative. Allergic/Immunologic: Negative. Neurological: Negative. Hematological: Negative.           Objective:      /84 (BP Location: Right arm, Patient Position: Sitting, Cuff Size: Adult)   Pulse 96   Temp 98.9 °F (37.2 °C) (Tympanic)   Resp 18   Ht 5' 1" (1.549 m)   Wt 78.9 kg (174 lb)   LMP 10/01/2010 (Approximate)   SpO2 96%   BMI 32.88 kg/m²          Physical Exam  Constitutional:       Appearance: She is well-developed. HENT:      Head: Normocephalic. Eyes:      Pupils: Pupils are equal, round, and reactive to light. Cardiovascular:      Rate and Rhythm: Normal rate. Heart sounds: No murmur heard. Pulmonary:      Effort: Pulmonary effort is normal. No respiratory distress. Breath sounds: Normal breath sounds. No wheezing or rales. Abdominal:      Palpations: Abdomen is soft. Musculoskeletal:         General: Normal range of motion. Cervical back: Normal range of motion and neck supple. Skin:     General: Skin is warm and dry. Neurological:      Mental Status: She is alert and oriented to person, place, and time.

## 2023-11-15 DIAGNOSIS — J45.50 SEVERE PERSISTENT ASTHMA WITHOUT COMPLICATION: Primary | ICD-10-CM

## 2023-11-15 NOTE — TELEPHONE ENCOUNTER
Received approval from [facility] for Fasenra pen injections     11/15/23-11/15/24

## 2023-11-20 DIAGNOSIS — E11.69 TYPE 2 DIABETES MELLITUS WITH OTHER SPECIFIED COMPLICATION, WITHOUT LONG-TERM CURRENT USE OF INSULIN (HCC): ICD-10-CM

## 2023-11-20 DIAGNOSIS — H81.09 MENIERE'S DISEASE, UNSPECIFIED LATERALITY: ICD-10-CM

## 2023-11-21 RX ORDER — ATORVASTATIN CALCIUM 40 MG/1
40 TABLET, FILM COATED ORAL DAILY
Qty: 90 TABLET | Refills: 1 | Status: ON HOLD | OUTPATIENT
Start: 2023-11-21

## 2023-11-21 RX ORDER — TRIAMTERENE AND HYDROCHLOROTHIAZIDE 37.5; 25 MG/1; MG/1
1 CAPSULE ORAL DAILY
Qty: 90 CAPSULE | Refills: 1 | Status: ON HOLD | OUTPATIENT
Start: 2023-11-21

## 2023-11-24 NOTE — TELEPHONE ENCOUNTER
Received email that patient is not able to use [facility]. If we can please resend scripts into [facility] have added it to her chart.  thanks

## 2023-11-30 ENCOUNTER — OFFICE VISIT (OUTPATIENT)
Dept: INTERNAL MEDICINE CLINIC | Facility: CLINIC | Age: 63
End: 2023-11-30

## 2023-11-30 VITALS
HEIGHT: 61 IN | SYSTOLIC BLOOD PRESSURE: 121 MMHG | BODY MASS INDEX: 33.79 KG/M2 | OXYGEN SATURATION: 97 % | HEART RATE: 98 BPM | DIASTOLIC BLOOD PRESSURE: 78 MMHG | TEMPERATURE: 97.9 F | WEIGHT: 179 LBS

## 2023-11-30 DIAGNOSIS — M25.561 CHRONIC PAIN OF BOTH KNEES: ICD-10-CM

## 2023-11-30 DIAGNOSIS — J45.41 MODERATE PERSISTENT ASTHMATIC BRONCHITIS WITH ACUTE EXACERBATION: ICD-10-CM

## 2023-11-30 DIAGNOSIS — E11.69 TYPE 2 DIABETES MELLITUS WITH OTHER SPECIFIED COMPLICATION, WITHOUT LONG-TERM CURRENT USE OF INSULIN (HCC): ICD-10-CM

## 2023-11-30 DIAGNOSIS — J43.2 CENTRILOBULAR EMPHYSEMA (HCC): Primary | ICD-10-CM

## 2023-11-30 DIAGNOSIS — G89.29 CHRONIC PAIN OF BOTH KNEES: ICD-10-CM

## 2023-11-30 DIAGNOSIS — M25.562 CHRONIC PAIN OF BOTH KNEES: ICD-10-CM

## 2023-11-30 PROCEDURE — 87636 SARSCOV2 & INF A&B AMP PRB: CPT | Performed by: PHYSICIAN ASSISTANT

## 2023-11-30 RX ORDER — PREDNISONE 10 MG/1
TABLET ORAL
Qty: 30 TABLET | Refills: 0 | Status: SHIPPED | OUTPATIENT
Start: 2023-11-30 | End: 2023-12-05

## 2023-11-30 RX ORDER — NAPROXEN 500 MG/1
500 TABLET ORAL 2 TIMES DAILY WITH MEALS
Qty: 60 TABLET | Refills: 2 | Status: SHIPPED | OUTPATIENT
Start: 2023-11-30 | End: 2023-12-05

## 2023-11-30 RX ORDER — GUAIFENESIN 600 MG/1
600 TABLET, EXTENDED RELEASE ORAL EVERY 12 HOURS SCHEDULED
Qty: 60 TABLET | Refills: 0 | Status: SHIPPED | OUTPATIENT
Start: 2023-11-30 | End: 2023-12-05

## 2023-11-30 RX ORDER — AZITHROMYCIN 250 MG/1
TABLET, FILM COATED ORAL
Qty: 6 TABLET | Refills: 0 | Status: SHIPPED | OUTPATIENT
Start: 2023-11-30 | End: 2023-12-05

## 2023-11-30 RX ORDER — METFORMIN HYDROCHLORIDE 750 MG/1
1500 TABLET, EXTENDED RELEASE ORAL
Qty: 180 TABLET | Refills: 3 | Status: SHIPPED | OUTPATIENT
Start: 2023-11-30

## 2023-11-30 NOTE — LETTER
November 30, 2023     Patient: Helena Edwards  YOB: 1960  Date of Visit: 11/30/2023      To Whom it May Concern:    Franciscofaisal Nissen is under my professional care. Vidal Austin was seen in my office on 11/30/2023. Vidal Austin may return to work on 12/4/23 . Please excuse 11/28/23-12/1/23. If you have any questions or concerns, please don't hesitate to call.          Sincerely,          Мария Yost PA-C        CC: No Recipients

## 2023-12-01 DIAGNOSIS — J45.50 SEVERE PERSISTENT ASTHMA WITHOUT COMPLICATION: ICD-10-CM

## 2023-12-01 LAB
FLUAV RNA RESP QL NAA+PROBE: NEGATIVE
FLUBV RNA RESP QL NAA+PROBE: NEGATIVE
SARS-COV-2 RNA RESP QL NAA+PROBE: NEGATIVE

## 2023-12-01 NOTE — ASSESSMENT & PLAN NOTE
Lab Results   Component Value Date    HGBA1C 6.8 (A) 07/27/2023     Requested refills. Continue metformin 750 mg BID. Schedule follow up.

## 2023-12-01 NOTE — PROGRESS NOTES
Name: Pola Oconnell      : 1960      MRN: [id number]  Encounter Provider: Milo Williamson PA-C  Encounter Date: 2023   Encounter department: [address] [place] [address]    Assessment & Plan     1. Centrilobular emphysema ([address])  Assessment & Plan:  Likely viral illness triggering COPD/asthma exacerbation. She did have a negtive COVID test at home 2 days ago. She would like flu testing today. Will cover with course of Azithromycin and prednisone taper. Avoid NSAIDs during use. Tylenol as needed. Start guaifenesin 600 mg BID as needed. Increase hydration. Adequate rest. Strict ER precautions given. Orders:  -     guaiFENesin (MUCINEX) 600 mg 12 hr tablet; Take 1 tablet (600 mg total) by mouth every 12 (twelve) hours  -     azithromycin (Zithromax) 250 mg tablet; Take two tablets on day one, then one tablet daily until gone. -     Covid/Flu- Office Collect    2. Moderate persistent asthmatic bronchitis with acute exacerbation  -     predniSONE 10 mg tablet; Take 5 tablets daily for two days, then 4 tablets daily for two days, 3 tablets daily for two days, 2 tablets daily for two days, 1 tablet daily for two days  -     Covid/Flu- Office Collect    3. Chronic pain of both knees  Assessment & Plan:  Requested refill. Continue naproxen as needed for pain. Orders:  -     naproxen (Naprosyn) 500 mg tablet; Take 1 tablet (500 mg total) by mouth 2 (two) times a day with meals    4. Type 2 diabetes mellitus with other specified complication, without long-term current use of insulin [facility])  Assessment & Plan:    Lab Results   Component Value Date    HGBA1C 6.8 (A) 2023     Requested refills. Continue metformin 750 mg BID. Schedule follow up. Orders:  -     metFORMIN (GLUCOPHAGE-XR) 750 mg 24 hr tablet; Take 2 tablets (1,500 mg total) by mouth daily with dinner           Subjective      Patient is a 61-year-old female presenting for a same day.   She is complaining of a cough and congestion that started 4 days ago. She states it started with fever, chills, headache, and body aches. She is unsure how high her fever got. States the second day the fever, headache, and body aches improved. Her cough and congestion has worsened. States she has a persistent productive cough. States she is coughing up a large amount of green phlegm. No hemoptysis. She also admits to intermittent wheezing and shortness of breath. She feels very fatigued. She has been around ill contacts at work and was traveling for [employer]. Denies chest pain or palpitations. Denies feeling dizzy or lightheaded. Denies earaches. Denies nausea or vomiting. She does not check her sugar. She has been compliant with her medications. She has not been taking anything for this yet. She is requiring her albuterol inhaler every 4-6 hours. She has been compliant with her Advair. Review of Systems   Constitutional:  Negative for appetite change, chills, diaphoresis, fatigue, fever and unexpected weight change. HENT:  Positive for congestion and sore throat (improved). Negative for ear discharge, ear pain, hearing loss, postnasal drip, rhinorrhea, sinus pressure, sinus pain, sneezing, tinnitus and trouble swallowing. Eyes:  Negative for visual disturbance. Respiratory:  Positive for cough, chest tightness, shortness of breath and wheezing. Cardiovascular:  Negative for chest pain, palpitations and leg swelling. Gastrointestinal:  Negative for abdominal pain, blood in stool, constipation, diarrhea, nausea and vomiting. Endocrine: Negative for cold intolerance, heat intolerance, polydipsia, polyphagia and polyuria. Musculoskeletal:  Positive for arthralgias and myalgias. Skin:  Negative for rash. Neurological:  Positive for headaches. Negative for dizziness, tremors, weakness, light-headedness and numbness. Hematological:  Negative for adenopathy.        Current Outpatient Medications on File Prior to Visit   Medication Sig    albuterol (2.5 mg/3 mL) 0.083 % nebulizer solution Take 3 mL (2.5 mg total) by nebulization every 6 (six) hours as needed for wheezing or shortness of breath    albuterol (PROVENTIL HFA,VENTOLIN HFA) 90 mcg/act inhaler INHALE 2 PUFFS BY MOUTH EVERY 4 HOURS AS NEEDED FOR WHEEZE    atorvastatin (LIPITOR) 40 mg tablet Take 1 tablet (40 mg total) by mouth daily    Benralizumab 30 MG/ML SOAJ Inject 30 mg under the skin every 28 days Inject 30 mg every 28 days for the first 3 injections    Benralizumab 30 MG/ML SOAJ Inject 30 mg under the skin every 56 days    Fluticasone-Salmeterol (Advair Diskus) 250-50 mcg/dose inhaler Inhale 1 puff 2 (two) times a day Rinse mouth after use. glucose blood (FREESTYLE LITE) test strip Use as instructed    glucose monitoring kit (FREESTYLE) monitoring kit Apply 1 m topically 4 (four) times a day as needed Use as directed    Lancets (freestyle) lancets Use as instructed    Probiotic Product (PRO-BIOTIC BLEND PO) Take 1 tablet by mouth daily    triamterene-hydrochlorothiazide (DYAZIDE) 37.5-25 mg per capsule Take 1 capsule by mouth daily       Objective     /78 (BP Location: Left arm, Patient Position: Sitting, Cuff Size: Large)   Pulse 98   Temp 97.9 °F (36.6 °C) (Temporal)   Ht 5' 1" (1.549 m)   Wt 81.2 kg (179 lb)   LMP 10/01/2010 (Approximate)   SpO2 97%   BMI 33.82 kg/m²     Physical Exam  Vitals and nursing note reviewed. Constitutional:       General: She is awake. She is not in acute distress. Appearance: Normal appearance. She is well-developed and well-groomed. She is obese. She is not ill-appearing. HENT:      Head: Normocephalic and atraumatic. Right Ear: Tympanic membrane, ear canal and external ear normal.      Left Ear: Tympanic membrane, ear canal and external ear normal.      Nose: Nose normal.      Mouth/Throat:      Lips: Pink. Mouth: Mucous membranes are moist.      Pharynx: Oropharynx is clear.  Uvula midline. No oropharyngeal exudate or posterior oropharyngeal erythema. Tonsils: No tonsillar exudate or tonsillar abscesses. Eyes:      General: No scleral icterus. Conjunctiva/sclera: Conjunctivae normal.   Cardiovascular:      Rate and Rhythm: Normal rate and regular rhythm. Pulses: Normal pulses. Heart sounds: Normal heart sounds. No murmur heard. Pulmonary:      Effort: Pulmonary effort is normal. No respiratory distress. Breath sounds: Normal air entry. No decreased air movement. Examination of the right-middle field reveals rhonchi. Examination of the left-middle field reveals rhonchi. Rhonchi present. No decreased breath sounds, wheezing or rales. Comments: Persistent wet cough  Abdominal:      General: Abdomen is flat. Bowel sounds are normal. There is no distension. Palpations: Abdomen is soft. There is no mass. Tenderness: There is no abdominal tenderness. There is no right CVA tenderness, left CVA tenderness, guarding or rebound. Hernia: No hernia is present. Musculoskeletal:         General: Normal range of motion. Cervical back: Neck supple. Right lower leg: Normal. No edema. Left lower leg: Normal. No edema. Lymphadenopathy:      Cervical: No cervical adenopathy. Skin:     General: Skin is warm. Capillary Refill: Capillary refill takes less than 2 seconds. Coloration: Skin is not jaundiced. Findings: No rash. Neurological:      General: No focal deficit present. Mental Status: She is alert and oriented to person, place, and time. Mental status is at baseline. Motor: Motor function is intact. Coordination: Coordination is intact. Gait: Gait is intact. Psychiatric:         Attention and Perception: Attention normal.         Mood and Affect: Mood and affect normal.         Speech: Speech normal.         Behavior: Behavior normal. Behavior is cooperative.        Milo Williamson PA-C

## 2023-12-01 NOTE — ASSESSMENT & PLAN NOTE
Likely viral illness triggering COPD/asthma exacerbation. She did have a negtive COVID test at home 2 days ago. She would like flu testing today. Will cover with course of Azithromycin and prednisone taper. Avoid NSAIDs during use. Tylenol as needed. Start guaifenesin 600 mg BID as needed. Increase hydration. Adequate rest. Strict ER precautions given.

## 2023-12-03 ENCOUNTER — HOSPITAL ENCOUNTER (INPATIENT)
Facility: HOSPITAL | Age: 63
LOS: 2 days | Discharge: HOME | DRG: 816 | End: 2023-12-05
Attending: EMERGENCY MEDICINE | Admitting: INTERNAL MEDICINE
Payer: COMMERCIAL

## 2023-12-03 ENCOUNTER — APPOINTMENT (EMERGENCY)
Dept: RADIOLOGY | Facility: HOSPITAL | Age: 63
DRG: 816 | End: 2023-12-03
Payer: COMMERCIAL

## 2023-12-03 DIAGNOSIS — J44.1 COPD WITH ACUTE EXACERBATION (HCC): ICD-10-CM

## 2023-12-03 DIAGNOSIS — R06.02 SHORTNESS OF BREATH: ICD-10-CM

## 2023-12-03 DIAGNOSIS — I50.9 CHF EXACERBATION (HCC): Primary | ICD-10-CM

## 2023-12-03 LAB
ALBUMIN SERPL BCP-MCNC: 4.2 G/DL (ref 3.5–5)
ALP SERPL-CCNC: 54 U/L (ref 34–104)
ALT SERPL W P-5'-P-CCNC: 30 U/L (ref 7–52)
ANION GAP SERPL CALCULATED.3IONS-SCNC: 8 MMOL/L
AST SERPL W P-5'-P-CCNC: 16 U/L (ref 13–39)
ATRIAL RATE: 91 BPM
BASOPHILS # BLD AUTO: 0.07 THOUSANDS/ÂΜL (ref 0–0.1)
BASOPHILS NFR BLD AUTO: 1 % (ref 0–1)
BILIRUB SERPL-MCNC: 0.33 MG/DL (ref 0.2–1)
BNP SERPL-MCNC: 634 PG/ML (ref 0–100)
BUN SERPL-MCNC: 16 MG/DL (ref 5–25)
CALCIUM SERPL-MCNC: 9.1 MG/DL (ref 8.4–10.2)
CARDIAC TROPONIN I PNL SERPL HS: 5 NG/L
CHLORIDE SERPL-SCNC: 106 MMOL/L (ref 96–108)
CO2 SERPL-SCNC: 27 MMOL/L (ref 21–32)
CREAT SERPL-MCNC: 0.89 MG/DL (ref 0.6–1.3)
EOSINOPHIL # BLD AUTO: 0.13 THOUSAND/ÂΜL (ref 0–0.61)
EOSINOPHIL NFR BLD AUTO: 1 % (ref 0–6)
ERYTHROCYTE [DISTWIDTH] IN BLOOD BY AUTOMATED COUNT: 12.4 % (ref 11.6–15.1)
FLUAV RNA RESP QL NAA+PROBE: NEGATIVE
FLUBV RNA RESP QL NAA+PROBE: NEGATIVE
GFR SERPL CREATININE-BSD FRML MDRD: 69 ML/MIN/1.73SQ M
GLUCOSE SERPL-MCNC: 209 MG/DL (ref 65–140)
HCT VFR BLD AUTO: 39.2 % (ref 34.8–46.1)
HGB BLD-MCNC: 13.1 G/DL (ref 11.5–15.4)
IMM GRANULOCYTES # BLD AUTO: 0.21 THOUSAND/UL (ref 0–0.2)
IMM GRANULOCYTES NFR BLD AUTO: 2 % (ref 0–2)
LYMPHOCYTES # BLD AUTO: 2.04 THOUSANDS/ÂΜL (ref 0.6–4.47)
LYMPHOCYTES NFR BLD AUTO: 15 % (ref 14–44)
MCH RBC QN AUTO: 30.5 PG (ref 26.8–34.3)
MCHC RBC AUTO-ENTMCNC: 33.4 G/DL (ref 31.4–37.4)
MCV RBC AUTO: 91 FL (ref 82–98)
MONOCYTES # BLD AUTO: 0.66 THOUSAND/ÂΜL (ref 0.17–1.22)
MONOCYTES NFR BLD AUTO: 5 % (ref 4–12)
NEUTROPHILS # BLD AUTO: 10.72 THOUSANDS/ÂΜL (ref 1.85–7.62)
NEUTS SEG NFR BLD AUTO: 76 % (ref 43–75)
NRBC BLD AUTO-RTO: 0 /100 WBCS
P AXIS: 45 DEGREES
PLATELET # BLD AUTO: 297 THOUSANDS/UL (ref 149–390)
PMV BLD AUTO: 10 FL (ref 8.9–12.7)
POTASSIUM SERPL-SCNC: 3.7 MMOL/L (ref 3.5–5.3)
PR INTERVAL: 112 MS
PROCALCITONIN SERPL-MCNC: <0.05 NG/ML
PROT SERPL-MCNC: 7.2 G/DL (ref 6.4–8.4)
QRS AXIS: 55 DEGREES
QRSD INTERVAL: 80 MS
QT INTERVAL: 362 MS
QTC INTERVAL: 445 MS
RBC # BLD AUTO: 4.29 MILLION/UL (ref 3.81–5.12)
RSV RNA RESP QL NAA+PROBE: NEGATIVE
SARS-COV-2 RNA RESP QL NAA+PROBE: NEGATIVE
SODIUM SERPL-SCNC: 141 MMOL/L (ref 135–147)
T WAVE AXIS: 48 DEGREES
VENTRICULAR RATE: 91 BPM
WBC # BLD AUTO: 13.83 THOUSAND/UL (ref 4.31–10.16)

## 2023-12-03 PROCEDURE — 83880 ASSAY OF NATRIURETIC PEPTIDE: CPT

## 2023-12-03 PROCEDURE — 94760 N-INVAS EAR/PLS OXIMETRY 1: CPT

## 2023-12-03 PROCEDURE — 96374 THER/PROPH/DIAG INJ IV PUSH: CPT

## 2023-12-03 PROCEDURE — 84484 ASSAY OF TROPONIN QUANT: CPT

## 2023-12-03 PROCEDURE — 84145 PROCALCITONIN (PCT): CPT

## 2023-12-03 PROCEDURE — 36415 COLL VENOUS BLD VENIPUNCTURE: CPT

## 2023-12-03 PROCEDURE — 93005 ELECTROCARDIOGRAM TRACING: CPT

## 2023-12-03 PROCEDURE — 85025 COMPLETE CBC W/AUTO DIFF WBC: CPT

## 2023-12-03 PROCEDURE — 94640 AIRWAY INHALATION TREATMENT: CPT

## 2023-12-03 PROCEDURE — 0241U HB NFCT DS VIR RESP RNA 4 TRGT: CPT

## 2023-12-03 PROCEDURE — 99285 EMERGENCY DEPT VISIT HI MDM: CPT

## 2023-12-03 PROCEDURE — 94644 CONT INHLJ TX 1ST HOUR: CPT

## 2023-12-03 PROCEDURE — 80053 COMPREHEN METABOLIC PANEL: CPT

## 2023-12-03 PROCEDURE — 99285 EMERGENCY DEPT VISIT HI MDM: CPT | Performed by: EMERGENCY MEDICINE

## 2023-12-03 PROCEDURE — 71045 X-RAY EXAM CHEST 1 VIEW: CPT

## 2023-12-03 RX ORDER — FUROSEMIDE 10 MG/ML
40 INJECTION INTRAMUSCULAR; INTRAVENOUS ONCE
Status: DISCONTINUED | OUTPATIENT
Start: 2023-12-03 | End: 2023-12-03

## 2023-12-03 RX ORDER — TRIAMTERENE AND HYDROCHLOROTHIAZIDE 37.5; 25 MG/1; MG/1
1 TABLET ORAL DAILY
Status: DISCONTINUED | OUTPATIENT
Start: 2023-12-04 | End: 2023-12-05 | Stop reason: HOSPADM

## 2023-12-03 RX ORDER — ACETAMINOPHEN 325 MG/1
650 TABLET ORAL EVERY 6 HOURS PRN
Status: DISCONTINUED | OUTPATIENT
Start: 2023-12-03 | End: 2023-12-03

## 2023-12-03 RX ORDER — SODIUM CHLORIDE FOR INHALATION 0.9 %
12 VIAL, NEBULIZER (ML) INHALATION ONCE
Status: COMPLETED | OUTPATIENT
Start: 2023-12-03 | End: 2023-12-03

## 2023-12-03 RX ORDER — PREDNISONE 20 MG/1
20 TABLET ORAL DAILY
Status: DISCONTINUED | OUTPATIENT
Start: 2023-12-06 | End: 2023-12-04

## 2023-12-03 RX ORDER — AZITHROMYCIN 250 MG/1
250 TABLET, FILM COATED ORAL EVERY 24 HOURS
Status: COMPLETED | OUTPATIENT
Start: 2023-12-04 | End: 2023-12-04

## 2023-12-03 RX ORDER — PREDNISONE 10 MG/1
10 TABLET ORAL DAILY
Status: DISCONTINUED | OUTPATIENT
Start: 2023-12-09 | End: 2023-12-03

## 2023-12-03 RX ORDER — ATORVASTATIN CALCIUM 40 MG/1
40 TABLET, FILM COATED ORAL DAILY
Status: DISCONTINUED | OUTPATIENT
Start: 2023-12-04 | End: 2023-12-05 | Stop reason: HOSPADM

## 2023-12-03 RX ORDER — FUROSEMIDE 10 MG/ML
20 INJECTION INTRAMUSCULAR; INTRAVENOUS ONCE
Status: COMPLETED | OUTPATIENT
Start: 2023-12-03 | End: 2023-12-03

## 2023-12-03 RX ORDER — FUROSEMIDE 10 MG/ML
20 INJECTION INTRAMUSCULAR; INTRAVENOUS ONCE
Status: DISCONTINUED | OUTPATIENT
Start: 2023-12-04 | End: 2023-12-04

## 2023-12-03 RX ORDER — FLUTICASONE FUROATE AND VILANTEROL 200; 25 UG/1; UG/1
1 POWDER RESPIRATORY (INHALATION) DAILY
Status: DISCONTINUED | OUTPATIENT
Start: 2023-12-03 | End: 2023-12-05 | Stop reason: HOSPADM

## 2023-12-03 RX ORDER — ALBUTEROL SULFATE 90 UG/1
2 AEROSOL, METERED RESPIRATORY (INHALATION) EVERY 6 HOURS PRN
Status: DISCONTINUED | OUTPATIENT
Start: 2023-12-03 | End: 2023-12-05 | Stop reason: HOSPADM

## 2023-12-03 RX ORDER — ACETAMINOPHEN 325 MG/1
650 TABLET ORAL EVERY 6 HOURS PRN
Status: DISCONTINUED | OUTPATIENT
Start: 2023-12-03 | End: 2023-12-05 | Stop reason: HOSPADM

## 2023-12-03 RX ORDER — LEVALBUTEROL INHALATION SOLUTION 1.25 MG/3ML
SOLUTION RESPIRATORY (INHALATION)
Status: COMPLETED
Start: 2023-12-03 | End: 2023-12-03

## 2023-12-03 RX ORDER — PREDNISONE 10 MG/1
10 TABLET ORAL DAILY
Status: DISCONTINUED | OUTPATIENT
Start: 2023-12-08 | End: 2023-12-04

## 2023-12-03 RX ORDER — LEVALBUTEROL INHALATION SOLUTION 1.25 MG/3ML
1.25 SOLUTION RESPIRATORY (INHALATION)
Status: DISCONTINUED | OUTPATIENT
Start: 2023-12-03 | End: 2023-12-05 | Stop reason: HOSPADM

## 2023-12-03 RX ORDER — ENOXAPARIN SODIUM 100 MG/ML
40 INJECTION SUBCUTANEOUS DAILY
Status: DISCONTINUED | OUTPATIENT
Start: 2023-12-03 | End: 2023-12-05 | Stop reason: HOSPADM

## 2023-12-03 RX ADMIN — LEVALBUTEROL HYDROCHLORIDE 1.25 MG: 1.25 SOLUTION RESPIRATORY (INHALATION) at 17:22

## 2023-12-03 RX ADMIN — IPRATROPIUM BROMIDE 1 MG: 0.5 SOLUTION RESPIRATORY (INHALATION) at 09:59

## 2023-12-03 RX ADMIN — IPRATROPIUM BROMIDE 0.5 MG: 0.5 SOLUTION RESPIRATORY (INHALATION) at 17:23

## 2023-12-03 RX ADMIN — ACETAMINOPHEN 650 MG: 325 TABLET, FILM COATED ORAL at 23:22

## 2023-12-03 RX ADMIN — Medication 12 ML: at 09:59

## 2023-12-03 RX ADMIN — FUROSEMIDE 20 MG: 10 INJECTION, SOLUTION INTRAMUSCULAR; INTRAVENOUS at 11:46

## 2023-12-03 RX ADMIN — ALBUTEROL SULFATE 10 MG: 2.5 SOLUTION RESPIRATORY (INHALATION) at 09:59

## 2023-12-03 NOTE — ASSESSMENT & PLAN NOTE
New heart failure versus bronchitis versus COPD exacerbation versus pneumonia. Patient with sick symptoms started 11/28, worsening shortness of breath minimally responsive to albuterol, orthopnea, worsening dyspnea on exertion, increased sputum production. Patient states she is able to walk about 1 block and is limited by shortness of breath. She has been using her albuterol 6 times a day and continues to use Advair without relief. Feels some palpitations after using albuterol at night. Weight gain of 16 pounds since July despite similar diet and exercise. Last echo 7/2023 with normal heart function EF 60%. BNP elevated at 634 on admission. Procalcitonin <0.05. she has been taking Z-Tarun and prednisone for possible pneumonia versus bronchitis prescribed 11/30 in clinic, this combination usually improves her bronchitis and COPD exacerbations within 2 days which has not happened this time -states it feels like prior episodes of pneumonia, plus new weight gain and diffuse edema. Plan:  -Daily weights, strict I's and O's  -She was not given prednisone yesterday, so prednisone was dc'd.  -Day 6 of Azithromycin.  -Day 2 of IV Ceftriaxone  -B/L edema + ; Rales + in B/L lower lung fields; continue Lasix 20 mg IV  - Reviewed prior chest CT and CXR, 11 pack yr smoking history, will check for Alpha 1 antitrypsin.    - Eosinophilia in the past,  will check IgE, Aspergillus Ab and Aspergillus IgE  - Continue Xeponex/Atrovent nebs  -Continue Fluticasone-Vilanterol inhaler

## 2023-12-03 NOTE — RESPIRATORY THERAPY NOTE
RT Protocol Note  Stephen Miranda 61 y.o. female MRN: [id number]  Unit/Bed#: -01 Encounter: [id number]    Assessment    Principal Problem:    Shortness of breath  Active Problems:    Diabetes mellitus ([address])    Mixed hyperlipidemia    Hypertension      Home Pulmonary Medications:  Albuterol MDI PRN, Advair BID       Past Medical History:   Diagnosis Date    Community acquired pneumonia of left lower lobe of lung 2023    Diabetes mellitus ([address] )     Sepsis ([address]) 6/10/2023    Vertigo      Social History     Socioeconomic History    Marital status: Single     Spouse name: None    Number of children: None    Years of education: None    Highest education level: None   Occupational History    None   Tobacco Use    Smoking status: Former     Packs/day: 0.25     Years: 47.00     Total pack years: 11.75     Types: Cigarettes     Start date:      Quit date: 2023     Years since quittin.5    Smokeless tobacco: Never    Tobacco comments: On and off since a teen. .. in 2019 started smoking more    Vaping Use    Vaping Use: Never used   Substance and Sexual Activity    Alcohol use: Yes     Comment: socially     Drug use: Never    Sexual activity: Not Currently     Partners: Male     Birth control/protection: Post-menopausal   Other Topics Concern    None   Social History Narrative    Former smoker - As per [employer]    Inadequate exercise    Physically able to work    Sedentary lifestyle     from significant other    Tobacco use - As per [employer]    Unemployed, looking for work      Social Determinants of Health     Financial Resource Strain: [address] (2023)    Overall Financial Resource Strain (CARDIA)     Difficulty of Paying Living Expenses: Hard   Food Insecurity: No Food Insecurity (2023)    Hunger Vital Sign     Worried About Running Out of Food in the Last Year: Never true     Ran Out of Food in the Last Year: Never true   Transportation Needs: No Transportation Needs (6/11/2023)    PRAPARE - Transportation     Lack of Transportation (Medical): No     Lack of Transportation (Non-Medical): No   Physical Activity: Inactive (11/22/2021)    Exercise Vital Sign     Days of Exercise per Week: 0 days     Minutes of Exercise per Session: 0 min   Stress: Not on file   Social Connections: Socially Isolated (11/22/2021)    Social Connection and Isolation Panel [NHANES]     Frequency of Communication with Friends and Family: Once a week     Frequency of Social Gatherings with Friends and Family: Twice a week     Attends Bahai Services: Never     Active Member of Clubs or Organizations: No     Attends Club or Organization Meetings: Never     Marital Status:    Intimate Partner Violence: Not At Risk (11/22/2021)    Humiliation, Afraid, Rape, and Kick questionnaire     Fear of Current or Ex-Partner: No     Emotionally Abused: No     Physically Abused: No     Sexually Abused: No   Housing Stability: Low Risk  (6/11/2023)    Housing Stability Vital Sign     Unable to Pay for Housing in the Last Year: No     Number of [address] in the Last Year: 1     Unstable Housing in the Last Year: No       Subjective         Objective    Physical Exam:   Assessment Type: Pre-treatment  General Appearance: Alert, Awake  Respiratory Pattern: Dyspnea with exertion  Chest Assessment: Chest expansion symmetrical  Bilateral Breath Sounds: Diminished, Expiratory wheezes    Vitals:  Blood pressure 126/75, pulse 88, temperature 98.3 °F (36.8 °C), resp. rate 16, height 5' 1" (1.549 m), weight 79.1 kg (174 lb 6.4 oz), last menstrual period 10/01/2010, SpO2 93 %, not currently breastfeeding. Imaging and other studies:           Plan    Respiratory Plan: Moderate/Severe Distress pathway        Resp Comments: (P) Pt. presents with COPD exacerbation. Pt. admitted that she has been experiencing DURAND and needing her Albuterol MDI more frequently(up to 6 times daily) with minimal relief.  Pt. takes advair BID. BBS were decreased with scattered wheezing. Will order xopenex and advair TID.

## 2023-12-03 NOTE — ED ATTENDING ATTESTATION
12/3/2023  IDallas MD, saw and evaluated the patient. I have discussed the patient with the resident/non-physician practitioner and agree with the resident's/non-physician practitioner's findings, Plan of Care, and MDM as documented in the resident's/non-physician practitioner's note, except where noted. All available labs and Radiology studies were reviewed. I was present for key portions of any procedure(s) performed by the resident/non-physician practitioner and I was immediately available to provide assistance. At this point I agree with the current assessment done in the Emergency Department. I have conducted an independent evaluation of this patient a history and physical is as follows:    ED Course         Critical Care Time  CriticalCare Time    Date/Time: 12/3/2023 4:08 PM    Performed by: Dallas Patel MD  Authorized by: Dallas Patel MD    Critical care provider statement:     Critical care time (minutes):  33    Critical care time was exclusive of:  Separately billable procedures and treating other patients and teaching time    Critical care was necessary to treat or prevent imminent or life-threatening deterioration of the following conditions:  Respiratory failure    Critical care was time spent personally by me on the following activities:  Development of treatment plan with patient or surrogate, obtaining history from patient or surrogate, evaluation of patient's response to treatment, examination of patient, re-evaluation of patient's condition, review of old charts, ordering and review of laboratory studies, ordering and review of radiographic studies and ordering and performing treatments and interventions      62 yo female with hx of copd, here for increased sob, change in sputum. No cp, no leg swelling, no weight gain. Pt on zpak and prednisone as outpatient. Vss, afebrile, lungs bilateral wheezing, rrr, abdomen soft nontender. Cardiac workup, taylor neb, reevaluate.

## 2023-12-03 NOTE — ASSESSMENT & PLAN NOTE
Lab Results   Component Value Date    HGBA1C 6.8 (A) 07/27/2023       No results for input(s): "POCGLU" in the last 72 hours. Blood Sugar Average: Last 72 hrs:    Plan:  Continue home med Metformin.

## 2023-12-03 NOTE — ED PROVIDER NOTES
History  Chief Complaint   Patient presents with    Shortness of Breath     Since Tuesday has had SOB when sitting and worse when walking. Patient is 59-year-old female presenting to the emergency department since Tuesday getting little bit worse. Patient notes some green sputum. No sore throat's been hurting as well as some nasal congestion associated with it. Patient notes she has an albuterol inhaler without significant relief. She notes subjective fevers as well as chills as well. Her last dose of steroids was few months ago. Currently taking Advair as well and significant relief. No known sick contacts. Not on any O2 at home. No history of intubation but has had hospitalization for this in June. Denies any headache dizziness tinnitus vision change neck pain back pain numbness tingling in any of extremities denies any chest pain. Prior to Admission Medications   Prescriptions Last Dose Informant Patient Reported? Taking? Benralizumab 30 MG/ML SOAJ   No No   Sig: Inject 30 mg under the skin every 28 days Inject 30 mg every 28 days for the first 3 injections   Benralizumab 30 MG/ML SOAJ   No No   Sig: Inject 30 mg under the skin every 56 days   Fluticasone-Salmeterol (Advair Diskus) 250-50 mcg/dose inhaler  Self No No   Sig: Inhale 1 puff 2 (two) times a day Rinse mouth after use.    Lancets (freestyle) lancets  Self No No   Sig: Use as instructed   Probiotic Product (PRO-BIOTIC BLEND PO)  Self Yes No   Sig: Take 1 tablet by mouth daily   albuterol (2.5 mg/3 mL) 0.083 % nebulizer solution  Self No No   Sig: Take 3 mL (2.5 mg total) by nebulization every 6 (six) hours as needed for wheezing or shortness of breath   albuterol (PROVENTIL HFA,VENTOLIN HFA) 90 mcg/act inhaler  Self No No   Sig: INHALE 2 PUFFS BY MOUTH EVERY 4 HOURS AS NEEDED FOR WHEEZE   atorvastatin (LIPITOR) 40 mg tablet   No No   Sig: Take 1 tablet (40 mg total) by mouth daily   azithromycin (Zithromax) 250 mg tablet   No No Sig: Take two tablets on day one, then one tablet daily until gone.    glucose blood (FREESTYLE LITE) test strip  Self No No   Sig: Use as instructed   glucose monitoring kit (FREESTYLE) monitoring kit  Self No No   Sig: Apply 1 m topically 4 (four) times a day as needed Use as directed   guaiFENesin (MUCINEX) 600 mg 12 hr tablet   No No   Sig: Take 1 tablet (600 mg total) by mouth every 12 (twelve) hours   metFORMIN (GLUCOPHAGE-XR) 750 mg 24 hr tablet   No No   Sig: Take 2 tablets (1,500 mg total) by mouth daily with dinner   naproxen (Naprosyn) 500 mg tablet   No No   Sig: Take 1 tablet (500 mg total) by mouth 2 (two) times a day with meals   predniSONE 10 mg tablet   No No   Sig: Take 5 tablets daily for two days, then 4 tablets daily for two days, 3 tablets daily for two days, 2 tablets daily for two days, 1 tablet daily for two days   triamterene-hydrochlorothiazide (DYAZIDE) 37.5-25 mg per capsule   No No   Sig: Take 1 capsule by mouth daily      Facility-Administered Medications: None       Past Medical History:   Diagnosis Date    Community acquired pneumonia of left lower lobe of lung 6/9/2023    Diabetes mellitus ([address])     Sepsis ([address]) 6/10/2023    Vertigo        Past Surgical History:   Procedure Laterality Date    BREAST BIOPSY Bilateral 12/01/2021    stereo x2    BREAST BIOPSY Right 12/01/2021    MAMMO STEREOTACTIC BREAST BIOPSY LEFT (ALL INC) Left 12/1/2021    MAMMO STEREOTACTIC BREAST BIOPSY RIGHT (ALL INC) Right 12/1/2021    TUMOR REMOVAL      right leg- benign        Family History   Problem Relation Age of Onset    Stroke Mother     Hypertension Mother     Diabetes Mother     Throat cancer Brother     Ovarian cancer Family         2 maternal aunts and 2 cousins     Stroke Father     Thyroid cancer Sister     Heart disease Sister     Uterine cancer Sister     No Known Problems Son     Heart attack Maternal Grandmother 43    Stroke Maternal Grandfather 62    Heart attack Paternal Grandmother Leukemia Paternal Grandfather     No Known Problems Sister     No Known Problems Son     Breast cancer Cousin 36    Breast cancer Maternal Aunt 26    Uterine cancer Maternal Aunt     Uterine cancer Cousin     No Known Problems Paternal Aunt      I have reviewed and agree with the history as documented. E-Cigarette/Vaping    E-Cigarette Use Never User      E-Cigarette/Vaping Substances    Nicotine No     THC No     CBD No     Flavoring No     Other No     Unknown No      Social History     Tobacco Use    Smoking status: Former     Packs/day: 0.25     Years: 47.00     Total pack years: 11.75     Types: Cigarettes     Start date:      Quit date: 2023     Years since quittin.5    Smokeless tobacco: Never    Tobacco comments: On and off since a teen. .. in 2019 started smoking more    Vaping Use    Vaping Use: Never used   Substance Use Topics    Alcohol use: Yes     Comment: socially     Drug use: Never        Review of Systems   Constitutional:  Positive for activity change. Negative for chills and fever. HENT:  Negative for ear pain and sore throat. Eyes:  Negative for pain and visual disturbance. Respiratory:  Positive for shortness of breath. Negative for cough and chest tightness. Cardiovascular:  Negative for chest pain and palpitations. Gastrointestinal:  Negative for abdominal pain, constipation, diarrhea, nausea and vomiting. Genitourinary:  Negative for dysuria and hematuria. Musculoskeletal:  Negative for arthralgias and back pain. Skin:  Negative for color change and rash. Neurological:  Negative for dizziness, seizures, syncope, weakness, light-headedness, numbness and headaches. Psychiatric/Behavioral:  Negative for agitation and behavioral problems. All other systems reviewed and are negative.       Physical Exam  ED Triage Vitals   Temperature Pulse Respirations Blood Pressure SpO2   23 0940 23 0940 23 0940 23 0940 23 0940   98.1 °F (36.7 °C) 98 (!) 24 163/98 96 %      Temp Source Heart Rate Source Patient Position - Orthostatic VS BP Location FiO2 (%)   12/03/23 0940 12/03/23 1100 -- 12/03/23 1100 --   Temporal Monitor  Right arm       Pain Score       12/03/23 0940       8             Orthostatic Vital Signs  Vitals:    12/03/23 0940 12/03/23 1100   BP: 163/98 164/73   Pulse: 98 91       Physical Exam  Vitals and nursing note reviewed. Constitutional:       General: She is not in acute distress. Appearance: She is well-developed. HENT:      Head: Normocephalic and atraumatic. Mouth/Throat:      Mouth: Mucous membranes are moist.   Eyes:      Conjunctiva/sclera: Conjunctivae normal.      Pupils: Pupils are equal, round, and reactive to light. Cardiovascular:      Rate and Rhythm: Normal rate and regular rhythm. Heart sounds: No murmur heard. Pulmonary:      Effort: Pulmonary effort is normal. Tachypnea present. No respiratory distress. Breath sounds: Examination of the right-middle field reveals rhonchi. Wheezing and rhonchi present. Abdominal:      Palpations: Abdomen is soft. Tenderness: There is no abdominal tenderness. Musculoskeletal:         General: No swelling. Normal range of motion. Cervical back: Normal range of motion and neck supple. Right lower leg: No tenderness. Edema present. Left lower leg: No tenderness. Edema present. Skin:     General: Skin is warm and dry. Capillary Refill: Capillary refill takes less than 2 seconds. Neurological:      General: No focal deficit present. Mental Status: She is alert and oriented to person, place, and time.    Psychiatric:         Mood and Affect: Mood normal.         Behavior: Behavior normal.         ED Medications  Medications   albuterol inhalation solution 10 mg (10 mg Nebulization Given 12/3/23 0959)   ipratropium (ATROVENT) 0.02 % inhalation solution 1 mg (1 mg Nebulization Given 12/3/23 0959)   sodium chloride 0.9 % inhalation solution 12 mL (12 mL Nebulization Given 12/3/23 0959)   furosemide (LASIX) injection 20 mg (20 mg Intravenous Given 12/3/23 1146)       Diagnostic Studies  Results Reviewed       Procedure Component Value Units Date/Time    FLU/RSV/COVID - if FLU/RSV clinically relevant [[id number]]  (Normal) Collected: 12/03/23 1011    Lab Status: Final result Specimen: Nares from Nose Updated: 12/03/23 1106     SARS-CoV-2 Negative     INFLUENZA A PCR Negative     INFLUENZA B PCR Negative     RSV PCR Negative    Narrative:      FOR PEDIATRIC PATIENTS - copy/paste COVID Guidelines URL to browser: [email or web address]    SARS-CoV-2 assay is a Nucleic Acid Amplification assay intended for the  qualitative detection of nucleic acid from SARS-CoV-2 in nasopharyngeal  swabs. Results are for the presumptive identification of SARS-CoV-2 RNA. Positive results are indicative of infection with SARS-CoV-2, the virus  causing COVID-19, but do not rule out bacterial infection or co-infection  with other viruses. Laboratories within the [facility] and its  territories are required to report all positive results to the appropriate  public health authorities. Negative results do not preclude SARS-CoV-2  infection and should not be used as the sole basis for treatment or other  patient management decisions. Negative results must be combined with  clinical observations, patient history, and epidemiological information. This test has not been FDA cleared or approved. This test has been authorized by FDA under an Emergency Use Authorization  (EUA). This test is only authorized for the duration of time the  declaration that circumstances exist justifying the authorization of the  emergency use of an in vitro diagnostic tests for detection of SARS-CoV-2  virus and/or diagnosis of COVID-19 infection under section 564(b)(1) of  the Act, 21 U. S.C. 466AYQ-4(Y)(5), unless the authorization is terminated  or revoked sooner. The test has been validated but independent review by FDA  and CLIA is pending. Test performed using [employer] Gene[employer]pert: This RT-PCR assay targets N2,  a region unique to SARS-CoV-2. A conserved region in the E-gene was chosen  for pan-Sarbecovirus detection which includes SARS-CoV-2. According to CMS-2020-01-R, this platform meets the definition of high-throughput technology.     B-Type Natriuretic Peptide(BNP) [[id number]]  (Abnormal) Collected: 12/03/23 0958    Lab Status: Final result Specimen: Blood from Arm, Right Updated: 12/03/23 1055      pg/mL     HS Troponin 0hr (reflex protocol) [[id number]]  (Normal) Collected: 12/03/23 0958    Lab Status: Final result Specimen: Blood from Arm, Right Updated: 12/03/23 1033     hs TnI 0hr 5 ng/L     Comprehensive metabolic panel [[id number]]  (Abnormal) Collected: 12/03/23 0958    Lab Status: Final result Specimen: Blood from Arm, Right Updated: 12/03/23 1026     Sodium 141 mmol/L      Potassium 3.7 mmol/L      Chloride 106 mmol/L      CO2 27 mmol/L      ANION GAP 8 mmol/L      BUN 16 mg/dL      Creatinine 0.89 mg/dL      Glucose 209 mg/dL      Calcium 9.1 mg/dL      AST 16 U/L      ALT 30 U/L      Alkaline Phosphatase 54 U/L      Total Protein 7.2 g/dL      Albumin 4.2 g/dL      Total Bilirubin 0.33 mg/dL      eGFR 69 ml/min/1.73sq m     Narrative:      [place] guidelines for Chronic Kidney Disease (CKD):     Stage 1 with normal or high GFR (GFR > 90 mL/min/1.73 square meters)    Stage 2 Mild CKD (GFR = 60-89 mL/min/1.73 square meters)    Stage 3A Moderate CKD (GFR = 45-59 mL/min/1.73 square meters)    Stage 3B Moderate CKD (GFR = 30-44 mL/min/1.73 square meters)    Stage 4 Severe CKD (GFR = 15-29 mL/min/1.73 square meters)    Stage 5 End Stage CKD (GFR <15 mL/min/1.73 square meters)  Note: GFR calculation is accurate only with a steady state creatinine    CBC and differential [[id number]] (Abnormal) Collected: 12/03/23 0958    Lab Status: Final result Specimen: Blood from Arm, Right Updated: 12/03/23 1010     WBC 13.83 Thousand/uL      RBC 4.29 Million/uL      Hemoglobin 13.1 g/dL      Hematocrit 39.2 %      MCV 91 fL      MCH 30.5 pg      MCHC 33.4 g/dL      RDW 12.4 %      MPV 10.0 fL      Platelets 672 Thousands/uL      nRBC 0 /100 WBCs      Neutrophils Relative 76 %      Immat GRANS % 2 %      Lymphocytes Relative 15 %      Monocytes Relative 5 %      Eosinophils Relative 1 %      Basophils Relative 1 %      Neutrophils Absolute 10.72 Thousands/µL      Immature Grans Absolute 0.21 Thousand/uL      Lymphocytes Absolute 2.04 Thousands/µL      Monocytes Absolute 0.66 Thousand/µL      Eosinophils Absolute 0.13 Thousand/µL      Basophils Absolute 0.07 Thousands/µL                    XR chest 1 view portable    (Results Pending)         Procedures  ECG 12 Lead Documentation Only    Date/Time: 12/3/2023 10:42 AM    Performed by: Manuel Lindquist DO  Authorized by: Manuel Lindquist DO    Indications / Diagnosis:  Sob  Patient location:  ED  Previous ECG:     Previous ECG:  Compared to current    Similarity:  No change  Interpretation:     Interpretation: normal    Rate:     ECG rate:  90    ECG rate assessment: normal    Rhythm:     Rhythm: sinus rhythm    Ectopy:     Ectopy: none    QRS:     QRS axis:  Normal    QRS intervals:  Normal  Conduction:     Conduction: normal    ST segments:     ST segments:  Normal  T waves:     T waves: non-specific          ED Course  ED Course as of 12/03/23 1223   Sun Dec 03, 2023   7906 Blood Pressure: 163/98   0947 Temperature: 98.1 °F (36.7 °C)   0947 Temp Source: Temporal   0947 Pulse: 98   0947 Respirations(!): 24   0947 SpO2: 96 %  - Given patient's concerns, will do a cardiac workup. - Will do an EKG for arrythmia, strain; troponin for same as per protocol for evaluation of ACS. - CBC for anemia; CMP for kidney function and electrolytes.    - Will check CXR for pneumonia, PTX, fluid overload  - HEART score:  - Wheezing; on exam will give heart neb for copd  - Disposition per workup. 1016 WBC(!): 13.83  Currently on steroids so could be secondary to that   1016 Hemoglobin: 13.1   1031 Creatinine: 0.89   1031 BUN: 16   1040 hs TnI 0hr: 5   1106 SARS-COV-2: Negative   1106 INFLU A PCR: Negative   1106 INFLU B PCR: Negative   1106 RSV PCR: Negative   1211 Patient aware of all lab and imaging findings. Aware of CHF exacerbation. Patient Chest xrayx consistent with fluid overload. Patient with orthopnea and dispnea with exertion. Admission reached out. 1217 Waiting for SOD to respond             HEART Risk Score      Flowsheet Row Most Recent Value   Heart Score Risk Calculator    History 0 Filed at: 12/03/2023 1042   ECG 0 Filed at: 12/03/2023 1042   Age 1 Filed at: 12/03/2023 1042   Risk Factors 1 Filed at: 12/03/2023 1042   Troponin 0 Filed at: 12/03/2023 1042   HEART Score 2 Filed at: 12/03/2023 1042                                  Medical Decision Making  Amount and/or Complexity of Data Reviewed  Labs: ordered. Decision-making details documented in ED Course. Radiology: ordered. Risk  Prescription drug management. Decision regarding hospitalization.           Disposition  Final diagnoses:   CHF exacerbation ([address])   COPD with acute exacerbation ([address])     Time reflects when diagnosis was documented in both MDM as applicable and the Disposition within this note       Time User Action Codes Description Comment    12/3/2023 12:05 PM Arlyss Dues Add [I50.9] CHF exacerbation ([address])     12/3/2023 12:05 PM Arlyss Dues Add [J44.1] COPD with acute exacerbation [facility])           ED Disposition       ED Disposition   Admit    Condition   Stable    Date/Time   Sun Dec 3, 2023 1205    Comment   Case was discussed with               Follow-up Information    None         Patient's Medications   Discharge Prescriptions    No medications on file No discharge procedures on file. PDMP Review       None             ED Provider  Attending physically available and evaluated Kari Martinez. I managed the patient along with the ED Attending.     Electronically Signed by           Marina Real DO  12/03/23 5075

## 2023-12-03 NOTE — H&P
INTERNAL MEDICINE RESIDENCY ADMISSION H&P     Name: Makenzie Han   Age & Sex: 61 y.o. female   MRN: [id number]  Unit/Bed#: -01   Encounter: [id number]  Primary Care Provider: Zina Florian PA-C    Code Status: Prior  Admission Status: OBSERVATION  Disposition: Patient requires Med/Surg    Admit to team: SOD Team A    ASSESSMENT/PLAN     Principal Problem:    Shortness of breath  Active Problems:    Diabetes mellitus (HCC)    Hypertension    Mixed hyperlipidemia      * Shortness of breath  Assessment & Plan  New heart failure versus bronchitis versus COPD exacerbation versus pneumonia. Patient with sick symptoms started 11/28, worsening shortness of breath minimally responsive to albuterol, orthopnea, worsening dyspnea on exertion, increased sputum production. Patient states she is able to walk about 1 block and is limited by shortness of breath. She has been using her albuterol 6 times a day and continues to use Advair without relief. Feels some palpitations after using albuterol at night. Weight gain of 16 pounds since July despite similar diet and exercise. Last echo 7/2023 with normal heart function EF 60%. BNP elevated at 634 on admission. She has been taking Z-Tarun and prednisone for possible pneumonia versus bronchitis prescribed 11/30 in clinic, this combination usually improves her bronchitis and COPD exacerbations within 2 days which has not happened this time -states it feels like prior episodes of pneumonia, plus new weight gain and diffuse edema.     Remaining Prednisone taper: 30 mg December 4 & 5, 20 mg December 6 & 7, 10 mg in December 8 & 9    Plan:  -Repeat echo  -F/u procalcitonin  -Daily weights, strict I's and O's  -Prednisone, continue taper  -Continue azithromycin 250 mg daily  -Continue Lasix 20 mg IV tomorrow and reassess volume status  -Albuterol as needed    Diabetes mellitus [facility])  Assessment & Plan  Lab Results   Component Value Date    HGBA1C 6.8 (A) 07/27/2023 No results for input(s): "POCGLU" in the last 72 hours. Blood Sugar Average: Last 72 hrs:    Plan:  Monitor fasting BG, consider SSI if necessary  Holding metformin while inpatient      Hypertension  Assessment & Plan  Continue home triamterene-hydrochlorothiazide  -Continue Lasix 20 mg IV tomorrow and reassess volume status    Mixed hyperlipidemia  Assessment & Plan  Continue Lipitor 40 mg daily        VTE Pharmacologic Prophylaxis: Enoxaparin (Lovenox)  VTE Mechanical Prophylaxis: sequential compression device    CHIEF COMPLAINT     Chief Complaint   Patient presents with    Shortness of Breath     Since Tuesday has had SOB when sitting and worse when walking. HISTORY OF PRESENT ILLNESS     59-year-old female history of type II diabetes, hypertension, hyperlipidemia, former smoker, Ménière's disease, recurrent bronchitis, COPD, presenting to the ED for shortness of breath. She has been feeling sick since 11/28 including subjective fevers, productive green mucus, swollen lymph nodes, difficulty breathing including shortness of breath, DURAND, orthopnea, diffuse swelling. She was recently seen in clinic on 11/30 for URI, she was prescribed Z-Tarun at that time with a prednisone taper for exacerbation of bronchitis. She states this feels like previous episode of pneumonia. She states that she has been using her albuterol 6 times a day with minimal relief, only lasting for 1 to 2 minutes. She was recently prescribed Fany [place] for her eosinophilic COPD though she has not started taking these injections yet; she states much better respiratory function since starting Advair several months ago. Of note she states some palpitations lasting about 15 seconds after using albuterol at night. She has had persistent weight gain over the past several months despite no changes to her diet or exercise. Last echo done 7/17/2023 showing EF 60% and normal function. She has gained 16 pounds since July.     On presentation to the ED her vitals were: 98.1 °F, HR 98, RR 24, SPO2 96% on 2L NC, /98. Initial labs: , K3.7, , CO2 27, BUN 16, CR 0.89, glucose 209. Hepatic panel within normal limits. BNP elevated at 634. Negative troponin. CBC showing elevated WBC 13.83, Hgb 13.1, HCT 39.2, . COVID, flu, RSV negative. CXR obtained, appears to have pulmonary congestion though pending final read. She received 20 mg IV Lasix, albuterol nebulization, Atrovent nebulization, saline nebs. She is a former smoker though quit several months ago, occasional alcohol use, no other drug use. She states being compliant with her medicines. Family history significant for several family members with heart attacks. She will be admitted to [facility] for workup of new CHF versus bronchitis versus pneumonia. Full code. REVIEW OF SYSTEMS     Review of Systems   Constitutional:  Positive for fatigue, fever and unexpected weight change. Negative for chills and diaphoresis. Subjective fevers that broke on    HENT:  Positive for sore throat. Respiratory:  Positive for cough, shortness of breath and wheezing. Negative for apnea. Gastrointestinal: Negative. Genitourinary: Negative. Musculoskeletal:  Positive for myalgias. Skin: Negative. Neurological: Negative. Hematological:  Positive for adenopathy. Subjective swelling of cervical lymph nodes   Psychiatric/Behavioral: Negative.        OBJECTIVE     Vitals:    23 0959 23 1100 23 1408 23 1523   BP:  164/73 119/70 126/75   BP Location:  Right arm Right arm    Pulse:  91 86 88   Resp:  22 20 16   Temp:   98.2 °F (36.8 °C) 98.3 °F (36.8 °C)   TempSrc:   Oral    SpO2: 96% 94% 95% 93%   Weight:   79.1 kg (174 lb 6.4 oz)    Height:   5' 1" (1.549 m)       Temperature:   Temp (24hrs), Av.2 °F (36.8 °C), Min:98.1 °F (36.7 °C), Max:98.3 °F (36.8 °C)    Temperature: 98.3 °F (36.8 °C)  Intake & Output:  I/O None          Weights:   IBW (Ideal Body Weight): 47.8 kg    Body mass index is 32.95 kg/m². Weight (last 2 days)       Date/Time Weight    12/03/23 14:08:53 79.1 (174.4)          Physical Exam  Constitutional:       General: She is not in acute distress. Appearance: She is not toxic-appearing. HENT:      Head: Normocephalic and atraumatic. Nose: No congestion. Mouth/Throat:      Mouth: Mucous membranes are moist.      Pharynx: Oropharynx is clear. Posterior oropharyngeal erythema present. No oropharyngeal exudate. Comments: No tonsillar hypertrophy or exudate  Eyes:      General: No scleral icterus. Extraocular Movements: Extraocular movements intact. Pupils: Pupils are equal, round, and reactive to light. Cardiovascular:      Rate and Rhythm: Normal rate and regular rhythm. Comments: No JVD appreciated  Pulmonary:      Effort: Pulmonary effort is normal. No respiratory distress. Breath sounds: Wheezing present. Comments: Wheezes diffusely through right lung, bilateral crackles at bases  Chest:      Chest wall: No tenderness. Abdominal:      General: There is no distension. Palpations: Abdomen is soft. Tenderness: There is no abdominal tenderness. There is no guarding or rebound. Musculoskeletal:         General: No tenderness. Normal range of motion. Cervical back: No tenderness. Comments: Slight pitting edema bilateral lower extremities along shins   Lymphadenopathy:      Cervical: No cervical adenopathy. Skin:     General: Skin is warm and dry. Capillary Refill: Capillary refill takes less than 2 seconds. Neurological:      General: No focal deficit present. Mental Status: She is alert and oriented to person, place, and time. Mental status is at baseline.        PAST MEDICAL HISTORY     Past Medical History:   Diagnosis Date    Community acquired pneumonia of left lower lobe of lung 6/9/2023    Diabetes mellitus ([address])     Sepsis ([address] ) 6/10/2023    Vertigo      PAST SURGICAL HISTORY     Past Surgical History:   Procedure Laterality Date    BREAST BIOPSY Bilateral 2021    stereo x2    BREAST BIOPSY Right 2021    MAMMO STEREOTACTIC BREAST BIOPSY LEFT (ALL INC) Left 2021    MAMMO STEREOTACTIC BREAST BIOPSY RIGHT (ALL INC) Right 2021    TUMOR REMOVAL      right leg- benign      SOCIAL & FAMILY HISTORY     Social History     Substance and Sexual Activity   Alcohol Use Yes    Comment: socially        Social History     Substance and Sexual Activity   Drug Use Never     Social History     Tobacco Use   Smoking Status Former    Packs/day: 0.25    Years: 47.00    Total pack years: 11.75    Types: Cigarettes    Start date:     Quit date: 2023    Years since quittin.5   Smokeless Tobacco Never   Tobacco Comments    On and off since a teen. .. in 2019 started smoking more      Family History   Problem Relation Age of Onset    Stroke Mother     Hypertension Mother     Diabetes Mother     Throat cancer Brother     Ovarian cancer Family         2 maternal aunts and 2 cousins     Stroke Father     Thyroid cancer Sister     Heart disease Sister     Uterine cancer Sister     No Known Problems Son     Heart attack Maternal Grandmother 43    Stroke Maternal Grandfather 58    Heart attack Paternal Grandmother     Leukemia Paternal Grandfather     No Known Problems Sister     No Known Problems Son     Breast cancer Cousin 36    Breast cancer Maternal Aunt 26    Uterine cancer Maternal Aunt     Uterine cancer Cousin     No Known Problems Paternal Aunt      LABORATORY DATA     Labs: I have personally reviewed pertinent reports.     Results from last 7 days   Lab Units 23  0958   WBC Thousand/uL 13.83*   HEMOGLOBIN g/dL 13.1   HEMATOCRIT % 39.2   PLATELETS Thousands/uL 297   NEUTROS PCT % 76*   MONOS PCT % 5   EOS PCT % 1      Results from last 7 days   Lab Units 23  0958   POTASSIUM mmol/L 3.7   CHLORIDE mmol/L 106   CO2 mmol/L 27   BUN mg/dL 16   CREATININE mg/dL 0.89   CALCIUM mg/dL 9.1   ALK PHOS U/L 54   ALT U/L 30   AST U/L 16                          Micro:  Lab Results   Component Value Date    BLOODCX No Growth After 5 Days. 07/16/2023    BLOODCX No Growth After 5 Days. 07/16/2023    BLOODCX No Growth After 5 Days. 06/09/2023     IMAGING & DIAGNOSTIC TESTS     Imaging: I have personally reviewed pertinent reports. No results found. EKG, Pathology, and Other Studies: I have personally reviewed pertinent reports. ALLERGIES   No Known Allergies  MEDICATIONS PRIOR TO ARRIVAL     Prior to Admission medications    Medication Sig Start Date End Date Taking? Authorizing Provider   albuterol (2.5 mg/3 mL) 0.083 % nebulizer solution Take 3 mL (2.5 mg total) by nebulization every 6 (six) hours as needed for wheezing or shortness of breath 7/31/23   Surinder Fenton MD   albuterol (PROVENTIL HFA,VENTOLIN HFA) 90 mcg/act inhaler INHALE 2 PUFFS BY MOUTH EVERY 4 HOURS AS NEEDED FOR WHEEZE 10/9/23   Jean Ricketts DO   atorvastatin (LIPITOR) 40 mg tablet Take 1 tablet (40 mg total) by mouth daily 11/21/23   Jasmin Villagran PA-C   azithromycin (Zithromax) 250 mg tablet Take two tablets on day one, then one tablet daily until gone.  11/30/23 12/4/23  Margarito Clements PA-C   Benralizumab 30 MG/ML SOAJ Inject 30 mg under the skin every 28 days Inject 30 mg every 28 days for the first 3 injections 12/1/23   ELIZABETH Smith   Benralizumab 30 MG/ML SOAJ Inject 30 mg under the skin every 56 days 12/1/23   ELIZABETH Smith   Fluticasone-Salmeterol (Advair Diskus) 250-50 mcg/dose inhaler Inhale 1 puff 2 (two) times a day Rinse mouth after use. 8/22/23 11/20/23  Sujata Almanzar DO   glucose blood (FREESTYLE LITE) test strip Use as instructed 9/29/21   Jean Ricketts DO   glucose monitoring kit (FREESTYLE) monitoring kit Apply 1 m topically 4 (four) times a day as needed Use as directed 2/22/23   DO Shawn Ramos (MUCINEX) 600 mg 12 hr tablet Take 1 tablet (600 mg total) by mouth every 12 (twelve) hours 11/30/23   Elpidio Omer PA-C   Lancets (freestyle) lancets Use as instructed 2/22/23   Bety Ricketts DO   metFORMIN (GLUCOPHAGE-XR) 750 mg 24 hr tablet Take 2 tablets (1,500 mg total) by mouth daily with dinner 11/30/23   Elpidio Omer PA-C   naproxen (Naprosyn) 500 mg tablet Take 1 tablet (500 mg total) by mouth 2 (two) times a day with meals 11/30/23   Elpidio Omer PA-C   predniSONE 10 mg tablet Take 5 tablets daily for two days, then 4 tablets daily for two days, 3 tablets daily for two days, 2 tablets daily for two days, 1 tablet daily for two days 11/30/23   Elpidio Omer PA-C   Probiotic Product (PRO-BIOTIC BLEND PO) Take 1 tablet by mouth daily    Historical Provider, MD   triamterene-hydrochlorothiazide (DYAZIDE) 37.5-25 mg per capsule Take 1 capsule by mouth daily 11/21/23   Elpidio Omer PA-C     MEDICATIONS ADMINISTERED IN LAST 24 HOURS     Medication Administration - last 24 hours from 12/02/2023 1526 to 12/03/2023 1526         Date/Time Order Dose Route Action Action by     12/03/2023 0959 EST albuterol inhalation solution 10 mg 10 mg Nebulization Given Rajwinder Pammer, RT     12/03/2023 0959 EST ipratropium (ATROVENT) 0.02 % inhalation solution 1 mg 1 mg Nebulization Given Rajwinder Pammer, RT     12/03/2023 0959 EST sodium chloride 0.9 % inhalation solution 12 mL 12 mL Nebulization Given Rajwinder Pammer, RT     12/03/2023 1146 EST furosemide (LASIX) injection 20 mg 20 mg Intravenous Given Morgan Suarez RN          CURRENT MEDICATIONS              Admission Time  I spent 45 minutes admitting the patient. This involved direct patient contact where I performed a full history and physical, reviewing previous records, and reviewing laboratory and other diagnostic studies. Portions of the record may have been created with voice recognition software.   Occasional wrong word or "sound a like" substitutions may have occurred due to the inherent limitations of voice recognition software.   Read the chart carefully and recognize, using context, where substitutions have occurred.    ==  Tico Metzger MD  [facility]  Internal Medicine Residency PGY-1

## 2023-12-03 NOTE — PLAN OF CARE
Problem: Knowledge Deficit  Goal: Patient/family/caregiver demonstrates understanding of disease process, treatment plan, medications, and discharge instructions  Description: Complete learning assessment and assess knowledge base.   Interventions:  - Provide teaching at level of understanding  - Provide teaching via preferred learning methods  Outcome: Progressing     Problem: CARDIOVASCULAR - ADULT  Goal: Maintains optimal cardiac output and hemodynamic stability  Description: INTERVENTIONS:  - Monitor I/O, vital signs and rhythm  - Monitor for S/S and trends of decreased cardiac output  - Administer and titrate ordered vasoactive medications to optimize hemodynamic stability  - Assess quality of pulses, skin color and temperature  - Assess for signs of decreased coronary artery perfusion  - Instruct patient to report change in severity of symptoms  Outcome: Progressing  Goal: Absence of cardiac dysrhythmias or at baseline rhythm  Description: INTERVENTIONS:  - Continuous cardiac monitoring, vital signs, obtain 12 lead EKG if ordered  - Administer antiarrhythmic and heart rate control medications as ordered  - Monitor electrolytes and administer replacement therapy as ordered  Outcome: Progressing     Problem: RESPIRATORY - ADULT  Goal: Achieves optimal ventilation and oxygenation  Description: INTERVENTIONS:  - Assess for changes in respiratory status  - Assess for changes in mentation and behavior  - Position to facilitate oxygenation and minimize respiratory effort  - Oxygen administered by appropriate delivery if ordered  - Initiate smoking cessation education as indicated  - Encourage broncho-pulmonary hygiene including cough, deep breathe, Incentive Spirometry  - Assess the need for suctioning and aspirate as needed  - Assess and instruct to report SOB or any respiratory difficulty  - Respiratory Therapy support as indicated  Outcome: Progressing

## 2023-12-04 LAB
ANION GAP SERPL CALCULATED.3IONS-SCNC: 10 MMOL/L
BASOPHILS # BLD AUTO: 0.09 THOUSANDS/ÂΜL (ref 0–0.1)
BASOPHILS NFR BLD AUTO: 1 % (ref 0–1)
BUN SERPL-MCNC: 17 MG/DL (ref 5–25)
CALCIUM SERPL-MCNC: 9.4 MG/DL (ref 8.4–10.2)
CHLORIDE SERPL-SCNC: 99 MMOL/L (ref 96–108)
CO2 SERPL-SCNC: 31 MMOL/L (ref 21–32)
CREAT SERPL-MCNC: 0.66 MG/DL (ref 0.6–1.3)
EOSINOPHIL # BLD AUTO: 0.44 THOUSAND/ÂΜL (ref 0–0.61)
EOSINOPHIL NFR BLD AUTO: 3 % (ref 0–6)
ERYTHROCYTE [DISTWIDTH] IN BLOOD BY AUTOMATED COUNT: 12.5 % (ref 11.6–15.1)
GFR SERPL CREATININE-BSD FRML MDRD: 94 ML/MIN/1.73SQ M
GLUCOSE SERPL-MCNC: 171 MG/DL (ref 65–140)
GLUCOSE SERPL-MCNC: 209 MG/DL (ref 65–140)
GLUCOSE SERPL-MCNC: 217 MG/DL (ref 65–140)
GLUCOSE SERPL-MCNC: 331 MG/DL (ref 65–140)
HCT VFR BLD AUTO: 42.9 % (ref 34.8–46.1)
HGB BLD-MCNC: 14.4 G/DL (ref 11.5–15.4)
IMM GRANULOCYTES # BLD AUTO: 0.24 THOUSAND/UL (ref 0–0.2)
IMM GRANULOCYTES NFR BLD AUTO: 2 % (ref 0–2)
LYMPHOCYTES # BLD AUTO: 4.94 THOUSANDS/ÂΜL (ref 0.6–4.47)
LYMPHOCYTES NFR BLD AUTO: 36 % (ref 14–44)
MCH RBC QN AUTO: 31.4 PG (ref 26.8–34.3)
MCHC RBC AUTO-ENTMCNC: 33.6 G/DL (ref 31.4–37.4)
MCV RBC AUTO: 94 FL (ref 82–98)
MONOCYTES # BLD AUTO: 0.9 THOUSAND/ÂΜL (ref 0.17–1.22)
MONOCYTES NFR BLD AUTO: 7 % (ref 4–12)
NEUTROPHILS # BLD AUTO: 6.99 THOUSANDS/ÂΜL (ref 1.85–7.62)
NEUTS SEG NFR BLD AUTO: 51 % (ref 43–75)
NRBC BLD AUTO-RTO: 0 /100 WBCS
PLATELET # BLD AUTO: 356 THOUSANDS/UL (ref 149–390)
PMV BLD AUTO: 10.2 FL (ref 8.9–12.7)
POTASSIUM SERPL-SCNC: 3.3 MMOL/L (ref 3.5–5.3)
RBC # BLD AUTO: 4.59 MILLION/UL (ref 3.81–5.12)
SODIUM SERPL-SCNC: 140 MMOL/L (ref 135–147)
WBC # BLD AUTO: 13.6 THOUSAND/UL (ref 4.31–10.16)

## 2023-12-04 PROCEDURE — 82785 ASSAY OF IGE: CPT | Performed by: STUDENT IN AN ORGANIZED HEALTH CARE EDUCATION/TRAINING PROGRAM

## 2023-12-04 PROCEDURE — 94760 N-INVAS EAR/PLS OXIMETRY 1: CPT

## 2023-12-04 PROCEDURE — NC001 PR NO CHARGE: Performed by: HOSPITALIST

## 2023-12-04 PROCEDURE — 86606 ASPERGILLUS ANTIBODY: CPT | Performed by: STUDENT IN AN ORGANIZED HEALTH CARE EDUCATION/TRAINING PROGRAM

## 2023-12-04 PROCEDURE — 85025 COMPLETE CBC W/AUTO DIFF WBC: CPT

## 2023-12-04 PROCEDURE — 82948 REAGENT STRIP/BLOOD GLUCOSE: CPT

## 2023-12-04 PROCEDURE — 94640 AIRWAY INHALATION TREATMENT: CPT

## 2023-12-04 PROCEDURE — 99223 1ST HOSP IP/OBS HIGH 75: CPT | Performed by: HOSPITALIST

## 2023-12-04 PROCEDURE — 80048 BASIC METABOLIC PNL TOTAL CA: CPT

## 2023-12-04 PROCEDURE — 82103 ALPHA-1-ANTITRYPSIN TOTAL: CPT | Performed by: STUDENT IN AN ORGANIZED HEALTH CARE EDUCATION/TRAINING PROGRAM

## 2023-12-04 PROCEDURE — 86003 ALLG SPEC IGE CRUDE XTRC EA: CPT | Performed by: STUDENT IN AN ORGANIZED HEALTH CARE EDUCATION/TRAINING PROGRAM

## 2023-12-04 RX ORDER — INSULIN LISPRO 100 [IU]/ML
2-12 INJECTION, SOLUTION INTRAVENOUS; SUBCUTANEOUS
Status: DISCONTINUED | OUTPATIENT
Start: 2023-12-04 | End: 2023-12-05 | Stop reason: HOSPADM

## 2023-12-04 RX ORDER — FUROSEMIDE 10 MG/ML
20 INJECTION INTRAMUSCULAR; INTRAVENOUS ONCE
Status: COMPLETED | OUTPATIENT
Start: 2023-12-04 | End: 2023-12-04

## 2023-12-04 RX ORDER — LANOLIN ALCOHOL/MO/W.PET/CERES
3 CREAM (GRAM) TOPICAL
Status: DISCONTINUED | OUTPATIENT
Start: 2023-12-04 | End: 2023-12-05 | Stop reason: HOSPADM

## 2023-12-04 RX ORDER — AZITHROMYCIN 250 MG/1
250 TABLET, FILM COATED ORAL EVERY 24 HOURS
Status: DISCONTINUED | OUTPATIENT
Start: 2023-12-04 | End: 2023-12-04

## 2023-12-04 RX ORDER — POTASSIUM CHLORIDE 20 MEQ/1
40 TABLET, EXTENDED RELEASE ORAL ONCE
Status: COMPLETED | OUTPATIENT
Start: 2023-12-04 | End: 2023-12-04

## 2023-12-04 RX ORDER — AZITHROMYCIN 250 MG/1
250 TABLET, FILM COATED ORAL EVERY 24 HOURS
Status: DISCONTINUED | OUTPATIENT
Start: 2023-12-05 | End: 2023-12-05

## 2023-12-04 RX ORDER — INSULIN LISPRO 100 [IU]/ML
2-12 INJECTION, SOLUTION INTRAVENOUS; SUBCUTANEOUS
Status: DISCONTINUED | OUTPATIENT
Start: 2023-12-05 | End: 2023-12-05 | Stop reason: HOSPADM

## 2023-12-04 RX ORDER — GUAIFENESIN 100 MG/5ML
200 SOLUTION ORAL EVERY 4 HOURS PRN
Status: DISCONTINUED | OUTPATIENT
Start: 2023-12-04 | End: 2023-12-05 | Stop reason: HOSPADM

## 2023-12-04 RX ORDER — INSULIN LISPRO 100 [IU]/ML
1-6 INJECTION, SOLUTION INTRAVENOUS; SUBCUTANEOUS
Status: DISCONTINUED | OUTPATIENT
Start: 2023-12-04 | End: 2023-12-04

## 2023-12-04 RX ADMIN — IPRATROPIUM BROMIDE 0.5 MG: 0.5 SOLUTION RESPIRATORY (INHALATION) at 20:02

## 2023-12-04 RX ADMIN — ATORVASTATIN CALCIUM 40 MG: 40 TABLET, FILM COATED ORAL at 08:29

## 2023-12-04 RX ADMIN — LEVALBUTEROL HYDROCHLORIDE 1.25 MG: 1.25 SOLUTION RESPIRATORY (INHALATION) at 20:02

## 2023-12-04 RX ADMIN — Medication 3 MG: at 21:21

## 2023-12-04 RX ADMIN — INSULIN LISPRO 8 UNITS: 100 INJECTION, SOLUTION INTRAVENOUS; SUBCUTANEOUS at 22:39

## 2023-12-04 RX ADMIN — CEFTRIAXONE SODIUM 1000 MG: 10 INJECTION, POWDER, FOR SOLUTION INTRAVENOUS at 16:20

## 2023-12-04 RX ADMIN — FLUTICASONE FUROATE AND VILANTEROL TRIFENATATE 1 PUFF: 200; 25 POWDER RESPIRATORY (INHALATION) at 08:32

## 2023-12-04 RX ADMIN — AZITHROMYCIN DIHYDRATE 250 MG: 250 TABLET, FILM COATED ORAL at 08:32

## 2023-12-04 RX ADMIN — INSULIN HUMAN 10 UNITS: 100 INJECTION, SOLUTION PARENTERAL at 23:18

## 2023-12-04 RX ADMIN — POTASSIUM CHLORIDE 40 MEQ: 1500 TABLET, EXTENDED RELEASE ORAL at 08:29

## 2023-12-04 RX ADMIN — ENOXAPARIN SODIUM 40 MG: 40 INJECTION SUBCUTANEOUS at 08:30

## 2023-12-04 RX ADMIN — INSULIN LISPRO 2 UNITS: 100 INJECTION, SOLUTION INTRAVENOUS; SUBCUTANEOUS at 17:30

## 2023-12-04 RX ADMIN — FUROSEMIDE 20 MG: 10 INJECTION, SOLUTION INTRAMUSCULAR; INTRAVENOUS at 08:29

## 2023-12-04 RX ADMIN — TRIAMTERENE AND HYDROCHLOROTHIAZIDE 1 TABLET: 37.5; 25 TABLET ORAL at 11:05

## 2023-12-04 RX ADMIN — ACETAMINOPHEN 650 MG: 325 TABLET, FILM COATED ORAL at 21:34

## 2023-12-04 RX ADMIN — IPRATROPIUM BROMIDE 0.5 MG: 0.5 SOLUTION RESPIRATORY (INHALATION) at 09:18

## 2023-12-04 RX ADMIN — LEVALBUTEROL HYDROCHLORIDE 1.25 MG: 1.25 SOLUTION RESPIRATORY (INHALATION) at 09:18

## 2023-12-04 RX ADMIN — INSULIN LISPRO 2 UNITS: 100 INJECTION, SOLUTION INTRAVENOUS; SUBCUTANEOUS at 12:30

## 2023-12-04 NOTE — CASE MANAGEMENT
Case Management Assessment    Patient name [facility]  Location [address] [place] 563/-61 MRN [id number]  : 1960 Date 2023       Current Admission Date: 12/3/2023  Current Admission Diagnosis:Shortness of breath   Patient Active Problem List    Diagnosis Date Noted    Shortness of breath 2023    Asthmatic bronchitis with acute exacerbation 2023    Centrilobular emphysema ([address]) 2023    Hypertension 2023    SIRS (systemic inflammatory response syndrome) ([address]) 2023    Personal history of nicotine dependence 06/10/2023    Mixed hyperlipidemia 2023    Chronic pain of both knees 2023    Anxiety 2021    Class 2 severe obesity due to excess calories with serious comorbidity in [facility]) 2021    Diabetes mellitus ([address]) 2019    Meniere disease 2019      LOS (days): 1  Geometric Mean LOS (GMLOS) (days):   Days to GMLOS:     OBJECTIVE:    Risk of Unplanned Readmission Score: 12.48      Current admission status: Inpatient     Preferred Pharmacy:   [facility], [address]  Phone: [phone] Fax: [phone]    Primary Care Provider: Tru Darling PA-C    Primary Insurance: [address]  Secondary Insurance:     ASSESSMENT:  [facility] Proxies    There are no active Health Care Proxies on file. Readmission Root Cause  30 Day Readmission: No    Patient Information  Admitted from[de-identified] Home  Mental Status: Alert  During Assessment patient was accompanied by: Not accompanied during assessment  Assessment information provided by[de-identified] Patient  Primary Caregiver: Self  Support Systems: [place] of Residence: [facility] [address] [facility] do you live in?: [address] entry access options.  Select all that apply.: Stairs  Number of steps to enter home.: One Flight  Type of Current Residence: Apartment  Floor Level: 2  Upon entering residence, is there a bedroom on the main floor (no further steps)?: Yes  Upon entering residence, is there a bathroom on the main floor (no further steps)?: Yes  Living Arrangements: Lives Alone  Is patient a ?: No    Activities of Daily Living Prior to Admission  Functional Status: Independent  Completes ADLs independently?: Yes  Ambulates independently?: Yes  Does patient use assisted devices?: No  Does patient currently own DME?: Yes  What DME does the patient currently own?: Nebulizer  Does patient have a history of Outpatient Therapy (PT/OT)?: No  Does the patient have a history of Short-Term Rehab?: No  Does patient have a history of HHC?: No  Does patient currently have [address]?: No    Patient Information Continued  Income Source: Employed  Does patient have prescription coverage?: Yes  Does patient receive dialysis treatments?: No  Does patient have a history of substance abuse?: No  Does patient have a history of Mental Health Diagnosis?: No    PHQ 2/9 Screening   Reviewed PHQ 2/9 Depression Screening Score?: No    Means of Transportation  Means of Transport to Appts[de-identified] Drives Self      Housing Stability: Low Risk  (12/4/2023)    Housing Stability Vital Sign     Unable to Pay for Housing in the Last Year: No     Number of [address] in the Last Year: 1     Unstable Housing in the Last Year: No   Food Insecurity: No Food Insecurity (12/4/2023)    Hunger Vital Sign     Worried About Running Out of Food in the Last Year: Never true     Ran Out of Food in the Last Year: Never true   Transportation Needs: No Transportation Needs (12/4/2023)    PRAPARE - Transportation     Lack of Transportation (Medical): No     Lack of Transportation (Non-Medical): No   Utilities: Not At Risk (12/4/2023)    [facility] Utilities     Threatened with loss of utilities: No       Summary: Met with patient at bedside to complete initial assessment. Demographics confirmed. Lives alone in a 2nd floor apartment. Independent with ADLs. Drives and works.  Has a nebulizer at home. Confirmed patient's emergency contact. Anticipating no needs on DC. Patient will drives herself home. 4

## 2023-12-04 NOTE — UTILIZATION REVIEW
Initial Clinical Review    Admission: Date/Time/Statement:   Admission Orders (From admission, onward)       Ordered        12/03/23 1223  [address]  Once                          Orders Placed This Encounter   Procedures    INPATIENT ADMISSION     Standing Status:   Standing     Number of Occurrences:   1     Order Specific Question:   Level of Care     Answer:   Med Surg [16]     Order Specific Question:   Estimated length of stay     Answer:   More than 2 Midnights     Order Specific Question:   Certification     Answer:   I certify that inpatient services are medically necessary for this patient for a duration of greater than two midnights. See H&P and MD Progress Notes for additional information about the patient's course of treatment. ED Arrival Information       Expected   -    Arrival   12/3/2023 09:36    Acuity   Emergent              Means of arrival   Walk-In    Escorted by   Self    Service   [facility]    Admission type   Emergency              Arrival complaint   SOB             Chief Complaint   Patient presents with    Shortness of Breath     Since Tuesday has had SOB when sitting and worse when walking. Initial Presentation: 61 y.o. female to ED presents for shortness of breath. She has been feeling sick since 11/28 including subjective fevers, productive green mucus, swollen lymph nodes, difficulty breathing including shortness of breath, dyspnea of exertion, orthopnea, diffuse swelling. Recently seen in clinic on 11/30 for URI, she was prescribed Z-Tarun at that time with a prednisone taper for exacerbation of bronchitis. States that she has been using her albuterol 6 times a day with minimal relief, only lasting for 1 to 2 minutes. 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  Pt notes some palpitations lasting about 15 seconds after using albuterol at night. She has had persistent weight gain over the past several months despite no changes to her diet or exercise. Last echo done 7/17/2023 showing EF 60% and normal function. She has gained 16 pounds since July. In ED, SpO2 96% on 2L, /98. Initial labs: , K3.7, , CO2 27, BUN 16, CR 0.89, glucose 209. CXR appears to have pulmonary congestion though pending final read. Received 20 mg IV Lasix, albuterol nebulization, Atrovent nebulization, saline nebs. PMH for T2DM, HTN, HLD,  former smoker, Ménière's disease, recurrent bronchitis, COPD. Family history significant for several family members with heart attacks. Remaining Prednisone taper: 30 mg December 4 & 5, 20 mg December 6 & 7, 10 mg in December 8 & 9  Admit Inpatient level of care for Shortness of breath. Repeat Echo. F/u Procalcitonin. BNP elevated 634. Continue po antibiotics. Continue Iv Lasix 20 mg 12/4 and reassess volume status. Albuterol prn. On exam; wheezes diffusely through right lung, bilateral crackles at bases. Slight pitting edema bilateral lower extremities along shins. Posterior oropharyngeal erythema. Date: 12/4   Day 2:   Progress notes; Continue Iv Lasix 20 mg. Continue home triamterene-hydrochlorothiazide. Prior chest CT and CXR, 11 pack yr smoking history, will check for Alpha 1 antitrypsin. Check IgE, Aspergillus Ab and Aspergillus IgE. Continue Xopenex/ Atrovent nebs. Continue Fluticasone-Vilanterol inhaler. On exam; B/L edema + ; Rales + in B/L lower lung fields. Pt still c/o chest congestion, cough a/w yellowish colored sputum.        ED Triage Vitals   Temperature Pulse Respirations Blood Pressure SpO2   12/03/23 0940 12/03/23 0940 12/03/23 0940 12/03/23 0940 12/03/23 0940   98.1 °F (36.7 °C) 98 (!) 24 163/98 96 %      Temp Source Heart Rate Source Patient Position - Orthostatic VS BP Location FiO2 (%)   12/03/23 0940 12/03/23 1100 12/03/23 1408 12/03/23 1100 --   Temporal Monitor Sitting Right arm       Pain Score       12/03/23 0940       8          Wt Readings from Last 1 Encounters:   12/04/23 77.8 kg (171 lb 9.6 oz)     Additional Vital Signs:   12/04/23 07:28:20 97.8 °F (36.6 °C) 85 -- 120/78 92 90 % -- -- -- Lying   12/03/23 21:37:35 98.1 °F (36.7 °C) 86 -- 105/64 78 98 % -- -- -- --   12/03/23 1735 -- -- -- -- -- 93 % -- -- -- --   12/03/23 15:23:07 98.3 °F (36.8 °C) 88 16 126/75 92 93 % -- -- -- --   12/03/23 14:08:53 98.2 °F (36.8 °C) 86 20 119/70 86 95 % -- -- None (Room air) Sitting   12/03/23 1100 -- 91 22 164/73 105 94 % 28 2 L/min Nasal cannula --     Pertinent Labs/Diagnostic Test Results:   XR chest 1 view portable   Final Result by Andrés Gonzales MD (12/03 2128)      Trace left pleural effusion with basilar atelectasis. Diffuse bilateral perihilar hazy pulmonary infiltrates are seen suggesting multifocal infection or diffuse edema, recommend clinical correlation.             Workstation performed: [id number]           Results from last 7 days   Lab Units 12/03/23  1011 11/30/23  1520   SARS-COV-2  Negative Negative     Results from last 7 days   Lab Units 12/04/23  0707 12/03/23  0958   WBC Thousand/uL 13.60* 13.83*   HEMOGLOBIN g/dL 14.4 13.1   HEMATOCRIT % 42.9 39.2   PLATELETS Thousands/uL 356 297   NEUTROS ABS Thousands/µL 6.99 10.72*         Results from last 7 days   Lab Units 12/04/23  0707 12/03/23  0958   SODIUM mmol/L 140 141   POTASSIUM mmol/L 3.3* 3.7   CHLORIDE mmol/L 99 106   CO2 mmol/L 31 27   ANION GAP mmol/L 10 8   BUN mg/dL 17 16   CREATININE mg/dL 0.66 0.89   EGFR ml/min/1.73sq m 94 69   CALCIUM mg/dL 9.4 9.1     Results from last 7 days   Lab Units 12/03/23  0958   AST U/L 16   ALT U/L 30   ALK PHOS U/L 54   TOTAL PROTEIN g/dL 7.2   ALBUMIN g/dL 4.2   TOTAL BILIRUBIN mg/dL 0.33         Results from last 7 days   Lab Units 12/04/23  0707 12/03/23  0958   GLUCOSE RANDOM mg/dL 171* 209*       Results from last 7 days   Lab Units 12/03/23  0958   HS TNI 0HR ng/L 5                 Results from last 7 days   Lab Units 12/03/23  0958   PROCALCITONIN ng/ml <0.05                 Results from last 7 days   Lab Units 12/03/23  0958   BNP pg/mL 634*       Results from last 7 days   Lab Units 12/03/23  1011 11/30/23  1520   INFLUENZA A PCR  Negative Negative   INFLUENZA B PCR  Negative Negative   RSV PCR  Negative  --        ED Treatment:   Medication Administration from 12/03/2023 0936 to 12/03/2023 1357         Date/Time Order Dose Route Action     12/03/2023 0959 EST albuterol inhalation solution 10 mg 10 mg Nebulization Given     12/03/2023 0959 EST ipratropium (ATROVENT) 0.02 % inhalation solution 1 mg 1 mg Nebulization Given     12/03/2023 0959 EST sodium chloride 0.9 % inhalation solution 12 mL 12 mL Nebulization Given     12/03/2023 1146 EST furosemide (LASIX) injection 20 mg 20 mg Intravenous Given          Past Medical History:   Diagnosis Date    Community acquired pneumonia of left lower lobe of lung 6/9/2023    Diabetes mellitus ([address])     Sepsis ([address]) 6/10/2023    Vertigo      Present on Admission:   Diabetes mellitus ([address])   Mixed hyperlipidemia   Hypertension      Admitting Diagnosis: SOB (shortness of breath) [R06.02]  CHF exacerbation (HCC) [I50.9]  COPD with acute exacerbation (HCC) [J44.1]  Age/Sex: 61 y.o. female    Admission Orders:  Scheduled Medications:  atorvastatin, 40 mg, Oral, Daily  enoxaparin, 40 mg, Subcutaneous, Daily  fluticasone-vilanterol, 1 puff, Inhalation, Daily  insulin lispro, 1-6 Units, Subcutaneous, TID AC  ipratropium, 0.5 mg, Nebulization, TID  levalbuterol, 1.25 mg, Nebulization, TID  triamterene-hydrochlorothiazide, 1 tablet, Oral, Daily    furosemide (LASIX) injection 20 mg  Dose: 20 mg  Freq:  Once Route: IV 12/4 x1  Start: 12/04/23 0815 End: 12/04/23 0829     Continuous IV Infusions: None     PRN Meds:  acetaminophen, 650 mg, Oral, Q6H PRN  albuterol, 2 puff, Inhalation, Q6H PRN        None    Network Utilization Review Department  ATTENTION: Please call with any questions or concerns to [phone] and carefully listen to the prompts so that you are directed to the right person. All voicemails are confidential.   For Discharge needs, contact Care Management DC Support Team at [phone] opt. 2  Send all requests for admission clinical reviews, approved or denied determinations and any other requests to dedicated fax number below belonging to the campus where the patient is receiving treatment.  List of dedicated fax numbers for the Facilities:  [place] DENIALS (Administrative/Medical Necessity) [phone]   DISCHARGE SUPPORT TEAM (NETWORK) [address] [facility] (Maternity/NICU/Pediatrics) [phone]   [address] [facility] 1000 [facility] [phone]   [address] [place] [address] 207 [facility] [address] [place] [address] [address] [address] [facility] [address] [phone]

## 2023-12-04 NOTE — PLAN OF CARE
Problem: PAIN - ADULT  Goal: Verbalizes/displays adequate comfort level or baseline comfort level  Description: Interventions:  - Encourage patient to monitor pain and request assistance  - Assess pain using appropriate pain scale  - Administer analgesics based on type and severity of pain and evaluate response  - Implement non-pharmacological measures as appropriate and evaluate response  - Consider cultural and social influences on pain and pain management  - Notify physician/advanced practitioner if interventions unsuccessful or patient reports new pain  Outcome: Progressing     Problem: INFECTION - ADULT  Goal: Absence or prevention of progression during hospitalization  Description: INTERVENTIONS:  - Assess and monitor for signs and symptoms of infection  - Monitor lab/diagnostic results  - Monitor all insertion sites, i.e. indwelling lines, tubes, and drains  - Monitor endotracheal if appropriate and nasal secretions for changes in amount and color  - [place] appropriate cooling/warming therapies per order  - Administer medications as ordered  - Instruct and encourage patient and family to use good hand hygiene technique  - Identify and instruct in appropriate isolation precautions for identified infection/condition  Outcome: Progressing  Goal: Absence of fever/infection during neutropenic period  Description: INTERVENTIONS:  - Monitor WBC    Outcome: Progressing     Problem: SAFETY ADULT  Goal: Patient will remain free of falls  Description: INTERVENTIONS:  - Educate patient/family on patient safety including physical limitations  - Instruct patient to call for assistance with activity   - Consult OT/PT to assist with strengthening/mobility   - Keep Call bell within reach  - Keep bed low and locked with side rails adjusted as appropriate  - Keep care items and personal belongings within reach  - Initiate and maintain comfort rounds  - Make Fall Risk Sign visible to staff  - Offer Toileting every  Hours, in advance of need  - Initiate/Maintain alarm  - Obtain necessary fall risk management equipment:   - Apply yellow socks and bracelet for high fall risk patients  - Consider moving patient to room near nurses station  Outcome: Progressing  Goal: Maintain or return to baseline ADL function  Description: INTERVENTIONS:  -  Assess patient's ability to carry out ADLs; assess patient's baseline for ADL function and identify physical deficits which impact ability to perform ADLs (bathing, care of mouth/teeth, toileting, grooming, dressing, etc.)  - Assess/evaluate cause of self-care deficits   - Assess range of motion  - Assess patient's mobility; develop plan if impaired  - Assess patient's need for assistive devices and provide as appropriate  - Encourage maximum independence but intervene and supervise when necessary  - Involve family in performance of ADLs  - Assess for home care needs following discharge   - Consider OT consult to assist with ADL evaluation and planning for discharge  - Provide patient education as appropriate  Outcome: Progressing  Goal: Maintains/Returns to pre admission functional level  Description: INTERVENTIONS:  - Perform AM-PAC 6 Click Basic Mobility/ Daily Activity assessment daily.  - Set and communicate daily mobility goal to care team and patient/family/caregiver. - Collaborate with rehabilitation services on mobility goals if consulted  - Perform Range of Motion  times a day. - Reposition patient every  hours.   - Dangle patient  times a day  - Stand patient  times a day  - Ambulate patient  times a day  - Out of bed to chair  times a day   - Out of bed for meals  times a day  - Out of bed for toileting  - Record patient progress and toleration of activity level   Outcome: Progressing

## 2023-12-04 NOTE — PROGRESS NOTES
INTERNAL MEDICINE RESIDENCY PROGRESS NOTE     Name: Karolina Davila   Age & Sex: 61 y.o. female   MRN: [id number]  Unit/Bed#: -01   Encounter: [id number]  Team: SOD Team A    PATIENT INFORMATION     Name: Karolina Davila   Age & Sex: 61 y.o. female   MRN: [id number]  Hospital Stay Days: 1    ASSESSMENT/PLAN     Principal Problem:    Shortness of breath  Active Problems:    Diabetes mellitus ([address])    Mixed hyperlipidemia    Hypertension      Hypertension  Assessment & Plan  Continue home triamterene-hydrochlorothiazide  -Continue Lasix 20 mg IV    Mixed hyperlipidemia  Assessment & Plan  Continue Lipitor 40 mg daily    Diabetes mellitus ([address])  Assessment & Plan  Lab Results   Component Value Date    HGBA1C 6.8 (A) 07/27/2023       No results for input(s): "POCGLU" in the last 72 hours. Blood Sugar Average: Last 72 hrs:    Plan:  Monitor fasting BG, SSI will be started today; BG goal 140-180  Holding metformin while inpatient      * Shortness of breath  Assessment & Plan  New heart failure versus bronchitis versus COPD exacerbation versus pneumonia. Patient with sick symptoms started 11/28, worsening shortness of breath minimally responsive to albuterol, orthopnea, worsening dyspnea on exertion, increased sputum production. Patient states she is able to walk about 1 block and is limited by shortness of breath. She has been using her albuterol 6 times a day and continues to use Advair without relief. Feels some palpitations after using albuterol at night. Weight gain of 16 pounds since July despite similar diet and exercise. Last echo 7/2023 with normal heart function EF 60%. BNP elevated at 634 on admission.   Procalcitonin <0.05. she has been taking Z-Tarun and prednisone for possible pneumonia versus bronchitis prescribed 11/30 in clinic, this combination usually improves her bronchitis and COPD exacerbations within 2 days which has not happened this time -states it feels like prior episodes of pneumonia, plus new weight gain and diffuse edema. Plan:  -Daily weights, strict I's and O's  -She was not given prednisone yesterday, so prednisone was dc'd.  -Day 5 of Azithromycin. -B/L edema + ; Rales + in B/L lower lung fields; continue Lasix 20 mg IV  - Reviewed prior chest CT and CXR, 11 pack yr smoking history, will check for Alpha 1 antitrypsin. - Eosinophilia in the past,  will check IgE, Aspergillus Ab and Aspergillus IgE  - Continue Xeponex/Atrovent nebs  -Continue Fluticasone-Vilanterol inhaler        Disposition: Medically active    SUBJECTIVE     Patient seen and examined. She still c/o chest congestion, cough a/w yellowish colored sputum. She was unable to sleep (attributes it to prednisone that was given). Tolerating PO. Last BM yesterday. OBJECTIVE     Vitals:    23 2137 23 0600 23 0728 23 0920   BP: 105/64  120/78    BP Location:   Right arm    Pulse: 86  85    Resp:       Temp: 98.1 °F (36.7 °C)  97.8 °F (36.6 °C)    TempSrc:   Oral    SpO2: 98%  90% 94%   Weight:  77.8 kg (171 lb 9.6 oz)     Height:          Temperature:   Temp (24hrs), Av.1 °F (36.7 °C), Min:97.8 °F (36.6 °C), Max:98.3 °F (36.8 °C)    Temperature: 97.8 °F (36.6 °C)  Intake & Output:  I/O             P. O.  180    Total Intake(mL/kg)  180 (2.3)    Urine (mL/kg/hr)  750    Total Output  750    Net  -570                Weights:   IBW (Ideal Body Weight): 47.8 kg    Body mass index is 32.42 kg/m². Weight (last 2 days)       Date/Time Weight    23 0600 77.8 (171.6)    23 14:08:53 79.1 (174.4)          Physical Exam  Constitutional:       General: She is not in acute distress. Appearance: Normal appearance. HENT:      Head: Normocephalic and atraumatic. Mouth/Throat:      Mouth: Mucous membranes are moist.      Pharynx: Oropharynx is clear. No oropharyngeal exudate or posterior oropharyngeal erythema.    Eyes:      Pupils: Pupils are equal, round, and reactive to light. Neck:      Vascular: No carotid bruit. Comments: B/L cervical LAD +  Cardiovascular:      Rate and Rhythm: Normal rate and regular rhythm. Pulses: Normal pulses. Heart sounds: Normal heart sounds. No murmur heard. No friction rub. No gallop. Pulmonary:      Effort: Pulmonary effort is normal. No respiratory distress. Breath sounds: No stridor. Rales present. No wheezing or rhonchi. Abdominal:      General: Abdomen is flat. Bowel sounds are normal.      Palpations: Abdomen is soft. Tenderness: There is no abdominal tenderness. Musculoskeletal:      Cervical back: Neck supple. Right lower leg: Edema present. Left lower leg: Edema present. Skin:     General: Skin is warm. Neurological:      Mental Status: She is alert and oriented to person, place, and time. Psychiatric:         Mood and Affect: Mood normal.         Behavior: Behavior normal.         Thought Content: Thought content normal.       LABORATORY DATA     Labs: I have personally reviewed pertinent reports. Results from last 7 days   Lab Units 12/04/23  0707 12/03/23  0958   WBC Thousand/uL 13.60* 13.83*   HEMOGLOBIN g/dL 14.4 13.1   HEMATOCRIT % 42.9 39.2   PLATELETS Thousands/uL 356 297   NEUTROS PCT % 51 76*   MONOS PCT % 7 5   EOS PCT % 3 1      Results from last 7 days   Lab Units 12/04/23  0707 12/03/23  0958   POTASSIUM mmol/L 3.3* 3.7   CHLORIDE mmol/L 99 106   CO2 mmol/L 31 27   BUN mg/dL 17 16   CREATININE mg/dL 0.66 0.89   CALCIUM mg/dL 9.4 9.1   ALK PHOS U/L  --  54   ALT U/L  --  30   AST U/L  --  16                            IMAGING & DIAGNOSTIC TESTING     Radiology Results: I have personally reviewed pertinent reports. XR chest 1 view portable    Result Date: 12/3/2023  Impression: Trace left pleural effusion with basilar atelectasis.  Diffuse bilateral perihilar hazy pulmonary infiltrates are seen suggesting multifocal infection or diffuse edema, recommend clinical correlation. Workstation performed: [id number]     Other Diagnostic Testing: I have personally reviewed pertinent reports. ACTIVE MEDICATIONS     Current Facility-Administered Medications   Medication Dose Route Frequency    acetaminophen (TYLENOL) tablet 650 mg  650 mg Oral Q6H PRN    albuterol (PROVENTIL HFA,VENTOLIN HFA) inhaler 2 puff  2 puff Inhalation Q6H PRN    atorvastatin (LIPITOR) tablet 40 mg  40 mg Oral Daily    enoxaparin (LOVENOX) subcutaneous injection 40 mg  40 mg Subcutaneous Daily    fluticasone-vilanterol 200-25 mcg/actuation 1 puff  1 puff Inhalation Daily    insulin lispro (HumaLOG) 100 units/mL subcutaneous injection 1-6 Units  1-6 Units Subcutaneous TID AC    ipratropium (ATROVENT) 0.02 % inhalation solution 0.5 mg  0.5 mg Nebulization TID    levalbuterol (XOPENEX) inhalation solution 1.25 mg  1.25 mg Nebulization TID    triamterene-hydrochlorothiazide (MAXZIDE-25) 37.5-25 mg per tablet 1 tablet  1 tablet Oral Daily       VTE Pharmacologic Prophylaxis: Enoxaparin (Lovenox)  VTE Mechanical Prophylaxis: sequential compression device    Portions of the record may have been created with voice recognition software. Occasional wrong word or "sound a like" substitutions may have occurred due to the inherent limitations of voice recognition software.   Read the chart carefully and recognize, using context, where substitutions have occurred.  ==  [address]

## 2023-12-04 NOTE — UTILIZATION REVIEW
NOTIFICATION OF INPATIENT ADMISSION   AUTHORIZATION REQUEST   SERVICING FACILITY:   [facility]  Address: [address] [place] [address], [address] [address]  Tax ID: [id number]  NPI: [id number] ATTENDING PROVIDER:  Attending Name and NPI#: Liv Hagan Md [[id number]]  Address: [address] [place] [address]  Phone: [phone]   ADMISSION INFORMATION:  [facility]patient [facility]   Place of Service Code: 21  Inpatient Admission Date/Time: 12/3/23 12:23 PM  Discharge Date/Time: No discharge date for patient encounter. Admitting Diagnosis Code/Description:  SOB (shortness of breath) [R06.02]  CHF exacerbation ([address]) [I50.9]  COPD with acute exacerbation ([address]) [J44.1]     UTILIZATION REVIEW CONTACT:  Facundo Burroughs Utilization   Network Utilization Review Department  Phone: [phone]  Fax: [phone]  Email: [email or web address]  Contact for approvals/pending authorizations, clinical reviews, and discharge. PHYSICIAN ADVISORY SERVICES:  Medical Necessity Denial & [facility] Review  Phone: [phone]  Fax: [phone]  Email: [email or web address]     DISCHARGE SUPPORT TEAM:  For Patients Discharge Needs & Updates  Phone: [phone] opt. 2 Fax: [phone]  Email: [email or web address]

## 2023-12-05 VITALS
RESPIRATION RATE: 18 BRPM | WEIGHT: 171.6 LBS | BODY MASS INDEX: 32.4 KG/M2 | TEMPERATURE: 99.1 F | SYSTOLIC BLOOD PRESSURE: 123 MMHG | HEIGHT: 61 IN | OXYGEN SATURATION: 93 % | HEART RATE: 104 BPM | DIASTOLIC BLOOD PRESSURE: 80 MMHG

## 2023-12-05 PROBLEM — R06.02 SHORTNESS OF BREATH: Status: RESOLVED | Noted: 2023-12-03 | Resolved: 2023-12-05

## 2023-12-05 LAB
A1AT SERPL-MCNC: 162 MG/DL (ref 101–187)
ANION GAP SERPL CALCULATED.3IONS-SCNC: 13 MMOL/L
BUN SERPL-MCNC: 20 MG/DL (ref 5–25)
CALCIUM SERPL-MCNC: 9.3 MG/DL (ref 8.4–10.2)
CHLORIDE SERPL-SCNC: 99 MMOL/L (ref 96–108)
CO2 SERPL-SCNC: 27 MMOL/L (ref 21–32)
CREAT SERPL-MCNC: 0.59 MG/DL (ref 0.6–1.3)
ERYTHROCYTE [DISTWIDTH] IN BLOOD BY AUTOMATED COUNT: 12.5 % (ref 11.6–15.1)
GFR SERPL CREATININE-BSD FRML MDRD: 97 ML/MIN/1.73SQ M
GLUCOSE SERPL-MCNC: 120 MG/DL (ref 65–140)
GLUCOSE SERPL-MCNC: 151 MG/DL (ref 65–140)
GLUCOSE SERPL-MCNC: 187 MG/DL (ref 65–140)
GLUCOSE SERPL-MCNC: 221 MG/DL (ref 65–140)
GLUCOSE SERPL-MCNC: 98 MG/DL (ref 65–140)
HCT VFR BLD AUTO: 43.3 % (ref 34.8–46.1)
HGB BLD-MCNC: 15.2 G/DL (ref 11.5–15.4)
MCH RBC QN AUTO: 31.5 PG (ref 26.8–34.3)
MCHC RBC AUTO-ENTMCNC: 35.1 G/DL (ref 31.4–37.4)
MCV RBC AUTO: 90 FL (ref 82–98)
PLATELET # BLD AUTO: 337 THOUSANDS/UL (ref 149–390)
PMV BLD AUTO: 10.1 FL (ref 8.9–12.7)
POTASSIUM SERPL-SCNC: 3.9 MMOL/L (ref 3.5–5.3)
RBC # BLD AUTO: 4.82 MILLION/UL (ref 3.81–5.12)
SODIUM SERPL-SCNC: 139 MMOL/L (ref 135–147)
WBC # BLD AUTO: 12.28 THOUSAND/UL (ref 4.31–10.16)

## 2023-12-05 PROCEDURE — 94760 N-INVAS EAR/PLS OXIMETRY 1: CPT

## 2023-12-05 PROCEDURE — 85027 COMPLETE CBC AUTOMATED: CPT | Performed by: STUDENT IN AN ORGANIZED HEALTH CARE EDUCATION/TRAINING PROGRAM

## 2023-12-05 PROCEDURE — 80048 BASIC METABOLIC PNL TOTAL CA: CPT | Performed by: STUDENT IN AN ORGANIZED HEALTH CARE EDUCATION/TRAINING PROGRAM

## 2023-12-05 PROCEDURE — 94664 DEMO&/EVAL PT USE INHALER: CPT

## 2023-12-05 PROCEDURE — 94640 AIRWAY INHALATION TREATMENT: CPT

## 2023-12-05 PROCEDURE — 99238 HOSP IP/OBS DSCHRG MGMT 30/<: CPT | Performed by: HOSPITALIST

## 2023-12-05 PROCEDURE — 82948 REAGENT STRIP/BLOOD GLUCOSE: CPT

## 2023-12-05 RX ORDER — INSULIN LISPRO 100 [IU]/ML
3 INJECTION, SOLUTION INTRAVENOUS; SUBCUTANEOUS
Status: DISCONTINUED | OUTPATIENT
Start: 2023-12-05 | End: 2023-12-05 | Stop reason: HOSPADM

## 2023-12-05 RX ORDER — GUAIFENESIN 100 MG/5ML
200 SOLUTION ORAL 3 TIMES DAILY PRN
Qty: 118 ML | Refills: 0 | Status: SHIPPED | OUTPATIENT
Start: 2023-12-05 | End: 2023-12-08

## 2023-12-05 RX ORDER — CEFDINIR 300 MG/1
300 CAPSULE ORAL EVERY 12 HOURS SCHEDULED
Status: DISCONTINUED | OUTPATIENT
Start: 2023-12-05 | End: 2023-12-05 | Stop reason: HOSPADM

## 2023-12-05 RX ORDER — CEFDINIR 300 MG/1
300 CAPSULE ORAL EVERY 12 HOURS SCHEDULED
Qty: 12 CAPSULE | Refills: 0 | Status: SHIPPED | OUTPATIENT
Start: 2023-12-05 | End: 2023-12-11

## 2023-12-05 RX ORDER — INSULIN GLARGINE 100 [IU]/ML
8 INJECTION, SOLUTION SUBCUTANEOUS
Status: DISCONTINUED | OUTPATIENT
Start: 2023-12-05 | End: 2023-12-05 | Stop reason: HOSPADM

## 2023-12-05 RX ORDER — INSULIN LISPRO 100 [IU]/ML
8 INJECTION, SOLUTION INTRAVENOUS; SUBCUTANEOUS ONCE
Status: COMPLETED | OUTPATIENT
Start: 2023-12-05 | End: 2023-12-05

## 2023-12-05 RX ADMIN — INSULIN LISPRO 3 UNITS: 100 INJECTION, SOLUTION INTRAVENOUS; SUBCUTANEOUS at 08:31

## 2023-12-05 RX ADMIN — IPRATROPIUM BROMIDE 0.5 MG: 0.5 SOLUTION RESPIRATORY (INHALATION) at 13:55

## 2023-12-05 RX ADMIN — INSULIN LISPRO 3 UNITS: 100 INJECTION, SOLUTION INTRAVENOUS; SUBCUTANEOUS at 12:20

## 2023-12-05 RX ADMIN — ENOXAPARIN SODIUM 40 MG: 40 INJECTION SUBCUTANEOUS at 08:33

## 2023-12-05 RX ADMIN — FLUTICASONE FUROATE AND VILANTEROL TRIFENATATE 1 PUFF: 200; 25 POWDER RESPIRATORY (INHALATION) at 08:31

## 2023-12-05 RX ADMIN — AZITHROMYCIN DIHYDRATE 250 MG: 250 TABLET, FILM COATED ORAL at 08:31

## 2023-12-05 RX ADMIN — IPRATROPIUM BROMIDE 0.5 MG: 0.5 SOLUTION RESPIRATORY (INHALATION) at 07:16

## 2023-12-05 RX ADMIN — ATORVASTATIN CALCIUM 40 MG: 40 TABLET, FILM COATED ORAL at 08:31

## 2023-12-05 RX ADMIN — LEVALBUTEROL HYDROCHLORIDE 1.25 MG: 1.25 SOLUTION RESPIRATORY (INHALATION) at 13:55

## 2023-12-05 RX ADMIN — INSULIN LISPRO 2 UNITS: 100 INJECTION, SOLUTION INTRAVENOUS; SUBCUTANEOUS at 08:33

## 2023-12-05 RX ADMIN — CEFDINIR 300 MG: 300 CAPSULE ORAL at 12:20

## 2023-12-05 RX ADMIN — LEVALBUTEROL HYDROCHLORIDE 1.25 MG: 1.25 SOLUTION RESPIRATORY (INHALATION) at 07:16

## 2023-12-05 RX ADMIN — INSULIN LISPRO 8 UNITS: 100 INJECTION, SOLUTION INTRAVENOUS; SUBCUTANEOUS at 01:14

## 2023-12-05 RX ADMIN — TRIAMTERENE AND HYDROCHLOROTHIAZIDE 1 TABLET: 37.5; 25 TABLET ORAL at 08:31

## 2023-12-05 RX ADMIN — INSULIN LISPRO 2 UNITS: 100 INJECTION, SOLUTION INTRAVENOUS; SUBCUTANEOUS at 12:21

## 2023-12-05 NOTE — PROGRESS NOTES
INTERNAL MEDICINE RESIDENCY PROGRESS NOTE     Name: Rachel Lopez   Age & Sex: 61 y.o. female   MRN: [id number]  Unit/Bed#: -01   Encounter: [id number]  Team: SOD Team A    PATIENT INFORMATION     Name: Rachel Lopez   Age & Sex: 61 y.o. female   MRN: [id number]  Hospital Stay Days: 2    ASSESSMENT/PLAN     Principal Problem:    Shortness of breath  Active Problems:    Diabetes mellitus ([address])    Mixed hyperlipidemia    Hypertension      Hypertension  Assessment & Plan  Continue home triamterene-hydrochlorothiazide  -Continue Lasix 20 mg IV    Mixed hyperlipidemia  Assessment & Plan  Continue Lipitor 40 mg daily    Diabetes mellitus ([address])  Assessment & Plan  Lab Results   Component Value Date    HGBA1C 6.8 (A) 07/27/2023       No results for input(s): "POCGLU" in the last 72 hours. Blood Sugar Average: Last 72 hrs:    Plan:  Monitor fasting BG, SSI will be started today; BG goal 140-180  Holding metformin while inpatient      * Shortness of breath  Assessment & Plan  New heart failure versus bronchitis versus COPD exacerbation versus pneumonia. Patient with sick symptoms started 11/28, worsening shortness of breath minimally responsive to albuterol, orthopnea, worsening dyspnea on exertion, increased sputum production. Patient states she is able to walk about 1 block and is limited by shortness of breath. She has been using her albuterol 6 times a day and continues to use Advair without relief. Feels some palpitations after using albuterol at night. Weight gain of 16 pounds since July despite similar diet and exercise. Last echo 7/2023 with normal heart function EF 60%. BNP elevated at 634 on admission.   Procalcitonin <0.05. she has been taking Z-Tarun and prednisone for possible pneumonia versus bronchitis prescribed 11/30 in clinic, this combination usually improves her bronchitis and COPD exacerbations within 2 days which has not happened this time -states it feels like prior episodes of pneumonia, plus new weight gain and diffuse edema. Plan:  -Daily weights, strict I's and O's  -She was not given prednisone yesterday, so prednisone was dc'd.  -Day 6 of Azithromycin.  -Day 2 of IV Ceftriaxone  -B/L edema + ; Rales + in B/L lower lung fields; continue Lasix 20 mg IV  - Reviewed prior chest CT and CXR, 11 pack yr smoking history, will check for Alpha 1 antitrypsin. - Eosinophilia in the past,  will check IgE, Aspergillus Ab and Aspergillus IgE  - Continue Xeponex/Atrovent nebs  -Continue Fluticasone-Vilanterol inhaler        Disposition: Medically active    SUBJECTIVE     Patient seen and examined. She still c/o chest congestion, cough a/w yellowish colored sputum. She was unable to sleep (attributes it to prednisone that was given). Tolerating PO. Last BM yesterday. Overnight, POC glucose was 331, given Regular insulin 10 U, Lispro 8 U; POC glucose 221; -> given Lispro 8 U    OBJECTIVE     Vitals:    23 1529 23 2303   BP: 125/84   112/67   BP Location: Right arm   Right arm   Pulse: 97   101   Resp:    16   Temp: 98.2 °F (36.8 °C)   98.8 °F (37.1 °C)   TempSrc: Oral   Oral   SpO2: 92% 92% 95% 95%   Weight:       Height:          Temperature:   Temp (24hrs), Av.3 °F (36.8 °C), Min:97.8 °F (36.6 °C), Max:98.8 °F (37.1 °C)    Temperature: 98.8 °F (37.1 °C)  Intake & Output:  I/O          07 07    P. O.  180    Total Intake(mL/kg)  180 (2.3)    Urine (mL/kg/hr)  750    Total Output  750    Net  -570                Weights:   IBW (Ideal Body Weight): 47.8 kg    Body mass index is 32.42 kg/m². Weight (last 2 days)       Date/Time Weight    23 0600 77.8 (171.6)    23 14:08:53 79.1 (174.4)          Physical Exam  Constitutional:       General: She is not in acute distress. Appearance: Normal appearance. HENT:      Head: Normocephalic and atraumatic.       Mouth/Throat:      Mouth: Mucous membranes are moist.      Pharynx: Oropharynx is clear. No oropharyngeal exudate or posterior oropharyngeal erythema. Eyes:      Pupils: Pupils are equal, round, and reactive to light. Neck:      Vascular: No carotid bruit. Comments: B/L cervical LAD +  Cardiovascular:      Rate and Rhythm: Normal rate and regular rhythm. Pulses: Normal pulses. Heart sounds: Normal heart sounds. No murmur heard. No friction rub. No gallop. Pulmonary:      Effort: Pulmonary effort is normal. No respiratory distress. Breath sounds: No stridor. Rales present. No wheezing or rhonchi. Abdominal:      General: Abdomen is flat. Bowel sounds are normal.      Palpations: Abdomen is soft. Tenderness: There is no abdominal tenderness. Musculoskeletal:      Cervical back: Neck supple. Right lower leg: Edema present. Left lower leg: Edema present. Skin:     General: Skin is warm. Neurological:      Mental Status: She is alert and oriented to person, place, and time. Psychiatric:         Mood and Affect: Mood normal.         Behavior: Behavior normal.         Thought Content: Thought content normal.       LABORATORY DATA     Labs: I have personally reviewed pertinent reports.   Results from last 7 days   Lab Units 12/05/23  0506 12/04/23  0707 12/03/23  0958   WBC Thousand/uL 12.28* 13.60* 13.83*   HEMOGLOBIN g/dL 15.2 14.4 13.1   HEMATOCRIT % 43.3 42.9 39.2   PLATELETS Thousands/uL 337 356 297   NEUTROS PCT %  --  51 76*   MONOS PCT %  --  7 5   EOS PCT %  --  3 1      Results from last 7 days   Lab Units 12/05/23  0506 12/04/23  0707 12/03/23  0958   POTASSIUM mmol/L 3.9 3.3* 3.7   CHLORIDE mmol/L 99 99 106   CO2 mmol/L 27 31 27   BUN mg/dL 20 17 16   CREATININE mg/dL 0.59* 0.66 0.89   CALCIUM mg/dL 9.3 9.4 9.1   ALK PHOS U/L  --   --  54   ALT U/L  --   --  30   AST U/L  --   --  16                            IMAGING & DIAGNOSTIC TESTING     Radiology Results: I have personally reviewed pertinent reports. XR chest 1 view portable    Result Date: 12/3/2023  Impression: Trace left pleural effusion with basilar atelectasis. Diffuse bilateral perihilar hazy pulmonary infiltrates are seen suggesting multifocal infection or diffuse edema, recommend clinical correlation. Workstation performed: [id number]     Other Diagnostic Testing: I have personally reviewed pertinent reports. ACTIVE MEDICATIONS     Current Facility-Administered Medications   Medication Dose Route Frequency    acetaminophen (TYLENOL) tablet 650 mg  650 mg Oral Q6H PRN    albuterol (PROVENTIL HFA,VENTOLIN HFA) inhaler 2 puff  2 puff Inhalation Q6H PRN    atorvastatin (LIPITOR) tablet 40 mg  40 mg Oral Daily    azithromycin (ZITHROMAX) tablet 250 mg  250 mg Oral Q24H    cefTRIAXone (ROCEPHIN) 1,000 mg in dextrose 5 % 50 mL IVPB  1,000 mg Intravenous Q24H    enoxaparin (LOVENOX) subcutaneous injection 40 mg  40 mg Subcutaneous Daily    fluticasone-vilanterol 200-25 mcg/actuation 1 puff  1 puff Inhalation Daily    guaiFENesin (ROBITUSSIN) oral liquid 200 mg  200 mg Oral Q4H PRN    insulin lispro (HumaLOG) 100 units/mL subcutaneous injection 2-12 Units  2-12 Units Subcutaneous TID AC    insulin lispro (HumaLOG) 100 units/mL subcutaneous injection 2-12 Units  2-12 Units Subcutaneous HS    ipratropium (ATROVENT) 0.02 % inhalation solution 0.5 mg  0.5 mg Nebulization TID    levalbuterol (XOPENEX) inhalation solution 1.25 mg  1.25 mg Nebulization TID    melatonin tablet 3 mg  3 mg Oral HS    triamterene-hydrochlorothiazide (MAXZIDE-25) 37.5-25 mg per tablet 1 tablet  1 tablet Oral Daily       VTE Pharmacologic Prophylaxis: Enoxaparin (Lovenox)  VTE Mechanical Prophylaxis: sequential compression device    Portions of the record may have been created with voice recognition software. Occasional wrong word or "sound a like" substitutions may have occurred due to the inherent limitations of voice recognition software.   Read the chart carefully and recognize, using context, where substitutions have occurred.  ==  [address]

## 2023-12-05 NOTE — PLAN OF CARE
Problem: PAIN - ADULT  Goal: Verbalizes/displays adequate comfort level or baseline comfort level  Description: Interventions:  - Encourage patient to monitor pain and request assistance  - Assess pain using appropriate pain scale  - Administer analgesics based on type and severity of pain and evaluate response  - Implement non-pharmacological measures as appropriate and evaluate response  - Consider cultural and social influences on pain and pain management  - Notify physician/advanced practitioner if interventions unsuccessful or patient reports new pain  Outcome: Progressing     Problem: INFECTION - ADULT  Goal: Absence or prevention of progression during hospitalization  Description: INTERVENTIONS:  - Assess and monitor for signs and symptoms of infection  - Monitor lab/diagnostic results  - Monitor all insertion sites, i.e. indwelling lines, tubes, and drains  - Monitor endotracheal if appropriate and nasal secretions for changes in amount and color  - [place] appropriate cooling/warming therapies per order  - Administer medications as ordered  - Instruct and encourage patient and family to use good hand hygiene technique  - Identify and instruct in appropriate isolation precautions for identified infection/condition  Outcome: Progressing  Goal: Absence of fever/infection during neutropenic period  Description: INTERVENTIONS:  - Monitor WBC    Outcome: Progressing     Problem: SAFETY ADULT  Goal: Patient will remain free of falls  Description: INTERVENTIONS:  - Educate patient/family on patient safety including physical limitations  - Instruct patient to call for assistance with activity   - Consult OT/PT to assist with strengthening/mobility   - Keep Call bell within reach  - Keep bed low and locked with side rails adjusted as appropriate  - Keep care items and personal belongings within reach  - Initiate and maintain comfort rounds  - Make Fall Risk Sign visible to staff  - Offer Toileting every  Hours, in advance of need  - Initiate/Maintain alarm  - Obtain necessary fall risk management equipment:   - Apply yellow socks and bracelet for high fall risk patients  - Consider moving patient to room near nurses station  Outcome: Progressing  Goal: Maintain or return to baseline ADL function  Description: INTERVENTIONS:  -  Assess patient's ability to carry out ADLs; assess patient's baseline for ADL function and identify physical deficits which impact ability to perform ADLs (bathing, care of mouth/teeth, toileting, grooming, dressing, etc.)  - Assess/evaluate cause of self-care deficits   - Assess range of motion  - Assess patient's mobility; develop plan if impaired  - Assess patient's need for assistive devices and provide as appropriate  - Encourage maximum independence but intervene and supervise when necessary  - Involve family in performance of ADLs  - Assess for home care needs following discharge   - Consider OT consult to assist with ADL evaluation and planning for discharge  - Provide patient education as appropriate  Outcome: Progressing  Goal: Maintains/Returns to pre admission functional level  Description: INTERVENTIONS:  - Perform AM-PAC 6 Click Basic Mobility/ Daily Activity assessment daily.  - Set and communicate daily mobility goal to care team and patient/family/caregiver. - Collaborate with rehabilitation services on mobility goals if consulted  - Perform Range of Motion  times a day. - Reposition patient every  hours.   - Dangle patient  times a day  - Stand patient  times a day  - Ambulate patient  times a day  - Out of bed to chair  times a day   - Out of bed for meals times a day  - Out of bed for toileting  - Record patient progress and toleration of activity level   Outcome: Progressing     Problem: DISCHARGE PLANNING  Goal: Discharge to home or other facility with appropriate resources  Description: INTERVENTIONS:  - Identify barriers to discharge w/patient and caregiver  - Arrange for needed discharge resources and transportation as appropriate  - Identify discharge learning needs (meds, wound care, etc.)  - Arrange for interpretive services to assist at discharge as needed  - Refer to Case Management Department for coordinating discharge planning if the patient needs post-hospital services based on physician/advanced practitioner order or complex needs related to functional status, cognitive ability, or social support system  Outcome: Progressing     Problem: Knowledge Deficit  Goal: Patient/family/caregiver demonstrates understanding of disease process, treatment plan, medications, and discharge instructions  Description: Complete learning assessment and assess knowledge base.   Interventions:  - Provide teaching at level of understanding  - Provide teaching via preferred learning methods  Outcome: Progressing     Problem: CARDIOVASCULAR - ADULT  Goal: Maintains optimal cardiac output and hemodynamic stability  Description: INTERVENTIONS:  - Monitor I/O, vital signs and rhythm  - Monitor for S/S and trends of decreased cardiac output  - Administer and titrate ordered vasoactive medications to optimize hemodynamic stability  - Assess quality of pulses, skin color and temperature  - Assess for signs of decreased coronary artery perfusion  - Instruct patient to report change in severity of symptoms  Outcome: Progressing  Goal: Absence of cardiac dysrhythmias or at baseline rhythm  Description: INTERVENTIONS:  - Continuous cardiac monitoring, vital signs, obtain 12 lead EKG if ordered  - Administer antiarrhythmic and heart rate control medications as ordered  - Monitor electrolytes and administer replacement therapy as ordered  Outcome: Progressing     Problem: RESPIRATORY - ADULT  Goal: Achieves optimal ventilation and oxygenation  Description: INTERVENTIONS:  - Assess for changes in respiratory status  - Assess for changes in mentation and behavior  - Position to facilitate oxygenation and minimize respiratory effort  - Oxygen administered by appropriate delivery if ordered  - Initiate smoking cessation education as indicated  - Encourage broncho-pulmonary hygiene including cough, deep breathe, Incentive Spirometry  - Assess the need for suctioning and aspirate as needed  - Assess and instruct to report SOB or any respiratory difficulty  - Respiratory Therapy support as indicated  Outcome: Progressing

## 2023-12-05 NOTE — UTILIZATION REVIEW
Continued Stay Review    Date: 12/5                          Current Patient Class: Inpatient  Current Level of Care: Med Surg    HPI:63 y.o. female initially admitted on 12/3     Assessment/Plan:   New heart failure versus bronchitis versus COPD exacerbation versus pneumonia. Patient with sick symptoms started 11/28, worsening shortness of breath minimally responsive to albuterol, orthopnea, worsening dyspnea on exertion, increased sputum production. Pt  states she is able to walk about 1 block and is limited by shortness of breath. She has been using her albuterol 6 times a day and continues to use Advair without relief. Feels some palpitations after using albuterol at night. Weight gain of 16 pounds since July despite similar diet and exercise. Last echo 7/2023 with normal heart function EF 60%. BNP elevated at 634 on admission. She has been taking Z-Tarun and prednisone for possible pneumonia versus bronchitis prescribed 11/30 in clinic, this combination usually improves her bronchitis and COPD exacerbations within 2 days which has not happened this time -states it feels like prior episodes of pneumonia, plus new weight gain and diffuse edema. Responded well on Iv antibiotics. POC glucose was found to be 331, given regular insulin 10U, Lispro 8U, glucose level decreased to 221, she was given additional 8U of Lispro, glucose level decreased to 120. On exam; Erythema +; exudates noted on B/L tonsils. + Rales. Non-pitting to BLE.      Vital Signs:   12/05/23 0833 -- 96 -- 116/76 89 91 % -- -- -- --   12/05/23 0717 -- 99 18 -- -- 94 % -- -- -- --   12/05/23 07:15:38 97.8 °F (36.6 °C) 88 18 98/65 76 95 % -- -- None (Room air) Lying     Pertinent Labs/Diagnostic Results:   Results from last 7 days   Lab Units 12/03/23  1011 11/30/23  1520   SARS-COV-2  Negative Negative     Results from last 7 days   Lab Units 12/05/23  0506 12/04/23  0707 12/03/23  0958   WBC Thousand/uL 12.28* 13.60* 13.83*   HEMOGLOBIN g/dL 15.2 14.4 13.1   HEMATOCRIT % 43.3 42.9 39.2   PLATELETS Thousands/uL 337 356 297   NEUTROS ABS Thousands/µL  --  6.99 10.72*         Results from last 7 days   Lab Units 12/05/23  0506 12/04/23  0707 12/03/23  0958   SODIUM mmol/L 139 140 141   POTASSIUM mmol/L 3.9 3.3* 3.7   CHLORIDE mmol/L 99 99 106   CO2 mmol/L 27 31 27   ANION GAP mmol/L 13 10 8   BUN mg/dL 20 17 16   CREATININE mg/dL 0.59* 0.66 0.89   EGFR ml/min/1.73sq m 97 94 69   CALCIUM mg/dL 9.3 9.4 9.1     Results from last 7 days   Lab Units 12/03/23  0958   AST U/L 16   ALT U/L 30   ALK PHOS U/L 54   TOTAL PROTEIN g/dL 7.2   ALBUMIN g/dL 4.2   TOTAL BILIRUBIN mg/dL 0.33     Results from last 7 days   Lab Units 12/05/23  0757 12/05/23  0322 12/05/23  0036 12/04/23  2057 12/04/23  1704 12/04/23  1212   POC GLUCOSE mg/dl 187* 120 221* 331* 217* 209*     Results from last 7 days   Lab Units 12/05/23  0506 12/04/23  0707 12/03/23  0958   GLUCOSE RANDOM mg/dL 98 171* 209*       Results from last 7 days   Lab Units 12/03/23  0958   HS TNI 0HR ng/L 5       Results from last 7 days   Lab Units 12/03/23  0958   PROCALCITONIN ng/ml <0.05       Results from last 7 days   Lab Units 12/03/23  0958   BNP pg/mL 634*         Results from last 7 days   Lab Units 12/03/23  1011 11/30/23  1520   INFLUENZA A PCR  Negative Negative   INFLUENZA B PCR  Negative Negative   RSV PCR  Negative  --        Medications:   Scheduled Medications:  atorvastatin, 40 mg, Oral, Daily  cefdinir, 300 mg, Oral, Q12H BRIAN  enoxaparin, 40 mg, Subcutaneous, Daily  fluticasone-vilanterol, 1 puff, Inhalation, Daily  insulin glargine, 8 Units, Subcutaneous, HS  insulin lispro, 2-12 Units, Subcutaneous, TID AC  insulin lispro, 2-12 Units, Subcutaneous, HS  insulin lispro, 3 Units, Subcutaneous, TID With Meals  ipratropium, 0.5 mg, Nebulization, TID  levalbuterol, 1.25 mg, Nebulization, TID  melatonin, 3 mg, Oral, HS  triamterene-hydrochlorothiazide, 1 tablet, Oral, Daily    cefTRIAXone (ROCEPHIN) 1,000 mg in dextrose 5 % 50 mL IVPB  Dose: 1,000 mg  Freq: Every 24 hours Route: IV  Last Dose: 1,000 mg (12/04/23 1620)  Start: 12/04/23 1515 End: 12/05/23 1029     Continuous IV Infusions: None     PRN Meds:  acetaminophen, 650 mg, Oral, Q6H PRN  albuterol, 2 puff, Inhalation, Q6H PRN  guaiFENesin, 200 mg, Oral, Q4H PRN        Discharge Plan: D    Network Utilization Review Department  ATTENTION: Please call with any questions or concerns to [phone] and carefully listen to the prompts so that you are directed to the right person. All voicemails are confidential.   For Discharge needs, contact Care Management DC Support Team at [phone] opt. 2  Send all requests for admission clinical reviews, approved or denied determinations and any other requests to dedicated fax number below belonging to the campus where the patient is receiving treatment.  List of dedicated fax numbers for the Facilities:  [place] DENIALS (Administrative/Medical Necessity) [phone]   DISCHARGE SUPPORT TEAM (NETWORK) [id number] [facility] (Maternity/NICU/Pediatrics) [phone]   [address] [facility] [phone]   [address] [facility] [address] [address] [facility] [address] [facility]  [facility] [phone]

## 2023-12-05 NOTE — DISCHARGE INSTR - AVS FIRST PAGE
You have been diagnosed with - or have a history of - diabetes during your hospitalization. Review the following to help you manage your diabetes. Discharge Medications:  Taking your medications as prescribed is one of the most important aspects of maintaining your blood sugar in the target range established by your physician. It is important to know the names of your medication, how they work, how much to take, and when to take them. Do not stop your prescribed medications or begin taking over-the counter or herbal medications without first speaking with your physician. Hypoglycemia (Low Blood Sugar)  Too little glucose (sugar) in your blood is called hypoglycemia or low blood sugar. Diabetes itself does not cause low blood sugar. But some of the treatments for diabetes, such as pills or insulin, may increase your risk for it. In severe cases, low blood sugar may cause you to lose consciousness or have a seizure. Low blood sugar is typically defined as a level below 70 mg/dL, (below 60 mg/dL if pregnant). Signs of low blood sugar (you may have one or more of these symptoms): shakiness or dizziness; cold/clammy skin or sweating; hunger;  headache; nervousness; hard/heavy heartbeat; weakness; confusion/irritability; blurred vision. Always treat low blood sugar right away, but do not overeat. What you should do if blood sugar too low:   First, check your blood sugar. If it is not possible to check your blood sugar, treat for low blood sugar anyway. If blood sugar is below 70 mg/dL (60 mg/dL if pregnant) OR  If unable to check blood sugar level and you have signs of low blood sugar,  eat or drink 15 to 20 grams of fast-acting sugar. This may be 3-4 glucose tablets or 4 oz. (half a cup) fruit juice or 4 oz. (half a cup) regular (non-diet) soda or 8 oz. (one cup) fat free milk, or 1 tablespoon of honey. Do not take more than this, or your blood sugar may go too high. Wait 15 minutes.   Then recheck your blood sugar if you can. If your blood sugar is still too low, repeat the steps above and check your blood sugar again. If your blood sugar still has not returned to your target range, contact your health care provider or seek emergency care. Once your blood sugar returns to target range, eat a snack or meal.  Preventing low blood sugar  Follow diabetic diet. Do not skip meals or snacks. Take your medications at the prescribed times. Always carry a source of fast-acting sugar and a snack when you are away from home. Seek further medical help if you have repeated  low or high blood sugars    Keep/call your physician for follow-up appointments for the diabetes. Diabetes Education Classes: You are encouraged to learn how to best manage your diabetes by attending diabetes education classes. Please call Kenneth for more information: [phone].
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
## 2023-12-05 NOTE — PLAN OF CARE
Problem: PAIN - ADULT  Goal: Verbalizes/displays adequate comfort level or baseline comfort level  Description: Interventions:  - Encourage patient to monitor pain and request assistance  - Assess pain using appropriate pain scale  - Administer analgesics based on type and severity of pain and evaluate response  - Implement non-pharmacological measures as appropriate and evaluate response  - Consider cultural and social influences on pain and pain management  - Notify physician/advanced practitioner if interventions unsuccessful or patient reports new pain  Outcome: Progressing     Problem: INFECTION - ADULT  Goal: Absence or prevention of progression during hospitalization  Description: INTERVENTIONS:  - Assess and monitor for signs and symptoms of infection  - Monitor lab/diagnostic results  - Monitor all insertion sites, i.e. indwelling lines, tubes, and drains  - Monitor endotracheal if appropriate and nasal secretions for changes in amount and color  - Magness appropriate cooling/warming therapies per order  - Administer medications as ordered  - Instruct and encourage patient and family to use good hand hygiene technique  - Identify and instruct in appropriate isolation precautions for identified infection/condition  Outcome: Progressing  Goal: Absence of fever/infection during neutropenic period  Description: INTERVENTIONS:  - Monitor WBC    Outcome: Progressing

## 2023-12-06 DIAGNOSIS — Z71.89 COMPLEX CARE COORDINATION: Primary | ICD-10-CM

## 2023-12-06 LAB
A FUMIGATUS IGE QN: <0.1 KUA/I
TOTAL IGE SMQN RAST: 174 KU/L (ref 0–113)

## 2023-12-06 NOTE — UTILIZATION REVIEW
NOTIFICATION OF ADMISSION DISCHARGE   This is a Notification of Discharge from [address]. Please be advised that this patient has been discharge from our facility. Below you will find the admission and discharge date and time including the patient’s disposition. UTILIZATION REVIEW CONTACT:  Annemarie Shepherd  Utilization   Network Utilization Review Department  Phone: [phone] x carefully listen to the prompts. All voicemails are confidential.  Email: [email or web address]     ADMISSION INFORMATION  PRESENTATION DATE: 12/3/2023  9:42 AM  OBERVATION ADMISSION DATE:   INPATIENT ADMISSION DATE: 12/3/23 12:23 PM   DISCHARGE DATE: 12/5/2023  5:33 PM   DISPOSITION:Home/Self Care    Network Utilization Review Department  ATTENTION: Please call with any questions or concerns to [phone] and carefully listen to the prompts so that you are directed to the right person. All voicemails are confidential.   For Discharge needs, contact Care Management DC Support Team at [phone] opt. 2  Send all requests for admission clinical reviews, approved or denied determinations and any other requests to dedicated fax number below belonging to the campus where the patient is receiving treatment.  List of dedicated fax numbers for the Facilities:  [place] DENIALS (Administrative/Medical Necessity) [phone]   DISCHARGE SUPPORT TEAM (Network) [phone] [address] [facility] (Maternity/NICU/Pediatrics) [phone]   [address] [facility] [address] [place] [address] [id number] [place] [address] [phone]   [facility] [address] [facility] [phone]   [address] [place] [address]  [address]  [phone]

## 2023-12-07 ENCOUNTER — PATIENT OUTREACH (OUTPATIENT)
Dept: INTERNAL MEDICINE CLINIC | Facility: CLINIC | Age: 63
End: 2023-12-07

## 2023-12-07 LAB
A FLAVUS AB SER QL ID: NEGATIVE
A FUMIGATUS AB SER QL ID: NEGATIVE
A NIGER AB SER QL ID: NEGATIVE
A NIGER IGE QN: <0.1 KU/L

## 2023-12-07 NOTE — PROGRESS NOTES
Outpatient Care Management Note:    Re:  HRR referral/hospital follow up call    Patient referred to outpatient nurse care management as she was identified as a high risk for readmission. Patient was at [address] from 12/3-12/5/23 for shortness of breath. Patient had started with sick symptoms on 11/28 and followed up with PCP on 11/30 and prescribed prednisone and Z-pack which normally helps her. COVID, flu, RSV negative. CXR showed pulmonary congestion and patient did receive Lasix. Patient given Cefdinir and continue Advair and was discharged home and to follow up with PCP and Pulmonary. I called and spoke with patient and explained my role and reason for outreach. Patient reports she is "recovering" and feels less congestion and feels her glands in her throat have gone down. She reports she was diagnosed with "strep throat." Patient confirms she has all of her medication at this time and taking as prescribed. She has routine follow up appointment with PCP on 12/14 @ 3 pm (40 minute appt) no sooner appointments with PCP at this time other than 20 minute appointments and patient declining to be seen sooner. Patient complaining of weight gain. Patient reports she was told she would be discharged on a diuretic and was not. She reports she plans to go to the pharmacy tomorrow and get an OTC diuretic. I encouraged her to be further evaluated by physician first and see what they further recommend. Declining to see another provider at this time. Patient reports weight in the hospital ws 171 lbs but did not weigh her the last day. She reports weight yesterday was 172.9 lbs and today was 173.9 lbs. Patient notes a bit more swelling in her lower legs then from when she left the hospital. She denies any increased shortness of breath at this time. Will have PCP review and further advise. Patient aware physician/clinical team will follow up with her tomorrow morning.      I did encourage she schedule follow up with pulmonary office. Patient does not have any further questions, concerns, or other needs at this time. PCP office number [phone] if needed. Declining need for further outpatient nurse care management outreach at this time. Please re-consult as needed.

## 2023-12-08 NOTE — PROGRESS NOTES
I reviewed her discharge summary, I do not see mention of patient being discharged with a diuretic. However I will prescribe Lasix 20 mg once daily to take until her appointment with me December 14 to help control her edema. She is already on a mild diuretic for her Ménière's disease: Triamterene-hydrochlorothiazide. We will continue to monitor her renal function and electrolytes closely. I will also place an order for labs to complete the day prior to her follow-up on December 14. She should go to the ER in the meantime if her symptoms worsen. I would also encourage her to schedule a follow-up with pulmonology as soon as possible.   Thank you

## 2023-12-13 NOTE — PROGRESS NOTES
Late entry from 12/8/23    I spoke to patient and made her aware of message.  She will  the Lasix and come to her 12/14/23 appt

## 2023-12-14 ENCOUNTER — OFFICE VISIT (OUTPATIENT)
Dept: INTERNAL MEDICINE CLINIC | Facility: CLINIC | Age: 63
End: 2023-12-14

## 2023-12-14 VITALS
DIASTOLIC BLOOD PRESSURE: 72 MMHG | HEIGHT: 61 IN | SYSTOLIC BLOOD PRESSURE: 134 MMHG | TEMPERATURE: 97.6 F | HEART RATE: 80 BPM | WEIGHT: 177 LBS | BODY MASS INDEX: 33.42 KG/M2

## 2023-12-14 DIAGNOSIS — J43.2 CENTRILOBULAR EMPHYSEMA (HCC): ICD-10-CM

## 2023-12-14 DIAGNOSIS — I10 PRIMARY HYPERTENSION: ICD-10-CM

## 2023-12-14 DIAGNOSIS — Z12.11 ENCOUNTER FOR COLORECTAL CANCER SCREENING: ICD-10-CM

## 2023-12-14 DIAGNOSIS — E87.79 OTHER HYPERVOLEMIA: ICD-10-CM

## 2023-12-14 DIAGNOSIS — Z12.12 ENCOUNTER FOR COLORECTAL CANCER SCREENING: ICD-10-CM

## 2023-12-14 DIAGNOSIS — Z12.31 BREAST CANCER SCREENING BY MAMMOGRAM: ICD-10-CM

## 2023-12-14 DIAGNOSIS — H81.02 MENIERE'S DISEASE OF LEFT EAR: ICD-10-CM

## 2023-12-14 DIAGNOSIS — J45.41 MODERATE PERSISTENT ASTHMATIC BRONCHITIS WITH ACUTE EXACERBATION: ICD-10-CM

## 2023-12-14 DIAGNOSIS — E78.2 MIXED HYPERLIPIDEMIA: ICD-10-CM

## 2023-12-14 DIAGNOSIS — E11.69 TYPE 2 DIABETES MELLITUS WITH OTHER SPECIFIED COMPLICATION, WITHOUT LONG-TERM CURRENT USE OF INSULIN (HCC): ICD-10-CM

## 2023-12-14 DIAGNOSIS — Z00.00 ANNUAL PHYSICAL EXAM: Primary | ICD-10-CM

## 2023-12-14 DIAGNOSIS — F41.9 ANXIETY: ICD-10-CM

## 2023-12-14 PROCEDURE — 99396 PREV VISIT EST AGE 40-64: CPT | Performed by: PHYSICIAN ASSISTANT

## 2023-12-14 PROCEDURE — 83036 HEMOGLOBIN GLYCOSYLATED A1C: CPT | Performed by: PHYSICIAN ASSISTANT

## 2023-12-14 PROCEDURE — 99214 OFFICE O/P EST MOD 30 MIN: CPT | Performed by: PHYSICIAN ASSISTANT

## 2023-12-14 RX ORDER — FUROSEMIDE 20 MG/1
20 TABLET ORAL DAILY
Qty: 30 TABLET | Refills: 0 | Status: SHIPPED | OUTPATIENT
Start: 2023-12-14

## 2023-12-14 NOTE — PATIENT INSTRUCTIONS
Please call to schedule pap smear - [id number] [address] [phone]  Wellness Visit for Adults   AMBULATORY CARE:   A wellness visit  is when you see your healthcare provider to get screened for health problems. Your healthcare provider will also give you advice on how to stay healthy. Write down your questions so you remember to ask them. Ask your healthcare provider how often you should have a wellness visit. What happens at a wellness visit:  Your healthcare provider will ask about your health, and your family history of health problems. This includes high blood pressure, heart disease, and cancer. He or she will ask if you have symptoms that concern you, if you smoke, and about your mood. You may also be asked about your intake of medicines, supplements, food, and alcohol. Any of the following may be done: Your weight  will be checked. Your height may also be checked so your body mass index (BMI) can be calculated. Your BMI shows if you are at a healthy weight. Your blood pressure  and heart rate will be checked. Your temperature may also be checked. Blood and urine tests  may be done. Blood tests may be done to check your cholesterol levels. Abnormal cholesterol levels increase your risk for heart disease and stroke. You may also need a blood or urine test to check for diabetes if you are at increased risk. Urine tests may be done to look for signs of an infection or kidney disease. A physical exam  includes checking your heartbeat and lungs with a stethoscope. Your healthcare provider may also check your skin to look for sun damage. Screening tests  may be recommended. A screening test is done to check for diseases that may not cause symptoms. The screening tests you may need depend on your age, gender, family history, and lifestyle habits. For example, colorectal screening may be recommended if you are 48years old or older.     Screening tests you need if you are a woman:   A Pap smear  is used to screen for cervical cancer. Pap smears are usually done every 3 to 5 years depending on your age. You may need them more often if you have had abnormal Pap smear test results in the past. Ask your healthcare provider how often you should have a Pap smear. A mammogram  is an x-ray of your breasts to screen for breast cancer. Experts recommend mammograms every 2 years starting at age 48 years. You may need a mammogram at age 52 years or younger if you have an increased risk for breast cancer. Talk to your healthcare provider about when you should start having mammograms and how often you need them. Vaccines you may need:   Get an influenza vaccine  every year. The influenza vaccine protects you from the flu. Several types of viruses cause the flu. The viruses change over time, so new vaccines are made each year. Get a tetanus-diphtheria (Td) booster vaccine  every 10 years. This vaccine protects you against tetanus and diphtheria. Tetanus is a severe infection that may cause painful muscle spasms and lockjaw. Diphtheria is a severe bacterial infection that causes a thick covering in the back of your mouth and throat. Get a human papillomavirus (HPV) vaccine  if you are female and aged 23 to 32 or male 23 to 24 and never received it. This vaccine protects you from HPV infection. HPV is the most common infection spread by sexual contact. HPV may also cause vaginal, penile, and anal cancers. Get a pneumococcal vaccine  if you are aged 72 years or older. The pneumococcal vaccine is an injection given to protect you from pneumococcal disease. Pneumococcal disease is an infection caused by pneumococcal bacteria. The infection may cause pneumonia, meningitis, or an ear infection. Get a shingles vaccine  if you are 60 or older, even if you have had shingles before. The shingles vaccine is an injection to protect you from the varicella-zoster virus.  This is the same virus that causes chickenpox. Shingles is a painful rash that develops in people who had chickenpox or have been exposed to the virus. How to eat healthy:  My Plate is a model for planning healthy meals. It shows the types and amounts of foods that should go on your plate. Fruits and vegetables make up about half of your plate, and grains and protein make up the other half. A serving of dairy is included on the side of your plate. The amount of calories and serving sizes you need depends on your age, gender, weight, and height. Examples of healthy foods are listed below:  Eat a variety of vegetables  such as dark green, red, and orange vegetables. You can also include canned vegetables low in sodium (salt) and frozen vegetables without added butter or sauces. Eat a variety of fresh fruits , canned fruit in 100% juice, frozen fruit, and dried fruit. Include whole grains. At least half of the grains you eat should be whole grains. Examples include whole-wheat bread, wheat pasta, brown rice, and whole-grain cereals such as oatmeal.    Eat a variety of protein foods such as seafood (fish and shellfish), lean meat, and poultry without skin (turkey and chicken). Examples of lean meats include pork leg, shoulder, or tenderloin, and beef round, sirloin, tenderloin, and extra lean ground beef. Other protein foods include eggs and egg substitutes, beans, peas, soy products, nuts, and seeds. Choose low-fat dairy products such as skim or 1% milk or low-fat yogurt, cheese, and cottage cheese. Limit unhealthy fats  such as butter, hard margarine, and shortening. Exercise:  Exercise at least 30 minutes per day on most days of the week. Some examples of exercise include walking, biking, dancing, and swimming. You can also fit in more physical activity by taking the stairs instead of the elevator or parking farther away from stores. Include muscle strengthening activities 2 days each week.  Regular exercise provides many health benefits. It helps you manage your weight, and decreases your risk for type 2 diabetes, heart disease, stroke, and high blood pressure. Exercise can also help improve your mood. Ask your healthcare provider about the best exercise plan for you. General health and safety guidelines:   Do not smoke. Nicotine and other chemicals in cigarettes and cigars can cause lung damage. Ask your healthcare provider for information if you currently smoke and need help to quit. E-cigarettes or smokeless tobacco still contain nicotine. Talk to your healthcare provider before you use these products. Limit alcohol. A drink of alcohol is 12 ounces of beer, 5 ounces of wine, or 1½ ounces of liquor. Lose weight, if needed. Being overweight increases your risk of certain health conditions. These include heart disease, high blood pressure, type 2 diabetes, and certain types of cancer. Protect your skin. Do not sunbathe or use tanning beds. Use sunscreen with a SPF 15 or higher. Apply sunscreen at least 15 minutes before you go outside. Reapply sunscreen every 2 hours. Wear protective clothing, hats, and sunglasses when you are outside. Drive safely. Always wear your seatbelt. Make sure everyone in your car wears a seatbelt. A seatbelt can save your life if you are in an accident. Do not use your cell phone when you are driving. This could distract you and cause an accident. Pull over if you need to make a call or send a text message. Practice safe sex. Use latex condoms if are sexually active and have more than one partner. Your healthcare provider may recommend screening tests for sexually transmitted infections (STIs). Wear helmets, lifejackets, and protective gear. Always wear a helmet when you ride a bike or motorcycle, go skiing, or play sports that could cause a head injury. Wear protective equipment when you play sports. Wear a lifejacket when you are on a boat or doing water sports.     © Copyright [employer] 2023 Information is for End User's use only and may not be sold, redistributed or otherwise used for commercial purposes. The above information is an  only. It is not intended as medical advice for individual conditions or treatments. Talk to your doctor, nurse or pharmacist before following any medical regimen to see if it is safe and effective for you. Weight Management   AMBULATORY CARE:   Why it is important to manage your weight:  Being overweight increases your risk of health conditions such as heart disease, high blood pressure, type 2 diabetes, and certain types of cancer. It can also increase your risk for osteoarthritis, sleep apnea, and other respiratory problems. Aim for a slow, steady weight loss. Even a small amount of weight loss can lower your risk of health problems. Risks of being overweight:  Extra weight can cause many health problems, including the following:  Diabetes (high blood sugar level)    High blood pressure or high cholesterol    Heart disease    Stroke    Gallbladder or liver disease    Cancer of the colon, breast, prostate, liver, or kidney    Sleep apnea    Arthritis or gout    Screening  is done to check for health conditions before you have signs or symptoms. If you are 28to 79years old, your blood sugar level may be checked every 3 years for signs of prediabetes or diabetes. Your healthcare provider will check your blood pressure at each visit. High blood pressure can lead to a stroke or other problems. Your provider may check for signs of heart disease, cancer, or other health problems. How to lose weight safely:  A safe and healthy way to lose weight is to eat fewer calories and get regular exercise. You can lose up about 1 pound a week by decreasing the number of calories you eat by 500 calories each day. You can decrease calories by eating smaller portion sizes or by cutting out high-calorie foods.  Read labels to find out how many calories are in the foods you eat. You can also burn calories with exercise such as walking, swimming, or biking. You will be more likely to keep weight off if you make these changes part of your lifestyle. Exercise at least 30 minutes per day on most days of the week. You can also fit in more physical activity by taking the stairs instead of the elevator or parking farther away from stores. Ask your healthcare provider about the best exercise plan for you. Healthy meal plan for weight management:  A healthy meal plan includes a variety of foods, contains fewer calories, and helps you stay healthy. A healthy meal plan includes the following:     Eat whole-grain foods more often. A healthy meal plan should contain fiber. Fiber is the part of grains, fruits, and vegetables that is not broken down by your body. Whole-grain foods are healthy and provide extra fiber in your diet. Some examples of whole-grain foods are whole-wheat breads and pastas, oatmeal, brown rice, and bulgur. Eat a variety of vegetables every day. Include dark, leafy greens such as spinach, kale, joel greens, and mustard greens. Eat yellow and orange vegetables such as carrots, sweet potatoes, and winter squash. Eat a variety of fruits every day. Choose fresh or canned fruit (canned in its own juice or light syrup) instead of juice. Fruit juice has very little or no fiber. Eat low-fat dairy foods. Drink fat-free (skim) milk or 1% milk. Eat fat-free yogurt and low-fat cottage cheese. Try low-fat cheeses such as mozzarella and other reduced-fat cheeses. Choose meat and other protein foods that are low in fat. Choose beans or other legumes such as split peas or lentils. Choose fish, skinless poultry (chicken or turkey), or lean cuts of red meat (beef or pork). Before you cook meat or poultry, cut off any visible fat. Use less fat and oil. Try baking foods instead of frying them.  Add less fat, such as margarine, sour cream, regular salad dressing and mayonnaise to foods. Eat fewer high-fat foods. Some examples of high-fat foods include french fries, doughnuts, ice cream, and cakes. Eat fewer sweets. Limit foods and drinks that are high in sugar. This includes candy, cookies, regular soda, and sweetened drinks. Ways to decrease calories:   Eat smaller portions. Use a small plate with smaller servings. Do not eat second helpings. When you eat at a restaurant, ask for a box and place half of your meal in the box before you eat. Share an entrée with someone else. Replace high-calorie snacks with healthy, low-calorie snacks. Choose fresh fruit, vegetables, fat-free rice cakes, or air-popped popcorn instead of potato chips, nuts, or chocolate. Choose water or calorie-free drinks instead of soda or sweetened drinks. Do not shop for groceries when you are hungry. You may be more likely to make unhealthy food choices. Take a grocery list of healthy foods and shop after you have eaten. Eat regular meals. Do not skip meals. Skipping meals can lead to overeating later in the day. This can make it harder for you to lose weight. Eat a healthy snack in place of a meal if you do not have time to eat a regular meal. Talk with a dietitian to help you create a meal plan and schedule that is right for you. Other things to consider as you try to lose weight:   Be aware of situations that may give you the urge to overeat, such as eating while watching television. Find ways to avoid these situations. For example, read a book, go for a walk, or do crafts. Meet with a weight loss support group or friends who are also trying to lose weight. This may help you stay motivated to continue working on your weight loss goals. © Copyright [employer] 2023 Information is for End User's use only and may not be sold, redistributed or otherwise used for commercial purposes. The above information is an  only.  It is not intended as medical advice for individual conditions or treatments. Talk to your doctor, nurse or pharmacist before following any medical regimen to see if it is safe and effective for you.

## 2023-12-14 NOTE — PROGRESS NOTES
[facility] Rylie Hagan    NAME: Vidal Osborn  AGE: 61 y.o. SEX: female  : 1960     DATE: 2023     Assessment and Plan:     Problem List Items Addressed This Visit          Endocrine    Diabetes mellitus ([address])       Lab Results   Component Value Date    HGBA1C 7.4 (A) 2023     Increase from 6.8%. She has been on prednisone quite a few times over these past 6 months. Discussed treatment options. She was agreeable to start 0.25 mg every 7 days for 4 weeks, then increase to 0.5 mg every 7 days. Continue metformin 750 mg twice daily. Reviewed nutrition and exercise recommendations. Due for diabetic eye exam. States she will call to schedule. Declined foot exam today. Up to date on urine micro. Will recheck A1c in 3 months. Relevant Medications    semaglutide, 0.25 or 0.5 mg/dose, (Ozempic, 0.25 or 0.5 MG/DOSE,) 2 mg/3 mL injection pen    Other Relevant Orders    POCT hemoglobin A1c (Completed)       Respiratory    Centrilobular emphysema (HCC)     Recent COPD/exacerbation vs CHF exacerbation. She did improve with course of azithromycin, cefdinir, and prednisone taper. Doing well today. Continue Advair 250-50 mcg twice daily. Rinse mouth after use. Continue albuterol as needed. She is supposed to start Clorinda Lang, which I see an approval for. Follow up with pulmonary medicine asap. Asthmatic bronchitis with acute exacerbation     See plan for COPD. Cardiovascular and Mediastinum    Hypertension     BP Readings from Last 3 Encounters:   23 134/72   23 123/80   23 121/78      Continue triamterene-HCTZ 37.5-25 mg daily. Limit sodium and salt intake. Avoid alcohol and caffeine. Relevant Medications    furosemide (LASIX) 20 mg tablet       Nervous and Auditory    Meniere disease     Symptoms have been controlled with triamterene-HCTZ 37.5-25 mg daily.              Other Anxiety     Patient reports she has been having difficulty focusing and concentrating over the past few months. She has difficulty accomplishing tasks. She is easily overwhelmed. She does have a history of anxiety. Denies depression. She is agreeable to consult with [facility] and try therapy. Information given. She will try [facility] and [place]. Mixed hyperlipidemia     Lab Results   Component Value Date    CHOLESTEROL 140 05/12/2023    CHOLESTEROL 129 11/19/2021    CHOLESTEROL 278 (H) 09/10/2019     Lab Results   Component Value Date    HDL 46 (L) 05/12/2023    HDL 40 (L) 11/19/2021    HDL 41 09/10/2019     Lab Results   Component Value Date    TRIG 236 (H) 05/12/2023    TRIG 143 11/19/2021    TRIG 255 (H) 09/10/2019     Lab Results   Component Value Date    [address] 94 05/12/2023    [address] 89 11/19/2021    [address] 237 09/10/2019      Lab Results   Component Value Date    LDLCALC 47 05/12/2023      ASCVD risk 11%. Continue atorvastatin 40 mg daily. Low fat diet. Annual physical exam - Primary     Discussed general health recommendations and screening guidelines. Agreeable to referral for colorectal cancer screening. Agreeable to mammogram for breast cancer screening. States she will call her GYN to schedule her cervical cancer screening. Up to date on CT lung cancer screening. Declines immunizations today. Recommend routine dental and eye exams. Next physical one year. Hypervolemia     Echo 7/17/23:     Left Ventricle: Left ventricular cavity size is normal. Wall thickness is normal. The left ventricular ejection fraction is 60%. Systolic function is normal. Wall motion is normal. Diastolic function is normal.  Right Ventricle: Right ventricular cavity size is normal. Systolic function is normal.    There was concern for volume overload during recent hospitalization. She did well with lasix. Will trial lasix for 2 weeks, 20 mg once daily. Check BMP in 2 weeks. Patient understands.  Limit sodium/salt intake. She is agreeable to consult with cardiology. Relevant Medications    furosemide (LASIX) 20 mg tablet    Other Relevant Orders    Ambulatory Referral to Cardiology    Basic metabolic panel     Other Visit Diagnoses       Encounter for colorectal cancer screening        Relevant Orders    Cologuard    Breast cancer screening by mammogram        Relevant Orders    Mammo screening bilateral w 3d & cad    BMI 33.0-33.9,adult                Immunizations and preventive care screenings were discussed with patient today. Appropriate education was printed on patient's after visit summary. Counseling:  Alcohol/drug use: discussed moderation in alcohol intake, the recommendations for healthy alcohol use, and avoidance of illicit drug use. Dental Health: discussed importance of regular tooth brushing, flossing, and dental visits. Injury prevention: discussed safety/seat belts, safety helmets, smoke detectors, carbon dioxide detectors, and smoking near bedding or upholstery. Sexual health: discussed sexually transmitted diseases, partner selection, use of condoms, avoidance of unintended pregnancy, and contraceptive alternatives. Exercise: the importance of regular exercise/physical activity was discussed. Recommend exercise 3-5 times per week for at least 30 minutes. BMI Counseling: Body mass index is 33.44 kg/m². The BMI is above normal. Nutrition recommendations include decreasing portion sizes, encouraging healthy choices of fruits and vegetables, decreasing fast food intake, consuming healthier snacks, limiting drinks that contain sugar, moderation in carbohydrate intake, increasing intake of lean protein, reducing intake of saturated and trans fat and reducing intake of cholesterol. Exercise recommendations include moderate physical activity 150 minutes/week, exercising 3-5 times per week and strength training exercises. Pharmacotherapy was ordered to help aid in weight loss. Rationale for BMI follow-up plan is due to patient being overweight or obese. Return in about 3 months (around 3/14/2024) for Recheck DM - 40 mins. Chief Complaint:     Chief Complaint   Patient presents with    Physical Exam     retaining      History of Present Illness:     Adult Annual Physical   Patient here for a comprehensive physical exam. The patient reports recent hospitalization. She was admitted for a COPD/asthma exacerbation, but there was also concern for CHF. States she was given lasix in the hospital and she believes she was supposed to be discharged with it. States since discharge, one week ago, she feels she has been retaining fluid. She was 171 when she left the hospital and states today she is 177. She did finish her antibiotic and prednisone taper. She is feeling much better. Denies cough, wheezing, and SOB. Diet and Physical Activity  Diet/Nutrition: well balanced diet, limited junk food, and limited fruits/vegetables. Exercise: no formal exercise. General Health  Sleep: sleeps well and gets 7-8 hours of sleep on average. Hearing: normal - bilateral.  Vision: no vision problems, most recent eye exam >1 year ago, and wears glasses. Dental: regular dental visits and brushes teeth twice daily. /GYN Health  Follows with gynecology? no   Patient is: postmenopausal  Last menstrual period: unknown  Contraceptive method:  postmenopausal .    Advanced Care Planning  Do you have an advanced directive? yes  Do you have a durable medical power of ? yes     Review of Systems:     Review of Systems   Constitutional:  Negative for appetite change, chills, diaphoresis, fatigue, fever and unexpected weight change. HENT:  Negative for congestion, ear discharge, ear pain, hearing loss, postnasal drip, rhinorrhea, sinus pressure, sinus pain, sneezing, sore throat, tinnitus and trouble swallowing. Eyes:  Negative for visual disturbance.    Respiratory:  Negative for cough, chest tightness, shortness of breath and wheezing. Cardiovascular:  Positive for leg swelling. Negative for chest pain and palpitations. Gastrointestinal:  Negative for abdominal pain, blood in stool, constipation, diarrhea, nausea and vomiting. Endocrine: Negative for cold intolerance, heat intolerance, polydipsia, polyphagia and polyuria. Musculoskeletal:  Negative for arthralgias and myalgias. Skin:  Negative for rash. Neurological:  Negative for dizziness, tremors, weakness, light-headedness, numbness and headaches. Hematological:  Negative for adenopathy. Psychiatric/Behavioral:  Positive for decreased concentration. Negative for agitation, behavioral problems, confusion, dysphoric mood, hallucinations, self-injury, sleep disturbance and suicidal ideas. The patient is nervous/anxious. The patient is not hyperactive. Past Medical History:     Past Medical History:   Diagnosis Date    Community acquired pneumonia of left lower lobe of lung 2023    Diabetes mellitus ([address])     Sepsis ([address]) 6/10/2023    Vertigo       Past Surgical History:     Past Surgical History:   Procedure Laterality Date    BREAST BIOPSY Bilateral 2021    stereo x2    BREAST BIOPSY Right 2021    MAMMO STEREOTACTIC BREAST BIOPSY LEFT ([employer]) Left 2021    MAMMO STEREOTACTIC BREAST BIOPSY RIGHT ([employer]) Right 2021    TUMOR REMOVAL      right leg- benign       Social History:     Social History     Socioeconomic History    Marital status: Single     Spouse name: None    Number of children: None    Years of education: None    Highest education level: None   Occupational History    None   Tobacco Use    Smoking status: Former     Current packs/day: 0.00     Average packs/day: 0.3 packs/day for 47.3 years (11.8 ttl pk-yrs)     Types: Cigarettes     Start date:      Quit date: 2023     Years since quittin.6    Smokeless tobacco: Never    Tobacco comments:      On and off since a teen... in 2019 started smoking more    Vaping Use    Vaping status: Never Used   Substance and Sexual Activity    Alcohol use: Yes     Comment: socially     Drug use: Never    Sexual activity: Not Currently     Partners: Male     Birth control/protection: Post-menopausal   Other Topics Concern    None   Social History Narrative    Former smoker - As per [employer]    Inadequate exercise    Physically able to work    Sedentary lifestyle     from significant other    Tobacco use - As per [employer], looking for work      Social Determinants of Health     Financial Resource Strain: [address] (2/24/2023)    Overall Financial Resource Strain (CARDIA)     Difficulty of Paying Living Expenses: Hard   Food Insecurity: No Food Insecurity (12/4/2023)    Hunger Vital Sign     Worried About Running Out of Food in the Last Year: Never true     [address] in the Last Year: Never true   Transportation Needs: No Transportation Needs (12/4/2023)    PRAPARE - Transportation     Lack of Transportation (Medical): No     Lack of Transportation (Non-Medical):  No   Physical Activity: Inactive (11/22/2021)    Exercise Vital Sign     Days of Exercise per Week: 0 days     Minutes of Exercise per Session: 0 min   Stress: Not on file   Social Connections: Socially Isolated (11/22/2021)    Social Connection and Isolation Panel [NHANES]     Frequency of Communication with Friends and Family: Once a week     Frequency of Social Gatherings with Friends and Family: Twice a week     Attends Sikhism Services: Never     Active Member of Clubs or Organizations: No     Attends Club or Organization Meetings: Never     Marital Status:    Intimate Partner Violence: Not At Risk (11/22/2021)    Humiliation, Afraid, Rape, and Kick questionnaire     Fear of Current or Ex-Partner: No     Emotionally Abused: No     Physically Abused: No     Sexually Abused: No   Housing Stability: Low Risk  (12/4/2023)    Housing Stability Vital Sign     Unable to Pay for Housing in the Last Year: No     Number of Places Lived in the Last Year: 1     Unstable Housing in the Last Year: No      Family History:     Family History   Problem Relation Age of Onset    Stroke Mother     Hypertension Mother     Diabetes Mother     Throat cancer Brother     Ovarian cancer Family         2 maternal aunts and 2 cousins     Stroke Father     Thyroid cancer Sister     Heart disease Sister     Uterine cancer Sister     No Known Problems Son     Heart attack Maternal Grandmother 43    Stroke Maternal Grandfather 62    Heart attack Paternal Grandmother     Leukemia Paternal Grandfather     No Known Problems Sister     No Known Problems Son     Breast cancer Cousin 36    Breast cancer Maternal Aunt 26    Uterine cancer Maternal Aunt     Uterine cancer Cousin     No Known Problems Paternal Aunt       Current Medications:     Current Outpatient Medications   Medication Sig Dispense Refill    furosemide (LASIX) 20 mg tablet Take 1 tablet (20 mg total) by mouth daily 30 tablet 0    semaglutide, 0.25 or 0.5 mg/dose, (Ozempic, 0.25 or 0.5 MG/DOSE,) 2 mg/3 mL injection pen 0.25 mg under the skin every 7 days for 4 doses (28 days), THEN 0.5 mg under the skin every 7 days 9 mL 5    albuterol (2.5 mg/3 mL) 0.083 % nebulizer solution Take 3 mL (2.5 mg total) by nebulization every 6 (six) hours as needed for wheezing or shortness of breath 75 mL 0    albuterol (PROVENTIL HFA,VENTOLIN HFA) 90 mcg/act inhaler INHALE 2 PUFFS BY MOUTH EVERY 4 HOURS AS NEEDED FOR WHEEZE 6.7 g 3    atorvastatin (LIPITOR) 40 mg tablet Take 1 tablet (40 mg total) by mouth daily 90 tablet 1    Fluticasone-Salmeterol (Advair Diskus) 250-50 mcg/dose inhaler Inhale 1 puff 2 (two) times a day Rinse mouth after use.  180 blister 5    glucose blood (FREESTYLE LITE) test strip Use as instructed 100 each 5    glucose monitoring kit (FREESTYLE) monitoring kit Apply 1 m topically 4 (four) times a day as needed Use as directed 1 each 0    Lancets (freestyle) lancets Use as instructed 100 each 3    metFORMIN (GLUCOPHAGE-XR) 750 mg 24 hr tablet Take 2 tablets (1,500 mg total) by mouth daily with dinner 180 tablet 3    Probiotic Product (PRO-BIOTIC BLEND PO) Take 1 tablet by mouth daily      triamterene-hydrochlorothiazide (DYAZIDE) 37.5-25 mg per capsule Take 1 capsule by mouth daily 90 capsule 1     No current facility-administered medications for this visit. Allergies:     No Known Allergies   Physical Exam:     /72 (BP Location: Right arm, Patient Position: Sitting, Cuff Size: Large)   Pulse 80   Temp 97.6 °F (36.4 °C) (Temporal)   Ht 5' 1" (1.549 m)   Wt 80.3 kg (177 lb)   LMP 10/01/2010 (Approximate)   BMI 33.44 kg/m²     Physical Exam  Vitals and nursing note reviewed. Constitutional:       General: She is awake. She is not in acute distress. Appearance: Normal appearance. She is well-developed and well-groomed. She is obese. She is not ill-appearing. HENT:      Head: Normocephalic and atraumatic. Right Ear: Tympanic membrane, ear canal and external ear normal.      Left Ear: Tympanic membrane, ear canal and external ear normal.      Nose: Nose normal.      Mouth/Throat:      Lips: Pink. Mouth: Mucous membranes are moist.      Pharynx: Oropharynx is clear. Uvula midline. No oropharyngeal exudate or posterior oropharyngeal erythema. Eyes:      General: No scleral icterus. Conjunctiva/sclera: Conjunctivae normal.   Cardiovascular:      Rate and Rhythm: Normal rate and regular rhythm. Pulses: Normal pulses. Radial pulses are 2+ on the right side and 2+ on the left side. Heart sounds: Normal heart sounds. No murmur heard. Pulmonary:      Effort: Pulmonary effort is normal. No respiratory distress. Breath sounds: Normal air entry. No decreased air movement. No decreased breath sounds, wheezing, rhonchi or rales.    Musculoskeletal:         General: Normal range of motion. Cervical back: Neck supple. Right lower leg: Edema present. Left lower leg: Edema present. Lymphadenopathy:      Cervical: No cervical adenopathy. Skin:     General: Skin is warm. Coloration: Skin is not jaundiced. Findings: No rash. Neurological:      General: No focal deficit present. Mental Status: She is alert and oriented to person, place, and time. Mental status is at baseline. Motor: Motor function is intact. Coordination: Coordination is intact. Gait: Gait is intact. Psychiatric:         Attention and Perception: Attention normal.         Mood and Affect: Mood and affect normal.         Speech: Speech normal.         Behavior: Behavior normal. Behavior is cooperative.           Roscoe Leventhal, [address] [facility]

## 2023-12-16 PROBLEM — R65.10 SIRS (SYSTEMIC INFLAMMATORY RESPONSE SYNDROME) (HCC): Status: RESOLVED | Noted: 2023-07-16 | Resolved: 2023-12-16

## 2023-12-16 PROBLEM — E87.70 HYPERVOLEMIA: Status: ACTIVE | Noted: 2023-12-16

## 2023-12-16 PROBLEM — Z00.00 ANNUAL PHYSICAL EXAM: Status: ACTIVE | Noted: 2023-12-16

## 2023-12-16 PROBLEM — E66.01 CLASS 2 SEVERE OBESITY DUE TO EXCESS CALORIES WITH SERIOUS COMORBIDITY IN ADULT (HCC): Status: RESOLVED | Noted: 2021-07-27 | Resolved: 2023-12-16

## 2023-12-16 PROBLEM — E66.812 CLASS 2 SEVERE OBESITY DUE TO EXCESS CALORIES WITH SERIOUS COMORBIDITY IN ADULT (HCC): Status: RESOLVED | Noted: 2021-07-27 | Resolved: 2023-12-16

## 2023-12-16 LAB — SL AMB POCT HEMOGLOBIN AIC: 7.4 (ref ?–6.5)

## 2023-12-16 NOTE — ASSESSMENT & PLAN NOTE
Echo 7/17/23:     Left Ventricle: Left ventricular cavity size is normal. Wall thickness is normal. The left ventricular ejection fraction is 60%. Systolic function is normal. Wall motion is normal. Diastolic function is normal.  Right Ventricle: Right ventricular cavity size is normal. Systolic function is normal.    There was concern for volume overload during recent hospitalization. She did well with lasix. Will trial lasix for 2 weeks, 20 mg once daily. Check BMP in 2 weeks. Patient understands. Limit sodium/salt intake. She is agreeable to consult with cardiology.

## 2023-12-16 NOTE — ASSESSMENT & PLAN NOTE
Patient reports she has been having difficulty focusing and concentrating over the past few months. She has difficulty accomplishing tasks. She is easily overwhelmed. She does have a history of anxiety. Denies depression. She is agreeable to consult with [address] [place] [address] and try therapy. Information given. She will try [facility] and [place].

## 2023-12-16 NOTE — ASSESSMENT & PLAN NOTE
Lab Results   Component Value Date    HGBA1C 7.4 (A) 12/14/2023     Increase from 6.8%. She has been on prednisone quite a few times over these past 6 months. Discussed treatment options. She was agreeable to start 0.25 mg every 7 days for 4 weeks, then increase to 0.5 mg every 7 days. Continue metformin 750 mg twice daily. Reviewed nutrition and exercise recommendations. Due for diabetic eye exam. States she will call to schedule. Declined foot exam today. Up to date on urine micro. Will recheck A1c in 3 months.

## 2023-12-16 NOTE — ASSESSMENT & PLAN NOTE
Recent COPD/exacerbation vs CHF exacerbation. She did improve with course of azithromycin, cefdinir, and prednisone taper. Doing well today. Continue Advair 250-50 mcg twice daily. Rinse mouth after use. Continue albuterol as needed. She is supposed to start Kofi Garcia, which I see an approval for. Follow up with pulmonary medicine asap.

## 2023-12-16 NOTE — ASSESSMENT & PLAN NOTE
Lab Results   Component Value Date    CHOLESTEROL 140 05/12/2023    CHOLESTEROL 129 11/19/2021    CHOLESTEROL 278 (H) 09/10/2019     Lab Results   Component Value Date    HDL 46 (L) 05/12/2023    HDL 40 (L) 11/19/2021    HDL 41 09/10/2019     Lab Results   Component Value Date    TRIG 236 (H) 05/12/2023    TRIG 143 11/19/2021    TRIG 255 (H) 09/10/2019     Lab Results   Component Value Date    [address] 05/12/2023    [address] 89 11/19/2021    [address] 237 09/10/2019      Lab Results   Component Value Date    LDLCALC 47 05/12/2023      ASCVD risk 11%. Continue atorvastatin 40 mg daily. Low fat diet.

## 2023-12-16 NOTE — ASSESSMENT & PLAN NOTE
Discussed general health recommendations and screening guidelines. Agreeable to referral for colorectal cancer screening. Agreeable to mammogram for breast cancer screening. States she will call her GYN to schedule her cervical cancer screening. Up to date on CT lung cancer screening. Declines immunizations today. Recommend routine dental and eye exams. Next physical one year.

## 2023-12-16 NOTE — ASSESSMENT & PLAN NOTE
BP Readings from Last 3 Encounters:   12/14/23 134/72   12/05/23 123/80   11/30/23 121/78      Continue triamterene-HCTZ 37.5-25 mg daily. Limit sodium and salt intake. Avoid alcohol and caffeine.

## 2023-12-18 ENCOUNTER — VBI (OUTPATIENT)
Dept: ADMINISTRATIVE | Facility: OTHER | Age: 63
End: 2023-12-18

## 2023-12-18 NOTE — TELEPHONE ENCOUNTER
12/18/23 2:03 PM     [employer] SmartForm used to document caregap status.    Luana Kaur MA    [id number]: 

## 2024-01-07 DIAGNOSIS — E87.79 OTHER HYPERVOLEMIA: ICD-10-CM

## 2024-01-08 RX ORDER — FUROSEMIDE 20 MG/1
20 TABLET ORAL DAILY
Qty: 90 TABLET | Refills: 1 | Status: SHIPPED | OUTPATIENT
Start: 2024-01-08

## 2024-01-15 ENCOUNTER — TELEPHONE (OUTPATIENT)
Dept: GASTROENTEROLOGY | Facility: CLINIC | Age: 64
End: 2024-01-15

## 2024-01-15 NOTE — TELEPHONE ENCOUNTER
We received paperwork    Approval for [facility]     I do not see [place] orders/script  Did patient start , when is she due       Auth expires 11-15-24

## 2024-01-19 NOTE — TELEPHONE ENCOUNTER
I called pt lmom asking pt to return our call to verify     Orders are in [place]     What Infusion Site art spt prefer   Does patient self inject at home   If yes we need [facility] d[facility] and script [facility]     What is patient [facility] to set up delivery     Approval is scanned under media   Request [id number]  Valid 11-15-23 to 11-15-24    Fasenra Pen 30mg

## 2024-01-22 NOTE — TELEPHONE ENCOUNTER
I called and spoke with patient      Patient states she NEVER started Fasenra       Patient states she was ordered this months ago and no one is helping her     I expressed understanding and explained we will work on auth /delivery etc      Patient wants to do home injections       Please DC [place] [facility] script for Fasenra

## 2024-01-22 NOTE — TELEPHONE ENCOUNTER
I called [facility]  [phone]    I am on hold    Spoke with Cy LIM, they have NO account on file for patient       Scanned letter from, 11-15-23  From [facility] Approval     Patient has a cost of 1,655.00      I called back to [place] and me   To get patient enrolled in their free drug program   Patient does NOT qualify for free drug      Patient has to apply online at [employer]    Once she does have co-pay card info  I provided my direct line for pt to call mr back with this information     Then we need to fax script to pts [facility] [place]

## 2024-01-22 NOTE — TELEPHONE ENCOUNTER
Dr. Almanzar ,     Please D/C [place] orders     Please print script and sign and please have staff scann into chart or email to me     Once patient is signed up I will call [facility] to set up delivery     Thank You

## 2024-01-23 DIAGNOSIS — J45.50 SEVERE PERSISTENT ASTHMA WITHOUT COMPLICATION: Primary | ICD-10-CM

## 2024-01-27 ENCOUNTER — NURSE TRIAGE (OUTPATIENT)
Dept: OTHER | Facility: OTHER | Age: 64
End: 2024-01-27

## 2024-01-27 DIAGNOSIS — J98.8 RESPIRATORY INFECTION: ICD-10-CM

## 2024-01-27 DIAGNOSIS — J98.8 RESPIRATORY INFECTION: Primary | ICD-10-CM

## 2024-01-27 RX ORDER — AZITHROMYCIN 250 MG/1
TABLET, FILM COATED ORAL EVERY 24 HOURS
Qty: 6 TABLET | Refills: 0 | Status: SHIPPED | OUTPATIENT
Start: 2024-01-27 | End: 2024-01-27 | Stop reason: SDUPTHER

## 2024-01-27 RX ORDER — AMOXICILLIN AND CLAVULANATE POTASSIUM 875; 125 MG/1; MG/1
1 TABLET, FILM COATED ORAL EVERY 12 HOURS SCHEDULED
Qty: 10 TABLET | Refills: 0 | Status: SHIPPED | OUTPATIENT
Start: 2024-01-27 | End: 2024-01-27

## 2024-01-27 RX ORDER — AZITHROMYCIN 250 MG/1
TABLET, FILM COATED ORAL EVERY 24 HOURS
Qty: 6 TABLET | Refills: 0 | Status: SHIPPED | OUTPATIENT
Start: 2024-01-27 | End: 2024-01-27

## 2024-01-27 RX ORDER — AMOXICILLIN AND CLAVULANATE POTASSIUM 875; 125 MG/1; MG/1
1 TABLET, FILM COATED ORAL EVERY 12 HOURS SCHEDULED
Qty: 10 TABLET | Refills: 0 | Status: SHIPPED | OUTPATIENT
Start: 2024-01-27 | End: 2024-01-27 | Stop reason: SDUPTHER

## 2024-01-27 RX ORDER — AMOXICILLIN AND CLAVULANATE POTASSIUM 875; 125 MG/1; MG/1
1 TABLET, FILM COATED ORAL EVERY 12 HOURS SCHEDULED
Qty: 10 TABLET | Refills: 0 | Status: SHIPPED | OUTPATIENT
Start: 2024-01-27 | End: 2024-02-01

## 2024-01-27 RX ORDER — AZITHROMYCIN 250 MG/1
TABLET, FILM COATED ORAL EVERY 24 HOURS
Qty: 6 TABLET | Refills: 0 | Status: SHIPPED | OUTPATIENT
Start: 2024-01-27 | End: 2024-02-01
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
## 2024-01-27 NOTE — TELEPHONE ENCOUNTER
"Regarding: cough/ medication request  ----- Message from Marixa Mcnair sent at 1/27/2024 10:41 AM EST -----  \"I have a really bad croupy call that is deep.My chest is starting to hurt from it. A lot of mucus and phlegm, can I get something prescribed to me?\"    "

## 2024-02-19 ENCOUNTER — OFFICE VISIT (OUTPATIENT)
Dept: PULMONOLOGY | Facility: CLINIC | Age: 64
End: 2024-02-19
Payer: COMMERCIAL

## 2024-02-19 VITALS
HEART RATE: 91 BPM | TEMPERATURE: 97.8 F | DIASTOLIC BLOOD PRESSURE: 70 MMHG | OXYGEN SATURATION: 98 % | WEIGHT: 178 LBS | SYSTOLIC BLOOD PRESSURE: 126 MMHG | BODY MASS INDEX: 33.63 KG/M2

## 2024-02-19 DIAGNOSIS — J45.41 MODERATE PERSISTENT ASTHMATIC BRONCHITIS WITH ACUTE EXACERBATION: Primary | ICD-10-CM

## 2024-02-19 DIAGNOSIS — J45.50 SEVERE PERSISTENT ASTHMA WITHOUT COMPLICATION: ICD-10-CM

## 2024-02-19 PROCEDURE — 99214 OFFICE O/P EST MOD 30 MIN: CPT | Performed by: INTERNAL MEDICINE

## 2024-02-19 RX ORDER — FLUTICASONE PROPIONATE AND SALMETEROL 250; 50 UG/1; UG/1
1 POWDER RESPIRATORY (INHALATION) 2 TIMES DAILY
Qty: 60 BLISTER | Refills: 2 | Status: SHIPPED | OUTPATIENT
Start: 2024-02-19 | End: 2024-05-19

## 2024-02-19 NOTE — PROGRESS NOTES
Assessment/Plan:    Asthmatic bronchitis with acute exacerbation  Luz Elena has better control on Advair 250, 1 puff twice a day however her symptoms are still not optimized.  Would like to move forward with Fasenra injections every 8 weeks.  She has rescue inhaler to use as needed and continues to make attempts to lose weight and exercise I reviewed her most recent x-ray and PFT and CAT scan.  Up-to-date on her vaccines.     Diagnoses and all orders for this visit:    Moderate persistent asthmatic bronchitis with acute exacerbation    Severe persistent asthma without complication  -     Fluticasone-Salmeterol (Advair Diskus) 250-50 mcg/dose inhaler; Inhale 1 puff 2 (two) times a day Rinse mouth after use.          Subjective:      Patient ID: Luz Elena Sherman is a 63 y.o. female.    Crow is here for follow-up of her asthma.  Her breathing is much better since starting on Advair twice daily.  She is using her rescue inhaler several times a week.        The following portions of the patient's history were reviewed and updated as appropriate: allergies, current medications, past family history, past medical history, past social history, past surgical history, and problem list.    Review of Systems   Constitutional: Negative.  Negative for unexpected weight change.   HENT: Negative.  Negative for postnasal drip.    Eyes: Negative.    Respiratory: Negative.  Negative for cough, shortness of breath and wheezing.    Cardiovascular: Negative.  Negative for chest pain and leg swelling.   Gastrointestinal: Negative.    Endocrine: Negative.    Genitourinary: Negative.    Musculoskeletal: Negative.    Allergic/Immunologic: Negative.    Neurological: Negative.    Hematological: Negative.          Objective:      /70   Pulse 91   Temp 97.8 °F (36.6 °C) (Tympanic)   Wt 80.7 kg (178 lb)   LMP 10/01/2010 (Approximate)   SpO2 98%   BMI 33.63 kg/m²          Physical Exam  Constitutional:       Appearance: She is  well-developed.   HENT:      Head: Normocephalic.   Eyes:      Pupils: Pupils are equal, round, and reactive to light.   Cardiovascular:      Rate and Rhythm: Normal rate.      Heart sounds: No murmur heard.  Pulmonary:      Effort: Pulmonary effort is normal. No respiratory distress.      Breath sounds: Normal breath sounds. No wheezing or rales.   Abdominal:      Palpations: Abdomen is soft.   Musculoskeletal:         General: Normal range of motion.      Cervical back: Normal range of motion and neck supple.   Skin:     General: Skin is warm and dry.   Neurological:      Mental Status: She is alert and oriented to person, place, and time.

## 2024-02-19 NOTE — ASSESSMENT & PLAN NOTE
Luz Elena has better control on Advair 250, 1 puff twice a day however her symptoms are still not optimized.  Would like to move forward with Fasenra injections every 8 weeks.  She has rescue inhaler to use as needed and continues to make attempts to lose weight and exercise I reviewed her most recent x-ray and PFT and CAT scan.  Up-to-date on her vaccines.

## 2024-02-20 ENCOUNTER — TELEPHONE (OUTPATIENT)
Dept: PULMONOLOGY | Facility: CLINIC | Age: 64
End: 2024-02-20

## 2024-02-20 NOTE — TELEPHONE ENCOUNTER
Called [facility] [employer] Ph # [phone] and verified that Coverage is active and effective from 1/1/2024. Patient has [facility] [employer]  PPO policy.   Reference #: [id number]

## 2024-02-21 ENCOUNTER — TELEPHONE (OUTPATIENT)
Dept: PULMONOLOGY | Facility: CLINIC | Age: 64
End: 2024-02-21

## 2024-02-21 NOTE — TELEPHONE ENCOUNTER
Spoke with pt, confirmed that she still has not received the Fasenra medication. Gave pt website, via [employer], to sign up for the co-pay program and informed her to contact the office once it is done.

## 2024-02-21 NOTE — TELEPHONE ENCOUNTER
----- Message from Sujata Almanzar, DO sent at 2/19/2024  4:27 PM EST -----  Please help Crow in order in for Fasenra and I am not sure why she has not gotten it yet

## 2024-05-17 ENCOUNTER — OFFICE VISIT (OUTPATIENT)
Dept: INTERNAL MEDICINE CLINIC | Facility: CLINIC | Age: 64
End: 2024-05-17

## 2024-05-17 VITALS
BODY MASS INDEX: 33.61 KG/M2 | SYSTOLIC BLOOD PRESSURE: 138 MMHG | HEART RATE: 95 BPM | WEIGHT: 178 LBS | DIASTOLIC BLOOD PRESSURE: 81 MMHG | OXYGEN SATURATION: 98 % | HEIGHT: 61 IN | TEMPERATURE: 98.5 F

## 2024-05-17 DIAGNOSIS — E11.69 TYPE 2 DIABETES MELLITUS WITH OTHER SPECIFIED COMPLICATION, WITHOUT LONG-TERM CURRENT USE OF INSULIN (HCC): Primary | ICD-10-CM

## 2024-05-17 DIAGNOSIS — F41.9 ANXIETY: ICD-10-CM

## 2024-05-17 DIAGNOSIS — E55.9 VITAMIN D DEFICIENCY: ICD-10-CM

## 2024-05-17 DIAGNOSIS — Z87.891 PERSONAL HISTORY OF NICOTINE DEPENDENCE: ICD-10-CM

## 2024-05-17 DIAGNOSIS — J43.2 CENTRILOBULAR EMPHYSEMA (HCC): ICD-10-CM

## 2024-05-17 DIAGNOSIS — E87.79 OTHER HYPERVOLEMIA: ICD-10-CM

## 2024-05-17 DIAGNOSIS — J45.40 MODERATE PERSISTENT ASTHMATIC BRONCHITIS WITHOUT COMPLICATION: ICD-10-CM

## 2024-05-17 DIAGNOSIS — E78.2 MIXED HYPERLIPIDEMIA: ICD-10-CM

## 2024-05-17 DIAGNOSIS — I10 PRIMARY HYPERTENSION: ICD-10-CM

## 2024-05-17 DIAGNOSIS — H81.09 MENIERE'S DISEASE, UNSPECIFIED LATERALITY: ICD-10-CM

## 2024-05-17 DIAGNOSIS — E66.09 CLASS 1 OBESITY DUE TO EXCESS CALORIES WITH SERIOUS COMORBIDITY AND BODY MASS INDEX (BMI) OF 33.0 TO 33.9 IN ADULT: ICD-10-CM

## 2024-05-17 PROBLEM — Z00.00 ANNUAL PHYSICAL EXAM: Status: RESOLVED | Noted: 2023-12-16 | Resolved: 2024-05-17

## 2024-05-17 PROCEDURE — 83036 HEMOGLOBIN GLYCOSYLATED A1C: CPT | Performed by: PHYSICIAN ASSISTANT

## 2024-05-17 PROCEDURE — 99214 OFFICE O/P EST MOD 30 MIN: CPT | Performed by: PHYSICIAN ASSISTANT

## 2024-05-17 RX ORDER — ATORVASTATIN CALCIUM 40 MG/1
40 TABLET, FILM COATED ORAL DAILY
Qty: 90 TABLET | Refills: 3 | Status: SHIPPED | OUTPATIENT
Start: 2024-05-17

## 2024-05-17 RX ORDER — TRIAMTERENE AND HYDROCHLOROTHIAZIDE 37.5; 25 MG/1; MG/1
1 CAPSULE ORAL DAILY
Qty: 90 CAPSULE | Refills: 3 | Status: SHIPPED | OUTPATIENT
Start: 2024-05-17

## 2024-05-17 RX ORDER — FUROSEMIDE 20 MG/1
20 TABLET ORAL DAILY
Qty: 90 TABLET | Refills: 3 | Status: SHIPPED | OUTPATIENT
Start: 2024-05-17

## 2024-05-20 PROBLEM — J45.909 ASTHMATIC BRONCHITIS WITHOUT COMPLICATION: Status: ACTIVE | Noted: 2023-08-22

## 2024-05-20 PROBLEM — E55.9 VITAMIN D DEFICIENCY: Status: ACTIVE | Noted: 2024-05-20

## 2024-05-20 LAB — SL AMB POCT HEMOGLOBIN AIC: 5.5 (ref ?–6.5)

## 2024-05-20 NOTE — PROGRESS NOTES
Ambulatory Visit  Name: Luz Elena Sherman      : 1960      MRN: [id number]  Encounter Provider: Nathaly Clements PA-C  Encounter Date: 2024   Encounter department: [facility]    Assessment & Plan   1. Type 2 diabetes mellitus with other specified complication, without long-term current use of insulin (HCC)  Assessment & Plan:    Lab Results   Component Value Date    HGBA1C 5.5 2024     Significant improvement from 7.4% since starting Ozempic. Ozempic also helping with weight loss. Patient would like to increase dose for more efficacy with weight loss. Discussed possible side effects. She is agreeable to start Ozempic 1 mg every 7 days. Continue metformin 7590 mg two tablets daily. Reviewed nutrition and exercise recommendations. Up to date on eye exam, will obtain records. Foot exam completed today. Urine micro ordered today. Will follow up in 4 months, sooner if needed.   Orders:  -     POCT hemoglobin A1c  -     semaglutide, 1 mg/dose, (Ozempic) 4 mg/3 mL injection pen; Inject 0.75 mL (1 mg total) under the skin once a week  -     atorvastatin (LIPITOR) 40 mg tablet; Take 1 tablet (40 mg total) by mouth daily  -     Albumin / creatinine urine ratio; Future  -     CBC and differential; Future  -     Comprehensive metabolic panel; Future  -     Vitamin B12; Future  2. Primary hypertension  Assessment & Plan:  BP Readings from Last 3 Encounters:   24 138/81   24 126/70   23 134/72      Continue triamterene-HCTZ 37.5-25 mg daily. Limit sodium and salt intake. Avoid alcohol and caffeine.    Orders:  -     TSH, 3rd generation with Free T4 reflex; Future  3. Mixed hyperlipidemia  Assessment & Plan:  Lab Results   Component Value Date    CHOLESTEROL 140 2023    CHOLESTEROL 129 2021    CHOLESTEROL 278 (H) 09/10/2019     Lab Results   Component Value Date    HDL 46 (L) 2023    HDL 40 (L) 2021    HDL 41 09/10/2019     Lab Results    Component Value Date    TRIG 236 (H) 05/12/2023    TRIG 143 11/19/2021    TRIG 255 (H) 09/10/2019     Lab Results   Component Value Date    NONHDLC 94 05/12/2023    NONHDLC 89 11/19/2021    NONHDLC 237 09/10/2019      Lab Results   Component Value Date    LDLCALC 47 05/12/2023      Due for repeat lipid panel. Continue atorvastatin 40 mg once daily. Low fat diet.   Orders:  -     Lipid panel; Future  4. Meniere's disease, unspecified laterality  Assessment & Plan:  Symptoms have been controlled with triamterene-HCTZ 37.5-25 mg daily.  Will recheck BMP.   Orders:  -     triamterene-hydrochlorothiazide (DYAZIDE) 37.5-25 mg per capsule; Take 1 capsule by mouth daily  5. Other hypervolemia  Assessment & Plan:  Echo 7/17/23:     Left Ventricle: Left ventricular cavity size is normal. Wall thickness is normal. The left ventricular ejection fraction is 60%. Systolic function is normal. Wall motion is normal. Diastolic function is normal.  Right Ventricle: Right ventricular cavity size is normal. Systolic function is normal.    She never scheduled with cardiology for a consult and declines at this time. She feels she is doing well. Will continue to monitor and discuss again at follow up. Continue furosemide 20 mg once daily as needed.   Orders:  -     furosemide (LASIX) 20 mg tablet; Take 1 tablet (20 mg total) by mouth daily  6. Moderate persistent asthmatic bronchitis without complication  Assessment & Plan:  Breathing has been much better. Albuterol use has significantly decreased. Continue Advair 250-50 mcg one puff BID. Rinse mouth after use. Continue albuterol as needed. She is waiting to start Fasenra injections. She is followed by pulmonology.   7. Centrilobular emphysema (HCC)  Assessment & Plan:  CT chest wo contrast 7/17/23: Subtle upper lobe centrilobular emphysema.     Pulmonology feels symptoms are more so related the asthma. She did quit smoking about 1 years ago.   8. Anxiety  Assessment & Plan:  Patient  reports her mood has been much better. Will discuss again at follow up.   9. Vitamin D deficiency  Assessment & Plan:  History of vitamin D deficiency. Will recheck vitamin D.  Orders:  -     Vitamin D 25 hydroxy; Future  10. Personal history of nicotine dependence  Assessment & Plan:  Quit smoking about 2 months ago. Up to date on CT lung cancer screening.   11. Class 1 obesity due to excess calories with serious comorbidity and body mass index (BMI) of 33.0 to 33.9 in adult    BMI Counseling: Body mass index is 33.63 kg/m². The BMI is above normal. Nutrition recommendations include decreasing portion sizes, encouraging healthy choices of fruits and vegetables, decreasing fast food intake, consuming healthier snacks, limiting drinks that contain sugar, moderation in carbohydrate intake, increasing intake of lean protein, reducing intake of saturated and trans fat and reducing intake of cholesterol. Exercise recommendations include moderate physical activity 150 minutes/week, exercising 3-5 times per week and strength training exercises. No pharmacotherapy was ordered. Rationale for BMI follow-up plan is due to patient being overweight or obese.     Depression Screening and Follow-up Plan: Patient was screened for depression during today's encounter. They screened negative with a PHQ-2 score of 0.      History of Present Illness     Patient is a 63 year old female presenting for follow up on her diabetes. States she has been compliant with all of her medications. Denies any medication side effects. She is not currently checking her sugar. She has not had any issues with her breathing since December. She offers no complaints today.         Review of Systems   Constitutional:  Negative for appetite change, chills, diaphoresis, fatigue, fever and unexpected weight change.   Eyes:  Negative for visual disturbance.   Respiratory:  Negative for cough, chest tightness, shortness of breath and wheezing.    Cardiovascular:   Negative for chest pain, palpitations and leg swelling.   Gastrointestinal:  Negative for abdominal pain, blood in stool, constipation, diarrhea, nausea and vomiting.   Endocrine: Negative for cold intolerance, heat intolerance, polydipsia, polyphagia and polyuria.   Musculoskeletal:  Positive for arthralgias (chronic) and joint swelling. Negative for myalgias.   Skin:  Negative for rash.   Neurological:  Negative for dizziness, tremors, weakness, light-headedness, numbness and headaches.   Hematological:  Negative for adenopathy.   Psychiatric/Behavioral:  Negative for dysphoric mood, self-injury, sleep disturbance and suicidal ideas. The patient is not nervous/anxious.      Past Medical History   Past Medical History:   Diagnosis Date    Community acquired pneumonia of left lower lobe of lung 6/9/2023    Diabetes mellitus (HCC)     Sepsis (HCC) 6/10/2023    Vertigo      Past Surgical History:   Procedure Laterality Date    BREAST BIOPSY Bilateral 12/01/2021    stereo x2    BREAST BIOPSY Right 12/01/2021    MAMMO STEREOTACTIC BREAST BIOPSY LEFT (ALL INC) Left 12/1/2021    MAMMO STEREOTACTIC BREAST BIOPSY RIGHT (ALL INC) Right 12/1/2021    TUMOR REMOVAL      right leg- benign      Family History   Problem Relation Age of Onset    Stroke Mother     Hypertension Mother     Diabetes Mother     Throat cancer Brother     Ovarian cancer Family         2 maternal aunts and 2 cousins     Stroke Father     Thyroid cancer Sister     Heart disease Sister     Uterine cancer Sister     No Known Problems Son     Heart attack Maternal Grandmother 42    Stroke Maternal Grandfather 62    Heart attack Paternal Grandmother     Leukemia Paternal Grandfather     No Known Problems Sister     No Known Problems Son     Breast cancer Cousin 40    Breast cancer Maternal Aunt 26    Uterine cancer Maternal Aunt     Uterine cancer Cousin     No Known Problems Paternal Aunt      Current Outpatient Medications on File Prior to Visit   Medication  Sig Dispense Refill    albuterol (2.5 mg/3 mL) 0.083 % nebulizer solution Take 3 mL (2.5 mg total) by nebulization every 6 (six) hours as needed for wheezing or shortness of breath 75 mL 0    albuterol (PROVENTIL HFA,VENTOLIN HFA) 90 mcg/act inhaler INHALE 2 PUFFS BY MOUTH EVERY 4 HOURS AS NEEDED FOR WHEEZE 6.7 g 3    benralizumab (FASENRA) subcutaneous injection Inject 1 mL (30 mg total) under the skin every 56 days 1 mL 5    benralizumab (FASENRA) subcutaneous injection Inject 1 mL (30 mg total) under the skin every 28 days 1 mL 2    Fluticasone-Salmeterol (Advair Diskus) 250-50 mcg/dose inhaler Inhale 1 puff 2 (two) times a day Rinse mouth after use. 60 blister 2    glucose blood (FREESTYLE LITE) test strip Use as instructed 100 each 5    glucose monitoring kit (FREESTYLE) monitoring kit Apply 1 m topically 4 (four) times a day as needed Use as directed 1 each 0    Lancets (freestyle) lancets Use as instructed 100 each 3    metFORMIN (GLUCOPHAGE-XR) 750 mg 24 hr tablet Take 2 tablets (1,500 mg total) by mouth daily with dinner 180 tablet 3     No current facility-administered medications on file prior to visit.   No Known Allergies   Current Outpatient Medications on File Prior to Visit   Medication Sig Dispense Refill    albuterol (2.5 mg/3 mL) 0.083 % nebulizer solution Take 3 mL (2.5 mg total) by nebulization every 6 (six) hours as needed for wheezing or shortness of breath 75 mL 0    albuterol (PROVENTIL HFA,VENTOLIN HFA) 90 mcg/act inhaler INHALE 2 PUFFS BY MOUTH EVERY 4 HOURS AS NEEDED FOR WHEEZE 6.7 g 3    benralizumab (FASENRA) subcutaneous injection Inject 1 mL (30 mg total) under the skin every 56 days 1 mL 5    benralizumab (FASENRA) subcutaneous injection Inject 1 mL (30 mg total) under the skin every 28 days 1 mL 2    Fluticasone-Salmeterol (Advair Diskus) 250-50 mcg/dose inhaler Inhale 1 puff 2 (two) times a day Rinse mouth after use. 60 blister 2    glucose blood (FREESTYLE LITE) test strip Use as  "instructed 100 each 5    glucose monitoring kit (FREESTYLE) monitoring kit Apply 1 m topically 4 (four) times a day as needed Use as directed 1 each 0    Lancets (freestyle) lancets Use as instructed 100 each 3    metFORMIN (GLUCOPHAGE-XR) 750 mg 24 hr tablet Take 2 tablets (1,500 mg total) by mouth daily with dinner 180 tablet 3     No current facility-administered medications on file prior to visit.      Social History     Tobacco Use    Smoking status: Former     Current packs/day: 0.00     Average packs/day: 0.3 packs/day for 47.3 years (11.8 ttl pk-yrs)     Types: Cigarettes     Start date:      Quit date: 2023     Years since quittin.0    Smokeless tobacco: Never    Tobacco comments:     On and off since a teen... in 2019 started smoking more    Vaping Use    Vaping status: Never Used   Substance and Sexual Activity    Alcohol use: Yes     Comment: socially     Drug use: Never    Sexual activity: Not Currently     Partners: Male     Birth control/protection: Post-menopausal     Objective     /81 (BP Location: Right arm, Patient Position: Sitting, Cuff Size: Standard)   Pulse 95   Temp 98.5 °F (36.9 °C) (Temporal)   Ht 5' 1\" (1.549 m)   Wt 80.7 kg (178 lb)   LMP 10/01/2010 (Approximate)   SpO2 98%   BMI 33.63 kg/m²     Physical Exam  Vitals and nursing note reviewed.   Constitutional:       General: She is awake. She is not in acute distress.     Appearance: Normal appearance. She is well-developed and well-groomed. She is obese. She is not ill-appearing.   HENT:      Head: Normocephalic and atraumatic.   Eyes:      General: No scleral icterus.     Conjunctiva/sclera: Conjunctivae normal.   Cardiovascular:      Rate and Rhythm: Normal rate and regular rhythm.      Pulses: Normal pulses. no weak pulses.           Radial pulses are 2+ on the right side and 2+ on the left side.        Dorsalis pedis pulses are 2+ on the right side and 2+ on the left side.        Posterior tibial pulses are " 2+ on the right side and 2+ on the left side.      Heart sounds: Normal heart sounds. No murmur heard.  Pulmonary:      Effort: Pulmonary effort is normal. No respiratory distress.      Breath sounds: Normal breath sounds and air entry. No decreased air movement. No decreased breath sounds, wheezing, rhonchi or rales.   Abdominal:      General: Abdomen is flat. Bowel sounds are normal. There is no distension.      Palpations: Abdomen is soft. There is no mass.      Tenderness: There is no abdominal tenderness. There is no guarding or rebound.      Hernia: No hernia is present.   Musculoskeletal:      Cervical back: Neck supple.      Right lower leg: No edema.      Left lower leg: No edema.   Feet:      Right foot:      Skin integrity: No ulcer, skin breakdown, erythema, warmth, callus or dry skin.      Left foot:      Skin integrity: No ulcer, skin breakdown, erythema, warmth, callus or dry skin.   Lymphadenopathy:      Cervical: No cervical adenopathy.   Skin:     General: Skin is warm.      Coloration: Skin is not jaundiced.      Findings: No rash.   Neurological:      General: No focal deficit present.      Mental Status: She is alert and oriented to person, place, and time. Mental status is at baseline.      Motor: Motor function is intact.      Coordination: Coordination is intact.      Gait: Gait is intact.   Psychiatric:         Attention and Perception: Attention normal.         Mood and Affect: Mood and affect normal.         Speech: Speech normal.         Behavior: Behavior normal. Behavior is cooperative.     Patient's shoes and socks removed.    Right Foot/Ankle   Right Foot Inspection  Skin Exam: skin normal and skin intact. No dry skin, no warmth, no callus, no erythema, no maceration, no abnormal color, no pre-ulcer, no ulcer and no callus.     Toe Exam: ROM and strength within normal limits.     Sensory   Proprioception: intact  Monofilament testing: intact    Vascular  Capillary refills: < 3  seconds  The right DP pulse is 2+. The right PT pulse is 2+.     Left Foot/Ankle  Left Foot Inspection  Skin Exam: skin normal and skin intact. No dry skin, no warmth, no erythema, no maceration, normal color, no pre-ulcer, no ulcer and no callus.     Toe Exam: ROM and strength within normal limits.     Sensory   Proprioception: intact  Monofilament testing: intact    Vascular  Capillary refills: < 3 seconds  The left DP pulse is 2+. The left PT pulse is 2+.     Assign Risk Category  No deformity present  No loss of protective sensation  No weak pulses  Risk: 0     Administrative Statements   I have spent a total time of 30 minutes on 05/20/24 In caring for this patient including Diagnostic results, Prognosis, Risks and benefits of tx options, Instructions for management, Patient and family education, Importance of tx compliance, Risk factor reductions, Impressions, Counseling / Coordination of care, Reviewing / ordering tests, medicine, procedures  , and Obtaining or reviewing history  .

## 2024-05-20 NOTE — ASSESSMENT & PLAN NOTE
Lab Results   Component Value Date    CHOLESTEROL 140 05/12/2023    CHOLESTEROL 129 11/19/2021    CHOLESTEROL 278 (H) 09/10/2019     Lab Results   Component Value Date    HDL 46 (L) 05/12/2023    HDL 40 (L) 11/19/2021    HDL 41 09/10/2019     Lab Results   Component Value Date    TRIG 236 (H) 05/12/2023    TRIG 143 11/19/2021    TRIG 255 (H) 09/10/2019     Lab Results   Component Value Date    NONHDLC 94 05/12/2023    NONHDLC 89 11/19/2021    NONHDLC 237 09/10/2019      Lab Results   Component Value Date    LDLCALC 47 05/12/2023      Due for repeat lipid panel. Continue atorvastatin 40 mg once daily. Low fat diet.

## 2024-05-20 NOTE — ASSESSMENT & PLAN NOTE
CT chest wo contrast 7/17/23: Subtle upper lobe centrilobular emphysema.     Pulmonology feels symptoms are more so related the asthma. She did quit smoking about 1 years ago.

## 2024-05-20 NOTE — ASSESSMENT & PLAN NOTE
Lab Results   Component Value Date    HGBA1C 5.5 05/20/2024     Significant improvement from 7.4% since starting Ozempic. Ozempic also helping with weight loss. Patient would like to increase dose for more efficacy with weight loss. Discussed possible side effects. She is agreeable to start Ozempic 1 mg every 7 days. Continue metformin 7590 mg two tablets daily. Reviewed nutrition and exercise recommendations. Up to date on eye exam, will obtain records. Foot exam completed today. Urine micro ordered today. Will follow up in 4 months, sooner if needed.

## 2024-05-20 NOTE — ASSESSMENT & PLAN NOTE
Breathing has been much better. Albuterol use has significantly decreased. Continue Advair 250-50 mcg one puff BID. Rinse mouth after use. Continue albuterol as needed. She is waiting to start Fasenra injections. She is followed by pulmonology.

## 2024-05-20 NOTE — ASSESSMENT & PLAN NOTE
BP Readings from Last 3 Encounters:   05/17/24 138/81   02/19/24 126/70   12/14/23 134/72      Continue triamterene-HCTZ 37.5-25 mg daily. Limit sodium and salt intake. Avoid alcohol and caffeine.

## 2024-05-21 ENCOUNTER — OFFICE VISIT (OUTPATIENT)
Dept: PULMONOLOGY | Facility: CLINIC | Age: 64
End: 2024-05-21
Payer: COMMERCIAL

## 2024-05-21 VITALS
DIASTOLIC BLOOD PRESSURE: 80 MMHG | RESPIRATION RATE: 18 BRPM | SYSTOLIC BLOOD PRESSURE: 130 MMHG | WEIGHT: 178 LBS | OXYGEN SATURATION: 97 % | HEIGHT: 61 IN | TEMPERATURE: 97 F | HEART RATE: 86 BPM | BODY MASS INDEX: 33.61 KG/M2

## 2024-05-21 DIAGNOSIS — J45.909 SEVERE ASTHMA WITHOUT COMPLICATION, UNSPECIFIED WHETHER PERSISTENT: Primary | ICD-10-CM

## 2024-05-21 PROCEDURE — 99215 OFFICE O/P EST HI 40 MIN: CPT | Performed by: INTERNAL MEDICINE

## 2024-05-21 NOTE — ASSESSMENT & PLAN NOTE
Crow is doing better on high-dose Advair but still needs her rescue inhaler and has limitations with regards to her breathing.  I reviewed her labs as well as her PFTs and x-rays.  I still think she would benefit from Fasenra injections and she was willing to move forward.  I reengaged her with our injectable liaison to help remove barriers.

## 2024-05-21 NOTE — PROGRESS NOTES
Ambulatory Visit  Name: Luz Elena Sherman      : 1960      MRN: [id number]  Encounter Provider: Sujata Almanzar DO  Encounter Date: 2024   Encounter department: [facility] PULMONARY [facility]    Assessment & Plan   1. Severe asthma without complication, unspecified whether persistent  Assessment & Plan:  Crow is doing better on high-dose Advair but still needs her rescue inhaler and has limitations with regards to her breathing.  I reviewed her labs as well as her PFTs and x-rays.  I still think she would benefit from Fasenra injections and she was willing to move forward.  I reengaged her with our injectable liaison to help remove barriers.      History of Present Illness     Luz Elena Sherman is a 63 y.o. female who presents for follow-up of her asthma.  She still feels the Advair is working well for her but she uses her rescue inhaler at least 2-3 times a week and has wheezing on occasion.    Review of Systems   Constitutional: Negative.  Negative for unexpected weight change.   HENT: Negative.  Negative for postnasal drip.    Eyes: Negative.    Respiratory: Negative.  Negative for cough, shortness of breath and wheezing.    Cardiovascular: Negative.  Negative for chest pain and leg swelling.   Gastrointestinal: Negative.    Endocrine: Negative.    Genitourinary: Negative.    Musculoskeletal: Negative.    Allergic/Immunologic: Negative.    Neurological: Negative.    Hematological: Negative.      Medical History Reviewed by provider this encounter:       Current Outpatient Medications on File Prior to Visit   Medication Sig Dispense Refill    albuterol (2.5 mg/3 mL) 0.083 % nebulizer solution Take 3 mL (2.5 mg total) by nebulization every 6 (six) hours as needed for wheezing or shortness of breath 75 mL 0    albuterol (PROVENTIL HFA,VENTOLIN HFA) 90 mcg/act inhaler INHALE 2 PUFFS BY MOUTH EVERY 4 HOURS AS NEEDED FOR WHEEZE 6.7 g 3    atorvastatin (LIPITOR) 40 mg tablet Take 1 tablet (40 mg  "total) by mouth daily 90 tablet 3    benralizumab (FASENRA) subcutaneous injection Inject 1 mL (30 mg total) under the skin every 56 days 1 mL 5    benralizumab (FASENRA) subcutaneous injection Inject 1 mL (30 mg total) under the skin every 28 days 1 mL 2    furosemide (LASIX) 20 mg tablet Take 1 tablet (20 mg total) by mouth daily 90 tablet 3    glucose blood (FREESTYLE LITE) test strip Use as instructed 100 each 5    glucose monitoring kit (FREESTYLE) monitoring kit Apply 1 m topically 4 (four) times a day as needed Use as directed 1 each 0    Lancets (freestyle) lancets Use as instructed 100 each 3    metFORMIN (GLUCOPHAGE-XR) 750 mg 24 hr tablet Take 2 tablets (1,500 mg total) by mouth daily with dinner 180 tablet 3    semaglutide, 1 mg/dose, (Ozempic) 4 mg/3 mL injection pen Inject 0.75 mL (1 mg total) under the skin once a week 9 mL 2    triamterene-hydrochlorothiazide (DYAZIDE) 37.5-25 mg per capsule Take 1 capsule by mouth daily 90 capsule 3    Fluticasone-Salmeterol (Advair Diskus) 250-50 mcg/dose inhaler Inhale 1 puff 2 (two) times a day Rinse mouth after use. 60 blister 2     No current facility-administered medications on file prior to visit.      Social History     Tobacco Use    Smoking status: Former     Current packs/day: 0.00     Average packs/day: 0.3 packs/day for 47.3 years (11.8 ttl pk-yrs)     Types: Cigarettes     Start date:      Quit date: 2023     Years since quittin.0    Smokeless tobacco: Never    Tobacco comments:     On and off since a teen... in 2019 started smoking more    Vaping Use    Vaping status: Never Used   Substance and Sexual Activity    Alcohol use: Yes     Comment: socially     Drug use: Never    Sexual activity: Not Currently     Partners: Male     Birth control/protection: Post-menopausal     Objective     /80 (BP Location: Left arm, Patient Position: Sitting, Cuff Size: Adult)   Pulse 86   Temp (!) 97 °F (36.1 °C) (Tympanic)   Resp 18   Ht 5' 1\" " (1.549 m)   Wt 80.7 kg (178 lb)   LMP 10/01/2010 (Approximate)   SpO2 97%   BMI 33.63 kg/m²     Physical Exam  Constitutional:       Appearance: She is well-developed.   HENT:      Head: Normocephalic and atraumatic.   Eyes:      Pupils: Pupils are equal, round, and reactive to light.   Cardiovascular:      Rate and Rhythm: Normal rate and regular rhythm.      Heart sounds: No murmur heard.  Pulmonary:      Effort: Pulmonary effort is normal. No respiratory distress.      Breath sounds: Normal breath sounds. No wheezing or rales.   Abdominal:      Palpations: Abdomen is soft.   Musculoskeletal:      Cervical back: Normal range of motion and neck supple.   Skin:     General: Skin is warm and dry.   Neurological:      Mental Status: She is alert and oriented to person, place, and time.       Administrative Statements

## 2024-05-22 ENCOUNTER — VBI (OUTPATIENT)
Dept: ADMINISTRATIVE | Facility: OTHER | Age: 64
End: 2024-05-22

## 2024-05-22 ENCOUNTER — TELEPHONE (OUTPATIENT)
Dept: PULMONOLOGY | Facility: CLINIC | Age: 64
End: 2024-05-22

## 2024-06-20 ENCOUNTER — VBI (OUTPATIENT)
Dept: ADMINISTRATIVE | Facility: OTHER | Age: 64
End: 2024-06-20

## 2024-06-20 NOTE — TELEPHONE ENCOUNTER
06/20/24 9:14 AM     Chart reviewed for Hemoglobin A1c ; nothing is submitted to the patient's insurance at this time.     Luana Kaur MA   [facility]

## 2024-07-03 DIAGNOSIS — J45.50 SEVERE PERSISTENT ASTHMA WITHOUT COMPLICATION: Primary | ICD-10-CM

## 2024-07-05 DIAGNOSIS — E87.79 OTHER HYPERVOLEMIA: ICD-10-CM

## 2024-07-05 RX ORDER — FUROSEMIDE 20 MG/1
20 TABLET ORAL DAILY
Qty: 90 TABLET | Refills: 3 | Status: SHIPPED | OUTPATIENT
Start: 2024-07-05

## 2024-07-05 NOTE — TELEPHONE ENCOUNTER
Received call from patient requesting refill of her Lasix.     Script sent to pharmacy for refill.

## 2024-07-17 ENCOUNTER — VBI (OUTPATIENT)
Dept: ADMINISTRATIVE | Facility: OTHER | Age: 64
End: 2024-07-17

## 2024-07-17 NOTE — TELEPHONE ENCOUNTER
07/17/24 9:51 AM     Chart reviewed for Mammogram ; nothing is submitted to the patient's insurance at this time.     Luana Kaur MA   [facility]

## 2024-07-19 ENCOUNTER — VBI (OUTPATIENT)
Dept: ADMINISTRATIVE | Facility: OTHER | Age: 64
End: 2024-07-19

## 2024-07-19 NOTE — TELEPHONE ENCOUNTER
07/19/24 8:31 AM     Chart reviewed for Diabetic Eye Exam ; nothing is submitted to the patient's insurance at this time.     Luana Kaur MA   [facility]

## 2024-07-30 ENCOUNTER — TELEPHONE (OUTPATIENT)
Dept: INTERNAL MEDICINE CLINIC | Facility: CLINIC | Age: 64
End: 2024-07-30

## 2024-08-02 ENCOUNTER — TELEPHONE (OUTPATIENT)
Dept: INTERNAL MEDICINE CLINIC | Facility: CLINIC | Age: 64
End: 2024-08-02

## 2024-08-02 DIAGNOSIS — E11.69 TYPE 2 DIABETES MELLITUS WITH OTHER SPECIFIED COMPLICATION, WITHOUT LONG-TERM CURRENT USE OF INSULIN (HCC): Primary | ICD-10-CM

## 2024-08-02 NOTE — TELEPHONE ENCOUNTER
Received call from patient. Patient requesting a refill of her Ozempic. Patient stated she is supposed to be on 2 mg now.     Please review and advise. Patient would need new script sent to [facility].     Patient follow up appointment made for 9/16/24 at 1600.     All questions/concerns answered at this time.

## 2024-09-10 ENCOUNTER — VBI (OUTPATIENT)
Dept: ADMINISTRATIVE | Facility: OTHER | Age: 64
End: 2024-09-10

## 2024-09-10 NOTE — TELEPHONE ENCOUNTER
09/10/24 7:30 AM     Chart reviewed for Pap Smear (HPV) aka Cervical Cancer Screening ; nothing is submitted to the patient's insurance at this time.     Luana Kaur MA   [facility]

## 2024-09-12 ENCOUNTER — VBI (OUTPATIENT)
Dept: ADMINISTRATIVE | Facility: OTHER | Age: 64
End: 2024-09-12

## 2024-09-12 NOTE — TELEPHONE ENCOUNTER
09/12/24 8:48 AM     Chart reviewed for CRC: Colonoscopy ; nothing is submitted to the patient's insurance at this time.     Luana Kaur MA   [facility]

## 2024-09-17 ENCOUNTER — VBI (OUTPATIENT)
Dept: ADMINISTRATIVE | Facility: OTHER | Age: 64
End: 2024-09-17

## 2024-09-17 NOTE — TELEPHONE ENCOUNTER
09/17/24 9:34 AM     Chart reviewed for Diabetic Eye Exam ; nothing is submitted to the patient's insurance at this time.     Luana Kaur MA   [facility]

## 2024-09-18 ENCOUNTER — VBI (OUTPATIENT)
Dept: ADMINISTRATIVE | Facility: OTHER | Age: 64
End: 2024-09-18

## 2024-09-18 NOTE — TELEPHONE ENCOUNTER
09/18/24 9:22 AM     Chart reviewed for Hemoglobin A1c ; nothing is submitted to the patient's insurance at this time.     Luana Kaur MA   [facility]

## 2024-09-26 DIAGNOSIS — E11.69 TYPE 2 DIABETES MELLITUS WITH OTHER SPECIFIED COMPLICATION, WITHOUT LONG-TERM CURRENT USE OF INSULIN (HCC): ICD-10-CM

## 2024-09-26 RX ORDER — SEMAGLUTIDE 2.68 MG/ML
INJECTION, SOLUTION SUBCUTANEOUS
Qty: 0.75 ML | Refills: 1 | Status: SHIPPED | OUTPATIENT
Start: 2024-09-26

## 2024-10-30 ENCOUNTER — OFFICE VISIT (OUTPATIENT)
Dept: PULMONOLOGY | Facility: CLINIC | Age: 64
End: 2024-10-30
Payer: COMMERCIAL

## 2024-10-30 VITALS
DIASTOLIC BLOOD PRESSURE: 68 MMHG | TEMPERATURE: 98.1 F | HEART RATE: 83 BPM | WEIGHT: 161.2 LBS | OXYGEN SATURATION: 96 % | SYSTOLIC BLOOD PRESSURE: 122 MMHG | BODY MASS INDEX: 30.46 KG/M2

## 2024-10-30 DIAGNOSIS — J45.50 SEVERE PERSISTENT ASTHMA WITHOUT COMPLICATION: Primary | ICD-10-CM

## 2024-10-30 PROCEDURE — 99214 OFFICE O/P EST MOD 30 MIN: CPT | Performed by: INTERNAL MEDICINE
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
## 2024-11-12 DIAGNOSIS — J45.50 SEVERE PERSISTENT ASTHMA WITHOUT COMPLICATION: ICD-10-CM

## 2024-11-12 RX ORDER — FLUTICASONE PROPIONATE AND SALMETEROL 250; 50 UG/1; UG/1
POWDER RESPIRATORY (INHALATION)
Qty: 60 BLISTER | Refills: 2 | Status: SHIPPED | OUTPATIENT
Start: 2024-11-12

## 2024-11-14 DIAGNOSIS — E11.69 TYPE 2 DIABETES MELLITUS WITH OTHER SPECIFIED COMPLICATION, WITHOUT LONG-TERM CURRENT USE OF INSULIN (HCC): ICD-10-CM

## 2024-11-14 RX ORDER — METFORMIN HYDROCHLORIDE 750 MG/1
TABLET, EXTENDED RELEASE ORAL
Qty: 180 TABLET | Refills: 3 | Status: SHIPPED | OUTPATIENT
Start: 2024-11-14

## 2024-11-15 ENCOUNTER — VBI (OUTPATIENT)
Dept: ADMINISTRATIVE | Facility: OTHER | Age: 64
End: 2024-11-15

## 2024-11-15 NOTE — TELEPHONE ENCOUNTER
11/15/24 10:45 AM     Chart reviewed for Pap Smear (HPV) aka Cervical Cancer Screening ; nothing is submitted to the patient's insurance at this time.     Luana Kaur MA   [facility]

## 2024-11-25 ENCOUNTER — VBI (OUTPATIENT)
Dept: ADMINISTRATIVE | Facility: OTHER | Age: 64
End: 2024-11-25

## 2024-11-25 NOTE — TELEPHONE ENCOUNTER
11/25/24 8:17 AM     Chart reviewed for CRC: Colonoscopy ; nothing is submitted to the patient's insurance at this time.     Luana Kaur MA   [facility]

## 2024-12-06 DIAGNOSIS — E11.69 TYPE 2 DIABETES MELLITUS WITH OTHER SPECIFIED COMPLICATION, WITHOUT LONG-TERM CURRENT USE OF INSULIN (HCC): ICD-10-CM

## 2024-12-08 ENCOUNTER — VBI (OUTPATIENT)
Dept: ADMINISTRATIVE | Facility: OTHER | Age: 64
End: 2024-12-08

## 2024-12-08 NOTE — TELEPHONE ENCOUNTER
12/08/24 2:05 PM     Chart reviewed for Diabetic Eye Exam ; nothing is submitted to the patient's insurance at this time.     Luana Kaur MA   [facility]

## 2024-12-09 RX ORDER — SEMAGLUTIDE 2.68 MG/ML
INJECTION, SOLUTION SUBCUTANEOUS
Qty: 3 ML | Refills: 1 | Status: SHIPPED | OUTPATIENT
Start: 2024-12-09

## 2024-12-24 DIAGNOSIS — J45.50 SEVERE PERSISTENT ASTHMA WITHOUT COMPLICATION: Primary | ICD-10-CM

## 2024-12-24 RX ORDER — EPINEPHRINE 1 MG/ML
0.3 INJECTION, SOLUTION, CONCENTRATE INTRAVENOUS
Status: CANCELLED | OUTPATIENT
Start: 2025-01-23

## 2024-12-26 DIAGNOSIS — E87.79 OTHER HYPERVOLEMIA: ICD-10-CM

## 2024-12-26 DIAGNOSIS — H81.09 MENIERE'S DISEASE, UNSPECIFIED LATERALITY: ICD-10-CM

## 2024-12-26 DIAGNOSIS — E11.69 TYPE 2 DIABETES MELLITUS WITH OTHER SPECIFIED COMPLICATION, WITHOUT LONG-TERM CURRENT USE OF INSULIN (HCC): ICD-10-CM

## 2024-12-28 RX ORDER — TRIAMTERENE AND HYDROCHLOROTHIAZIDE 37.5; 25 MG/1; MG/1
1 CAPSULE ORAL DAILY
Qty: 90 CAPSULE | Refills: 0 | Status: SHIPPED | OUTPATIENT
Start: 2024-12-28

## 2024-12-28 RX ORDER — FUROSEMIDE 20 MG/1
20 TABLET ORAL DAILY
Qty: 90 TABLET | Refills: 0 | Status: SHIPPED | OUTPATIENT
Start: 2024-12-28

## 2024-12-28 RX ORDER — ATORVASTATIN CALCIUM 40 MG/1
40 TABLET, FILM COATED ORAL DAILY
Qty: 90 TABLET | Refills: 0 | Status: SHIPPED | OUTPATIENT
Start: 2024-12-28

## 2025-01-13 ENCOUNTER — HOSPITAL ENCOUNTER (OUTPATIENT)
Facility: HOSPITAL | Age: 65
Setting detail: OBSERVATION
Discharge: HOME | End: 2025-01-14
Attending: EMERGENCY MEDICINE | Admitting: INTERNAL MEDICINE
Payer: COMMERCIAL

## 2025-01-13 ENCOUNTER — APPOINTMENT (EMERGENCY)
Dept: RADIOLOGY | Facility: HOSPITAL | Age: 65
End: 2025-01-13
Payer: COMMERCIAL

## 2025-01-13 DIAGNOSIS — J45.50 SEVERE PERSISTENT ASTHMA WITHOUT COMPLICATION: ICD-10-CM

## 2025-01-13 DIAGNOSIS — R42 VERTIGO: Primary | ICD-10-CM

## 2025-01-13 DIAGNOSIS — E87.79 OTHER HYPERVOLEMIA: ICD-10-CM

## 2025-01-13 DIAGNOSIS — R26.2 AMBULATORY DYSFUNCTION: ICD-10-CM

## 2025-01-13 DIAGNOSIS — E04.2 MULTINODULAR THYROID: ICD-10-CM

## 2025-01-13 PROBLEM — E87.6 HYPOKALEMIA: Status: ACTIVE | Noted: 2025-01-13

## 2025-01-13 LAB
ALBUMIN SERPL BCG-MCNC: 4.3 G/DL (ref 3.5–5)
ALP SERPL-CCNC: 68 U/L (ref 34–104)
ALT SERPL W P-5'-P-CCNC: 22 U/L (ref 7–52)
ANION GAP SERPL CALCULATED.3IONS-SCNC: 11 MMOL/L (ref 4–13)
AST SERPL W P-5'-P-CCNC: 17 U/L (ref 13–39)
ATRIAL RATE: 88 BPM
BASOPHILS # BLD AUTO: 0.05 THOUSANDS/ΜL (ref 0–0.1)
BASOPHILS NFR BLD AUTO: 1 % (ref 0–1)
BILIRUB SERPL-MCNC: 0.53 MG/DL (ref 0.2–1)
BUN SERPL-MCNC: 13 MG/DL (ref 5–25)
CALCIUM SERPL-MCNC: 9.3 MG/DL (ref 8.4–10.2)
CARDIAC TROPONIN I PNL SERPL HS: 3 NG/L (ref ?–50)
CHLORIDE SERPL-SCNC: 99 MMOL/L (ref 96–108)
CO2 SERPL-SCNC: 27 MMOL/L (ref 21–32)
CREAT SERPL-MCNC: 0.57 MG/DL (ref 0.6–1.3)
EOSINOPHIL # BLD AUTO: 0.25 THOUSAND/ΜL (ref 0–0.61)
EOSINOPHIL NFR BLD AUTO: 3 % (ref 0–6)
ERYTHROCYTE [DISTWIDTH] IN BLOOD BY AUTOMATED COUNT: 12.4 % (ref 11.6–15.1)
GFR SERPL CREATININE-BSD FRML MDRD: 98 ML/MIN/1.73SQ M
GLUCOSE SERPL-MCNC: 116 MG/DL (ref 65–140)
GLUCOSE SERPL-MCNC: 86 MG/DL (ref 65–140)
GLUCOSE SERPL-MCNC: 88 MG/DL (ref 65–140)
HCT VFR BLD AUTO: 43.7 % (ref 34.8–46.1)
HGB BLD-MCNC: 14.8 G/DL (ref 11.5–15.4)
IMM GRANULOCYTES # BLD AUTO: 0.02 THOUSAND/UL (ref 0–0.2)
IMM GRANULOCYTES NFR BLD AUTO: 0 % (ref 0–2)
LYMPHOCYTES # BLD AUTO: 2.94 THOUSANDS/ΜL (ref 0.6–4.47)
LYMPHOCYTES NFR BLD AUTO: 33 % (ref 14–44)
MAGNESIUM SERPL-MCNC: 1.9 MG/DL (ref 1.9–2.7)
MCH RBC QN AUTO: 30.1 PG (ref 26.8–34.3)
MCHC RBC AUTO-ENTMCNC: 33.9 G/DL (ref 31.4–37.4)
MCV RBC AUTO: 89 FL (ref 82–98)
MONOCYTES # BLD AUTO: 0.69 THOUSAND/ΜL (ref 0.17–1.22)
MONOCYTES NFR BLD AUTO: 8 % (ref 4–12)
NEUTROPHILS # BLD AUTO: 4.95 THOUSANDS/ΜL (ref 1.85–7.62)
NEUTS SEG NFR BLD AUTO: 55 % (ref 43–75)
NRBC BLD AUTO-RTO: 0 /100 WBCS
P AXIS: 25 DEGREES
PLATELET # BLD AUTO: 245 THOUSANDS/UL (ref 149–390)
PLATELET # BLD AUTO: 273 THOUSANDS/UL (ref 149–390)
PMV BLD AUTO: 9.9 FL (ref 8.9–12.7)
PMV BLD AUTO: 9.9 FL (ref 8.9–12.7)
POTASSIUM SERPL-SCNC: 3.2 MMOL/L (ref 3.5–5.3)
POTASSIUM SERPL-SCNC: 3.7 MMOL/L (ref 3.5–5.3)
PR INTERVAL: 126 MS
PROT SERPL-MCNC: 7.3 G/DL (ref 6.4–8.4)
QRS AXIS: 12 DEGREES
QRSD INTERVAL: 68 MS
QT INTERVAL: 362 MS
QTC INTERVAL: 438 MS
RBC # BLD AUTO: 4.91 MILLION/UL (ref 3.81–5.12)
SODIUM SERPL-SCNC: 137 MMOL/L (ref 135–147)
T WAVE AXIS: 18 DEGREES
TSH SERPL DL<=0.05 MIU/L-ACNC: 0.46 UIU/ML (ref 0.45–4.5)
VENTRICULAR RATE: 88 BPM
WBC # BLD AUTO: 8.9 THOUSAND/UL (ref 4.31–10.16)

## 2025-01-13 PROCEDURE — 84443 ASSAY THYROID STIM HORMONE: CPT | Performed by: EMERGENCY MEDICINE

## 2025-01-13 PROCEDURE — 96360 HYDRATION IV INFUSION INIT: CPT

## 2025-01-13 PROCEDURE — 84132 ASSAY OF SERUM POTASSIUM: CPT

## 2025-01-13 PROCEDURE — 71046 X-RAY EXAM CHEST 2 VIEWS: CPT

## 2025-01-13 PROCEDURE — 99285 EMERGENCY DEPT VISIT HI MDM: CPT | Performed by: EMERGENCY MEDICINE

## 2025-01-13 PROCEDURE — 84484 ASSAY OF TROPONIN QUANT: CPT | Performed by: EMERGENCY MEDICINE

## 2025-01-13 PROCEDURE — 99285 EMERGENCY DEPT VISIT HI MDM: CPT

## 2025-01-13 PROCEDURE — 82948 REAGENT STRIP/BLOOD GLUCOSE: CPT

## 2025-01-13 PROCEDURE — 70498 CT ANGIOGRAPHY NECK: CPT

## 2025-01-13 PROCEDURE — 93010 ELECTROCARDIOGRAM REPORT: CPT | Performed by: INTERNAL MEDICINE

## 2025-01-13 PROCEDURE — 93005 ELECTROCARDIOGRAM TRACING: CPT

## 2025-01-13 PROCEDURE — 85049 AUTOMATED PLATELET COUNT: CPT

## 2025-01-13 PROCEDURE — 70496 CT ANGIOGRAPHY HEAD: CPT

## 2025-01-13 PROCEDURE — 85025 COMPLETE CBC W/AUTO DIFF WBC: CPT

## 2025-01-13 PROCEDURE — 36415 COLL VENOUS BLD VENIPUNCTURE: CPT

## 2025-01-13 PROCEDURE — 83735 ASSAY OF MAGNESIUM: CPT | Performed by: EMERGENCY MEDICINE

## 2025-01-13 PROCEDURE — 80053 COMPREHEN METABOLIC PANEL: CPT

## 2025-01-13 PROCEDURE — 99223 1ST HOSP IP/OBS HIGH 75: CPT | Performed by: INTERNAL MEDICINE

## 2025-01-13 RX ORDER — FLUTICASONE FUROATE AND VILANTEROL 200; 25 UG/1; UG/1
1 POWDER RESPIRATORY (INHALATION)
Status: DISCONTINUED | OUTPATIENT
Start: 2025-01-14 | End: 2025-01-14 | Stop reason: HOSPADM

## 2025-01-13 RX ORDER — MECLIZINE HYDROCHLORIDE 25 MG/1
25 TABLET ORAL EVERY 12 HOURS PRN
Status: DISCONTINUED | OUTPATIENT
Start: 2025-01-13 | End: 2025-01-14 | Stop reason: HOSPADM

## 2025-01-13 RX ORDER — INSULIN LISPRO 100 [IU]/ML
1-5 INJECTION, SOLUTION INTRAVENOUS; SUBCUTANEOUS
Status: DISCONTINUED | OUTPATIENT
Start: 2025-01-13 | End: 2025-01-14 | Stop reason: HOSPADM

## 2025-01-13 RX ORDER — ENOXAPARIN SODIUM 100 MG/ML
40 INJECTION SUBCUTANEOUS DAILY
Status: DISCONTINUED | OUTPATIENT
Start: 2025-01-13 | End: 2025-01-14 | Stop reason: HOSPADM

## 2025-01-13 RX ORDER — TRIAMTERENE AND HYDROCHLOROTHIAZIDE 37.5; 25 MG/1; MG/1
1 TABLET ORAL DAILY
Status: DISCONTINUED | OUTPATIENT
Start: 2025-01-13 | End: 2025-01-14 | Stop reason: HOSPADM

## 2025-01-13 RX ORDER — POTASSIUM CHLORIDE 1500 MG/1
20 TABLET, EXTENDED RELEASE ORAL ONCE
Status: COMPLETED | OUTPATIENT
Start: 2025-01-13 | End: 2025-01-13

## 2025-01-13 RX ORDER — MECLIZINE HCL 12.5 MG 12.5 MG/1
12.5 TABLET ORAL EVERY 8 HOURS PRN
Status: CANCELLED | OUTPATIENT
Start: 2025-01-13

## 2025-01-13 RX ORDER — ALBUTEROL SULFATE 90 UG/1
1 INHALANT RESPIRATORY (INHALATION) EVERY 6 HOURS PRN
Status: DISCONTINUED | OUTPATIENT
Start: 2025-01-13 | End: 2025-01-14 | Stop reason: HOSPADM

## 2025-01-13 RX ORDER — POTASSIUM CHLORIDE 1500 MG/1
40 TABLET, EXTENDED RELEASE ORAL ONCE
Status: COMPLETED | OUTPATIENT
Start: 2025-01-13 | End: 2025-01-13

## 2025-01-13 RX ORDER — MECLIZINE HYDROCHLORIDE 25 MG/1
25 TABLET ORAL ONCE
Status: COMPLETED | OUTPATIENT
Start: 2025-01-13 | End: 2025-01-13

## 2025-01-13 RX ORDER — ATORVASTATIN CALCIUM 40 MG/1
40 TABLET, FILM COATED ORAL DAILY
Status: DISCONTINUED | OUTPATIENT
Start: 2025-01-13 | End: 2025-01-14 | Stop reason: HOSPADM

## 2025-01-13 RX ORDER — ENOXAPARIN SODIUM 100 MG/ML
40 INJECTION SUBCUTANEOUS DAILY
Status: CANCELLED | OUTPATIENT
Start: 2025-01-13

## 2025-01-13 RX ADMIN — POTASSIUM CHLORIDE 40 MEQ: 1500 TABLET, EXTENDED RELEASE ORAL at 10:44

## 2025-01-13 RX ADMIN — ENOXAPARIN SODIUM 40 MG: 40 INJECTION SUBCUTANEOUS at 16:58

## 2025-01-13 RX ADMIN — SODIUM CHLORIDE 1000 ML: 0.9 INJECTION, SOLUTION INTRAVENOUS at 13:38

## 2025-01-13 RX ADMIN — TRIAMTERENE AND HYDROCHLOROTHIAZIDE 1 TABLET: 37.5; 25 TABLET ORAL at 18:50

## 2025-01-13 RX ADMIN — POTASSIUM CHLORIDE 20 MEQ: 1500 TABLET, EXTENDED RELEASE ORAL at 16:59

## 2025-01-13 RX ADMIN — IOHEXOL 85 ML: 350 INJECTION, SOLUTION INTRAVENOUS at 10:23

## 2025-01-13 RX ADMIN — MECLIZINE HYDROCHLORIDE 25 MG: 25 TABLET ORAL at 12:27

## 2025-01-13 RX ADMIN — ATORVASTATIN CALCIUM 40 MG: 40 TABLET, FILM COATED ORAL at 16:59

## 2025-01-13 NOTE — Clinical Note
Case was discussed with SOD and the patient's admission status was agreed to be Admission Status: observation status to the service of Dr. Henry .

## 2025-01-13 NOTE — PLAN OF CARE
Problem: PAIN - ADULT  Goal: Verbalizes/displays adequate comfort level or baseline comfort level  Description: Interventions:  - Encourage patient to monitor pain and request assistance  - Assess pain using appropriate pain scale  - Administer analgesics based on type and severity of pain and evaluate response  - Implement non-pharmacological measures as appropriate and evaluate response  - Consider cultural and social influences on pain and pain management  - Notify physician/advanced practitioner if interventions unsuccessful or patient reports new pain  Outcome: Progressing     Problem: INFECTION - ADULT  Goal: Absence or prevention of progression during hospitalization  Description: INTERVENTIONS:  - Assess and monitor for signs and symptoms of infection  - Monitor lab/diagnostic results  - Monitor all insertion sites, i.e. indwelling lines, tubes, and drains  - Monitor endotracheal if appropriate and nasal secretions for changes in amount and color  - [place] appropriate cooling/warming therapies per order  - Administer medications as ordered  - Instruct and encourage patient and family to use good hand hygiene technique  - Identify and instruct in appropriate isolation precautions for identified infection/condition  Outcome: Progressing  Goal: Absence of fever/infection during neutropenic period  Description: INTERVENTIONS:  - Monitor WBC    Outcome: Progressing     Problem: SAFETY ADULT  Goal: Patient will remain free of falls  Description: INTERVENTIONS:  - Educate patient/family on patient safety including physical limitations  - Instruct patient to call for assistance with activity   - Consult OT/PT to assist with strengthening/mobility   - Keep Call bell within reach  - Keep bed low and locked with side rails adjusted as appropriate  - Keep care items and personal belongings within reach  - Initiate and maintain comfort rounds  - Make Fall Risk Sign visible to staff  - Offer Toileting every 4 Hours,  in advance of need  - Initiate/Maintain 4alarm  - Obtain necessary fall risk management equipment: 4  - Apply yellow socks and bracelet for high fall risk patients  - Consider moving patient to room near nurses station  Outcome: Progressing  Goal: Maintain or return to baseline ADL function  Description: INTERVENTIONS:  -  Assess patient's ability to carry out ADLs; assess patient's baseline for ADL function and identify physical deficits which impact ability to perform ADLs (bathing, care of mouth/teeth, toileting, grooming, dressing, etc.)  - Assess/evaluate cause of self-care deficits   - Assess range of motion  - Assess patient's mobility; develop plan if impaired  - Assess patient's need for assistive devices and provide as appropriate  - Encourage maximum independence but intervene and supervise when necessary  - Involve family in performance of ADLs  - Assess for home care needs following discharge   - Consider OT consult to assist with ADL evaluation and planning for discharge  - Provide patient education as appropriate  Outcome: Progressing  Goal: Maintains/Returns to pre admission functional level  Description: INTERVENTIONS:  - Perform AM-PAC 6 Click Basic Mobility/ Daily Activity assessment daily.  - Set and communicate daily mobility goal to care team and patient/family/caregiver.   - Collaborate with rehabilitation services on mobility goals if consulted  - Perform Range of Motion 4 times a day.  - Reposition patient every 4 hours.  - Dangle patient 4 times a day  - Stand patient 4 times a day  - Ambulate patient 4 times a day  - Out of bed to chair 4 times a day   - Out of bed for meals 4 times a day  - Out of bed for toileting  - Record patient progress and toleration of activity level   Outcome: Progressing     Problem: DISCHARGE PLANNING  Goal: Discharge to home or other facility with appropriate resources  Description: INTERVENTIONS:  - Identify barriers to discharge w/patient and caregiver  -  Arrange for needed discharge resources and transportation as appropriate  - Identify discharge learning needs (meds, wound care, etc.)  - Arrange for interpretive services to assist at discharge as needed  - Refer to Case Management Department for coordinating discharge planning if the patient needs post-hospital services based on physician/advanced practitioner order or complex needs related to functional status, cognitive ability, or social support system  Outcome: Progressing     Problem: Knowledge Deficit  Goal: Patient/family/caregiver demonstrates understanding of disease process, treatment plan, medications, and discharge instructions  Description: Complete learning assessment and assess knowledge base.  Interventions:  - Provide teaching at level of understanding  - Provide teaching via preferred learning methods  Outcome: Progressing

## 2025-01-13 NOTE — ASSESSMENT & PLAN NOTE
Pt was diagnosed with meniere in 2009 previously took meclizine but was refractory, now taking diazepam (takes prn) and dyazide daily. Triggers include: sodium, caffeine, chocolate, sugar, lateral EOM, and quick positional changes. Meniere's complicated with hearing loss in left ear and tinnitus.     Plan:   Salt restriction, education on alcohol/nicotine avoidance   Outpatient referral for vestibular rehab  Discharging on Lasix PRN, as patient is on Dyazide with soft BPs and hypokalemia and might have a confounding orthostatic component  Meclizine 25 mg every 12 hours PRN  Needs outpatient referral to ENT  PT/OT eval

## 2025-01-13 NOTE — INCIDENTAL FINDINGS
The following findings require follow up:  Radiographic finding   Finding: Multinodular thyroid   Follow up required: Thyroid ultrasound   Follow up should be done within 2 week(s)    Please notify the following clinician to assist with the follow up:   Nathaly Seaman PA-C

## 2025-01-13 NOTE — ED PROVIDER NOTES
Time reflects when diagnosis was documented in both MDM as applicable and the Disposition within this note       Time User Action Codes Description Comment    1/13/2025 11:29 AM Pelon Rdz [R42] Vertigo     1/13/2025 11:30 AM Pelon Rdz [E04.2] Multinodular thyroid     1/13/2025  2:08 PM Pelon Rdz [R26.2] Ambulatory dysfunction           ED Disposition       ED Disposition   Admit    Condition   Stable    Date/Time   Mon Jan 13, 2025  2:08 PM    Comment   Case was discussed with SOD and the patient's admission status was agreed to be Admission Status: observation status to the service of Dr. Lloyd .               Assessment & Plan       Medical Decision Making  64-year-old female with past medical history of Ménière's disease presents to ED for evaluation of worsening dizziness.  Patient has a nonfocal neurologic exam.  She ambulates without difficulty.  Differential diagnosis: Considering exacerbation of patient's baseline Ménière's disease, however given recent head strike, rule out intracranial bleed.  Patient states that she favors leaning to 1 side, so rule out cerebellar stroke.  Plan: Will order CTA head and neck with and without contrast to evaluate for intracranial abnormality, CBC to evaluate for infectious and anemia, CMP to evaluate for electrolyte abnormality, TSH to evaluate for thyroid, troponin and EKG to evaluate for cardiac.  Please see ED course for additional workup.    Amount and/or Complexity of Data Reviewed  Labs: ordered. Decision-making details documented in ED Course.  Radiology: ordered. Decision-making details documented in ED Course.    Risk  Prescription drug management.  Decision regarding hospitalization.        ED Course as of 01/13/25 1654   Mon Jan 13, 2025   0906 EKG read and interpreted by me.  Rate 88 bpm.  Normal sinus rhythm.  Normal axis.  Normal AL and QT intervals.  Nonspecific T wave flattening in lead III.  No STEMI.   0939 CBC and  differential  unremarkable   1005 Potassium(!): 3.2  Ordered 40 meq oral potassium chloride   1015 hs TnI 0hr: 3  Within normal limits   1037 No acute cardiopulmonary disease   1123 TSH 3RD GENERATON: 0.460  Within normal limits   1123 MAGNESIUM: 1.9  Unremarkable   1130 CTA head and neck w wo contrast  CT Brain:  No acute intracranial CT abnormality.     CT Angiography:     1.  Patent major vessels of the [place] without significant stenosis. No aneurysm.  2.  No significant stenosis in the cervical carotid or vertebral arteries.     Other: Multinodular thyroid. Correlate with thyroid ultrasound.     1152 Will attempt to ambulate   1154 Patient drove to emergency department.  Will not give Valium.  Will give meclizine for vertigo   1325 Patient states that she does not feel safe going home, with fear of falling at home.  She lives alone.  Will discuss case with inpatient medicine team.   1409 Patient admitted to [facility] for observation for ambulatory dysfunction       Medications   albuterol (PROVENTIL HFA,VENTOLIN HFA) inhaler 1 puff (has no administration in time range)   atorvastatin (LIPITOR) tablet 40 mg (has no administration in time range)   fluticasone-vilanterol 200-25 mcg/actuation 1 puff (has no administration in time range)   triamterene-hydrochlorothiazide (MAXZIDE-25) 37.5-25 mg per tablet 1 tablet (has no administration in time range)   meclizine (ANTIVERT) tablet 25 mg (has no administration in time range)   enoxaparin (LOVENOX) subcutaneous injection 40 mg (has no administration in time range)   potassium chloride (Klor-Con M20) CR tablet 20 mEq (has no administration in time range)   insulin lispro (HumALOG/ADMELOG) 100 units/mL subcutaneous injection 1-5 Units (has no administration in time range)   potassium chloride (Klor-Con M20) CR tablet 40 mEq (40 mEq Oral Given 1/13/25 1044)   iohexol (OMNIPAQUE) 350 MG/ML injection (MULTI-DOSE) 85 mL (85 mL Intravenous Given 1/13/25 1023)   meclizine  (ANTIVERT) tablet 25 mg (25 mg Oral Given 1/13/25 1227)   sodium chloride 0.9 % bolus 1,000 mL (1,000 mL Intravenous New Bag 1/13/25 1638)       ED Risk Strat Scores                                              History of Present Illness       Chief Complaint   Patient presents with    Dizziness     H/o meniere's disease. A couple of weeks ago had a dining room wooden chair hit her in the head. Started with vertigo the next day, but symptoms lessened after taking meclizine. Still having the dizziness and unsteadiness and fell back into the recliner at 0130 this morning. Having intermittent headaches since the incident. Denies LOC . Denies blood thinners and aspirin. Reports she has been more tired and sleeping more.        Past Medical History:   Diagnosis Date    Community acquired pneumonia of left lower lobe of lung 6/9/2023    Diabetes mellitus (HCC)     Sepsis (HCC) 6/10/2023    Vertigo       Past Surgical History:   Procedure Laterality Date    BREAST BIOPSY Bilateral 12/01/2021    stereo x2    BREAST BIOPSY Right 12/01/2021    MAMMO STEREOTACTIC BREAST BIOPSY LEFT (ALL INC) Left 12/1/2021    MAMMO STEREOTACTIC BREAST BIOPSY RIGHT (ALL INC) Right 12/1/2021    TUMOR REMOVAL      right leg- benign       Family History   Problem Relation Age of Onset    Stroke Mother     Hypertension Mother     Diabetes Mother     Throat cancer Brother     Ovarian cancer Family         2 maternal aunts and 2 cousins     Stroke Father     Thyroid cancer Sister     Heart disease Sister     Uterine cancer Sister     No Known Problems Son     Heart attack Maternal Grandmother 42    Stroke Maternal Grandfather 62    Heart attack Paternal Grandmother     Leukemia Paternal Grandfather     No Known Problems Sister     No Known Problems Son     Breast cancer Cousin 40    Breast cancer Maternal Aunt 26    Uterine cancer Maternal Aunt     Uterine cancer Cousin     No Known Problems Paternal Aunt       Social History     Tobacco Use     Smoking status: Former     Current packs/day: 0.00     Average packs/day: 0.3 packs/day for 47.3 years (11.8 ttl pk-yrs)     Types: Cigarettes     Start date:      Quit date: 2023     Years since quittin.7    Smokeless tobacco: Never    Tobacco comments:     On and off since a teen... in 2019 started smoking more    Vaping Use    Vaping status: Never Used   Substance Use Topics    Alcohol use: Yes     Comment: socially     Drug use: Never      E-Cigarette/Vaping    E-Cigarette Use Never User       E-Cigarette/Vaping Substances    Nicotine No     THC No     CBD No     Flavoring No     Other No     Unknown No       I have reviewed and agree with the history as documented.     64-year-old female with past medical history of Ménière's disease presents to ED for evaluation of dizziness.  Patient reports that several weeks ago, she was cleaning out her house when a chair fell on her head.  She subsequently had several episodes of vomiting over the days that followed.  Since that time, she has had daily headaches.  Patient states that she has Ménière's disease in which symptoms were normally controlled with medications.  Since her head injury, she has had worsening of symptoms.  She describes more frequent vertigo episodes.  She also describes favoring leaning to 1 side which is worse than her baseline.  Patient does not take AC/AP medications.  She reports that last night, she felt off balance and fell.  She hit her head on her recliner.  There was no loss of consciousness.  Patient also reports that she has had intermittent palpitations over the past few weeks which last up to 1 minute then resolved.         Review of Systems   Constitutional:  Negative for chills and fever.   HENT:  Negative for congestion.    Respiratory:  Negative for shortness of breath.    Cardiovascular:  Negative for chest pain.   Gastrointestinal:  Positive for nausea and vomiting. Negative for abdominal pain.   Musculoskeletal:   Negative for back pain and neck pain.   Skin:  Negative for color change and wound.   Neurological:  Positive for dizziness and headaches.           Objective       ED Triage Vitals   Temperature Pulse Blood Pressure Respirations SpO2 Patient Position - Orthostatic VS   01/13/25 0812 01/13/25 0812 01/13/25 0812 01/13/25 0812 01/13/25 0812 01/13/25 0812   (!) 97.2 °F (36.2 °C) 86 138/65 20 96 % Sitting      Temp Source Heart Rate Source BP Location FiO2 (%) Pain Score    01/13/25 0812 01/13/25 0812 01/13/25 0812 -- 01/13/25 0814    Oral Monitor Left arm  No Pain      Vitals      Date and Time Temp Pulse SpO2 Resp BP Pain Score FACES Pain Rating User   01/13/25 1642 -- 83 99 % -- 118/77 -- -- DII   01/13/25 1641 97.7 °F (36.5 °C) -- -- -- 118/77 -- -- DII   01/13/25 1640 97.7 °F (36.5 °C) -- -- -- 118/77 -- -- DII   01/13/25 1639 97.7 °F (36.5 °C) -- -- -- 119/72 -- -- DII   01/13/25 1638 97.7 °F (36.5 °C) -- -- -- 116/74 -- -- DII   01/13/25 1638 97.7 °F (36.5 °C) -- -- -- 116/74 -- -- DII   01/13/25 1519 -- -- 97 % -- -- -- -- CS   01/13/25 1519 -- 88 -- 18 107/61 -- -- JS   01/13/25 1517 -- 82 -- 20 117/68 -- -- JS   01/13/25 1515 -- 81 -- 20 111/64 -- -- JS   01/13/25 1300 -- 81 95 % 20 99/59 -- -- CS   01/13/25 1121 -- 74 98 % 20 109/63 -- -- CS   01/13/25 0814 -- -- -- -- -- No Pain -- ST   01/13/25 0812 97.2 °F (36.2 °C) 86 96 % 20 138/65 -- -- ST            Physical Exam  Vitals and nursing note reviewed.   Constitutional:       General: She is not in acute distress.     Appearance: Normal appearance. She is normal weight. She is not ill-appearing, toxic-appearing or diaphoretic.   HENT:      Head: Normocephalic and atraumatic.      Nose: No congestion.      Mouth/Throat:      Mouth: Mucous membranes are moist.      Pharynx: Oropharynx is clear.   Eyes:      Extraocular Movements: Extraocular movements intact.      Conjunctiva/sclera: Conjunctivae normal.      Pupils: Pupils are equal, round, and reactive to  light.   Cardiovascular:      Rate and Rhythm: Normal rate and regular rhythm.      Pulses: Normal pulses.      Heart sounds: Normal heart sounds.   Pulmonary:      Effort: Pulmonary effort is normal.      Breath sounds: Normal breath sounds.   Abdominal:      General: Abdomen is flat.      Palpations: Abdomen is soft.      Tenderness: There is no abdominal tenderness.   Musculoskeletal:      Cervical back: Neck supple.      Right lower leg: No edema.      Left lower leg: No edema.   Skin:     General: Skin is warm and dry.      Capillary Refill: Capillary refill takes less than 2 seconds.   Neurological:      General: No focal deficit present.      Mental Status: She is alert and oriented to person, place, and time.      Cranial Nerves: No cranial nerve deficit.      Sensory: No sensory deficit.      Motor: No weakness.      Coordination: Coordination normal.      Gait: Gait normal.         Results Reviewed       Procedure Component Value Units Date/Time    Potassium [[id number]]  (Normal) Collected: 01/13/25 1517    Lab Status: Final result Specimen: Blood from Arm, Right Updated: 01/13/25 1546     Potassium 3.7 mmol/L     Platelet count [[id number]]     Lab Status: No result Specimen: Blood     TSH, 3rd generation with Free T4 reflex [[id number]]  (Normal) Collected: 01/13/25 0927    Lab Status: Final result Specimen: Blood from Arm, Right Updated: 01/13/25 1100     TSH 3RD GENERATON 0.460 uIU/mL     Magnesium [[id number]]  (Normal) Collected: 01/13/25 0927    Lab Status: Final result Specimen: Blood from Arm, Right Updated: 01/13/25 1044     Magnesium 1.9 mg/dL     HS Troponin 0hr (reflex protocol) [[id number]]  (Normal) Collected: 01/13/25 0927    Lab Status: Final result Specimen: Blood from Arm, Right Updated: 01/13/25 1010     hs TnI 0hr 3 ng/L     Comprehensive metabolic panel [[id number]]  (Abnormal) Collected: 01/13/25 0927    Lab Status: Final result Specimen: Blood from Arm, Right Updated: 01/13/25 1004      Sodium 137 mmol/L      Potassium 3.2 mmol/L      Chloride 99 mmol/L      CO2 27 mmol/L      ANION GAP 11 mmol/L      BUN 13 mg/dL      Creatinine 0.57 mg/dL      Glucose 88 mg/dL      Calcium 9.3 mg/dL      AST 17 U/L      ALT 22 U/L      Alkaline Phosphatase 68 U/L      Total Protein 7.3 g/dL      Albumin 4.3 g/dL      Total Bilirubin 0.53 mg/dL      eGFR 98 ml/min/1.73sq m     Narrative:      National Kidney Disease Foundation guidelines for Chronic Kidney Disease (CKD):     Stage 1 with normal or high GFR (GFR > 90 mL/min/1.73 square meters)    Stage 2 Mild CKD (GFR = 60-89 mL/min/1.73 square meters)    Stage 3A Moderate CKD (GFR = 45-59 mL/min/1.73 square meters)    Stage 3B Moderate CKD (GFR = 30-44 mL/min/1.73 square meters)    Stage 4 Severe CKD (GFR = 15-29 mL/min/1.73 square meters)    Stage 5 End Stage CKD (GFR <15 mL/min/1.73 square meters)  Note: GFR calculation is accurate only with a steady state creatinine    CBC and differential [[id number]] Collected: 01/13/25 0927    Lab Status: Final result Specimen: Blood from Arm, Right Updated: 01/13/25 0939     WBC 8.90 Thousand/uL      RBC 4.91 Million/uL      Hemoglobin 14.8 g/dL      Hematocrit 43.7 %      MCV 89 fL      MCH 30.1 pg      MCHC 33.9 g/dL      RDW 12.4 %      MPV 9.9 fL      Platelets 273 Thousands/uL      nRBC 0 /100 WBCs      Segmented % 55 %      Immature Grans % 0 %      Lymphocytes % 33 %      Monocytes % 8 %      Eosinophils Relative 3 %      Basophils Relative 1 %      Absolute Neutrophils 4.95 Thousands/µL      Absolute Immature Grans 0.02 Thousand/uL      Absolute Lymphocytes 2.94 Thousands/µL      Absolute Monocytes 0.69 Thousand/µL      Eosinophils Absolute 0.25 Thousand/µL      Basophils Absolute 0.05 Thousands/µL             XR chest 2 views   Final Interpretation by Anirudh Saleh MD (01/13 1032)      No acute cardiopulmonary disease.            Resident: Avi Torres I, the attending radiologist, have reviewed the  images and agree with the final report above.      Workstation performed: [id number]         CTA head and neck w wo contrast   Final Interpretation by Steve Galdamez MD (01/13 1047)      CT Brain:  No acute intracranial CT abnormality.      CT Angiography:      1.  Patent major vessels of the [place] without significant stenosis. No aneurysm.   2.  No significant stenosis in the cervical carotid or vertebral arteries.      Other: Multinodular thyroid. Correlate with thyroid ultrasound.            Workstation performed: [id number]             Procedures    ED Medication and Procedure Management   Prior to Admission Medications   Prescriptions Last Dose Informant Patient Reported? Taking?   Fluticasone-Salmeterol (Wixela Inhub) 250-50 mcg/dose inhaler 1/12/2025  No Yes   Sig: INHALE 1 PUFF 2 TIMES A DAY RINSE MOUTH AFTER USE.   Lancets (freestyle) lancets Not Taking Self No No   Sig: Use as instructed   Patient not taking: Reported on 1/13/2025   albuterol (2.5 mg/3 mL) 0.083 % nebulizer solution 1/12/2025 Self No Yes   Sig: Take 3 mL (2.5 mg total) by nebulization every 6 (six) hours as needed for wheezing or shortness of breath   albuterol (PROVENTIL HFA,VENTOLIN HFA) 90 mcg/act inhaler 1/12/2025 Self No Yes   Sig: INHALE 2 PUFFS BY MOUTH EVERY 4 HOURS AS NEEDED FOR WHEEZE   atorvastatin (LIPITOR) 40 mg tablet 1/12/2025  No Yes   Sig: Take 1 tablet (40 mg total) by mouth daily   benralizumab (FASENRA) subcutaneous injection Not Taking Self No No   Sig: Inject 1 mL (30 mg total) under the skin every 56 days   Patient not taking: Reported on 1/13/2025   benralizumab (FASENRA) subcutaneous injection Not Taking Self No No   Sig: Inject 1 mL (30 mg total) under the skin every 28 days   Patient not taking: Reported on 1/13/2025   furosemide (LASIX) 20 mg tablet 1/12/2025  No Yes   Sig: Take 1 tablet (20 mg total) by mouth daily   glucose blood (FREESTYLE LITE) test strip Not Taking Self No No   Sig: Use as  instructed   Patient not taking: Reported on 1/13/2025   glucose monitoring kit (FREESTYLE) monitoring kit Not Taking Self No No   Sig: Apply 1 m topically 4 (four) times a day as needed Use as directed   Patient not taking: Reported on 1/13/2025   metFORMIN (GLUCOPHAGE-XR) 750 mg 24 hr tablet 1/12/2025  No Yes   Sig: TAKE 2 TABLETS BY MOUTH DAILY WITH DINNER   semaglutide, 2 mg/dose, (Ozempic, 2 MG/DOSE,) 8 mg/ mL injection pen 1/12/2025  No Yes   Sig: INJECT 0.75 ML (2 MG) SUBCUTANEOUSLY EVERY 7 DAYS   triamterene-hydrochlorothiazide (DYAZIDE) 37.5-25 mg per capsule 1/12/2025  No Yes   Sig: Take 1 capsule by mouth daily      Facility-Administered Medications: None     Current Discharge Medication List        CONTINUE these medications which have NOT CHANGED    Details   albuterol (2.5 mg/3 mL) 0.083 % nebulizer solution Take 3 mL (2.5 mg total) by nebulization every 6 (six) hours as needed for wheezing or shortness of breath  Qty: 75 mL, Refills: 0    Associated Diagnoses: COPD exacerbation (HCC)      albuterol (PROVENTIL HFA,VENTOLIN HFA) 90 mcg/act inhaler INHALE 2 PUFFS BY MOUTH EVERY 4 HOURS AS NEEDED FOR WHEEZE  Qty: 6.7 g, Refills: 3    Comments: Substitution to a formulary equivalent within the same pharmaceutical class is authorized.  Associated Diagnoses: Acute bronchitis      atorvastatin (LIPITOR) 40 mg tablet Take 1 tablet (40 mg total) by mouth daily  Qty: 90 tablet, Refills: 0    Associated Diagnoses: Type 2 diabetes mellitus with other specified complication, without long-term current use of insulin (HCC)      Fluticasone-Salmeterol (Wixela Inhub) 250-50 mcg/dose inhaler INHALE 1 PUFF 2 TIMES A DAY RINSE MOUTH AFTER USE.  Qty: 60 blister, Refills: 2    Comments: Substitution to a formulary equivalent within the same pharmaceutical class is authorized.  Associated Diagnoses: Severe persistent asthma without complication      furosemide (LASIX) 20 mg tablet Take 1 tablet (20 mg total) by mouth  daily  Qty: 90 tablet, Refills: 0    Associated Diagnoses: Other hypervolemia      metFORMIN (GLUCOPHAGE-XR) 750 mg 24 hr tablet TAKE 2 TABLETS BY MOUTH DAILY WITH DINNER  Qty: 180 tablet, Refills: 3    Associated Diagnoses: Type 2 diabetes mellitus with other specified complication, without long-term current use of insulin (HCC)      semaglutide, 2 mg/dose, (Ozempic, 2 MG/DOSE,) 8 mg/ mL injection pen INJECT 0.75 ML (2 MG) SUBCUTANEOUSLY EVERY 7 DAYS  Qty: 3 mL, Refills: 1    Associated Diagnoses: Type 2 diabetes mellitus with other specified complication, without long-term current use of insulin (HCC)      triamterene-hydrochlorothiazide (DYAZIDE) 37.5-25 mg per capsule Take 1 capsule by mouth daily  Qty: 90 capsule, Refills: 0    Associated Diagnoses: Meniere's disease, unspecified laterality      !! benralizumab (FASENRA) subcutaneous injection Inject 1 mL (30 mg total) under the skin every 56 days  Qty: 1 mL, Refills: 5    Associated Diagnoses: Severe persistent asthma without complication      !! benralizumab (FASENRA) subcutaneous injection Inject 1 mL (30 mg total) under the skin every 28 days  Qty: 1 mL, Refills: 2    Associated Diagnoses: Severe persistent asthma without complication      glucose blood (FREESTYLE LITE) test strip Use as instructed  Qty: 100 each, Refills: 5    Associated Diagnoses: Type 2 diabetes mellitus with other specified complication, without long-term current use of insulin ([facility])      glucose monitoring kit (FREESTYLE) monitoring kit Apply 1 m topically 4 (four) times a day as needed Use as directed  Qty: 1 each, Refills: 0    Associated Diagnoses: Type 2 diabetes mellitus with other specified complication, without long-term current use of insulin ([facility])      Lancets (freestyle) lancets Use as instructed  Qty: 100 each, Refills: 3    Comments: Please dispense Freestyle lite lancets  Associated Diagnoses: Type 2 diabetes mellitus with other specified complication, without long-term  current use of insulin (HCC)       !! - Potential duplicate medications found. Please discuss with provider.        No discharge procedures on file.  ED SEPSIS DOCUMENTATION   Time reflects when diagnosis was documented in both MDM as applicable and the Disposition within this note       Time User Action Codes Description Comment    1/13/2025 11:29 AM Pelon Rdz [R42] Vertigo     1/13/2025 11:30 AM Pelon Rdz [E04.2] Multinodular thyroid     1/13/2025  2:08 PM Pelon Rdz [R26.2] Ambulatory dysfunction                  Pelno Rdz DO  01/13/25 [id number]

## 2025-01-13 NOTE — LETTER
[facility]  [address]  [place] [address]  Dept: [phone]    January 14, 2025     Patient: Luz Elena Sherman   YOB: 1960   Date of Visit: 1/13/2025       To Whom it May Concern:    Luz Elena Sherman is under my professional care. She was seen in the hospital from 1/13/2025 to 01/14/25. She may return to work on 1/20/25 without limitations.    If you have any questions or concerns, please don't hesitate to call.         Sincerely,          Shira Dimas, DO

## 2025-01-13 NOTE — ASSESSMENT & PLAN NOTE
Lab Results   Component Value Date    HGBA1C 5.5 05/20/2024     Home medications:  Metformin 750 daily, Ozempic 2 mg every 7 days, Lipitor 40    Plan: Hold home metformin  SSI

## 2025-01-13 NOTE — ASSESSMENT & PLAN NOTE
Patient with history of Ménière's disease on diuretic therapy with multiple falls at home.  CTA head without stenosis or aneurysm.    Ddx: refractory meniere's vs orthostatic component with being on 2 diuretics and soft Bps vs concussive syndrome s/p fall    Plan:  Check orthostatics:   Lyin/59, sittin/59 standin/71  PT OT eval  See plan under Ménière's disease
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
## 2025-01-13 NOTE — LETTER
[facility]  [address]  [place] [address]  Dept: [phone]    January 14, 2025     Patient: Luz Elena Sherman   YOB: 1960   Date of Visit: 1/13/2025       To Whom it May Concern:    Luz Elena Sherman is under my professional care. She was seen in the hospital from 1/13/2025 to 01/14/25. She may return to work on 1/20/2025 without limitations.    If you have any questions or concerns, please don't hesitate to call.         Sincerely,          Shira Dimas, DO
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
## 2025-01-14 VITALS
BODY MASS INDEX: 30.43 KG/M2 | RESPIRATION RATE: 14 BRPM | HEIGHT: 61 IN | TEMPERATURE: 98.6 F | OXYGEN SATURATION: 93 % | WEIGHT: 161.16 LBS | HEART RATE: 87 BPM | SYSTOLIC BLOOD PRESSURE: 120 MMHG | DIASTOLIC BLOOD PRESSURE: 75 MMHG

## 2025-01-14 LAB
ALBUMIN SERPL BCG-MCNC: 4 G/DL (ref 3.5–5)
ALP SERPL-CCNC: 58 U/L (ref 34–104)
ALT SERPL W P-5'-P-CCNC: 21 U/L (ref 7–52)
ANION GAP SERPL CALCULATED.3IONS-SCNC: 8 MMOL/L (ref 4–13)
AST SERPL W P-5'-P-CCNC: 17 U/L (ref 13–39)
BASOPHILS # BLD AUTO: 0.04 THOUSANDS/ΜL (ref 0–0.1)
BASOPHILS NFR BLD AUTO: 1 % (ref 0–1)
BILIRUB SERPL-MCNC: 0.57 MG/DL (ref 0.2–1)
BUN SERPL-MCNC: 11 MG/DL (ref 5–25)
CALCIUM SERPL-MCNC: 9.2 MG/DL (ref 8.4–10.2)
CHLORIDE SERPL-SCNC: 101 MMOL/L (ref 96–108)
CO2 SERPL-SCNC: 28 MMOL/L (ref 21–32)
CREAT SERPL-MCNC: 0.49 MG/DL (ref 0.6–1.3)
EOSINOPHIL # BLD AUTO: 0.27 THOUSAND/ΜL (ref 0–0.61)
EOSINOPHIL NFR BLD AUTO: 4 % (ref 0–6)
ERYTHROCYTE [DISTWIDTH] IN BLOOD BY AUTOMATED COUNT: 12.7 % (ref 11.6–15.1)
GFR SERPL CREATININE-BSD FRML MDRD: 103 ML/MIN/1.73SQ M
GLUCOSE SERPL-MCNC: 111 MG/DL (ref 65–140)
GLUCOSE SERPL-MCNC: 139 MG/DL (ref 65–140)
GLUCOSE SERPL-MCNC: 92 MG/DL (ref 65–140)
HCT VFR BLD AUTO: 41.8 % (ref 34.8–46.1)
HGB BLD-MCNC: 13.9 G/DL (ref 11.5–15.4)
IMM GRANULOCYTES # BLD AUTO: 0.02 THOUSAND/UL (ref 0–0.2)
IMM GRANULOCYTES NFR BLD AUTO: 0 % (ref 0–2)
LYMPHOCYTES # BLD AUTO: 2.39 THOUSANDS/ΜL (ref 0.6–4.47)
LYMPHOCYTES NFR BLD AUTO: 38 % (ref 14–44)
MAGNESIUM SERPL-MCNC: 1.9 MG/DL (ref 1.9–2.7)
MCH RBC QN AUTO: 30.5 PG (ref 26.8–34.3)
MCHC RBC AUTO-ENTMCNC: 33.3 G/DL (ref 31.4–37.4)
MCV RBC AUTO: 92 FL (ref 82–98)
MONOCYTES # BLD AUTO: 0.56 THOUSAND/ΜL (ref 0.17–1.22)
MONOCYTES NFR BLD AUTO: 9 % (ref 4–12)
NEUTROPHILS # BLD AUTO: 2.98 THOUSANDS/ΜL (ref 1.85–7.62)
NEUTS SEG NFR BLD AUTO: 48 % (ref 43–75)
NRBC BLD AUTO-RTO: 0 /100 WBCS
PLATELET # BLD AUTO: 261 THOUSANDS/UL (ref 149–390)
PMV BLD AUTO: 10.2 FL (ref 8.9–12.7)
POTASSIUM SERPL-SCNC: 3.9 MMOL/L (ref 3.5–5.3)
PROT SERPL-MCNC: 6.9 G/DL (ref 6.4–8.4)
RBC # BLD AUTO: 4.55 MILLION/UL (ref 3.81–5.12)
SODIUM SERPL-SCNC: 137 MMOL/L (ref 135–147)
WBC # BLD AUTO: 6.26 THOUSAND/UL (ref 4.31–10.16)

## 2025-01-14 PROCEDURE — 80053 COMPREHEN METABOLIC PANEL: CPT

## 2025-01-14 PROCEDURE — 97167 OT EVAL HIGH COMPLEX 60 MIN: CPT

## 2025-01-14 PROCEDURE — 97116 GAIT TRAINING THERAPY: CPT

## 2025-01-14 PROCEDURE — 82948 REAGENT STRIP/BLOOD GLUCOSE: CPT

## 2025-01-14 PROCEDURE — 85025 COMPLETE CBC W/AUTO DIFF WBC: CPT

## 2025-01-14 PROCEDURE — 97163 PT EVAL HIGH COMPLEX 45 MIN: CPT

## 2025-01-14 PROCEDURE — 99238 HOSP IP/OBS DSCHRG MGMT 30/<: CPT | Performed by: INTERNAL MEDICINE

## 2025-01-14 PROCEDURE — 83735 ASSAY OF MAGNESIUM: CPT

## 2025-01-14 RX ORDER — MECLIZINE HYDROCHLORIDE 25 MG/1
25 TABLET ORAL EVERY 12 HOURS PRN
Qty: 30 TABLET | Refills: 0 | Status: SHIPPED | OUTPATIENT
Start: 2025-01-14

## 2025-01-14 RX ORDER — DIAZEPAM 5 MG/1
5 TABLET ORAL EVERY 6 HOURS PRN
Qty: 5 TABLET | Refills: 0 | Status: SHIPPED | OUTPATIENT
Start: 2025-01-14 | End: 2025-01-24

## 2025-01-14 RX ORDER — FUROSEMIDE 20 MG/1
20 TABLET ORAL DAILY PRN
Qty: 30 TABLET | Refills: 0 | Status: SHIPPED | OUTPATIENT
Start: 2025-01-14

## 2025-01-14 RX ADMIN — MECLIZINE HYDROCHLORIDE 25 MG: 25 TABLET ORAL at 06:05

## 2025-01-14 RX ADMIN — ATORVASTATIN CALCIUM 40 MG: 40 TABLET, FILM COATED ORAL at 08:19

## 2025-01-14 RX ADMIN — FLUTICASONE FUROATE AND VILANTEROL TRIFENATATE 1 PUFF: 200; 25 POWDER RESPIRATORY (INHALATION) at 08:20

## 2025-01-14 RX ADMIN — ENOXAPARIN SODIUM 40 MG: 40 INJECTION SUBCUTANEOUS at 08:19

## 2025-01-14 RX ADMIN — TRIAMTERENE AND HYDROCHLOROTHIAZIDE 1 TABLET: 37.5; 25 TABLET ORAL at 08:19

## 2025-01-14 NOTE — PLAN OF CARE
Problem: PAIN - ADULT  Goal: Verbalizes/displays adequate comfort level or baseline comfort level  Description: Interventions:  - Encourage patient to monitor pain and request assistance  - Assess pain using appropriate pain scale  - Administer analgesics based on type and severity of pain and evaluate response  - Implement non-pharmacological measures as appropriate and evaluate response  - Consider cultural and social influences on pain and pain management  - Notify physician/advanced practitioner if interventions unsuccessful or patient reports new pain  Outcome: Progressing     Problem: INFECTION - ADULT  Goal: Absence or prevention of progression during hospitalization  Description: INTERVENTIONS:  - Assess and monitor for signs and symptoms of infection  - Monitor lab/diagnostic results  - Monitor all insertion sites, i.e. indwelling lines, tubes, and drains  - Monitor endotracheal if appropriate and nasal secretions for changes in amount and color  - [place] appropriate cooling/warming therapies per order  - Administer medications as ordered  - Instruct and encourage patient and family to use good hand hygiene technique  - Identify and instruct in appropriate isolation precautions for identified infection/condition  Outcome: Progressing  Goal: Absence of fever/infection during neutropenic period  Description: INTERVENTIONS:  - Monitor WBC    Outcome: Progressing     Problem: SAFETY ADULT  Goal: Patient will remain free of falls  Description: INTERVENTIONS:  - Educate patient/family on patient safety including physical limitations  - Instruct patient to call for assistance with activity   - Consult OT/PT to assist with strengthening/mobility   - Keep Call bell within reach  - Keep bed low and locked with side rails adjusted as appropriate  - Keep care items and personal belongings within reach  - Initiate and maintain comfort rounds  - Make Fall Risk Sign visible to staff  - Offer Toileting every 4 Hours,  in advance of need  - Initiate/Maintain 4alarm  - Obtain necessary fall risk management equipment: 4  - Apply yellow socks and bracelet for high fall risk patients  - Consider moving patient to room near nurses station  Outcome: Progressing  Goal: Maintain or return to baseline ADL function  Description: INTERVENTIONS:  -  Assess patient's ability to carry out ADLs; assess patient's baseline for ADL function and identify physical deficits which impact ability to perform ADLs (bathing, care of mouth/teeth, toileting, grooming, dressing, etc.)  - Assess/evaluate cause of self-care deficits   - Assess range of motion  - Assess patient's mobility; develop plan if impaired  - Assess patient's need for assistive devices and provide as appropriate  - Encourage maximum independence but intervene and supervise when necessary  - Involve family in performance of ADLs  - Assess for home care needs following discharge   - Consider OT consult to assist with ADL evaluation and planning for discharge  - Provide patient education as appropriate  Outcome: Progressing  Goal: Maintains/Returns to pre admission functional level  Description: INTERVENTIONS:  - Perform AM-PAC 6 Click Basic Mobility/ Daily Activity assessment daily.  - Set and communicate daily mobility goal to care team and patient/family/caregiver.   - Collaborate with rehabilitation services on mobility goals if consulted  - Perform Range of Motion 4 times a day.  - Reposition patient every 4 hours.  - Dangle patient 4 times a day  - Stand patient 4 times a day  - Ambulate patient 4 times a day  - Out of bed to chair 4 times a day   - Out of bed for meals 4 times a day  - Out of bed for toileting  - Record patient progress and toleration of activity level   Outcome: Progressing     Problem: DISCHARGE PLANNING  Goal: Discharge to home or other facility with appropriate resources  Description: INTERVENTIONS:  - Identify barriers to discharge w/patient and caregiver  -  Arrange for needed discharge resources and transportation as appropriate  - Identify discharge learning needs (meds, wound care, etc.)  - Arrange for interpretive services to assist at discharge as needed  - Refer to Case Management Department for coordinating discharge planning if the patient needs post-hospital services based on physician/advanced practitioner order or complex needs related to functional status, cognitive ability, or social support system  Outcome: Progressing     Problem: Knowledge Deficit  Goal: Patient/family/caregiver demonstrates understanding of disease process, treatment plan, medications, and discharge instructions  Description: Complete learning assessment and assess knowledge base.  Interventions:  - Provide teaching at level of understanding  - Provide teaching via preferred learning methods  Outcome: Progressing

## 2025-01-14 NOTE — UTILIZATION REVIEW
Initial Clinical Review    Admission: Date/Time/Statement:   Admission Orders (From admission, onward)       Ordered        01/13/25 1409  Place in Observation  Once                          Orders Placed This Encounter   Procedures    Place in Observation     Standing Status:   Standing     Number of Occurrences:   1     Level of Care:   Med Surg [16]     ED Arrival Information       Expected   -    Arrival   1/13/2025 08:06    Acuity   Urgent              Means of arrival   Walk-In    Escorted by   Self    Service   Hospitalist    Admission type   Emergency              Arrival complaint   Vertigo             Chief Complaint   Patient presents with    Dizziness     H/o meniere's disease. A couple of weeks ago had a dining room wooden chair hit her in the head. Started with vertigo the next day, but symptoms lessened after taking meclizine. Still having the dizziness and unsteadiness and fell back into the recliner at 0130 this morning. Having intermittent headaches since the incident. Denies LOC . Denies blood thinners and aspirin. Reports she has been more tired and sleeping more.        Initial Presentation: 64 y.o. female  type 2 diabetes (A1c 5.5), hypertension,, anxiety, severe persistent ED status post fall. Pt was diagnosed with meniere in 2009 previously took meclizine but was refractory, now taking diazepam (takes prn) and dyazide daily. Triggers include: sodium, caffeine, chocolate, sugar, lateral EOM, and quick positional changes. Meniere's complicated with hearing loss in left ear and tinnitus. Pt reports that around thanksgiving she hit her right head on a chair while cleaning and she subsequently developed vertigo the following day. Her symptoms since this incident has increased in duration and frequency. She is now experiencing vertigo qod to daily. She notes 2 falls since November, most recent fall last night. She states she was standing from her chair when she had a sense of room spinning and fell  backwards striking her head on the recliner. She did not LOC. Prior to this she denies palpitations, chest pain, SOB. Does not use any ambulatory assistant devices. After this episode, she felt fatigue and slept longer than usual. When she awoke this morning, she did her vestibular therapy but had residual fatigue and vertigo. Pt lives alone.       In the ED, CMP notable for hypokalemia with 3.2, TSH within normal limits, troponin unremarkable, magnesium normal, CTA head and neck without.  Incidental finding of multinodular thyroid.  Patient felt unsafe being discharged, she lives by herself.  Patient admitted for ambulatory dysfunction pending PT OT eval.    ADMIT OBSERVATION STATUS    Anticipated Length of Stay/Certification Statement:      Date:     Day 2:      ED Treatment-Medication Administration from 01/13/2025 0806 to 01/13/2025 1631         Date/Time Order Dose Route Action     01/13/2025 1044 potassium chloride (Klor-Con M20) CR tablet 40 mEq 40 mEq Oral Given     01/13/2025 1023 iohexol (OMNIPAQUE) 350 MG/ML injection (MULTI-DOSE) 85 mL 85 mL Intravenous Given     01/13/2025 1227 meclizine (ANTIVERT) tablet 25 mg 25 mg Oral Given     01/13/2025 1338 sodium chloride 0.9 % bolus 1,000 mL 1,000 mL Intravenous New Bag            Scheduled Medications:  atorvastatin, 40 mg, Oral, Daily  enoxaparin, 40 mg, Subcutaneous, Daily  fluticasone-vilanterol, 1 puff, Inhalation, Daily  insulin lispro, 1-5 Units, Subcutaneous, TID AC  triamterene-hydrochlorothiazide, 1 tablet, Oral, Daily      Continuous IV Infusions:     PRN Meds:  albuterol, 1 puff, Inhalation, Q6H PRN  meclizine, 25 mg, Oral, Q12H PRN      ED Triage Vitals   Temperature Pulse Respirations Blood Pressure SpO2 Pain Score   01/13/25 0812 01/13/25 0812 01/13/25 0812 01/13/25 0812 01/13/25 0812 01/13/25 0814   (!) 97.2 °F (36.2 °C) 86 20 138/65 96 % No Pain     Weight (last 2 days)       Date/Time Weight    01/13/25 1757 73.1 (161.16)            Vital  Signs (last 3 days)       Date/Time Temp Pulse Resp BP MAP (mmHg) SpO2 O2 Device Patient Position - Orthostatic VS Prasanth Coma Scale Score Pain    01/14/25 07:27:02 97.7 °F (36.5 °C) 89 -- 112/67 82 92 % -- -- -- --    01/14/25 0300 -- -- -- -- -- -- -- -- -- No Pain    01/13/25 2130 -- -- -- -- -- -- -- -- 15 --    01/13/25 1757 97.7 °F (36.5 °C) 83 18 118/77 -- -- -- Sitting -- No Pain    01/13/25 1745 -- -- -- -- -- -- -- -- -- No Pain    01/13/25 1700 -- -- -- -- -- -- -- -- 15 No Pain    01/13/25 16:42:41 -- 83 -- 118/77 91 99 % -- -- -- --    01/13/25 16:41:29 97.7 °F (36.5 °C) -- -- 118/77 91 -- -- Standing -- --    01/13/25 16:40:43 97.7 °F (36.5 °C) -- -- 118/77 91 -- -- Standing -- --    01/13/25 16:39:43 97.7 °F (36.5 °C) -- -- 119/72 88 -- -- Sitting - Orthostatic VS -- --    01/13/25 16:38:33 97.7 °F (36.5 °C) -- -- 116/74 88 -- -- Lying - Orthostatic VS -- --    01/13/25 16:38:04 97.7 °F (36.5 °C) -- -- 116/74 88 -- -- -- -- --    01/13/25 1519 -- 88 18 107/61 78 97 % -- Standing - Orthostatic VS -- --    01/13/25 1517 -- 82 20 117/68 81 -- -- Sitting - Orthostatic VS -- --    01/13/25 1515 -- 81 20 111/64 -- -- -- Lying - Orthostatic VS -- --    01/13/25 1300 -- 81 20 99/59 74 95 % None (Room air) -- -- --    01/13/25 1121 -- 74 20 109/63 81 98 % None (Room air) -- -- --    01/13/25 0947 -- -- -- -- -- -- -- -- 15 --    01/13/25 0814 -- -- -- -- -- -- -- -- -- No Pain    01/13/25 0812 97.2 °F (36.2 °C) 86 20 138/65 -- 96 % None (Room air) Sitting -- --              Pertinent Labs/Diagnostic Test Results:   Radiology:  XR chest 2 views   Final Interpretation by Anirudh Saleh MD (01/13 1032)      No acute cardiopulmonary disease.            Resident: Avi Torres I, the attending radiologist, have reviewed the images and agree with the final report above.      Workstation performed: [id number]         CTA head and neck w wo contrast   Final Interpretation by Steve Galdamez MD (01/13 6220)     "  CT Brain:  No acute intracranial CT abnormality.      CT Angiography:      1.  Patent major vessels of the [place] without significant stenosis. No aneurysm.   2.  No significant stenosis in the cervical carotid or vertebral arteries.      Other: Multinodular thyroid. Correlate with thyroid ultrasound.            Workstation performed: [id number]           Cardiology:  ECG 12 lead   Final Result by Kalen Spivey MD (01/13 1017)   Normal sinus rhythm   Nonspecific ST abnormality   Abnormal ECG   When compared with ECG of 03-Dec-2023 09:46,   No significant change was found   Confirmed by Kalen Spivey ([id number]) on 1/13/2025 10:17:12 AM        GI:  No orders to display           Results from last 7 days   Lab Units 01/14/25  0555 01/13/25  1704 01/13/25  0927   WBC Thousand/uL 6.26  --  8.90   HEMOGLOBIN g/dL 13.9  --  14.8   HEMATOCRIT % 41.8  --  43.7   PLATELETS Thousands/uL 261 245 273   TOTAL NEUT ABS Thousands/µL 2.98  --  4.95         Results from last 7 days   Lab Units 01/14/25  0555 01/13/25  1517 01/13/25  0927   SODIUM mmol/L 137  --  137   POTASSIUM mmol/L 3.9 3.7 3.2*   CHLORIDE mmol/L 101  --  99   CO2 mmol/L 28  --  27   ANION GAP mmol/L 8  --  11   BUN mg/dL 11  --  13   CREATININE mg/dL 0.49*  --  0.57*   EGFR ml/min/1.73sq m 103  --  98   CALCIUM mg/dL 9.2  --  9.3   MAGNESIUM mg/dL 1.9  --  1.9     Results from last 7 days   Lab Units 01/14/25  0555 01/13/25  0927   AST U/L 17 17   ALT U/L 21 22   ALK PHOS U/L 58 68   TOTAL PROTEIN g/dL 6.9 7.3   ALBUMIN g/dL 4.0 4.3   TOTAL BILIRUBIN mg/dL 0.57 0.53     Results from last 7 days   Lab Units 01/14/25  0725 01/13/25 2033 01/13/25  1646   POC GLUCOSE mg/dl 92 116 86     Results from last 7 days   Lab Units 01/14/25  0555 01/13/25  0927   GLUCOSE RANDOM mg/dL 111 88             No results found for: \"BETA-HYDROXYBUTYRATE\"                   Results from last 7 days   Lab Units 01/13/25  0927   HS TNI 0HR ng/L 3             Results " from last 7 days   Lab Units 01/13/25  0927   TSH 3RD GENERATON uIU/mL 0.460                                                                                                           Past Medical History:   Diagnosis Date    Community acquired pneumonia of left lower lobe of lung 6/9/2023    Diabetes mellitus (HCC)     Sepsis (HCC) 6/10/2023    Vertigo      Present on Admission:   Meniere disease   Diabetes mellitus (HCC)   Mixed hyperlipidemia   Anxiety   Hypertension   Severe asthma without complication      Admitting Diagnosis: Multinodular thyroid [E04.2]  Vertigo [R42]  Ambulatory dysfunction [R26.2]  Age/Sex: 64 y.o. female    Network Utilization Review Department  ATTENTION: Please call with any questions or concerns to [phone] and carefully listen to the prompts so that you are directed to the right person. All voicemails are confidential.   For Discharge needs, contact Care Management DC Support Team at [phone] opt. 2  Send all requests for admission clinical reviews, approved or denied determinations and any other requests to dedicated fax number below belonging to the campus where the patient is receiving treatment. List of dedicated fax numbers for the Facilities:  FACILITY NAME UR FAX NUMBER   ADMISSION DENIALS (Administrative/Medical Necessity) [phone]   DISCHARGE SUPPORT TEAM (NETWORK) [phone]   PARENT CHILD HEALTH (Maternity/NICU/Pediatrics) [phone]   [facility] [phone]   [facility] [phone]   [facility] [phone]   [facility] [phone]   [facility] [phone]   [facility] [phone]   [facility] [phone]   [facility] [phone]   [facility] [phone]   [facility]  [phone]   [facility] [phone]   [facility] [phone]

## 2025-01-14 NOTE — DISCHARGE SUMMARY
INTERNAL MEDICINE RESIDENCY DISCHARGE SUMMARY     Luz Elena Sherman   64 y.o. female  MRN: [id number]  Room/Bed: /-01     [facility] BE MED SURG 5   Encounter: [id number]    Principal Problem:    Ambulatory dysfunction  Active Problems:    Meniere disease    Diabetes mellitus (HCC)    Anxiety    Mixed hyperlipidemia    Hypertension    Severe asthma without complication    Hypokalemia    Multinodular thyroid      * Ambulatory dysfunction  Assessment & Plan  Patient with history of Ménière's disease on diuretic therapy with multiple falls at home.  CTA head without stenosis or aneurysm.    Ddx: refractory meniere's vs orthostatic component with being on 2 diuretics and soft Bps vs concussive syndrome s/p fall    Plan:  Check orthostatics:   Lyin/59, sittin/59 standin/71  PT OT eval  See plan under Ménière's disease    Meniere disease  Assessment & Plan  Pt was diagnosed with meniere in  previously took meclizine but was refractory, now taking diazepam (takes prn) and dyazide daily. Triggers include: sodium, caffeine, chocolate, sugar, lateral EOM, and quick positional changes. Meniere's complicated with hearing loss in left ear and tinnitus.     Plan:   Salt restriction, education on alcohol/nicotine avoidance   Outpatient referral for vestibular rehab  Discharging on Lasix PRN, as patient is on Dyazide with soft BPs and hypokalemia and might have a confounding orthostatic component  Meclizine 25 mg every 12 hours PRN  Needs outpatient referral to ENT  PT/OT eval     Multinodular thyroid  Assessment & Plan  Outpatient thyroid ultrasound    Hypokalemia  Assessment & Plan  Within normal limits  Patient on Lasix and Dyazide    Severe asthma without complication  Assessment & Plan  Follows with pulmonology on Wixela albuterol as needed  Not currently on Biologics    Hypertension  Assessment & Plan  Patient on    Mixed hyperlipidemia  Assessment &  "Plan  Continue Lipitor 40    Diabetes mellitus (HCC)  Assessment & Plan    Lab Results   Component Value Date    HGBA1C 5.5 05/20/2024     Home medications:  Metformin 750 daily, Ozempic 2 mg every 7 days, Lipitor 40    Plan: Hold home metformin  SSI         DETAILS OF HOSPITAL COURSE     Per HPI \"Patient is a 64-year-old female medical history of type 2 diabetes (A1c 5.5), hypertension,, anxiety, severe persistent ED status post fall. Pt was diagnosed with meniere in 2009 previously took meclizine but was refractory, now taking diazepam (takes prn) and dyazide daily. Triggers include: sodium, caffeine, chocolate, sugar, lateral EOM, and quick positional changes. Meniere's complicated with hearing loss in left ear and tinnitus. Pt reports that around thanksgiving she hit her right head on a chair while cleaning and she subsequently developed vertigo the following day. Her symptoms since this incident has increased in duration and frequency. She is now experiencing vertigo qod to daily. She notes 2 falls since November, most recent fall last night. She states she was standing from her chair when she had a sense of room spinning and fell backwards striking her head on the recliner. She did not LOC. Prior to this she denies palpitations, chest pain, SOB. Does not use any ambulatory assistant devices. After this episode, she felt fatigue and slept longer than usual. When she awoke this morning, she did her vestibular therapy but had residual fatigue and vertigo. Pt lives alone. In the ED, CMP notable for hypokalemia with 3.2, TSH within normal limits, troponin unremarkable, magnesium normal, CTA head and neck unremarkable.  Incidental finding of multinodular thyroid.  Patient felt unsafe being discharged, she lives by herself.  Patient admitted for ambulatory dysfunction pending PT OT eval.\"    Patient's Lasix were held in the setting of two diuretics s/p fall. Orthostatics were normal. Patient has had vertigo in the " "past and symptoms worsened with recent head strike and fall. Patient reports improvement after taking the Meclizine 25 mg every 12 hours as needed. Patient will be sent with short course of PRN diazepam, as she has used this in the past for her Meniere's. Patient seen and examined on the day of discharge. Patient reports that she is feeling much better this morning and is able to ambulate to the bathroom and felt steady on her feet. She states that she feels comfortable going home today and is looking forward to going to vestibular rehab. Patient will follow up with PCP within one week. Patient's lasix 20 mg QD was held during the admission. This will be placed as PRN on discharge and patient to follow up with PCP. Patient will require US of thyroid within two weeks in the setting of incidental finding of multinodular thyroid. Referrals have been placed for patient to follow with neurology as well as ENT for Meniere's vs post concussive syndrome. Patient was seen and evaluated by PT, they found patient to have vestibular-ocular component on exam and they agree with outpatient referrals.      Vitals:    01/13/25 1642 01/13/25 1757 01/14/25 0727 01/14/25 1518   BP: 118/77 118/77 112/67 120/75   BP Location:  Left arm     Pulse: 83 83 89 87   Resp:  18  14   Temp:  97.7 °F (36.5 °C) 97.7 °F (36.5 °C) 98.6 °F (37 °C)   TempSrc:  Oral     SpO2: 99%  92% 93%   Weight:  73.1 kg (161 lb 2.5 oz)     Height:  5' 1\" (1.549 m)      Physical Exam  Constitutional:       General: She is not in acute distress.     Appearance: Normal appearance. She is not ill-appearing or diaphoretic.   HENT:      Head: Normocephalic and atraumatic.      Mouth/Throat:      Mouth: Mucous membranes are moist.   Cardiovascular:      Rate and Rhythm: Normal rate and regular rhythm.      Pulses: Normal pulses.      Heart sounds: Normal heart sounds.   Pulmonary:      Effort: Pulmonary effort is normal.      Breath sounds: Rales present.      Comments: " Rales present at lower bases of lungs b/l  Abdominal:      General: Abdomen is flat. Bowel sounds are normal.      Palpations: Abdomen is soft.   Musculoskeletal:      Right lower leg: No edema.      Left lower leg: No edema.   Skin:     General: Skin is warm and dry.   Neurological:      Mental Status: She is alert and oriented to person, place, and time. Mental status is at baseline.      Motor: No weakness.      Gait: Gait normal.   Psychiatric:         Mood and Affect: Mood normal.         Behavior: Behavior normal.          DISCHARGE INFORMATION     PCP at Discharge: Nathaly Seaman PA-C     Admitting Provider: Tiarra Lloyd MD  Admission Date: 1/13/2025    Discharge Provider: Tiarra Lloyd MD  Discharge Date: 1/14/25    Discharge Disposition: Home/Self Care  Discharge Condition: good  Discharge with Lines: no    Discharge Diet: regular diet  Activity Restrictions: none  Test Results Pending at Discharge: none    Discharge Diagnoses:  Principal Problem:    Ambulatory dysfunction  Active Problems:    Meniere disease    Diabetes mellitus (HCC)    Anxiety    Mixed hyperlipidemia    Hypertension    Severe asthma without complication    Hypokalemia    Multinodular thyroid  Resolved Problems:    * No resolved hospital problems. *      Consulting Providers:      Diagnostic & Therapeutic Procedures Performed:  CTA head and neck w wo contrast  Result Date: 1/13/2025  Impression: CT Brain:  No acute intracranial CT abnormality. CT Angiography: 1.  Patent major vessels of the [place] without significant stenosis. No aneurysm. 2.  No significant stenosis in the cervical carotid or vertebral arteries. Other: Multinodular thyroid. Correlate with thyroid ultrasound. Workstation performed: [id number]     XR chest 2 views  Result Date: 1/13/2025  Impression: No acute cardiopulmonary disease. Resident: Avi Torres I, the attending radiologist, have reviewed the images and agree with the final report  above. Workstation performed: [id number]       Code Status: Level 1 - Full Code  Advance Directive & Living Will: <no information>  Power of :    POLST:      STOP taking these medications  STOP taking these medications   benralizumab subcutaneous injection  Commonly known as: FASENRA     START taking these medications  START taking these medications    Morning Afternoon Evening Bedtime As Needed    meclizine 25 mg tablet  Commonly known as: ANTIVERT  Take 1 tablet (25 mg total) by mouth every 12 (twelve) hours as needed for dizziness  Refills: 0  Last time this was given: 25 mg on January 14, 2025  6:05 AM  Signed by: Shira Dimas, DO     1 tablet     CHANGE how you take these medications  CHANGE how you take these medications    Morning Afternoon Evening Bedtime As Needed    furosemide 20 mg tablet  Commonly known as: LASIX  Take 1 tablet (20 mg total) by mouth daily as needed (as needed for lower extremity swelling)  Refills: 0  Signed by: Shira Dimas DO  What changed:  when to take this  reasons to take this     1 tablet     CONTINUE taking these medications  CONTINUE taking these medications    Morning Afternoon Evening Bedtime As Needed    * albuterol (2.5 mg/3 mL) 0.083 % nebulizer solution  Take 3 mL (2.5 mg total) by nebulization every 6 (six) hours as needed for wheezing or shortness of breath  Refills: 0  Signed by: Evelina Nelson MD     3 mL    * albuterol 90 mcg/act inhaler  Commonly known as: PROVENTIL HFA,VENTOLIN HFA  INHALE 2 PUFFS BY MOUTH EVERY 4 HOURS AS NEEDED FOR WHEEZE  Refills: 3  Doctor's comments: Substitution to a formulary equivalent within the same pharmaceutical class is authorized.  Signed by: Ginny Ricketts DO    INHALE 2 PUFFS BY MOUTH EVERY 4 HOURS AS NEEDED FOR WHEEZE    atorvastatin 40 mg tablet  Commonly known as: LIPITOR  Take 1 tablet (40 mg total) by mouth daily  Refills: 0  Last time this was given: 40 mg on January 14, 2025  8:19 AM  Signed by:  Nathaly Seaman PA-C 1 tablet        metFORMIN 750 mg 24 hr tablet  Commonly known as: GLUCOPHAGE-XR  TAKE 2 TABLETS BY MOUTH DAILY WITH DINNER  Refills: 3  Signed by: Nathaly Seaman PA-C    TAKE 2 TABLETS BY MOUTH DAILY WITH DINNER    Ozempic (2 MG/DOSE) 8 MG/3ML injection pen  Generic drug: semaglutide (2 mg/dose)  INJECT 0.75 ML (2 MG) SUBCUTANEOUSLY EVERY 7 DAYS  Refills: 1  Signed by: Nathaly Seaman PA-C    INJECT 0.75 ML (2 MG) SUBCUTANEOUSLY EVERY 7 DAYS    triamterene-hydrochlorothiazide 37.5-25 mg per capsule  Commonly known as: DYAZIDE  Take 1 capsule by mouth daily  Refills: 0  Last time this was given: Ask your nurse or doctor  Signed by: Nathaly Seaman PA-C 1 capsule        Wixela Inhub 250-50 MCG/ACT inhaler  Generic drug: Fluticasone-Salmeterol  INHALE 1 PUFF 2 TIMES A DAY RINSE MOUTH AFTER USE.  Refills: 2  Doctor's comments: Substitution to a formulary equivalent within the same pharmaceutical class is authorized.  Signed by: ELIZABETH Munguia    INHALE 1 PUFF 2 TIMES A DAY RINSE MOUTH AFTER USE.    very important  * This list has 2 medication(s) that are the same as other medications prescribed for you. Read the directions carefully, and ask your doctor or other care provider to review them with you.     OTHER medications on file  OTHER medications on file    Morning Afternoon Evening Bedtime As Needed    freestyle lancets  Use as instructed  Refills: 3  Doctor's comments: Please dispense Freestyle lite lancets  Signed by: Ginny Ricketts DO    Use as instructed    glucose monitoring kit monitoring kit  Apply 1 m topically 4 (four) times a day as needed Use as directed  Refills: 0  Signed by: Ginny Ricketts DO    Apply 1 m topically 4 (four) times a day as needed Use as directed       Allergies:  No Known Allergies    FOLLOW-UP     PCP Outpatient Follow-up:  Nathaly Seaman PA-C    Active Issues Requiring Follow-up:   Establish with ENT outpatient. Follow up with vestibular  "PT.    Discharge Statement:   I spent 45 minutes minutes discharging the patient. This time was spent on the day of discharge. I had direct contact with the patient on the day of discharge. Additional documentation is required if more than 30 minutes were spent on discharge.    Portions of the record may have been created with voice recognition software.  Occasional wrong word or \"sound a like\" substitutions may have occurred due to the inherent limitations of voice recognition software.  Read the chart carefully and recognize, using context, where substitutions have occurred.    ==  Shira Dimas DO  [facility]  Internal Medicine Resident PGY-1   "

## 2025-01-14 NOTE — CASE MANAGEMENT
Case Management Assessment & Discharge Planning Note    Patient name Luz Elena Sherman  Location /-01 MRN [id number]  : 1960 Date 2025       Current Admission Date: 2025  Current Admission Diagnosis:Ambulatory dysfunction   Patient Active Problem List    Diagnosis Date Noted Date Diagnosed    Ambulatory dysfunction 2025     Hypokalemia 2025     Multinodular thyroid 2025     Vitamin D deficiency 2024     Hypervolemia 2023     Severe asthma without complication 2023     Centrilobular emphysema (HCC) 2023     Hypertension 2023     Personal history of nicotine dependence 06/10/2023     Mixed hyperlipidemia 2023     Chronic pain of both knees 2023     Anxiety 2021     Diabetes mellitus (HCC) 2019     Meniere disease 2019       LOS (days): 0  Geometric Mean LOS (GMLOS) (days):   Days to GMLOS:     OBJECTIVE:          Current admission status: Observation       Preferred Pharmacy:   [address] [address]  Phone: [phone] Fax: [phone]    [id number]  [place] BARRY [address] [address]  Phone: [phone] Fax: [phone]    Primary Care Provider: Nathaly Seaman PA-C    Primary Insurance: [facility] HEALTH PLAN  Secondary Insurance:     ASSESSMENT:  Active Health Care Proxies    There are no active Health Care Proxies on file.         Patient Information  Admitted from:: Home  Mental Status: Alert  During Assessment patient was accompanied by: Not accompanied during assessment  Assessment information provided by:: Patient  Primary Caregiver: Self  Support Systems: Self  Home entry access options. Select all that apply.: No steps to enter home (0 steps to enter from outside, one flight of steps inside apartment building for access to second floor apartment)  Type of Current Residence:  Apartment  Floor Level: 2  Upon entering residence, is there a bedroom on the main floor (no further steps)?: Yes  Upon entering residence, is there a bathroom on the main floor (no further steps)?: Yes  Living Arrangements: Lives Alone    Activities of Daily Living Prior to Admission  Functional Status: Independent  Completes ADLs independently?: Yes  Ambulates independently?: Yes  Does patient use assisted devices?: No  Does patient currently own DME?: No  Does patient have a history of Outpatient Therapy (PT/OT)?: No  Does the patient have a history of Short-Term Rehab?: No  Does patient have a history of HHC?: No  Does patient currently have HHC?: No    Patient Information Continued  Income Source: Employed (works full time)  Does patient have prescription coverage?: Yes  Does patient have a history of substance abuse?: No  Does patient have a history of Mental Health Diagnosis?: No    Means of Transportation  Means of Transport to Appts:: Drives Self          DISCHARGE DETAILS:    Discharge planning discussed with:: Pt at bedside      Other Referral/Resources/Interventions Provided:  Referral Comments: Initial assessment completed with pt at bedside. Pt resides alone in a second floor apartment, 0 [address] from outside,1 flight of steps from inside apartment building for access to second floor apartment. Pt reports to be IPTA, with no use of DME or HHC/STR/OP therapy hx. Pt works full time and still drives. After care needs pending at this time. CM team will continue to follow and remain available for discharge planning needs and or concerns.

## 2025-01-14 NOTE — OCCUPATIONAL THERAPY NOTE
Occupational Therapy Evaluation     Patient Name: Luz Elena Sherman  Today's Date: 1/14/2025  Problem List  Principal Problem:    Ambulatory dysfunction  Active Problems:    Meniere disease    Diabetes mellitus (HCC)    Anxiety    Mixed hyperlipidemia    Hypertension    Severe asthma without complication    Hypokalemia    Multinodular thyroid    Past Medical History  Past Medical History:   Diagnosis Date    Community acquired pneumonia of left lower lobe of lung 6/9/2023    Diabetes mellitus (HCC)     Sepsis (HCC) 6/10/2023    Vertigo      Past Surgical History  Past Surgical History:   Procedure Laterality Date    BREAST BIOPSY Bilateral 12/01/2021    stereo x2    BREAST BIOPSY Right 12/01/2021    MAMMO STEREOTACTIC BREAST BIOPSY LEFT (ALL INC) Left 12/1/2021    MAMMO STEREOTACTIC BREAST BIOPSY RIGHT (ALL INC) Right 12/1/2021    TUMOR REMOVAL      right leg- benign              01/14/25 1338   OT Last Visit   OT Visit Date 01/14/25   Note Type   Note type Evaluation   Pain Assessment   Pain Assessment Tool 0-10   Pain Score No Pain   Restrictions/Precautions   Weight Bearing Precautions Per Order No   Other Precautions Chair Alarm   Home Living   Type of Home Apartment   Home Layout One level;Performs ADLs on one level  (2nd fl apt, FOS to 2nd fl)   Bathroom Shower/Tub Tub/shower unit   Bathroom Toilet Standard   Bathroom Accessibility Accessible   Additional Comments Pt lives in a 2nd fl apt with a FOS to 2nd fl.   Prior Function   Level of [place] Independent with ADLs;Independent with functional mobility;Independent with IADLS   Lives With Alone   Receives Help From Family;Friend(s)   IADLs Independent with driving;Independent with meal prep;Independent with medication management   Falls in the last 6 months 1 to 4   Vocational Full time employment   Lifestyle   Autonomy Pta pt I with ADL, IADL and functional mobility, (+) .   Reciprocal Relationships supportive family/friends   Service to Others  "works   Intrinsic Gratification enjoys keeping busy   General   Additional Pertinent History With PMH including Meniere disease   Subjective   Subjective \"I need to figure this out\"   ADL   Where Assessed Chair  (+bathroom)   Eating Assistance 6  Modified independent   Grooming Assistance 6  Modified Independent   UB Bathing Assistance 5  Supervision/Setup   LB Bathing Assistance 5  Supervision/Setup   UB Dressing Assistance 6  Modified independent   LB Dressing Assistance 5  Supervision/Setup   Toileting Assistance  6  Modified independent   Functional Assistance 5  Supervision/Setup   Bed Mobility   Supine to Sit 6  Modified independent   Additional items HOB elevated   Additional Comments Pt greeted in bed, left in chair with alarm on and all needs within reach.   Transfers   Sit to Stand 5  Supervision   Additional items Verbal cues   Stand to Sit 5  Supervision   Additional items Verbal cues   Toilet transfer 5  Supervision   Additional items Standard toilet   Additional Comments without AD   Functional Mobility   Functional Mobility 5  Supervision   Additional Comments Pt performs household distance mobility with supervision without AD, one seated rest break.   Additional items   (no AD)   Balance   Static Sitting Fair +   Dynamic Sitting Fair   Static Standing Fair   Dynamic Standing Fair -   Ambulatory Fair -   Activity Tolerance   Activity Tolerance Patient limited by fatigue   Medical Staff Made Aware Co-eval with DPT due to medical complexity.   Nurse Made Aware RN cleared for therapy.   RUE Assessment   RUE Assessment WFL   LUE Assessment   LUE Assessment WFL   Hand Function   Gross Motor Coordination Functional   Fine Motor Coordination Functional   Sensation   Light Touch No apparent deficits   Additional Comments although reporting some tingling sensation in B hands   Vision-Basic Assessment   Current Vision Wears glasses all the time   Patient Visual Report Difficulty maintaining concentration with " focus  (per pt report)   Vision - Complex Assessment   Ocular Range of Motion Intact   Cognition   Overall Cognitive Status WFL   Arousal/Participation Cooperative;Alert   Attention Attends with cues to redirect   Orientation Level Oriented X4   Memory Within functional limits   Following Commands Follows one step commands without difficulty   Comments Pt cooperative to therapy although anxious about medical course at times requiring redirect.   Assessment   Prognosis Good   Assessment Pt is a 64 y.o. female who was [facility] with Ambulatory dysfunction following a fall a couple weeks ago, undergoing workup. Pt seen for an OT evaluation per active OT orders.  Pt  has a past medical history of Community acquired pneumonia of left lower lobe of lung, Diabetes mellitus (HCC), Sepsis (HCC), and Vertigo. Pt lives in a [facility] apt with FOS up to [facility], uses a tub shower and standard toilet. Pta, pt was independent w/ ADL/IADL and functional mobility, was (+) driving and was not using any DME at baseline. Currently, pt is Mod I for UB ADL, Supervision for LB ADL, and completed transfers/FM w Supervision. The patient's raw score on the AM-PAC Daily Activity Inpatient Short Form is 22. A raw score of greater than or equal to 19 suggests the patient may benefit from discharge to home. Please refer to the recommendation of the Occupational Therapist for safe discharge planning. Pt is likely close to functional baseline, indicating no further acute OT needs at this time, d/c acute OT. From OT standpoint, recommend home with increased social support, no further OT needs upon D/C. Pt was left seated in bedside chair with chair alarm on and all needs within reach.   Goals   Patient Goals to get better   Plan   OT Frequency Eval only   Discharge Recommendation   Rehab Resource Intensity Level, OT No post-acute rehabilitation needs   AM-PAC Daily Activity Inpatient   Lower Body Dressing 3    Bathing 3   Toileting 4   Upper Body Dressing 4   Grooming 4   Eating 4   Daily Activity Raw Score 22   Daily Activity Standardized Score (Calc for Raw Score >=11) 47.1   AM-PAC Applied Cognition Inpatient   Following a Speech/Presentation 4   Understanding Ordinary Conversation 4   Taking Medications 4   Remembering Where Things Are Placed or Put Away 4   Remembering List of 4-5 Errands 4   Taking Care of Complicated Tasks 4   Applied Cognition Raw Score 24   Applied Cognition Standardized Score 62.21   End of Consult   Education Provided Yes   Patient Position at End of Consult Bedside chair;Bed/Chair alarm activated;All needs within reach   Nurse Communication Nurse aware of consult       JEFF Brown, OTR/L

## 2025-01-14 NOTE — PHYSICAL THERAPY NOTE
Physical Therapy Evaluation     Patient's Name: Luz Elena Sherman    Admitting Diagnosis  Multinodular thyroid [E04.2]  Vertigo [R42]  Ambulatory dysfunction [R26.2]    Problem List  Patient Active Problem List   Diagnosis    Meniere disease    Diabetes mellitus (HCC)    Anxiety    Mixed hyperlipidemia    Chronic pain of both knees    Personal history of nicotine dependence    Hypertension    Centrilobular emphysema (HCC)    Severe asthma without complication    Hypervolemia    Vitamin D deficiency    Ambulatory dysfunction    Hypokalemia    Multinodular thyroid       Past Medical History  Past Medical History:   Diagnosis Date    Community acquired pneumonia of left lower lobe of lung 6/9/2023    Diabetes mellitus (HCC)     Sepsis (HCC) 6/10/2023    Vertigo        Past Surgical History  Past Surgical History:   Procedure Laterality Date    BREAST BIOPSY Bilateral 12/01/2021    stereo x2    BREAST BIOPSY Right 12/01/2021    MAMMO STEREOTACTIC BREAST BIOPSY LEFT (ALL INC) Left 12/1/2021    MAMMO STEREOTACTIC BREAST BIOPSY RIGHT (ALL INC) Right 12/1/2021    TUMOR REMOVAL      right leg- benign           01/14/25 1320   PT Last Visit   PT Visit Date 01/14/25   Note Type   Note type Evaluation  (and Tx)   Pain Assessment   Pain Assessment Tool 0-10   Pain Score No Pain   Restrictions/Precautions   Braces or Orthoses   (denies)   Other Precautions Telemetry;Chair Alarm   Home Living   Type of Home Apartment   Home Layout One level  (2nd floor w/ no BAYRON but flight of steps to the 2nd floor)   Prior Function   Level of [place] Independent with functional mobility  (amb w/o AD)   Lives With Alone   Receives Help From Family;Friend(s)   Falls in the last 6 months 1 to 4   Vocational Full time employment   Comments (S)  Pt reports hx of head trauma around Thanksgiving when the chair fell on her from being placed on the table  while cleaning the floor; reports new/ vs exacerbated symptoms started 2 days later; MD  resident w/ [facility] notified;   General   Additional Pertinent History Requested by mead team to assess the pt today   Cognition   Overall Cognitive Status WFL   Arousal/Participation Alert   Attention Attends with cues to redirect   Orientation Level Oriented to person;Oriented to place;Oriented to situation   Memory Within functional limits   Following Commands Follows one step commands without difficulty   Subjective   Subjective Pt is resting in bed; arousable; reports she has not been walking much yet; somewhat apprehensive about mobilization but agreeable to try;   RUE Assessment   RUE Assessment WFL  (AROM)   LUE Assessment   LUE Assessment WFL  (AROM)   RLE Assessment   RLE Assessment WFL  (AROM)   Strength RLE   RLE Overall Strength   (good -)   LLE Assessment   LLE Assessment WFL  (AROM)   Strength LLE   LLE Overall Strength   (good -)   Vision-Basic Assessment   Current Vision Wears glasses all the time  (reports some difficulty focusing)   Patient Visual Report Other (Comment)  (able to track eyes in call directions smoothly but reports it is triggering her symptoms; MD resident w/ [facility] informed)   Vestibular   Vestibular Comments no increased vertigo w/ positional changes, amb or on the steps   Bed Mobility   Supine to Sit 6  Modified independent   Additional items HOB elevated   Transfers   Sit to Stand 5  Supervision   Additional items Verbal cues   Stand to Sit 5  Supervision   Additional items Verbal cues   Ambulation/Elevation   Gait pattern Short stride;Inconsistent bird  (no overt LOB, knee buckling or swaying)   Gait Assistance 5  Supervision   Additional items Verbal cues   Assistive Device None   Distance 110 ft   Balance   Static Sitting Fair +   Dynamic Sitting Fair   Static Standing Fair   Dynamic Standing Fair -   Ambulatory Fair -   Activity Tolerance   Activity Tolerance Patient limited by fatigue   Medical Staff Made Aware Co-eval performed w/ OTR due to complexity of medical status;  spoke to Dr. Candido Dimas   Nurse Made Aware spoke to ANGELES Goff   Assessment   Prognosis Good   Problem List Decreased strength;Decreased endurance;Impaired balance;Decreased mobility   Assessment Pt is 64 y.o. female admitted with hx of vertigo, fatigue, increased sleepiness developed after  and Dx of Ambulatory dysfunction; undergoing w/u. Pt 's comorbidities affecting POC include: Diabetes mellitus, asthma, HTN, anxiety, Meniere disease and Vertigo and personal factors of: living alone and steps to her apt. Pt's clinical presentation is currently  unstable/unpredictable which is evident in ongoing telem monitoring, ongoing management of current Dx and need for stand by assist w/ all phases of OOB mobility when usually mobilizing independently. Pt presents w/ min overall weakness, decreased functional endurance and inconsistent amb balance and gait patterns likely in light of relative immobility and general apprehension of mobilization but no overt/acute vertiginous symptoms reported during the session. Will cont to follow pt in PT for progressive mobilization to max level of (I), endurance, and safety. D/C recommendations are outlined below. Will cont to follow until then.   Goals   Patient Goals to cont feeling better   [facility] Expiration Date 01/21/25   Short Term Goal #1 5-7 days. Pt will amb 400 ft w/o assistive device, (I) in order to facilitate safe return to community amb status. Pt will negotiate 12 steps w/ hand rail, mod (I) in order to navigate in and out of the premorbid living environment. Pt will perform transfers w/ mod (I) to assure (I) and safety w/ transitions during aspects of mobility/locomotion.  Pt will participate in LE therex and balance activities to max progression w/ mobility skills.   PT Treatment Day 0   Plan   Treatment/Interventions Functional transfer training;LE strengthening/ROM;Elevations;Therapeutic exercise;Endurance training;Gait training;Spoke to MD;Spoke to nursing;OT   PT  Frequency 3-5x/wk   Discharge Recommendation   Rehab Resource Intensity Level, PT III (Minimum Resource Intensity)  (OP PT/vestibular assessment; r/o additional med consults (ENT and neuro))   AM-PAC Basic Mobility Inpatient   Turning in Flat Bed Without Bedrails 4   Lying on Back to Sitting on Edge of Flat Bed Without Bedrails 4   Moving Bed to Chair 3   Standing Up From Chair Using Arms 3   Walk in Room 3   Climb 3-5 Stairs With Railing 3   Basic Mobility Inpatient Raw Score 20   Basic Mobility Standardized Score 43.99   [facility] Highest Level Of Mobility   -HLM Goal 6: Walk 10 steps or more   -HLM Achieved 7: Walk 25 feet or more   Modified [place] Scale   Modified [place] Scale 2   Additional Treatment Session   Start Time 1322   End Time 1339   Treatment Assessment Additional follow up consecutive session performed to progress further w/ mobilization, incl steps assessment (pt felt OK after the initial mobility assessment and was agreeable to try steps); sit <--> stand transfers w/ mod (I); amb 2 x 20 ft w/o AD, mod (I) w/ steps negotiation in between; up and down 9 steps negotiation w/ (R) hand rail, reciprocal pattern, mod (I); after seated rest period, another 50 ft and 2 x 10 ft w/o AD, mod (I) (in the room/BR) w/ standing/seated periods in between; extensive discussion on possible OP follow ups to help clarifying her current Dx and initiation of appropriate therapy and follow ups based on vestibular assessment in the OP setting and medical Dx; until then, informed PT will follow while in the hospital to max functional endurance, strength and tolerance to mobilization; pt expressed understanding; discussed w/ MD as well;   Equipment Use none   Additional Treatment Day 1   End of Consult   Patient Position at End of Consult Bedside chair;Bed/Chair alarm activated;All needs within reach         Elijah Sagastume, PT

## 2025-01-14 NOTE — DISCHARGE INSTR - AVS FIRST PAGE
You were found to have a multinodular thyroid on your CT scan which was an incidental finding. Please follow up with your PCP, Nathaly Seaman PA-C, within one week. The recommendation is to have thyroid ultrasound completed within two weeks.     Please make an appointment with ENT after discharge.     We are recommending you go to vestibular therapy, please reach out to the information attached to make an appointment.     You will be sent home with short term diazepam to help with your Meniere's disease. Please follow up with your PCP. We will also be sending you home with meclizine 25 mg every 12 hours as needed for your dizziness associated with Meniere's disease. We are giving you a referral to follow up with ENT about your Meniere's disease     In the setting of your fall, we recommended you stop taking the Lasix 20 mg scheduled and take it on an as needed basis for lower extremity swelling, until you see your PCP. You may also check your weight daily, and take Lasix 20 mg if you have an increase in your weight of over 3 pounds.

## 2025-01-14 NOTE — PLAN OF CARE
Problem: PHYSICAL THERAPY ADULT  Goal: Performs mobility at highest level of function for planned discharge setting.  See evaluation for individualized goals.  Description: Treatment/Interventions: Functional transfer training, LE strengthening/ROM, Elevations, Therapeutic exercise, Endurance training, Gait training, Spoke to MD, Spoke to nursing, OT          See flowsheet documentation for full assessment, interventions and recommendations.  Note: Prognosis: Good  Problem List: Decreased strength, Decreased endurance, Impaired balance, Decreased mobility  Assessment: Pt is 64 y.o. female admitted with hx of vertigo, fatigue, increased sleepiness developed after  and Dx of Ambulatory dysfunction; undergoing w/u. Pt 's comorbidities affecting POC include: Diabetes mellitus, asthma, HTN, anxiety, Meniere disease and Vertigo and personal factors of: living alone and steps to her apt. Pt's clinical presentation is currently  unstable/unpredictable which is evident in ongoing telem monitoring, ongoing management of current Dx and need for stand by assist w/ all phases of OOB mobility when usually mobilizing independently. Pt presents w/ min overall weakness, decreased functional endurance and inconsistent amb balance and gait patterns likely in light of relative immobility and general apprehension of mobilization but no overt/acute vertiginous symptoms reported during the session. Will cont to follow pt in PT for progressive mobilization to max level of (I), endurance, and safety. D/C recommendations are outlined below. Will cont to follow until then.        Rehab Resource Intensity Level, PT: III (Minimum Resource Intensity) (OP PT/vestibular assessment; r/o additional med consults (ENT and neuro))    See flowsheet documentation for full assessment.

## 2025-01-16 ENCOUNTER — TRANSITIONAL CARE MANAGEMENT (OUTPATIENT)
Dept: INTERNAL MEDICINE CLINIC | Facility: CLINIC | Age: 65
End: 2025-01-16

## 2025-01-21 ENCOUNTER — OFFICE VISIT (OUTPATIENT)
Dept: NEUROLOGY | Facility: CLINIC | Age: 65
End: 2025-01-21
Payer: COMMERCIAL

## 2025-01-21 VITALS
DIASTOLIC BLOOD PRESSURE: 88 MMHG | HEIGHT: 61 IN | BODY MASS INDEX: 31.08 KG/M2 | SYSTOLIC BLOOD PRESSURE: 124 MMHG | TEMPERATURE: 97.1 F | WEIGHT: 164.6 LBS | HEART RATE: 94 BPM | OXYGEN SATURATION: 97 %

## 2025-01-21 DIAGNOSIS — R29.818 SUSPECTED SLEEP APNEA: ICD-10-CM

## 2025-01-21 DIAGNOSIS — Z87.820 HISTORY OF CONCUSSION: ICD-10-CM

## 2025-01-21 DIAGNOSIS — E66.9 OBESITY (BMI 30-39.9): ICD-10-CM

## 2025-01-21 DIAGNOSIS — R42 DIZZINESS: ICD-10-CM

## 2025-01-21 DIAGNOSIS — G44.309 POST-TRAUMATIC HEADACHE: Primary | ICD-10-CM

## 2025-01-21 PROCEDURE — 99245 OFF/OP CONSLTJ NEW/EST HI 55: CPT | Performed by: STUDENT IN AN ORGANIZED HEALTH CARE EDUCATION/TRAINING PROGRAM

## 2025-01-21 PROCEDURE — G2211 COMPLEX E/M VISIT ADD ON: HCPCS | Performed by: STUDENT IN AN ORGANIZED HEALTH CARE EDUCATION/TRAINING PROGRAM

## 2025-01-21 RX ORDER — PREDNISONE 20 MG/1
TABLET ORAL
Qty: 12 TABLET | Refills: 0 | Status: SHIPPED | OUTPATIENT
Start: 2025-01-21 | End: 2025-01-27

## 2025-01-21 NOTE — PROGRESS NOTES
Review of Systems   Constitutional:  Negative for appetite change, fatigue and fever.   HENT: Negative.  Negative for hearing loss, tinnitus, trouble swallowing and voice change.    Eyes: Negative.  Negative for photophobia, pain and visual disturbance.   Respiratory: Negative.  Negative for shortness of breath.    Cardiovascular: Negative.  Negative for palpitations.   Gastrointestinal: Negative.  Negative for nausea and vomiting.   Endocrine: Negative.  Negative for cold intolerance.   Genitourinary: Negative.  Negative for dysuria, frequency and urgency.   Musculoskeletal:  Positive for gait problem (balance issues). Negative for back pain, myalgias, neck pain and neck stiffness.   Skin: Negative.  Negative for rash.   Allergic/Immunologic: Negative.    Neurological:  Positive for dizziness and headaches (30/30 HA). Negative for tremors, seizures, syncope, facial asymmetry, speech difficulty, weakness, light-headedness and numbness.   Hematological: Negative.  Does not bruise/bleed easily.   Psychiatric/Behavioral: Negative.  Negative for confusion, hallucinations and sleep disturbance.    All other systems reviewed and are negative.

## 2025-01-21 NOTE — PATIENT INSTRUCTIONS
Additional Testing or Referrals:   - Labs: B12, Vitamin D  - MRI brain    Headache/migraine treatment:   Abortive medications (for immediate treatment of a headache): Ok to take ibuprofen or acetaminophen for headaches, but try to limit the amount and frequency that you are taking to avoid medication overuse/rebound headache. Ideally no more than 2-3 days per week.    Bridge  Prednisone Bridge  Day 1 and 2: 60mg (3 tablets)  Day 3 and 4: 40mg (2 tablets)  Day 5 and 6: 20mg (1 tablet)    Over the counter preventive supplements for headaches/migraines - try for 2-3 months at least   (to take every day to help prevent headaches - not to take at the time of headache):  - Magnesium (oxide or glycinate) 400mg daily  - Can occasionally cause stomach upset - if so try at night, with food or stop, rarely can cause diarrhea if so stop.  - Riboflavin (Vitamin B2) 400mg daily - FYI B2 may make your urine bright/neon yellow - find online     Lifestyle Recommendations:  - Maintain good sleep hygiene.  Going to bed and waking up at consistent times, avoiding excessive daytime naps, avoiding caffeinated beverages in the evening, avoid excessive stimulation in the evening and generally using bed primarily for sleeping.  One hour before bedtime would recommend turning lights down lower, decreasing your activity (may read quietly, listen to music at a low volume). When you get into bed, should eliminate all technology (no texting, emailing, playing with your phone, iPad or tablet in bed).  - Maintain good hydration. Drink  2L of fluid a day (4 typical small water bottles)  - Maintain good nutrition. In particular don't skip meals and eat balanced meals regularly.    Education and Follow-up  - Please contact us if any questions or concerns arise. Of course, try to protect yourself from head injuries, and if any new concerning symptoms or significant blow to the head or body go to the emergency department.  - Follow up in 4 months

## 2025-01-21 NOTE — PROGRESS NOTES
[facility] Consult   PATIENT:  Luz Elena Sherman  MRN:  [id number]  :  1960  DATE OF SERVICE:  2025  REFERRED BY: Shira Angulo, *  PMD: Nathaly Seaman PA-C    Assessment:   Luz Elena Sherman is a very pleasant 64 y.o. female with a past medical history that includes obesity, Ménière's disease, diabetes, anxiety, hyperlipidemia, hypertension, emphysema, asthma referred here for evaluation of mild TBI/concussion.    1. Post-traumatic headache  Assessment & Plan:  Ms. Sherman is here today for evaluation of a suspected concussion that occurred in 2024 when a chair fell and hit her on the head.  Her main issue is ongoing dizziness since the incident.  She has a pre-existing history of Ménière's disease that was well-controlled, but feels that it flared up after this.  She will be seeing ENT in a couple days, but given the persistent nature of her symptoms I have ordered some labs and an MRI as below.  There may be a headache component to her symptoms as well since she has been dealing with daily headaches since the injury.  Thankfully they have improved a bit, but she was open to trying magnesium and B2 supplements daily for prevention.  She will also be attending physical therapy next week which I think will be very helpful.  Orders:  -     predniSONE 20 mg tablet; Take 3 tablets (60 mg total) by mouth daily for 2 days, THEN 2 tablets (40 mg total) daily for 2 days, THEN 1 tablet (20 mg total) daily for 2 days.  -     MRI brain IAC wo and w contrast; Future; Expected date: 2025  2. Dizziness  -     Ambulatory Referral to Neurology  -     Vitamin B12; Future  -     Vitamin D 25 hydroxy; Future  -     MRI brain IAC wo and w contrast; Future; Expected date: 2025  3. Obesity (BMI 30-39.9)  4. History of concussion  -     MRI brain IAC wo and w contrast; Future; Expected date: 2025  5. Suspected sleep apnea      Workup:  - Neurocognitive assessment  "reveals normal neurological exam.  - CTA head/neck 1/13/2025: No acute intracranial findings.  Patent major vessels.  - MRI brain and IAC  - Labs: B12, Vitamin D    -We have discussed concussions and the natural course of recovery. We have discussed that symptoms from a concussion typically take 2 weeks to resolve, and although sometimes it can feel like concussion symptoms linger on, at this point these symptoms would be related to contributing factors. We also discussed that the course may wax and wane.  - Contributing factors may include:   Prolonged removal from normal routine,  posttraumatic headache,  comorbid injuries, preexisting chronic headaches or migraines, cervicogenic headache, medication overuse headache, preexisting learning disability, history of concussion with prolonged recovery, anxiety or depression, stress, deconditioning,  comorbid medical diagnoses, young age.   - I have recommended gradual return of normal cognitive and physical activity with safety precautions  - We discussed that newer research regarding concussion shows that the sooner one returns gradually to their normal physical and cognitive routine, the sooner one tends to recover. Prolonged removal from normal routine and deconditioning have been shown to prolong symptoms and worsen depression.   - We discussed that sometimes there is a constellation of symptoms that some refer to as \"post concussion syndrome,\" but I prefer not to use this term since that can be misleading and make people think they are still brain injured or \"concussed,\" when the most common and likely etiology this far out from the head trauma is either contributing factors or a form of functional neurologic disorder with mixed symptoms, especially after a thorough workup to rule out other etiologies since concussion would not be the direct cause at this point.   - We discussed how cognitive issues can have multiple causes and often related to multifactorial " etiologies including stress, anxiety,  mood, pain, hypervigilance  and sleep issues and provided reassurance that, it is not likely the cognitive dysfunction is related to concussion at this point.   - Safe driving precautions, should not drive at all if feeling sleepy or cognitively not well.      Preventative:  - we discussed headache hygiene and lifestyle factors that may improve headaches  - Mg and B2  - Currently on through other providers: None  - Past/ failed/contraindicated: None  - future options: Topamax, TCA/SNRI, Propranolol (consider history of severe asthma), Memantine, Diamox, CGRP med, botox    Acute:  - discussed not taking over-the-counter or prescription pain medications more than 3 days per week to prevent medication overuse/rebound headache  - Prednisone bridge  - Currently on through other providers: None  - Past/ failed/contraindicated: None  - future options:  Triptan, prochlorperazine, Toradol IM or p.o., could consider trial of 5 days of Depakote 500 mg nightly or dexamethasone 2 mg daily for prolonged migraine, ubrelvy, reyvow, nurtec, zavzpret  Patient instructions:   Additional Testing or Referrals:   - Labs: B12, Vitamin D  - MRI brain    Headache/migraine treatment:   Abortive medications (for immediate treatment of a headache): Ok to take ibuprofen or acetaminophen for headaches, but try to limit the amount and frequency that you are taking to avoid medication overuse/rebound headache. Ideally no more than 2-3 days per week.    Bridge  Prednisone Bridge  Day 1 and 2: 60mg (3 tablets)  Day 3 and 4: 40mg (2 tablets)  Day 5 and 6: 20mg (1 tablet)    Over the counter preventive supplements for headaches/migraines - try for 2-3 months at least   (to take every day to help prevent headaches - not to take at the time of headache):  - Magnesium (oxide or glycinate) 400mg daily  - Can occasionally cause stomach upset - if so try at night, with food or stop, rarely can cause diarrhea if so  stop.  - Riboflavin (Vitamin B2) 400mg daily - FYI B2 may make your urine bright/neon yellow - find online     Lifestyle Recommendations:  - Maintain good sleep hygiene.  Going to bed and waking up at consistent times, avoiding excessive daytime naps, avoiding caffeinated beverages in the evening, avoid excessive stimulation in the evening and generally using bed primarily for sleeping.  One hour before bedtime would recommend turning lights down lower, decreasing your activity (may read quietly, listen to music at a low volume). When you get into bed, should eliminate all technology (no texting, emailing, playing with your phone, iPad or tablet in bed).  - Maintain good hydration. Drink  2L of fluid a day (4 typical small water bottles)  - Maintain good nutrition. In particular don't skip meals and eat balanced meals regularly.    Education and Follow-up  - Please contact us if any questions or concerns arise. Of course, try to protect yourself from head injuries, and if any new concerning symptoms or significant blow to the head or body go to the emergency department.  - Follow up in 4 months  CC:   Luz Elena Sherman is a  right handed female who presents for evaluation following a possible concussion.    History obtained from patient as well as available medical record review.    This is a Case that may be under litigation. We will not be writing any letters or working with  on their behalf. However, we will continue to do our best to provide the best medical care possible.  History of Present Illness:   Current medical illnesses or past medical history include obesity, Ménière's disease, diabetes, anxiety, hyperlipidemia, hypertension, emphysema, asthma    Specifics:   -Patient was seen in the ED on 1/13/2025 for dizziness.  She reported that several weeks prior (early December she was cleaning out her house when a chair fell on her head.  She had several episodes of vomiting over the days that followed  "and also endorsed daily headaches.  She also endorsed worsening and more frequent vertiginous episodes.    Primary issues at this time:  [x] Headaches [] Oculomotor [x] Vestibular [] Cognitive [] Mood [] Sleep/Fatigue  Headache  Headaches started at what age? 64 years old  How often do the headaches occur?   - as of 1/21/2025: 30/30  What time of the day do the headaches start?  No particular time of day  How long do the headaches last? Seconds long - a few times per day  Are you ever headache free? Yes    Aura? without aura     Last eye exam: 2 weeks ago - wears glasses. \"Small bleed in the back of the right eye\" - possibly related to diabetes? - not concerned as per patient.    Where is your headache located and pain quality? Right frontal to occipital - Pressure, throbbing  What is the intensity of pain? Worst 5/10, Average: 5/10  Associated symptoms:   [x] Nausea  [x] Stiff or sore neck   [x] Photophobia     [x]Phonophobia   [x] Blurred vision   [x] Prefer quiet, dark room  [x] Light-headed or dizzy - Room spinning sensation. Endorses some difficulties with walking at times. Has had a couple falls as a result of this. On and off. Not daily.  [x] Tinnitus - chronic  [x] Hands tingle or feel numb/paresthesias - not with headache. Primarily left hand    Things that make the headache worse? No specific movements    Headache triggers:  None    Have you seen someone else for headaches or pain? No  Have you had trigger point injection performed and how often? No  Have you had Botox injection performed and how often? No   Have you had epidural injections or transforaminal injections performed? No  Are you current pregnant or planning on getting pregnant? No  Have you ever had any Brain imaging? yes    What medications do you take or have you taken for your headaches?   ABORTIVE:    OTC medications: None  Prescription: None    Past/ failed/contraindicated:  OTC medications: Tylenol  Prescription: None    PREVENTIVE: "   None    Past/ failed/contraindicated:  None    Current level of physical activity: None    Sleep: about 7 hours per night on average  Trouble falling asleep: [] Yes [x] No  Trouble staying asleep: [x] Yes [] No  History of sleep apnea: [] Yes [x] No  - Positive history of snoring, gasping, snorting, choking, pauses    Water: about 2 bottles per day  Diet: about 2-3 meals per day    The following portions of the patient's history were reviewed in the system and updated as appropriate: allergies, current medications, past family history, past medical history, past social history, past surgical history and problem list.    Pertinent family history:  [] Migraines  [] Learning disability (ADHD, dyslexia)   [x] Psych disorder (depression, anxiety) - brother, sister, mother, cousins    Pertinent social history:  Work:   Education: Associates  lives alone    Illicit Drugs: denies  Alcohol/tobacco: Social alcohol use, Denies tobacco use  Past Medical History:   1. Any history of prior Concussion?  No  Any other TBI's aside from Concussion? no     2. Preexisting Headache history?  negative    3. Preexisting Psych history? positive      [x] Anxiety  Prior Psych treatment? no    4. Preexisting Learning disability?   no    5. Preexisting Sleep problems? yes    6. History of seizures/epilepsy (non febrile) no    Past Medical History:   Diagnosis Date   • Community acquired pneumonia of left lower lobe of lung 6/9/2023   • Diabetes mellitus (HCC)    • Sepsis (HCC) 6/10/2023   • Vertigo      Patient Active Problem List   Diagnosis   • Meniere disease   • Diabetes mellitus (HCC)   • Anxiety   • Mixed hyperlipidemia   • Chronic pain of both knees   • Personal history of nicotine dependence   • Hypertension   • Centrilobular emphysema (HCC)   • Severe asthma without complication   • Hypervolemia   • Vitamin D deficiency   • Ambulatory dysfunction   • Hypokalemia   • Multinodular thyroid   • Post-traumatic headache   •  Dizziness   • History of concussion   • Obesity (BMI 30-39.9)   • Suspected sleep apnea       Medications:      Current Outpatient Medications   Medication Sig Dispense Refill   • albuterol (2.5 mg/3 mL) 0.083 % nebulizer solution Take 3 mL (2.5 mg total) by nebulization every 6 (six) hours as needed for wheezing or shortness of breath 75 mL 0   • albuterol (PROVENTIL HFA,VENTOLIN HFA) 90 mcg/act inhaler INHALE 2 PUFFS BY MOUTH EVERY 4 HOURS AS NEEDED FOR WHEEZE 6.7 g 3   • atorvastatin (LIPITOR) 40 mg tablet Take 1 tablet (40 mg total) by mouth daily 90 tablet 0   • diazepam (VALIUM) 5 mg tablet Take 1 tablet (5 mg total) by mouth every 6 (six) hours as needed for anxiety for up to 10 days 5 tablet 0   • Fluticasone-Salmeterol (Wixela Inhub) 250-50 mcg/dose inhaler INHALE 1 PUFF 2 TIMES A DAY RINSE MOUTH AFTER USE. 60 blister 2   • furosemide (LASIX) 20 mg tablet Take 1 tablet (20 mg total) by mouth daily as needed (as needed for lower extremity swelling) 30 tablet 0   • meclizine (ANTIVERT) 25 mg tablet Take 1 tablet (25 mg total) by mouth every 12 (twelve) hours as needed for dizziness 30 tablet 0   • metFORMIN (GLUCOPHAGE-XR) 750 mg 24 hr tablet TAKE 2 TABLETS BY MOUTH DAILY WITH DINNER 180 tablet 3   • predniSONE 20 mg tablet Take 3 tablets (60 mg total) by mouth daily for 2 days, THEN 2 tablets (40 mg total) daily for 2 days, THEN 1 tablet (20 mg total) daily for 2 days. 12 tablet 0   • semaglutide, 2 mg/dose, (Ozempic, 2 MG/DOSE,) 8 mg/ mL injection pen INJECT 0.75 ML (2 MG) SUBCUTANEOUSLY EVERY 7 DAYS 3 mL 1   • triamterene-hydrochlorothiazide (DYAZIDE) 37.5-25 mg per capsule Take 1 capsule by mouth daily 90 capsule 0   • glucose blood (FREESTYLE LITE) test strip Use as instructed (Patient not taking: Reported on 1/13/2025) 100 each 5   • glucose monitoring kit (FREESTYLE) monitoring kit Apply 1 m topically 4 (four) times a day as needed Use as directed (Patient not taking: Reported on 1/13/2025) 1 each 0    • Lancets (freestyle) lancets Use as instructed (Patient not taking: Reported on 2025) 100 each 3     No current facility-administered medications for this visit.        Allergies:    No Known Allergies    Family History:        Family History   Problem Relation Age of Onset   • Stroke Mother    • Hypertension Mother    • Diabetes Mother    • Throat cancer Brother    • Ovarian cancer Family         2 maternal aunts and 2 cousins    • Stroke Father    • Thyroid cancer Sister    • Heart disease Sister    • Uterine cancer Sister    • No Known Problems Son    • Heart attack Maternal Grandmother 42   • Stroke Maternal Grandfather 62   • Heart attack Paternal Grandmother    • Leukemia Paternal Grandfather    • No Known Problems Sister    • No Known Problems Son    • Breast cancer Cousin 40   • Breast cancer Maternal Aunt 26   • Uterine cancer Maternal Aunt    • Uterine cancer Cousin    • No Known Problems Paternal Aunt          Social History:       Social History     Socioeconomic History   • Marital status: Single     Spouse name: Not on file   • Number of children: Not on file   • Years of education: Not on file   • Highest education level: Not on file   Occupational History   • Not on file   Tobacco Use   • Smoking status: Former     Current packs/day: 0.00     Average packs/day: 0.3 packs/day for 47.3 years (11.8 ttl pk-yrs)     Types: Cigarettes     Start date:      Quit date: 2023     Years since quittin.7   • Smokeless tobacco: Never   • Tobacco comments:     On and off since a teen... in 2019 started smoking more    Vaping Use   • Vaping status: Never Used   Substance and Sexual Activity   • Alcohol use: Not Currently     Comment: socially    • Drug use: Never   • Sexual activity: Not Currently     Partners: Male     Birth control/protection: Post-menopausal   Other Topics Concern   • Not on file   Social History Narrative    Former smoker - As per [employer]    Inadequate exercise    Physically  able to work    Sedentary lifestyle     from significant other    Tobacco use - As per [employer]    Unemployed, looking for work      Social Drivers of Health     Financial Resource Strain: Low Risk  (2024)    Overall Financial Resource Strain (CARDIA)    • Difficulty of Paying Living Expenses: Not hard at all   Food Insecurity: No Food Insecurity (2025)    Nursing - Inadequate Food Risk Classification    • Worried About Running Out of Food in the Last Year: Never true    • Ran Out of Food in the Last Year: Never true    • Ran Out of Food in the Last Year: Never true   Transportation Needs: No Transportation Needs (2025)    Nursing - Transportation Risk Classification    • Lack of Transportation: Not on file    • Lack of Transportation: No   Physical Activity: Inactive (2021)    Exercise Vital Sign    • Days of Exercise per Week: 0 days    • Minutes of Exercise per Session: 0 min   Stress: Not on file   Social Connections: Socially Isolated (2021)    Social Connection and Isolation Panel [NHANES]    • Frequency of Communication with Friends and Family: Once a week    • Frequency of Social Gatherings with Friends and Family: Twice a week    • Attends [employer] Services: Never    • Active Member of Clubs or Organizations: No    • Attends Club or Organization Meetings: Never    • Marital Status:    Intimate Partner Violence: Unknown (2025)    Nursing IPS    • Feels Physically and Emotionally Safe: Not on file    • Physically Hurt by Someone: Not on file    • Humiliated or Emotionally Abused by Someone: Not on file    • Physically Hurt by Someone: No    • Hurt or Threatened by Someone: No   Housing Stability: Unknown (2025)    Nursing: Inadequate Housing Risk Classification    • Has Housing: Not on file    • Worried About Losing Housing: Not on file    • Unable to Get Utilities: Not on file    • Unable to Pay for Housing in the Last Year: No    • Has Housin  "      Objective:   Physical Exam:                                                                 Vitals:            Constitutional:    /88 (BP Location: Left arm, Patient Position: Sitting, Cuff Size: Standard)   Pulse 94   Temp (!) 97.1 °F (36.2 °C) (Temporal)   Ht 5' 1\" (1.549 m)   Wt 74.7 kg (164 lb 9.6 oz)   LMP 10/01/2010 (Approximate)   SpO2 97%   BMI 31.10 kg/m²   BP Readings from Last 3 Encounters:   01/21/25 124/88   01/14/25 120/75   10/30/24 122/68     Pulse Readings from Last 3 Encounters:   01/21/25 94   01/14/25 87   10/30/24 83         Well developed, well nourished, well groomed. No dysmorphic features.       HEENT:  Normocephalic atraumatic. See neuro exam   Chest:  Respirations appear regular and unlabored.    Cardiovascular:  no observed significant swelling.    Musculoskeletal:  (see below under neurologic exam for evaluation of motor function and gait)   Skin:  warm and dry, not diaphoretic.    Psychiatric:  Normal behavior and appropriate affect       Neurological Examination:     Mental status/cognitive function:   Recent and remote memory intact. Attention span and concentration as well as fund of knowledge are appropriate for age. Normal language and spontaneous speech.  Cranial Nerves:  III, IV, VI-Pupils were equal, round. Extraocular movements were full and conjugate   VII-facial expression symmetric  VIII-hearing grossly intact bilaterally   Motor Exam: symmetric bulk throughout. no atrophy, fasciculations or abnormal movements noted.   Coordination:  no apparent dysmetria, ataxia or tremor noted  Gait: steady casual gait    Pertinent lab results: None     Pertinent Imaging:   -CTA head/neck 1/13/2025: No acute intracranial findings.  Patent major vessels.    I have personally reviewed imaging and radiology read  Review of Systems:   Constitutional:  Negative for appetite change, fatigue and fever.   HENT: Negative.  Negative for hearing loss, tinnitus, trouble swallowing " and voice change.    Eyes: Negative.  Negative for photophobia, pain and visual disturbance.   Respiratory: Negative.  Negative for shortness of breath.    Cardiovascular: Negative.  Negative for palpitations.   Gastrointestinal: Negative.  Negative for nausea and vomiting.   Endocrine: Negative.  Negative for cold intolerance.   Genitourinary: Negative.  Negative for dysuria, frequency and urgency.   Musculoskeletal:  Positive for gait problem (balance issues). Negative for back pain, myalgias, neck pain and neck stiffness.   Skin: Negative.  Negative for rash.   Allergic/Immunologic: Negative.    Neurological:  Positive for dizziness and headaches (30/30 HA). Negative for tremors, seizures, syncope, facial asymmetry, speech difficulty, weakness, light-headedness and numbness.   Hematological: Negative.  Does not bruise/bleed easily.   Psychiatric/Behavioral: Negative.  Negative for confusion, hallucinations and sleep disturbance.    All other systems reviewed and are negative.    I have spent 40 minutes with Patient  today in which greater than 50% of this time was spent in counseling/coordination of care regarding Diagnostic results, Prognosis, Risks and benefits of tx options, Patient and family education, Importance of tx compliance, Impressions, Documenting in the medical record, Reviewing / ordering tests, medicine, procedures  , and Obtaining or reviewing history  . I also spent 15 minutes non face to face for this patient the same day.     Activity Minutes   Precharting/reviewing 10   Patient care/counseling 40   Postcharting/care coordination 5       Author:  Joe Vitale DO   Fellowship trained Concussion Specialist

## 2025-01-21 NOTE — ASSESSMENT & PLAN NOTE
Ms. Sherman is here today for evaluation of a suspected concussion that occurred in December 2024 when a chair fell and hit her on the head.  Her main issue is ongoing dizziness since the incident.  She has a pre-existing history of Ménière's disease that was well-controlled, but feels that it flared up after this.  She will be seeing ENT in a couple days, but given the persistent nature of her symptoms I have ordered some labs and an MRI as below.  There may be a headache component to her symptoms as well since she has been dealing with daily headaches since the injury.  Thankfully they have improved a bit, but she was open to trying magnesium and B2 supplements daily for prevention.  She will also be attending physical therapy next week which I think will be very helpful.

## 2025-01-30 ENCOUNTER — TELEPHONE (OUTPATIENT)
Dept: INTERNAL MEDICINE CLINIC | Facility: CLINIC | Age: 65
End: 2025-01-30

## 2025-02-06 ENCOUNTER — EVALUATION (OUTPATIENT)
Dept: PHYSICAL THERAPY | Facility: REHABILITATION | Age: 65
End: 2025-02-06
Payer: COMMERCIAL

## 2025-02-06 DIAGNOSIS — R42 VERTIGO: ICD-10-CM

## 2025-02-06 PROCEDURE — 97112 NEUROMUSCULAR REEDUCATION: CPT | Performed by: PHYSICAL THERAPIST

## 2025-02-06 PROCEDURE — 97161 PT EVAL LOW COMPLEX 20 MIN: CPT | Performed by: PHYSICAL THERAPIST

## 2025-02-07 ENCOUNTER — HOSPITAL ENCOUNTER (OUTPATIENT)
Dept: RADIOLOGY | Facility: HOSPITAL | Age: 65
Discharge: HOME | End: 2025-02-07
Attending: STUDENT IN AN ORGANIZED HEALTH CARE EDUCATION/TRAINING PROGRAM
Payer: COMMERCIAL

## 2025-02-07 DIAGNOSIS — E04.2 MULTINODULAR THYROID: ICD-10-CM

## 2025-02-07 PROCEDURE — 76536 US EXAM OF HEAD AND NECK: CPT

## 2025-02-11 DIAGNOSIS — E11.69 TYPE 2 DIABETES MELLITUS WITH OTHER SPECIFIED COMPLICATION, WITHOUT LONG-TERM CURRENT USE OF INSULIN (HCC): ICD-10-CM

## 2025-02-11 RX ORDER — SEMAGLUTIDE 2.68 MG/ML
INJECTION, SOLUTION SUBCUTANEOUS
Qty: 3 ML | Refills: 1 | Status: SHIPPED | OUTPATIENT
Start: 2025-02-11

## 2025-03-10 ENCOUNTER — TELEPHONE (OUTPATIENT)
Dept: INTERNAL MEDICINE CLINIC | Facility: CLINIC | Age: 65
End: 2025-03-10

## 2025-03-27 DIAGNOSIS — H81.09 MENIERE'S DISEASE, UNSPECIFIED LATERALITY: ICD-10-CM

## 2025-03-27 DIAGNOSIS — E11.69 TYPE 2 DIABETES MELLITUS WITH OTHER SPECIFIED COMPLICATION, WITHOUT LONG-TERM CURRENT USE OF INSULIN (HCC): ICD-10-CM

## 2025-03-27 RX ORDER — ATORVASTATIN CALCIUM 40 MG/1
40 TABLET, FILM COATED ORAL DAILY
Qty: 90 TABLET | Refills: 0 | Status: SHIPPED | OUTPATIENT
Start: 2025-03-27

## 2025-03-27 RX ORDER — TRIAMTERENE AND HYDROCHLOROTHIAZIDE 37.5; 25 MG/1; MG/1
1 CAPSULE ORAL DAILY
Qty: 90 CAPSULE | Refills: 0 | Status: SHIPPED | OUTPATIENT
Start: 2025-03-27

## 2025-04-16 ENCOUNTER — VBI (OUTPATIENT)
Dept: ADMINISTRATIVE | Facility: OTHER | Age: 65
End: 2025-04-16

## 2025-04-16 NOTE — TELEPHONE ENCOUNTER
04/16/25 8:38 AM     Chart reviewed for Mammogram ; nothing is submitted to the patient's insurance at this time.     Luana Kaur MA   [facility]

## 2025-04-23 DIAGNOSIS — E11.69 TYPE 2 DIABETES MELLITUS WITH OTHER SPECIFIED COMPLICATION, WITHOUT LONG-TERM CURRENT USE OF INSULIN (HCC): ICD-10-CM

## 2025-04-24 ENCOUNTER — VBI (OUTPATIENT)
Dept: ADMINISTRATIVE | Facility: OTHER | Age: 65
End: 2025-04-24

## 2025-04-24 NOTE — TELEPHONE ENCOUNTER
04/24/25 9:34 AM     Chart reviewed for Pap Smear (HPV) aka Cervical Cancer Screening ; nothing is submitted to the patient's insurance at this time.     Luana Kaur MA   [facility]

## 2025-04-25 RX ORDER — SEMAGLUTIDE 2.68 MG/ML
INJECTION, SOLUTION SUBCUTANEOUS
Qty: 3 ML | Refills: 0 | Status: SHIPPED | OUTPATIENT
Start: 2025-04-25

## 2025-06-04 ENCOUNTER — VBI (OUTPATIENT)
Dept: ADMINISTRATIVE | Facility: OTHER | Age: 65
End: 2025-06-04

## 2025-06-04 NOTE — TELEPHONE ENCOUNTER
06/04/25 8:30 AM     Chart reviewed for Mammogram ; nothing is submitted to the patient's insurance at this time.     Luana Kaur MA   [facility]

## 2025-06-08 DIAGNOSIS — E11.69 TYPE 2 DIABETES MELLITUS WITH OTHER SPECIFIED COMPLICATION, WITHOUT LONG-TERM CURRENT USE OF INSULIN (HCC): ICD-10-CM

## 2025-06-09 RX ORDER — SEMAGLUTIDE 2.68 MG/ML
INJECTION, SOLUTION SUBCUTANEOUS
Qty: 3 ML | Refills: 0 | Status: SHIPPED | OUTPATIENT
Start: 2025-06-09

## 2025-07-29 ENCOUNTER — VBI (OUTPATIENT)
Dept: ADMINISTRATIVE | Facility: OTHER | Age: 65
End: 2025-07-29